# Patient Record
Sex: MALE | Race: WHITE | Employment: OTHER | URBAN - NONMETROPOLITAN AREA
[De-identification: names, ages, dates, MRNs, and addresses within clinical notes are randomized per-mention and may not be internally consistent; named-entity substitution may affect disease eponyms.]

---

## 2017-01-09 ENCOUNTER — ANTI-COAG VISIT (OUTPATIENT)
Dept: OTHER | Age: 72
End: 2017-01-09

## 2017-01-09 VITALS
BODY MASS INDEX: 24.4 KG/M2 | WEIGHT: 161 LBS | DIASTOLIC BLOOD PRESSURE: 66 MMHG | HEART RATE: 73 BPM | SYSTOLIC BLOOD PRESSURE: 114 MMHG

## 2017-01-09 DIAGNOSIS — I26.99 OTHER ACUTE PULMONARY EMBOLISM WITHOUT ACUTE COR PULMONALE (HCC): ICD-10-CM

## 2017-01-09 LAB — POC INR: 1.8 (ref 0.8–1.2)

## 2017-01-09 PROCEDURE — 85610 PROTHROMBIN TIME: CPT | Performed by: NURSE PRACTITIONER

## 2017-01-09 PROCEDURE — 99213 OFFICE O/P EST LOW 20 MIN: CPT | Performed by: NURSE PRACTITIONER

## 2017-01-09 ASSESSMENT — ENCOUNTER SYMPTOMS
DIARRHEA: 0
BLOOD IN STOOL: 0
CONSTIPATION: 0
SHORTNESS OF BREATH: 0

## 2017-01-10 ENCOUNTER — CARE COORDINATION (OUTPATIENT)
Dept: CARE COORDINATION | Age: 72
End: 2017-01-10

## 2017-01-30 ENCOUNTER — ANTI-COAG VISIT (OUTPATIENT)
Dept: OTHER | Age: 72
End: 2017-01-30

## 2017-01-30 VITALS
SYSTOLIC BLOOD PRESSURE: 139 MMHG | BODY MASS INDEX: 24.46 KG/M2 | WEIGHT: 161.4 LBS | HEART RATE: 78 BPM | DIASTOLIC BLOOD PRESSURE: 84 MMHG

## 2017-01-30 DIAGNOSIS — I82.419 DEEP VEIN THROMBOSIS (DVT) OF FEMORAL VEIN, UNSPECIFIED CHRONICITY, UNSPECIFIED LATERALITY (HCC): ICD-10-CM

## 2017-01-30 DIAGNOSIS — G45.9 TRANSIENT CEREBRAL ISCHEMIA, UNSPECIFIED TYPE: ICD-10-CM

## 2017-01-30 DIAGNOSIS — I26.99 OTHER ACUTE PULMONARY EMBOLISM WITHOUT ACUTE COR PULMONALE (HCC): ICD-10-CM

## 2017-01-30 LAB — POC INR: 2.1 (ref 0.8–1.2)

## 2017-01-30 PROCEDURE — 99999 PR OFFICE/OUTPT VISIT,PROCEDURE ONLY: CPT | Performed by: NURSE PRACTITIONER

## 2017-01-30 PROCEDURE — G8427 DOCREV CUR MEDS BY ELIG CLIN: HCPCS | Performed by: NURSE PRACTITIONER

## 2017-01-30 PROCEDURE — 3017F COLORECTAL CA SCREEN DOC REV: CPT | Performed by: NURSE PRACTITIONER

## 2017-01-30 PROCEDURE — 85610 PROTHROMBIN TIME: CPT | Performed by: NURSE PRACTITIONER

## 2017-01-30 PROCEDURE — 4040F PNEUMOC VAC/ADMIN/RCVD: CPT | Performed by: NURSE PRACTITIONER

## 2017-01-30 PROCEDURE — G8420 CALC BMI NORM PARAMETERS: HCPCS | Performed by: NURSE PRACTITIONER

## 2017-01-30 ASSESSMENT — ENCOUNTER SYMPTOMS
DIARRHEA: 0
CONSTIPATION: 0
SHORTNESS OF BREATH: 0
BLOOD IN STOOL: 0

## 2017-02-27 ENCOUNTER — ANTI-COAG VISIT (OUTPATIENT)
Dept: OTHER | Age: 72
End: 2017-02-27

## 2017-02-27 VITALS
BODY MASS INDEX: 24.46 KG/M2 | SYSTOLIC BLOOD PRESSURE: 111 MMHG | DIASTOLIC BLOOD PRESSURE: 67 MMHG | HEART RATE: 70 BPM | WEIGHT: 161.4 LBS

## 2017-02-27 DIAGNOSIS — I26.99 OTHER ACUTE PULMONARY EMBOLISM WITHOUT ACUTE COR PULMONALE (HCC): ICD-10-CM

## 2017-02-27 DIAGNOSIS — G45.9 TRANSIENT CEREBRAL ISCHEMIA, UNSPECIFIED TYPE: ICD-10-CM

## 2017-02-27 DIAGNOSIS — I82.412 DEEP VEIN THROMBOSIS (DVT) OF FEMORAL VEIN OF LEFT LOWER EXTREMITY, UNSPECIFIED CHRONICITY (HCC): ICD-10-CM

## 2017-02-27 LAB
INR BLD: 1.9
POC INR: 1.9 (ref 0.8–1.2)

## 2017-02-27 PROCEDURE — G8420 CALC BMI NORM PARAMETERS: HCPCS | Performed by: NURSE PRACTITIONER

## 2017-02-27 PROCEDURE — 99999 PR OFFICE/OUTPT VISIT,PROCEDURE ONLY: CPT | Performed by: NURSE PRACTITIONER

## 2017-02-27 PROCEDURE — G8427 DOCREV CUR MEDS BY ELIG CLIN: HCPCS | Performed by: NURSE PRACTITIONER

## 2017-02-27 PROCEDURE — 85610 PROTHROMBIN TIME: CPT | Performed by: NURSE PRACTITIONER

## 2017-02-27 PROCEDURE — 4040F PNEUMOC VAC/ADMIN/RCVD: CPT | Performed by: NURSE PRACTITIONER

## 2017-02-27 PROCEDURE — 3017F COLORECTAL CA SCREEN DOC REV: CPT | Performed by: NURSE PRACTITIONER

## 2017-02-27 ASSESSMENT — ENCOUNTER SYMPTOMS
BLOOD IN STOOL: 0
SHORTNESS OF BREATH: 0
CONSTIPATION: 0
DIARRHEA: 0

## 2017-02-28 ENCOUNTER — OFFICE VISIT (OUTPATIENT)
Dept: FAMILY MEDICINE CLINIC | Age: 72
End: 2017-02-28

## 2017-02-28 VITALS
HEIGHT: 68 IN | HEART RATE: 73 BPM | OXYGEN SATURATION: 98 % | DIASTOLIC BLOOD PRESSURE: 60 MMHG | BODY MASS INDEX: 24.25 KG/M2 | SYSTOLIC BLOOD PRESSURE: 100 MMHG | TEMPERATURE: 97.8 F | WEIGHT: 160 LBS | RESPIRATION RATE: 14 BRPM

## 2017-02-28 DIAGNOSIS — M46.1 SI (SACROILIAC) JOINT INFLAMMATION (HCC): Primary | ICD-10-CM

## 2017-02-28 DIAGNOSIS — I10 ESSENTIAL HYPERTENSION: ICD-10-CM

## 2017-02-28 DIAGNOSIS — K21.9 GASTROESOPHAGEAL REFLUX DISEASE WITHOUT ESOPHAGITIS: ICD-10-CM

## 2017-02-28 DIAGNOSIS — R25.2 CRAMPS OF LEFT LOWER EXTREMITY: ICD-10-CM

## 2017-02-28 DIAGNOSIS — I82.412 DEEP VEIN THROMBOSIS (DVT) OF FEMORAL VEIN OF LEFT LOWER EXTREMITY, UNSPECIFIED CHRONICITY (HCC): ICD-10-CM

## 2017-02-28 DIAGNOSIS — R25.2 MUSCLE CRAMPS: ICD-10-CM

## 2017-02-28 PROCEDURE — 3017F COLORECTAL CA SCREEN DOC REV: CPT | Performed by: FAMILY MEDICINE

## 2017-02-28 PROCEDURE — 99214 OFFICE O/P EST MOD 30 MIN: CPT | Performed by: FAMILY MEDICINE

## 2017-02-28 PROCEDURE — G8420 CALC BMI NORM PARAMETERS: HCPCS | Performed by: FAMILY MEDICINE

## 2017-02-28 PROCEDURE — G8427 DOCREV CUR MEDS BY ELIG CLIN: HCPCS | Performed by: FAMILY MEDICINE

## 2017-02-28 PROCEDURE — 1123F ACP DISCUSS/DSCN MKR DOCD: CPT | Performed by: FAMILY MEDICINE

## 2017-02-28 PROCEDURE — 4040F PNEUMOC VAC/ADMIN/RCVD: CPT | Performed by: FAMILY MEDICINE

## 2017-02-28 PROCEDURE — G8484 FLU IMMUNIZE NO ADMIN: HCPCS | Performed by: FAMILY MEDICINE

## 2017-02-28 PROCEDURE — 1036F TOBACCO NON-USER: CPT | Performed by: FAMILY MEDICINE

## 2017-02-28 ASSESSMENT — PATIENT HEALTH QUESTIONNAIRE - PHQ9
SUM OF ALL RESPONSES TO PHQ9 QUESTIONS 1 & 2: 0
SUM OF ALL RESPONSES TO PHQ QUESTIONS 1-9: 0
2. FEELING DOWN, DEPRESSED OR HOPELESS: 0
1. LITTLE INTEREST OR PLEASURE IN DOING THINGS: 0

## 2017-02-28 ASSESSMENT — ENCOUNTER SYMPTOMS
DIARRHEA: 0
BACK PAIN: 1
RESPIRATORY NEGATIVE: 1
VOMITING: 0
CONSTIPATION: 0
COUGH: 0
ALLERGIC/IMMUNOLOGIC NEGATIVE: 1
ABDOMINAL PAIN: 1
NAUSEA: 0

## 2017-03-13 ENCOUNTER — OFFICE VISIT (OUTPATIENT)
Dept: PULMONOLOGY | Age: 72
End: 2017-03-13

## 2017-03-13 VITALS
WEIGHT: 161 LBS | OXYGEN SATURATION: 97 % | BODY MASS INDEX: 24.4 KG/M2 | HEIGHT: 68 IN | TEMPERATURE: 97.2 F | DIASTOLIC BLOOD PRESSURE: 62 MMHG | HEART RATE: 70 BPM | SYSTOLIC BLOOD PRESSURE: 94 MMHG

## 2017-03-13 DIAGNOSIS — R91.1 INCIDENTAL LUNG NODULE, GREATER THAN OR EQUAL TO 8MM: Primary | ICD-10-CM

## 2017-03-13 PROCEDURE — 1036F TOBACCO NON-USER: CPT | Performed by: INTERNAL MEDICINE

## 2017-03-13 PROCEDURE — G8484 FLU IMMUNIZE NO ADMIN: HCPCS | Performed by: INTERNAL MEDICINE

## 2017-03-13 PROCEDURE — G8427 DOCREV CUR MEDS BY ELIG CLIN: HCPCS | Performed by: INTERNAL MEDICINE

## 2017-03-13 PROCEDURE — G8420 CALC BMI NORM PARAMETERS: HCPCS | Performed by: INTERNAL MEDICINE

## 2017-03-13 PROCEDURE — 3017F COLORECTAL CA SCREEN DOC REV: CPT | Performed by: INTERNAL MEDICINE

## 2017-03-13 PROCEDURE — 1123F ACP DISCUSS/DSCN MKR DOCD: CPT | Performed by: INTERNAL MEDICINE

## 2017-03-13 PROCEDURE — 4040F PNEUMOC VAC/ADMIN/RCVD: CPT | Performed by: INTERNAL MEDICINE

## 2017-03-13 PROCEDURE — 99214 OFFICE O/P EST MOD 30 MIN: CPT | Performed by: INTERNAL MEDICINE

## 2017-03-15 DIAGNOSIS — I82.412 DEEP VEIN THROMBOSIS (DVT) OF FEMORAL VEIN OF LEFT LOWER EXTREMITY, UNSPECIFIED CHRONICITY (HCC): Primary | ICD-10-CM

## 2017-03-20 ENCOUNTER — ANTI-COAG VISIT (OUTPATIENT)
Dept: OTHER | Age: 72
End: 2017-03-20

## 2017-03-20 VITALS
HEART RATE: 79 BPM | SYSTOLIC BLOOD PRESSURE: 115 MMHG | BODY MASS INDEX: 23.99 KG/M2 | DIASTOLIC BLOOD PRESSURE: 69 MMHG | WEIGHT: 157.8 LBS

## 2017-03-20 DIAGNOSIS — I82.412 DEEP VEIN THROMBOSIS (DVT) OF FEMORAL VEIN OF LEFT LOWER EXTREMITY, UNSPECIFIED CHRONICITY (HCC): ICD-10-CM

## 2017-03-20 DIAGNOSIS — G45.9 TRANSIENT CEREBRAL ISCHEMIA, UNSPECIFIED TYPE: ICD-10-CM

## 2017-03-20 LAB — POC INR: 1.9 (ref 0.8–1.2)

## 2017-03-20 ASSESSMENT — ENCOUNTER SYMPTOMS
CONSTIPATION: 0
SHORTNESS OF BREATH: 0
DIARRHEA: 0
BLOOD IN STOOL: 0

## 2017-04-11 ENCOUNTER — ANTI-COAG VISIT (OUTPATIENT)
Dept: OTHER | Age: 72
End: 2017-04-11

## 2017-04-11 VITALS
HEART RATE: 66 BPM | SYSTOLIC BLOOD PRESSURE: 108 MMHG | WEIGHT: 156 LBS | BODY MASS INDEX: 23.72 KG/M2 | DIASTOLIC BLOOD PRESSURE: 67 MMHG

## 2017-04-11 DIAGNOSIS — I82.412 DEEP VEIN THROMBOSIS (DVT) OF FEMORAL VEIN OF LEFT LOWER EXTREMITY, UNSPECIFIED CHRONICITY (HCC): ICD-10-CM

## 2017-04-11 DIAGNOSIS — G45.9 TRANSIENT CEREBRAL ISCHEMIA, UNSPECIFIED TYPE: ICD-10-CM

## 2017-04-11 LAB — POC INR: 2.1 (ref 0.8–1.2)

## 2017-04-11 ASSESSMENT — ENCOUNTER SYMPTOMS
SHORTNESS OF BREATH: 0
BLOOD IN STOOL: 0
DIARRHEA: 0
CONSTIPATION: 0

## 2017-05-01 RX ORDER — WARFARIN SODIUM 2.5 MG/1
TABLET ORAL
Qty: 180 TABLET | Refills: 0 | OUTPATIENT
Start: 2017-05-01

## 2017-05-01 RX ORDER — WARFARIN SODIUM 2.5 MG/1
TABLET ORAL
Qty: 180 TABLET | Refills: 3 | Status: SHIPPED | OUTPATIENT
Start: 2017-05-01 | End: 2018-06-05 | Stop reason: SDUPTHER

## 2017-05-01 RX ORDER — WARFARIN SODIUM 2.5 MG/1
TABLET ORAL
Qty: 180 TABLET | Refills: 3 | OUTPATIENT
Start: 2017-05-01

## 2017-05-09 ENCOUNTER — ANTI-COAG VISIT (OUTPATIENT)
Dept: OTHER | Age: 72
End: 2017-05-09

## 2017-05-09 VITALS
WEIGHT: 156.8 LBS | DIASTOLIC BLOOD PRESSURE: 64 MMHG | HEART RATE: 74 BPM | BODY MASS INDEX: 23.84 KG/M2 | SYSTOLIC BLOOD PRESSURE: 100 MMHG

## 2017-05-09 DIAGNOSIS — G45.9 TRANSIENT CEREBRAL ISCHEMIA, UNSPECIFIED TYPE: ICD-10-CM

## 2017-05-09 DIAGNOSIS — I82.412 DEEP VEIN THROMBOSIS (DVT) OF FEMORAL VEIN OF LEFT LOWER EXTREMITY, UNSPECIFIED CHRONICITY (HCC): ICD-10-CM

## 2017-05-09 LAB — POC INR: 2.5 (ref 0.8–1.2)

## 2017-05-09 RX ORDER — CHLORHEXIDINE GLUCONATE 0.12 MG/ML
RINSE ORAL
Refills: 0 | COMMUNITY
Start: 2017-04-11 | End: 2017-08-29 | Stop reason: ALTCHOICE

## 2017-05-09 ASSESSMENT — ENCOUNTER SYMPTOMS
DIARRHEA: 0
SHORTNESS OF BREATH: 0
BLOOD IN STOOL: 0
CONSTIPATION: 0

## 2017-06-06 ENCOUNTER — ANTI-COAG VISIT (OUTPATIENT)
Dept: OTHER | Age: 72
End: 2017-06-06

## 2017-06-06 VITALS
HEART RATE: 65 BPM | SYSTOLIC BLOOD PRESSURE: 128 MMHG | DIASTOLIC BLOOD PRESSURE: 67 MMHG | BODY MASS INDEX: 23.87 KG/M2 | WEIGHT: 157 LBS

## 2017-06-06 DIAGNOSIS — I82.412 DEEP VEIN THROMBOSIS (DVT) OF FEMORAL VEIN OF LEFT LOWER EXTREMITY, UNSPECIFIED CHRONICITY (HCC): ICD-10-CM

## 2017-06-06 DIAGNOSIS — G45.9 TRANSIENT CEREBRAL ISCHEMIA, UNSPECIFIED TYPE: ICD-10-CM

## 2017-06-06 LAB
HCT VFR BLD CALC: 41.7 % (ref 42–52)
HEMOGLOBIN: 14 GM/DL (ref 14–18)
POC INR: 2.6 (ref 0.8–1.2)

## 2017-06-06 ASSESSMENT — ENCOUNTER SYMPTOMS
DIARRHEA: 0
CONSTIPATION: 0
BLOOD IN STOOL: 0
SHORTNESS OF BREATH: 0

## 2017-06-20 ENCOUNTER — OFFICE VISIT (OUTPATIENT)
Dept: PULMONOLOGY | Age: 72
End: 2017-06-20

## 2017-06-20 VITALS
OXYGEN SATURATION: 94 % | BODY MASS INDEX: 23.82 KG/M2 | SYSTOLIC BLOOD PRESSURE: 107 MMHG | DIASTOLIC BLOOD PRESSURE: 63 MMHG | HEART RATE: 64 BPM | HEIGHT: 68 IN | WEIGHT: 157.2 LBS | TEMPERATURE: 96.7 F

## 2017-06-20 DIAGNOSIS — R91.1 INCIDENTAL LUNG NODULE, GREATER THAN OR EQUAL TO 8MM: Primary | ICD-10-CM

## 2017-06-20 DIAGNOSIS — R93.89 ABNORMAL CT SCAN, CHEST: ICD-10-CM

## 2017-06-20 PROCEDURE — 4040F PNEUMOC VAC/ADMIN/RCVD: CPT | Performed by: INTERNAL MEDICINE

## 2017-06-20 PROCEDURE — G8427 DOCREV CUR MEDS BY ELIG CLIN: HCPCS | Performed by: INTERNAL MEDICINE

## 2017-06-20 PROCEDURE — 99214 OFFICE O/P EST MOD 30 MIN: CPT | Performed by: INTERNAL MEDICINE

## 2017-06-20 PROCEDURE — 3017F COLORECTAL CA SCREEN DOC REV: CPT | Performed by: INTERNAL MEDICINE

## 2017-06-20 PROCEDURE — 1123F ACP DISCUSS/DSCN MKR DOCD: CPT | Performed by: INTERNAL MEDICINE

## 2017-06-20 PROCEDURE — G8420 CALC BMI NORM PARAMETERS: HCPCS | Performed by: INTERNAL MEDICINE

## 2017-06-20 PROCEDURE — 1036F TOBACCO NON-USER: CPT | Performed by: INTERNAL MEDICINE

## 2017-07-05 ENCOUNTER — ANTI-COAG VISIT (OUTPATIENT)
Dept: OTHER | Age: 72
End: 2017-07-05

## 2017-07-05 VITALS
DIASTOLIC BLOOD PRESSURE: 74 MMHG | SYSTOLIC BLOOD PRESSURE: 133 MMHG | WEIGHT: 156.4 LBS | HEART RATE: 89 BPM | BODY MASS INDEX: 23.78 KG/M2

## 2017-07-05 DIAGNOSIS — G45.9 TRANSIENT CEREBRAL ISCHEMIA, UNSPECIFIED TYPE: ICD-10-CM

## 2017-07-05 DIAGNOSIS — I82.412 DEEP VEIN THROMBOSIS (DVT) OF FEMORAL VEIN OF LEFT LOWER EXTREMITY, UNSPECIFIED CHRONICITY (HCC): ICD-10-CM

## 2017-07-05 LAB — POC INR: 3.1 (ref 0.8–1.2)

## 2017-07-05 ASSESSMENT — ENCOUNTER SYMPTOMS
CONSTIPATION: 0
SHORTNESS OF BREATH: 0
DIARRHEA: 0
BLOOD IN STOOL: 0

## 2017-07-25 ENCOUNTER — TELEPHONE (OUTPATIENT)
Dept: FAMILY MEDICINE CLINIC | Age: 72
End: 2017-07-25

## 2017-07-25 DIAGNOSIS — F43.9 SITUATIONAL STRESS: Primary | ICD-10-CM

## 2017-07-25 RX ORDER — LORAZEPAM 0.5 MG/1
0.5 TABLET ORAL EVERY 6 HOURS PRN
Qty: 30 TABLET | Refills: 1 | Status: SHIPPED | OUTPATIENT
Start: 2017-07-25 | End: 2017-08-24

## 2017-07-26 ENCOUNTER — HOSPITAL ENCOUNTER (OUTPATIENT)
Dept: PHARMACY | Age: 72
Setting detail: THERAPIES SERIES
Discharge: HOME | End: 2017-07-26
Payer: MEDICARE

## 2017-07-26 VITALS
SYSTOLIC BLOOD PRESSURE: 106 MMHG | HEART RATE: 70 BPM | DIASTOLIC BLOOD PRESSURE: 64 MMHG | WEIGHT: 151 LBS | BODY MASS INDEX: 22.96 KG/M2

## 2017-07-26 DIAGNOSIS — G45.9 TRANSIENT CEREBRAL ISCHEMIA, UNSPECIFIED TYPE: ICD-10-CM

## 2017-07-26 DIAGNOSIS — I82.412 DEEP VEIN THROMBOSIS (DVT) OF FEMORAL VEIN OF LEFT LOWER EXTREMITY, UNSPECIFIED CHRONICITY (HCC): ICD-10-CM

## 2017-07-26 LAB — POC INR: 1.6 (ref 0.8–1.2)

## 2017-07-26 PROCEDURE — 99211 OFF/OP EST MAY X REQ PHY/QHP: CPT | Performed by: PHARMACIST

## 2017-07-26 PROCEDURE — 85610 PROTHROMBIN TIME: CPT | Performed by: PHARMACIST

## 2017-07-26 PROCEDURE — 36416 COLLJ CAPILLARY BLOOD SPEC: CPT | Performed by: PHARMACIST

## 2017-07-26 ASSESSMENT — ENCOUNTER SYMPTOMS
DIARRHEA: 0
CONSTIPATION: 0
SHORTNESS OF BREATH: 0
BLOOD IN STOOL: 0

## 2017-08-16 ENCOUNTER — HOSPITAL ENCOUNTER (OUTPATIENT)
Dept: PHARMACY | Age: 72
Setting detail: THERAPIES SERIES
Discharge: HOME | End: 2017-08-16
Payer: MEDICARE

## 2017-08-16 VITALS
HEART RATE: 72 BPM | SYSTOLIC BLOOD PRESSURE: 126 MMHG | DIASTOLIC BLOOD PRESSURE: 74 MMHG | BODY MASS INDEX: 23.29 KG/M2 | WEIGHT: 153.2 LBS

## 2017-08-16 DIAGNOSIS — I82.412 DEEP VEIN THROMBOSIS (DVT) OF FEMORAL VEIN OF LEFT LOWER EXTREMITY, UNSPECIFIED CHRONICITY (HCC): ICD-10-CM

## 2017-08-16 DIAGNOSIS — G45.9 TRANSIENT CEREBRAL ISCHEMIA, UNSPECIFIED TYPE: ICD-10-CM

## 2017-08-16 LAB — POC INR: 2.1 (ref 0.8–1.2)

## 2017-08-16 PROCEDURE — 36416 COLLJ CAPILLARY BLOOD SPEC: CPT

## 2017-08-16 PROCEDURE — 85610 PROTHROMBIN TIME: CPT

## 2017-08-16 PROCEDURE — 99211 OFF/OP EST MAY X REQ PHY/QHP: CPT

## 2017-08-16 ASSESSMENT — ENCOUNTER SYMPTOMS
SHORTNESS OF BREATH: 0
BLOOD IN STOOL: 0
CONSTIPATION: 0
DIARRHEA: 0

## 2017-08-29 ENCOUNTER — OFFICE VISIT (OUTPATIENT)
Dept: FAMILY MEDICINE CLINIC | Age: 72
End: 2017-08-29
Payer: MEDICARE

## 2017-08-29 VITALS
BODY MASS INDEX: 23.31 KG/M2 | RESPIRATION RATE: 16 BRPM | HEART RATE: 68 BPM | DIASTOLIC BLOOD PRESSURE: 80 MMHG | HEIGHT: 68 IN | SYSTOLIC BLOOD PRESSURE: 130 MMHG | WEIGHT: 153.8 LBS

## 2017-08-29 DIAGNOSIS — M48.061 LUMBAR SPINAL STENOSIS: ICD-10-CM

## 2017-08-29 DIAGNOSIS — I10 ESSENTIAL HYPERTENSION: Primary | ICD-10-CM

## 2017-08-29 DIAGNOSIS — K21.9 GASTROESOPHAGEAL REFLUX DISEASE WITHOUT ESOPHAGITIS: ICD-10-CM

## 2017-08-29 DIAGNOSIS — N18.30 CKD (CHRONIC KIDNEY DISEASE) STAGE 3, GFR 30-59 ML/MIN (HCC): ICD-10-CM

## 2017-08-29 DIAGNOSIS — M54.50 CHRONIC MIDLINE LOW BACK PAIN WITHOUT SCIATICA: ICD-10-CM

## 2017-08-29 DIAGNOSIS — G89.29 CHRONIC MIDLINE LOW BACK PAIN WITHOUT SCIATICA: ICD-10-CM

## 2017-08-29 DIAGNOSIS — Z51.81 MEDICATION MONITORING ENCOUNTER: ICD-10-CM

## 2017-08-29 DIAGNOSIS — G45.9 TRANSIENT CEREBRAL ISCHEMIA, UNSPECIFIED TYPE: ICD-10-CM

## 2017-08-29 DIAGNOSIS — G47.62 NOCTURNAL LEG CRAMPS: ICD-10-CM

## 2017-08-29 PROCEDURE — 99214 OFFICE O/P EST MOD 30 MIN: CPT | Performed by: FAMILY MEDICINE

## 2017-08-29 PROCEDURE — 1123F ACP DISCUSS/DSCN MKR DOCD: CPT | Performed by: FAMILY MEDICINE

## 2017-08-29 PROCEDURE — 3017F COLORECTAL CA SCREEN DOC REV: CPT | Performed by: FAMILY MEDICINE

## 2017-08-29 PROCEDURE — 4040F PNEUMOC VAC/ADMIN/RCVD: CPT | Performed by: FAMILY MEDICINE

## 2017-08-29 PROCEDURE — G8427 DOCREV CUR MEDS BY ELIG CLIN: HCPCS | Performed by: FAMILY MEDICINE

## 2017-08-29 PROCEDURE — G8420 CALC BMI NORM PARAMETERS: HCPCS | Performed by: FAMILY MEDICINE

## 2017-08-29 PROCEDURE — 1036F TOBACCO NON-USER: CPT | Performed by: FAMILY MEDICINE

## 2017-08-29 ASSESSMENT — ENCOUNTER SYMPTOMS
DIARRHEA: 0
COUGH: 0
CONSTIPATION: 0
ALLERGIC/IMMUNOLOGIC NEGATIVE: 1
RESPIRATORY NEGATIVE: 1
ABDOMINAL PAIN: 1
BACK PAIN: 1
NAUSEA: 0

## 2017-09-13 ENCOUNTER — HOSPITAL ENCOUNTER (OUTPATIENT)
Dept: PHARMACY | Age: 72
Setting detail: THERAPIES SERIES
Discharge: HOME | End: 2017-09-13
Payer: MEDICARE

## 2017-09-13 VITALS
BODY MASS INDEX: 23.39 KG/M2 | DIASTOLIC BLOOD PRESSURE: 75 MMHG | WEIGHT: 153.8 LBS | HEART RATE: 69 BPM | SYSTOLIC BLOOD PRESSURE: 125 MMHG

## 2017-09-13 DIAGNOSIS — G45.9 TRANSIENT CEREBRAL ISCHEMIA, UNSPECIFIED TYPE: ICD-10-CM

## 2017-09-13 DIAGNOSIS — I82.412 DEEP VEIN THROMBOSIS (DVT) OF FEMORAL VEIN OF LEFT LOWER EXTREMITY, UNSPECIFIED CHRONICITY (HCC): ICD-10-CM

## 2017-09-13 LAB — POC INR: 2 (ref 0.8–1.2)

## 2017-09-13 PROCEDURE — 36416 COLLJ CAPILLARY BLOOD SPEC: CPT

## 2017-09-13 PROCEDURE — 85610 PROTHROMBIN TIME: CPT

## 2017-09-13 PROCEDURE — 99211 OFF/OP EST MAY X REQ PHY/QHP: CPT

## 2017-09-13 ASSESSMENT — ENCOUNTER SYMPTOMS
DIARRHEA: 0
CONSTIPATION: 0
BLOOD IN STOOL: 0
SHORTNESS OF BREATH: 0

## 2017-09-27 ENCOUNTER — TELEPHONE (OUTPATIENT)
Dept: PHARMACY | Age: 72
End: 2017-09-27

## 2017-10-10 NOTE — H&P
[address] [address]  History and Physical Update    Pt Name: Burgess Anderson  MRN: [id number]  YOB: 1945  Date of evaluation: 10/10/2017    I have examined the patient and reviewed the H&P/Consult and there are no changes to the patient or plans.       Teresa De Santiago  Electronically signed 10/10/2017 at 5:44 PM

## 2017-10-11 ENCOUNTER — HOSPITAL ENCOUNTER (OUTPATIENT)
Age: 72
Setting detail: OUTPATIENT SURGERY
Discharge: HOME | End: 2017-10-11
Attending: SPECIALIST | Admitting: SPECIALIST
Payer: MEDICARE

## 2017-10-11 ENCOUNTER — HOSPITAL ENCOUNTER (OUTPATIENT)
Dept: PHARMACY | Age: 72
Setting detail: THERAPIES SERIES
Discharge: HOME | End: 2017-10-11
Payer: MEDICARE

## 2017-10-11 VITALS
WEIGHT: 151.8 LBS | OXYGEN SATURATION: 100 % | DIASTOLIC BLOOD PRESSURE: 66 MMHG | SYSTOLIC BLOOD PRESSURE: 140 MMHG | HEART RATE: 85 BPM | TEMPERATURE: 98.3 F | HEIGHT: 68 IN | RESPIRATION RATE: 15 BRPM | BODY MASS INDEX: 23.01 KG/M2

## 2017-10-11 VITALS
BODY MASS INDEX: 22.93 KG/M2 | WEIGHT: 150.8 LBS | SYSTOLIC BLOOD PRESSURE: 131 MMHG | HEART RATE: 67 BPM | DIASTOLIC BLOOD PRESSURE: 63 MMHG

## 2017-10-11 DIAGNOSIS — G45.9 TRANSIENT CEREBRAL ISCHEMIA, UNSPECIFIED TYPE: ICD-10-CM

## 2017-10-11 DIAGNOSIS — I82.412 DEEP VEIN THROMBOSIS (DVT) OF FEMORAL VEIN OF LEFT LOWER EXTREMITY, UNSPECIFIED CHRONICITY (HCC): ICD-10-CM

## 2017-10-11 LAB — POC INR: 2.4 (ref 0.8–1.2)

## 2017-10-11 PROCEDURE — 85610 PROTHROMBIN TIME: CPT

## 2017-10-11 PROCEDURE — 7100000010 HC PHASE II RECOVERY - FIRST 15 MIN: Performed by: SPECIALIST

## 2017-10-11 PROCEDURE — 6370000000 HC RX 637 (ALT 250 FOR IP): Performed by: SPECIALIST

## 2017-10-11 PROCEDURE — A6446 CONFORM BAND S W>=3" <5"/YD: HCPCS | Performed by: SPECIALIST

## 2017-10-11 PROCEDURE — 36416 COLLJ CAPILLARY BLOOD SPEC: CPT

## 2017-10-11 PROCEDURE — 7100000011 HC PHASE II RECOVERY - ADDTL 15 MIN: Performed by: SPECIALIST

## 2017-10-11 PROCEDURE — 88331 PATH CONSLTJ SURG 1 BLK 1SPC: CPT

## 2017-10-11 PROCEDURE — 99211 OFF/OP EST MAY X REQ PHY/QHP: CPT

## 2017-10-11 PROCEDURE — 3600000002 HC SURGERY LEVEL 2 BASE: Performed by: SPECIALIST

## 2017-10-11 PROCEDURE — A6223 GAUZE >16<=48 NO W/SAL W/O B: HCPCS | Performed by: SPECIALIST

## 2017-10-11 PROCEDURE — 3600000012 HC SURGERY LEVEL 2 ADDTL 15MIN: Performed by: SPECIALIST

## 2017-10-11 PROCEDURE — 2500000003 HC RX 250 WO HCPCS: Performed by: SPECIALIST

## 2017-10-11 PROCEDURE — 88305 TISSUE EXAM BY PATHOLOGIST: CPT

## 2017-10-11 RX ORDER — LIDOCAINE HYDROCHLORIDE AND EPINEPHRINE 10; 10 MG/ML; UG/ML
INJECTION, SOLUTION INFILTRATION; PERINEURAL PRN
Status: DISCONTINUED | OUTPATIENT
Start: 2017-10-11 | End: 2017-10-11 | Stop reason: HOSPADM

## 2017-10-11 RX ORDER — GINSENG 100 MG
CAPSULE ORAL PRN
Status: DISCONTINUED | OUTPATIENT
Start: 2017-10-11 | End: 2017-10-11 | Stop reason: HOSPADM

## 2017-10-11 ASSESSMENT — ENCOUNTER SYMPTOMS
BLOOD IN STOOL: 0
CONSTIPATION: 0
SHORTNESS OF BREATH: 0
DIARRHEA: 0

## 2017-10-11 ASSESSMENT — PAIN - FUNCTIONAL ASSESSMENT: PAIN_FUNCTIONAL_ASSESSMENT: 0-10

## 2017-10-11 ASSESSMENT — PAIN SCALES - GENERAL: PAINLEVEL_OUTOF10: 2

## 2017-10-11 NOTE — OP NOTE
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 Surgeons/Assistants: Dr. Clau Arboleda     Estimated Blood Loss: 5ml     Complications: none immediately appreciated    Procedure: With the patient lying in the supine position after having anesthetized the area with a total of 23 ml of 1% Lidocaine 1:100,000 with epinephrine solution, the area was then prepped  draped in the standard surgical fashion. The skin cancer was excised and sent for fresh frozen evaluation. Pathology called back to the room and stated indeed the margin were clear. This left a very sizeable defect, which could not be closed primarily. Therefore, a rhomboid flap was then designed, elevated and inset with 4-0 Monocryl suture placed in interrupted buried fashion. The Burrow's triangles were resected to prevent dog-ear deformity and final closure was completed using skin staples, bacitracin, xeroform and bulky sterile dressings. The patient tolerated the procedure quite well and remained hemodynamically stable throughout the procedure and was quite comfortable throughout the operative course.     Clinical staging for cancer cases:  Ct  Hosea Arboleda  Electronically signed by me on 10/11/2017 at 1:10 PM

## 2017-11-08 ENCOUNTER — HOSPITAL ENCOUNTER (OUTPATIENT)
Dept: PHARMACY | Age: 72
Setting detail: THERAPIES SERIES
Discharge: HOME | End: 2017-11-08
Payer: MEDICARE

## 2017-11-08 VITALS
BODY MASS INDEX: 23.26 KG/M2 | HEART RATE: 67 BPM | DIASTOLIC BLOOD PRESSURE: 75 MMHG | SYSTOLIC BLOOD PRESSURE: 126 MMHG | WEIGHT: 153 LBS

## 2017-11-08 DIAGNOSIS — I82.412 DEEP VEIN THROMBOSIS (DVT) OF FEMORAL VEIN OF LEFT LOWER EXTREMITY, UNSPECIFIED CHRONICITY (HCC): ICD-10-CM

## 2017-11-08 DIAGNOSIS — G45.9 TRANSIENT CEREBRAL ISCHEMIA, UNSPECIFIED TYPE: ICD-10-CM

## 2017-11-08 LAB — POC INR: 2.2 (ref 0.8–1.2)

## 2017-11-08 PROCEDURE — 36416 COLLJ CAPILLARY BLOOD SPEC: CPT | Performed by: PHARMACIST

## 2017-11-08 PROCEDURE — 99211 OFF/OP EST MAY X REQ PHY/QHP: CPT | Performed by: PHARMACIST

## 2017-11-08 PROCEDURE — 85610 PROTHROMBIN TIME: CPT | Performed by: PHARMACIST

## 2017-12-13 ENCOUNTER — HOSPITAL ENCOUNTER (OUTPATIENT)
Dept: PHARMACY | Age: 72
Setting detail: THERAPIES SERIES
Discharge: HOME | End: 2017-12-13
Payer: MEDICARE

## 2017-12-13 LAB — POC INR: 2 (ref 0.8–1.2)

## 2017-12-13 PROCEDURE — 99211 OFF/OP EST MAY X REQ PHY/QHP: CPT

## 2017-12-13 PROCEDURE — 85610 PROTHROMBIN TIME: CPT

## 2017-12-13 PROCEDURE — 36416 COLLJ CAPILLARY BLOOD SPEC: CPT

## 2017-12-13 NOTE — PROGRESS NOTES
[facility]  Anticoagulation Clinic  [phone] (phone)           [phone] (fax)    Visit Date: 12/13/2017     Subjective:       Patient ID: Heraclio Moe, 74 y.o., male    HPI  Patient seen in clinic for anticoagulation management due to Hx of PE, DVT, TIA with a goal INR of 2.0-3.0. Patient last seen 5 week(s) ago. INR ordered and reviewed today. Patient denies any new Rx medications. Patient denies any OTC medications or products. Patient denies any missed doses. Patient denies change in diet. Patient denies change in tobacco/alcohol use. Patient denies upcoming surgeries. Patient states he has some discoloration/swelling in his leg where his clot had been in the past. Denies any pain/problems walking or redness. Advised if it worsens he should contact his PCP. Review of Systems   Constitutional: Negative for activity change, appetite change and fatigue. HENT: Negative for nosebleeds. Respiratory: Negative for shortness of breath. Cardiovascular: Negative for chest pain and leg swelling. Gastrointestinal: Negative for blood in stool, constipation and diarrhea. Genitourinary: Negative for hematuria. Neurological: Negative for weakness, light-headedness and headaches. Hematological: Does not bruise/bleed easily.        Objective:   Physical Exam   There were no vitals filed for this visit. Physical Exam    Assessment:      Lab Results   Component Value Date    INR 2.00 (H) 12/13/2017    INR 2.20 (H) 11/08/2017    INR 2.40 (H) 10/11/2017     INR therapeutic   Recent Labs      12/13/17   1115   INR  2.00*                               Plan:      Continue Coumadin 2.5 mg W, 5 mg MTThFSS. Recheck INR 5 weeks. Patient reminded to call the Anticoagulation Clinic with any signs or symptoms of bleeding or with any medication changes. Patient given instructions utilizing the teach back method.     Discharged ambulatory in no apparent distress.     Wilson Lloyd, PharmD  12/13/2017 11:28 AM

## 2017-12-18 ENCOUNTER — HOSPITAL ENCOUNTER (OUTPATIENT)
Age: 72
Discharge: HOME | End: 2017-12-18

## 2017-12-18 ENCOUNTER — HOSPITAL ENCOUNTER (OUTPATIENT)
Dept: CT IMAGING | Age: 72
Discharge: HOME | End: 2017-12-18
Payer: MEDICARE

## 2017-12-18 DIAGNOSIS — R91.1 INCIDENTAL LUNG NODULE, GREATER THAN OR EQUAL TO 8MM: ICD-10-CM

## 2017-12-18 DIAGNOSIS — I82.412 DEEP VEIN THROMBOSIS (DVT) OF FEMORAL VEIN OF LEFT LOWER EXTREMITY, UNSPECIFIED CHRONICITY (HCC): Primary | ICD-10-CM

## 2017-12-18 DIAGNOSIS — I82.412 DEEP VEIN THROMBOSIS (DVT) OF FEMORAL VEIN OF LEFT LOWER EXTREMITY, UNSPECIFIED CHRONICITY (HCC): ICD-10-CM

## 2017-12-18 LAB
HCT VFR BLD CALC: 42.3 % (ref 42–52)
HEMOGLOBIN: 14.2 GM/DL (ref 14–18)

## 2017-12-18 PROCEDURE — 85018 HEMOGLOBIN: CPT

## 2017-12-18 PROCEDURE — 36415 COLL VENOUS BLD VENIPUNCTURE: CPT

## 2017-12-18 PROCEDURE — 85014 HEMATOCRIT: CPT

## 2017-12-18 PROCEDURE — 71250 CT THORAX DX C-: CPT

## 2017-12-19 ENCOUNTER — OFFICE VISIT (OUTPATIENT)
Dept: PULMONOLOGY | Age: 72
End: 2017-12-19
Payer: MEDICARE

## 2017-12-19 VITALS
BODY MASS INDEX: 23.61 KG/M2 | WEIGHT: 155.8 LBS | HEART RATE: 80 BPM | HEIGHT: 68 IN | TEMPERATURE: 98.3 F | OXYGEN SATURATION: 97 % | SYSTOLIC BLOOD PRESSURE: 113 MMHG | DIASTOLIC BLOOD PRESSURE: 66 MMHG | RESPIRATION RATE: 16 BRPM

## 2017-12-19 DIAGNOSIS — R93.89 ABNORMAL CT OF THE CHEST: Primary | ICD-10-CM

## 2017-12-19 DIAGNOSIS — R91.1 INCIDENTAL LUNG NODULE, GREATER THAN OR EQUAL TO 8MM: ICD-10-CM

## 2017-12-19 PROCEDURE — 99214 OFFICE O/P EST MOD 30 MIN: CPT | Performed by: INTERNAL MEDICINE

## 2017-12-19 PROCEDURE — 1123F ACP DISCUSS/DSCN MKR DOCD: CPT | Performed by: INTERNAL MEDICINE

## 2017-12-19 PROCEDURE — G8427 DOCREV CUR MEDS BY ELIG CLIN: HCPCS | Performed by: INTERNAL MEDICINE

## 2017-12-19 PROCEDURE — 3017F COLORECTAL CA SCREEN DOC REV: CPT | Performed by: INTERNAL MEDICINE

## 2017-12-19 PROCEDURE — G8420 CALC BMI NORM PARAMETERS: HCPCS | Performed by: INTERNAL MEDICINE

## 2017-12-19 PROCEDURE — 4040F PNEUMOC VAC/ADMIN/RCVD: CPT | Performed by: INTERNAL MEDICINE

## 2017-12-19 PROCEDURE — G8484 FLU IMMUNIZE NO ADMIN: HCPCS | Performed by: INTERNAL MEDICINE

## 2017-12-19 PROCEDURE — 1036F TOBACCO NON-USER: CPT | Performed by: INTERNAL MEDICINE

## 2017-12-19 NOTE — PROGRESS NOTES
[facility]                                                 Pulmonary medicine clinic follow up note:      Neck Circumference -  15.5\"   Mallampati - 4    Lung Nodule Screening     [] Qualifies    [x] Does not qualify   [] Declined    [] Completed    HPI: The patient is a 74 y.o. male came for follow up regarding his left  lower lobe lung nodule with a repeat CT chest with out IV contrast. He had a hx of  left leg DVT 05/2015 and PE in 05/2016. His hypercoagulable work up was found to be negative in the past. He is on anticoagulation with coumadin indefinitely. He follows with [facility]. He currently takes no inhalers, he denies cough or shortness of breath. He states that he has had more stress lately. He denies cough. No recent hospitalizations. He is s/p CT guided biopsy of right lower lobe lung cavity leision on 5/19/15. He has a hx of HTN, GERD, RAMAN, TIA, chronic low back pain who recently admitted with left leg pain x 2 weeks. He underwent CTA of chest along with bilateral duplex scan of legs. He was found to have a 3 cm cavitary lesion right lower lobe a 16 mm lesion left lower lobe most concerning for neoplasm. Pulmonary medicine was called for further evaluation of lung mass. Review of Systems   General/Constitutional: He lost 2lbs of weight from the last visit with no appetite changes. No fever or chills. HENT: Negative. Eyes: Negative. Upper respiratory tract: No nasal stuffiness or post nasal drip. Lower respiratory tract/ lungs: No cough with no sputum production. No hemoptysis. Cardiovascular: No palpitations or chest pain. Gastrointestinal: No nausea or vomiting. Neurological: No focal neurologiacal weakness. Extremities: No edema. Musculoskeletal: No complaints. Genitourinary: No complaints. Hematological: Negative. Psychiatric/Behavioral: Negative. Skin: No itching.     Past Medical Physical Exam   Constitutional: No distress, mod build well kept  Head: Normocephalic and atraumatic. Mouth/Throat: Oropharynx is clear and moist. No oral thrush. Eyes: Conjunctivae are normal. No scleral icterus. Pupils are reactive to light. Neck: Neck supple. No tracheal deviation present. Cardiovascular: Normal rate, regular rhythm, S1 and S2 with no murmur. No peripheral edema or tenderness of left lower leg. Pulmonary/Chest: Normal effort with clear breath sounds. Patient exhibits no tenderness. Abdominal: Soft. Bowel sounds audible. No distension or tenderness to palp. Musculoskeletal: Moves all extremities  Neurological: Alert and follows commands  Skin: Skin is warm and dry. Diagnostic Data:      VL LOWER EXTREMITY BILATERAL VENOUS DUPLEX May 18, 2015  IMPRESSION: 1. There is no deep venous thrombosis within the right lower extremity. 2. There is acute occluding thrombus within the left common femoral, profunda femoral, femoral, and popliteal veins. There is also acute occluding thrombus within the left gastrocnemius, posterior tibial, and peroneal veins. 3. Jos Batista was notified by Marissa Dela Cruz at the time of imaging 5/18/2015 at 1649 hrs. Echo:   Study date: 19-May-2015  SUMMARY:  Left ventricle:  Size was normal.  Systolic function was normal. Ejection fraction was estimated in the range   of 55 % to 65 %. There were no regional wall motion abnormalities. Right ventricle: The size was normal.  Systolic function was normal.    Pulmonary arteries:  Estimated peak pressure was at least 30 mmHg. Connective tissue work up results:  ETHAN( Antinuclear antibody): Negative  RA ( Rheumatoid factor): Negative  ACE( Angiotensin converting enzyme level): <5 ( normal)  ESR ( Sed rate):28 ( Elevated)    Fungal antibodies: Negative    PSA: 6.68 ( Elevated)    Chest Xray done on :  May 21, 2015  IMPRESSION: 1. No pneumothorax is seen on the current examination.  Stable chest Range & Units Status   Anticardiolipin IgG 3 0 - 14 GPL  Cardiolipin Ab IgM 0 0 - 12 MPL Final   INTERPRETIVE INFORMATION: Anti-Cardiolipin IgM. CT of chest:  Mar 6, 2017  PROCEDURE: CT CHEST WO CONTRAST   COMPARISON: CTA chest 5/17/2016  1. No acute intrathoracic findings. 2. Nodule at the left lung base of indeterminate etiology. Followup CT of the chest in 3 months is recommended. CT chest:  Jun 13, 2017  PROCEDURE: CT CHEST WO CONTRAST   CLINICAL INFORMATION: Incidental lung nodule, greater than or equal to 8mm   COMPARISON: 3/6/2017    PROCEDURE: CT CHEST WO CONTRAST  1. Mild fibrotic scarring right lung base. 2. Persistent irregular nodule along left hemidiaphragm posterolaterally, most suggestive of fibrotic scarring although this was not present on the prior study from 2/8/2016. 3. Further evaluation is warranted either with repeat CT thorax in 6 months or a PET CT scan at this time. CT chest:  Dec 18, 2017  PROCEDURE: CT CHEST WO CONTRAST  1. The nodular density left lung base is decreased in size compared the prior examination now measuring 8.4 mm in greatest dimension. This likely represents a developing scar. 1 year follow-up is recommended. 2. Stable CT thorax otherwise. Assessment:  -Left lower lobe Nodule at the left lung base used to measure 1.1cm in the past. Now measuring 8.4mm in size. It was found initially on CT chest dated Mar 6, 2017- >? Scar Need follow up.   -Hx of 3 cm cavitary lesion right lower lobe a 16 mm lesion left lower lobe initially found on a CT chest dated 5/18/2015. The lesion is stable and  measuring 1.1cm in size. Differential includes scar Vs other etiologies. His PET scan is negative ( 6/10/2015)- stable.  -Left lower lobe pulmonary thromboembolism found on CTA 5/17/16. He had a hx of DVT in the past. He is on anticoagulation with coumadin indefinitely. He follows with coumadin clinic at [facility]. -Hx of Borderline serology for Coccidioidomycosis ?

## 2018-01-04 RX ORDER — BENAZEPRIL HYDROCHLORIDE 40 MG/1
40 TABLET, FILM COATED ORAL DAILY
Qty: 90 TABLET | Refills: 3 | Status: SHIPPED | OUTPATIENT
Start: 2018-01-04 | End: 2018-02-28 | Stop reason: DRUGHIGH

## 2018-01-04 NOTE — TELEPHONE ENCOUNTER
From: Bao Moe  Sent: 1/4/2018 11:54 AM EST  Subject: Medication Renewal Request    Belkis Cuevas would like a refill of the following medications:  benazepril (LOTENSIN) 40 MG tablet Emma Kohler MD]    Preferred pharmacy: [facility] [address] [place] , [place] - [address] [place] [address]. - F [phone]    Comment:

## 2018-01-17 ENCOUNTER — HOSPITAL ENCOUNTER (OUTPATIENT)
Dept: PHARMACY | Age: 73
Setting detail: THERAPIES SERIES
Discharge: HOME | End: 2018-01-17
Payer: MEDICARE

## 2018-01-17 LAB — POC INR: 2.4 (ref 0.8–1.2)

## 2018-01-17 PROCEDURE — 36416 COLLJ CAPILLARY BLOOD SPEC: CPT

## 2018-01-17 PROCEDURE — 85610 PROTHROMBIN TIME: CPT

## 2018-01-17 PROCEDURE — 99211 OFF/OP EST MAY X REQ PHY/QHP: CPT

## 2018-01-31 ENCOUNTER — TELEPHONE (OUTPATIENT)
Dept: PHARMACY | Age: 73
End: 2018-01-31

## 2018-02-21 ENCOUNTER — HOSPITAL ENCOUNTER (OUTPATIENT)
Dept: PHARMACY | Age: 73
Setting detail: THERAPIES SERIES
Discharge: HOME | End: 2018-02-21
Payer: MEDICARE

## 2018-02-21 VITALS — WEIGHT: 160.2 LBS | BODY MASS INDEX: 24.36 KG/M2

## 2018-02-21 DIAGNOSIS — G45.9 TRANSIENT CEREBRAL ISCHEMIA, UNSPECIFIED TYPE: ICD-10-CM

## 2018-02-21 DIAGNOSIS — I82.412 DEEP VEIN THROMBOSIS (DVT) OF FEMORAL VEIN OF LEFT LOWER EXTREMITY, UNSPECIFIED CHRONICITY (HCC): ICD-10-CM

## 2018-02-21 LAB — POC INR: 2.5 (ref 0.8–1.2)

## 2018-02-21 PROCEDURE — 36416 COLLJ CAPILLARY BLOOD SPEC: CPT

## 2018-02-21 PROCEDURE — 85610 PROTHROMBIN TIME: CPT

## 2018-02-21 PROCEDURE — 99211 OFF/OP EST MAY X REQ PHY/QHP: CPT

## 2018-02-21 NOTE — PROGRESS NOTES
[facility]  Anticoagulation Clinic  [phone] (phone)  [phone] (fax)    Mr. Johnathan Banuelos is a 74 y.o.  male with history of DVT, PE, TIA, per Dr. Jared Leal referral, who presents today for Warfarin monitoring and adjustment (5 week visit). Patient verifies current dosing regimen and tablet strength. No missed or extra doses. Patient denies s/s bleeding/bruising/SOB/chest pain. Has left leg swelling occasionally - usual.  No blood in urine or stool. Dietary changes:  has had more lettuce. No changes in medication/OTC agents/herbals. No change in alcohol use or tobacco use. No change in activity level. Patient denies headaches/dizziness/lightheadedness/falls. No vomiting/diarrhea or acute illness. No procedures scheduled in the future at this time. Lab Results   Component Value Date    INR 2.50 (H) 02/21/2018    INR 2.40 (H) 01/17/2018    INR 2.00 (H) 12/13/2017     INR therapeutic - goal 2-3. Recent Labs      02/21/18   1344   INR  2.50*           Plan:  POCT INR ordered/performed/result reviewed. Continue PO Coumadin 2.5 mg W, 5 mg MTThFSS. Recheck INR 6 weeks. Patient reminded to call the Anticoagulation Clinic with any signs or symptoms of bleeding or with any medication changes. Patient given instructions utilizing the teach back method. Discharged ambulatory in no apparent distress. After visit summary printed and reviewed with patient.       Medications reviewed and updated on home medication list Yes    Influenza vaccine:     [] given    [x] declined   [] received previously   [] plans to receive at a later time   [x] refused    [x] documented in EPIC

## 2018-02-28 ENCOUNTER — OFFICE VISIT (OUTPATIENT)
Dept: FAMILY MEDICINE CLINIC | Age: 73
End: 2018-02-28
Payer: MEDICARE

## 2018-02-28 VITALS
BODY MASS INDEX: 24.52 KG/M2 | HEIGHT: 68 IN | WEIGHT: 161.8 LBS | DIASTOLIC BLOOD PRESSURE: 68 MMHG | HEART RATE: 70 BPM | RESPIRATION RATE: 12 BRPM | TEMPERATURE: 97.7 F | SYSTOLIC BLOOD PRESSURE: 112 MMHG

## 2018-02-28 DIAGNOSIS — G89.29 CHRONIC MIDLINE LOW BACK PAIN WITHOUT SCIATICA: ICD-10-CM

## 2018-02-28 DIAGNOSIS — N18.30 CKD (CHRONIC KIDNEY DISEASE) STAGE 3, GFR 30-59 ML/MIN (HCC): ICD-10-CM

## 2018-02-28 DIAGNOSIS — I10 ESSENTIAL HYPERTENSION: Primary | ICD-10-CM

## 2018-02-28 DIAGNOSIS — M54.50 CHRONIC MIDLINE LOW BACK PAIN WITHOUT SCIATICA: ICD-10-CM

## 2018-02-28 DIAGNOSIS — K21.9 GASTROESOPHAGEAL REFLUX DISEASE WITHOUT ESOPHAGITIS: ICD-10-CM

## 2018-02-28 DIAGNOSIS — M46.1 INFLAMMATION OF SACROILIAC JOINT (HCC): ICD-10-CM

## 2018-02-28 DIAGNOSIS — R97.20 PSA ELEVATION: ICD-10-CM

## 2018-02-28 DIAGNOSIS — M48.062 SPINAL STENOSIS OF LUMBAR REGION WITH NEUROGENIC CLAUDICATION: ICD-10-CM

## 2018-02-28 DIAGNOSIS — I82.412 DEEP VEIN THROMBOSIS (DVT) OF FEMORAL VEIN OF LEFT LOWER EXTREMITY, UNSPECIFIED CHRONICITY (HCC): ICD-10-CM

## 2018-02-28 PROCEDURE — 1036F TOBACCO NON-USER: CPT | Performed by: FAMILY MEDICINE

## 2018-02-28 PROCEDURE — 1123F ACP DISCUSS/DSCN MKR DOCD: CPT | Performed by: FAMILY MEDICINE

## 2018-02-28 PROCEDURE — 99214 OFFICE O/P EST MOD 30 MIN: CPT | Performed by: FAMILY MEDICINE

## 2018-02-28 PROCEDURE — 4040F PNEUMOC VAC/ADMIN/RCVD: CPT | Performed by: FAMILY MEDICINE

## 2018-02-28 PROCEDURE — G8420 CALC BMI NORM PARAMETERS: HCPCS | Performed by: FAMILY MEDICINE

## 2018-02-28 PROCEDURE — 3017F COLORECTAL CA SCREEN DOC REV: CPT | Performed by: FAMILY MEDICINE

## 2018-02-28 PROCEDURE — G8427 DOCREV CUR MEDS BY ELIG CLIN: HCPCS | Performed by: FAMILY MEDICINE

## 2018-02-28 PROCEDURE — G8484 FLU IMMUNIZE NO ADMIN: HCPCS | Performed by: FAMILY MEDICINE

## 2018-02-28 RX ORDER — BENAZEPRIL HYDROCHLORIDE 20 MG/1
20 TABLET ORAL DAILY
Qty: 30 TABLET | Refills: 5 | Status: SHIPPED | OUTPATIENT
Start: 2018-02-28 | End: 2018-04-04 | Stop reason: DRUGHIGH

## 2018-02-28 ASSESSMENT — ENCOUNTER SYMPTOMS
RESPIRATORY NEGATIVE: 1
GASTROINTESTINAL NEGATIVE: 1
ALLERGIC/IMMUNOLOGIC NEGATIVE: 1
COUGH: 0
BACK PAIN: 1

## 2018-02-28 NOTE — PROGRESS NOTES
Subjective:      Patient ID: Didier Osei is a 74 y.o. male. HPI  Follow up of chronic conditions. Encounter Diagnoses   Name Primary?  Essential hypertension Yes    Gastroesophageal reflux disease without esophagitis     Deep vein thrombosis (DVT) of femoral vein of left lower extremity, unspecified chronicity (HCC)     Chronic midline low back pain without sciatica     PSA elevation, bph elevation     Spinal stenosis of lumbar region with neurogenic claudication     CKD (chronic kidney disease) stage 3, GFR 30-59 ml/min     Inflammation of sacroiliac joint (HCC)      HTN is stable with current medications. Pt has no medication side effects nor orthostatic symptoms. Patient is interested in seeing if his dose of Lotensin can be lowered so I will change him from 40 mg down to 20 mg daily. Sheets for tracking home blood pressures have been given to him and encouraged him to get a home blood pressure cuff and unit. BP Readings from Last 3 Encounters:   02/28/18 112/68   12/19/17 113/66   11/08/17 126/75     He continues on Coumadin therapy for treatment of his DVT of his left leg. He occasionally gets some soft tenderness in the leg especially walking and occasionally there is some dependent edema that develops intermittently. His PSA will be reviewed after being repeated in May of this year. He is gone from 4.6 up to 6.7 but no change in neurologic symptoms. He gets up twice to urinate at night but does feel like he gets complete bladder emptying. His spinal stenosis causing lumbar pain and some claudication is ongoing. He was asking about herbal preparations that might be helpful to control back pain and several suggestions were given to him which he will look into. Renal functions will be reassessed again with labs that he will obtain at the [address] lab draw at [facility] in May 2018.   Lab Results   Component Value Date     05/17/2016    K 4.9 05/17/2016 CL 99 05/17/2016    CO2 26 05/17/2016    BUN 30 05/17/2016    CREATININE 1.4 05/17/2016    GLUCOSE 100 05/17/2016    GLUCOSE 110 05/02/2015    CALCIUM 9.2 05/17/2016      The rest of this patient's conditions are stable. Past medical and surgical hx reviewed. Past Medical History:   Diagnosis Date    Arthritis     neck, Wrists , back    Cancer ([facility] [address]     skin (removed)    Cerebral artery occlusion with cerebral infarction ([address]     TIA    DVT (deep venous thrombosis) (HCC)     GERD (gastroesophageal reflux disease)     Hx of blood clots     PE    Hypertension     Pulmonary emboli ([address] 5/7/16    Scoliosis     TIA (transient ischemic attack)      Past Surgical History:   Procedure Laterality Date    EXCISION / BIOPSY SKIN LESION OF ARM Left 10/11/2017    EXCISION 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 WITH FLAP AND FROZEN SECTION performed by Manolo Milian MD at [place] [address] Left 10/11/2017    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    SKIN CANCER EXCISION  2010    Face skin cancer removal     Portions of this note were completed with a voice recording program.  Efforts were made to edit the dictations but occasionally words are mis-transcribed. Review of Systems   Constitutional: Negative. HENT: Negative. Respiratory: Negative. Negative for cough. Cardiovascular: Positive for leg swelling. Negative for chest pain and palpitations. See HPI. Gastrointestinal: Negative. Endocrine: Negative. Genitourinary: Positive for difficulty urinating. Negative for dysuria, frequency, hematuria and urgency. Symptoms are related to his BPH. He also gets up twice at night to urinate. Musculoskeletal: Positive for back pain. Skin: Negative. Allergic/Immunologic: Negative. Neurological: Negative. Negative for dizziness, light-headedness and headaches.    Hematological: Does not bruise/bleed easily. Psychiatric/Behavioral: Negative. All other systems reviewed and are negative. Objective:   Physical Exam   Constitutional: He is oriented to person, place, and time. He appears well-developed and well-nourished. HENT:   Right Ear: External ear normal.   Left Ear: External ear normal.   Nose: Nose normal.   Mouth/Throat: Oropharynx is clear and moist.   Eyes: Conjunctivae are normal.   Neck: Carotid bruit is not present. No thyromegaly present. Cardiovascular: Normal rate, regular rhythm, S1 normal, S2 normal and normal heart sounds. Exam reveals no gallop. No murmur heard. Pulmonary/Chest: No respiratory distress. He has no decreased breath sounds. He has no wheezes. He has no rhonchi. He has no rales. Abdominal: Soft. Bowel sounds are normal.   Musculoskeletal: He exhibits edema. Lumbar back: He exhibits decreased range of motion, tenderness and pain. He exhibits no spasm. Legs:  Lymphadenopathy:     He has no cervical adenopathy. Neurological: He is alert and oriented to person, place, and time. Skin: Skin is warm and dry. Psychiatric: He has a normal mood and affect. Nursing note and vitals reviewed. Assessment:      1. Essential hypertension  benazepril (LOTENSIN) 20 MG tablet   2. Gastroesophageal reflux disease without esophagitis     3. Deep vein thrombosis (DVT) of femoral vein of left lower extremity, unspecified chronicity (HCC)     4. Chronic midline low back pain without sciatica     5. PSA elevation, bph elevation  PSA Prostatic Specific Antigen   6. Spinal stenosis of lumbar region with neurogenic claudication     7. CKD (chronic kidney disease) stage 3, GFR 30-59 ml/min     8.  Inflammation of sacroiliac joint ([facility] [address]             Plan:      Orders Placed This Encounter   Procedures    PSA Prostatic Specific Antigen     Standing Status:   Future     Standing Expiration Date:   8/28/2018     Medications Discontinued During This Encounter

## 2018-04-04 ENCOUNTER — HOSPITAL ENCOUNTER (OUTPATIENT)
Dept: PHARMACY | Age: 73
Setting detail: THERAPIES SERIES
Discharge: HOME | End: 2018-04-04
Payer: MEDICARE

## 2018-04-04 DIAGNOSIS — G45.9 TRANSIENT CEREBRAL ISCHEMIA, UNSPECIFIED TYPE: ICD-10-CM

## 2018-04-04 DIAGNOSIS — I82.412 DEEP VEIN THROMBOSIS (DVT) OF FEMORAL VEIN OF LEFT LOWER EXTREMITY, UNSPECIFIED CHRONICITY (HCC): ICD-10-CM

## 2018-04-04 LAB — POC INR: 2.5 (ref 0.8–1.2)

## 2018-04-04 PROCEDURE — 85610 PROTHROMBIN TIME: CPT

## 2018-04-04 PROCEDURE — 36416 COLLJ CAPILLARY BLOOD SPEC: CPT

## 2018-04-04 PROCEDURE — 99211 OFF/OP EST MAY X REQ PHY/QHP: CPT

## 2018-04-04 RX ORDER — BENAZEPRIL HYDROCHLORIDE 40 MG/1
40 TABLET, FILM COATED ORAL DAILY
COMMUNITY
End: 2019-01-30 | Stop reason: SDUPTHER

## 2018-04-27 ENCOUNTER — TELEPHONE (OUTPATIENT)
Dept: PHARMACY | Age: 73
End: 2018-04-27

## 2018-05-16 ENCOUNTER — HOSPITAL ENCOUNTER (OUTPATIENT)
Dept: PHARMACY | Age: 73
Setting detail: THERAPIES SERIES
Discharge: HOME | End: 2018-05-16
Payer: MEDICARE

## 2018-05-16 DIAGNOSIS — I82.412 DEEP VEIN THROMBOSIS (DVT) OF FEMORAL VEIN OF LEFT LOWER EXTREMITY, UNSPECIFIED CHRONICITY (HCC): ICD-10-CM

## 2018-05-16 DIAGNOSIS — G45.9 TRANSIENT CEREBRAL ISCHEMIA, UNSPECIFIED TYPE: ICD-10-CM

## 2018-05-16 LAB — POC INR: 2.2 (ref 0.8–1.2)

## 2018-05-16 PROCEDURE — 36416 COLLJ CAPILLARY BLOOD SPEC: CPT

## 2018-05-16 PROCEDURE — 85610 PROTHROMBIN TIME: CPT

## 2018-05-16 PROCEDURE — 99211 OFF/OP EST MAY X REQ PHY/QHP: CPT

## 2018-06-05 ENCOUNTER — HOSPITAL ENCOUNTER (EMERGENCY)
Age: 73
Discharge: HOME | End: 2018-06-05
Attending: EMERGENCY MEDICINE
Payer: MEDICARE

## 2018-06-05 ENCOUNTER — HOSPITAL ENCOUNTER (EMERGENCY)
Dept: GENERAL RADIOLOGY | Age: 73
Discharge: HOME | End: 2018-06-05
Payer: MEDICARE

## 2018-06-05 ENCOUNTER — TELEPHONE (OUTPATIENT)
Dept: PHARMACY | Age: 73
End: 2018-06-05

## 2018-06-05 VITALS
DIASTOLIC BLOOD PRESSURE: 74 MMHG | RESPIRATION RATE: 16 BRPM | WEIGHT: 160 LBS | OXYGEN SATURATION: 98 % | BODY MASS INDEX: 24.33 KG/M2 | HEART RATE: 68 BPM | TEMPERATURE: 98.4 F | SYSTOLIC BLOOD PRESSURE: 129 MMHG

## 2018-06-05 DIAGNOSIS — S20.211A BRUISED RIBS, RIGHT, INITIAL ENCOUNTER: ICD-10-CM

## 2018-06-05 DIAGNOSIS — S20.211A CONTUSION OF RIGHT CHEST WALL, INITIAL ENCOUNTER: Primary | ICD-10-CM
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
## 2018-06-05 DIAGNOSIS — Z79.01 WARFARIN ANTICOAGULATION: ICD-10-CM

## 2018-06-05 PROCEDURE — 99213 OFFICE O/P EST LOW 20 MIN: CPT

## 2018-06-05 PROCEDURE — 71101 X-RAY EXAM UNILAT RIBS/CHEST: CPT

## 2018-06-05 PROCEDURE — 99213 OFFICE O/P EST LOW 20 MIN: CPT | Performed by: EMERGENCY MEDICINE

## 2018-06-05 RX ORDER — WARFARIN SODIUM 2.5 MG/1
TABLET ORAL
Qty: 180 TABLET | Refills: 3 | Status: SHIPPED | OUTPATIENT
Start: 2018-06-05 | End: 2019-06-25 | Stop reason: SDUPTHER

## 2018-06-05 RX ORDER — NAPROXEN 500 MG/1
500 TABLET ORAL 2 TIMES DAILY
Qty: 15 TABLET | Refills: 0 | Status: SHIPPED | OUTPATIENT
Start: 2018-06-05 | End: 2018-06-05 | Stop reason: CLARIF

## 2018-06-05 RX ORDER — TIZANIDINE 4 MG/1
4 TABLET ORAL 3 TIMES DAILY PRN
Qty: 12 TABLET | Refills: 0 | Status: SHIPPED | OUTPATIENT
Start: 2018-06-05 | End: 2018-08-29 | Stop reason: ALTCHOICE

## 2018-06-05 ASSESSMENT — ENCOUNTER SYMPTOMS
SINUS PRESSURE: 0
BACK PAIN: 1
COUGH: 0
SORE THROAT: 0
SHORTNESS OF BREATH: 0
TROUBLE SWALLOWING: 0
WHEEZING: 0
EYE DISCHARGE: 0
DIARRHEA: 0
EYE REDNESS: 0
ABDOMINAL PAIN: 0
EYE PAIN: 0
STRIDOR: 0
VOICE CHANGE: 0
NAUSEA: 0
VOMITING: 0

## 2018-06-05 ASSESSMENT — PAIN SCALES - GENERAL: PAINLEVEL_OUTOF10: 6

## 2018-06-05 ASSESSMENT — PAIN DESCRIPTION - FREQUENCY: FREQUENCY: CONTINUOUS

## 2018-06-05 ASSESSMENT — PAIN DESCRIPTION - ORIENTATION: ORIENTATION: RIGHT;LOWER

## 2018-06-05 ASSESSMENT — PAIN DESCRIPTION - DESCRIPTORS: DESCRIPTORS: ACHING;SORE

## 2018-06-05 ASSESSMENT — PAIN DESCRIPTION - PAIN TYPE: TYPE: ACUTE PAIN

## 2018-06-05 ASSESSMENT — PAIN DESCRIPTION - LOCATION: LOCATION: RIB CAGE

## 2018-06-25 ENCOUNTER — TELEPHONE (OUTPATIENT)
Dept: PHARMACY | Age: 73
End: 2018-06-25

## 2018-06-27 ENCOUNTER — HOSPITAL ENCOUNTER (OUTPATIENT)
Dept: PHARMACY | Age: 73
Setting detail: THERAPIES SERIES
Discharge: HOME | End: 2018-06-27
Payer: MEDICARE

## 2018-06-27 DIAGNOSIS — I82.412 DEEP VEIN THROMBOSIS (DVT) OF FEMORAL VEIN OF LEFT LOWER EXTREMITY, UNSPECIFIED CHRONICITY (HCC): ICD-10-CM

## 2018-06-27 DIAGNOSIS — G45.9 TRANSIENT CEREBRAL ISCHEMIA, UNSPECIFIED TYPE: ICD-10-CM

## 2018-06-27 LAB — POC INR: 2.2 (ref 0.8–1.2)

## 2018-06-27 PROCEDURE — 99211 OFF/OP EST MAY X REQ PHY/QHP: CPT

## 2018-06-27 PROCEDURE — 85610 PROTHROMBIN TIME: CPT

## 2018-06-27 PROCEDURE — 36416 COLLJ CAPILLARY BLOOD SPEC: CPT

## 2018-08-08 ENCOUNTER — HOSPITAL ENCOUNTER (OUTPATIENT)
Dept: PHARMACY | Age: 73
Setting detail: THERAPIES SERIES
Discharge: HOME | End: 2018-08-08
Payer: MEDICARE

## 2018-08-08 DIAGNOSIS — G45.9 TRANSIENT CEREBRAL ISCHEMIA, UNSPECIFIED TYPE: ICD-10-CM

## 2018-08-08 DIAGNOSIS — I82.412 DEEP VEIN THROMBOSIS (DVT) OF FEMORAL VEIN OF LEFT LOWER EXTREMITY, UNSPECIFIED CHRONICITY (HCC): ICD-10-CM

## 2018-08-08 LAB — POC INR: 2.3 (ref 0.8–1.2)

## 2018-08-08 PROCEDURE — 36416 COLLJ CAPILLARY BLOOD SPEC: CPT

## 2018-08-08 PROCEDURE — 85610 PROTHROMBIN TIME: CPT

## 2018-08-08 PROCEDURE — 99211 OFF/OP EST MAY X REQ PHY/QHP: CPT

## 2018-08-08 NOTE — PROGRESS NOTES
[facility]  Anticoagulation Clinic  [phone] (phone)  [phone] (fax)    Mr. Milan Malloy is a 74 y.o.  male with history of PE and infarction, TIA, DVT who presents today for anticoagulation monitoring and adjustment. Patient verifies current dosing regimen and tablet strength. No missed or extra doses. Patient denies s/s bleeding/bruising/swelling/SOB/chest pain  No blood in urine or stool. No dietary changes. No changes in medication/OTC agents/Herbals. No change in alcohol use or tobacco use. No change in activity level. Patient denies headaches/dizziness/lightheadedness/falls. No vomiting/diarrhea or acute illness. No procedures scheduled in the future at this time. Assessment:   Lab Results   Component Value Date    INR 2.30 (H) 08/08/2018    INR 2.20 (H) 06/27/2018    INR 2.20 (H) 05/16/2018     INR therapeutic   Recent Labs      08/08/18   1311   INR  2.30*     Plan: POCT INR ordered and result reviewed. Continue Coumadin 2.5 mg po W, 5 mg po MTTHFSS. Recheck INR 6 weeks. Patient reminded to call the Anticoagulation Clinic with any signs or symptoms of bleeding or with any medication changes. Patient given instructions utilizing the teach back method. Discharged ambulatory in no apparent distress. After visit summary printed and reviewed with patient.       Medications reviewed and updated on home medication list Yes    Influenza vaccine:     [] given    [x] declined   [] received previously   [] plans to receive at a later time   [] refused    [x] documented in EPIC

## 2018-08-29 ENCOUNTER — OFFICE VISIT (OUTPATIENT)
Dept: FAMILY MEDICINE CLINIC | Age: 73
End: 2018-08-29
Payer: MEDICARE

## 2018-08-29 VITALS
OXYGEN SATURATION: 96 % | DIASTOLIC BLOOD PRESSURE: 64 MMHG | WEIGHT: 154.3 LBS | RESPIRATION RATE: 16 BRPM | SYSTOLIC BLOOD PRESSURE: 122 MMHG | HEART RATE: 68 BPM | BODY MASS INDEX: 24.8 KG/M2 | HEIGHT: 66 IN

## 2018-08-29 DIAGNOSIS — I10 ESSENTIAL HYPERTENSION: Primary | ICD-10-CM

## 2018-08-29 DIAGNOSIS — Z79.01 ANTICOAGULATED ON COUMADIN: ICD-10-CM

## 2018-08-29 DIAGNOSIS — K21.9 GASTROESOPHAGEAL REFLUX DISEASE WITHOUT ESOPHAGITIS: ICD-10-CM

## 2018-08-29 DIAGNOSIS — R97.20 PSA ELEVATION: ICD-10-CM

## 2018-08-29 DIAGNOSIS — G89.29 CHRONIC MIDLINE LOW BACK PAIN WITHOUT SCIATICA: ICD-10-CM

## 2018-08-29 DIAGNOSIS — Z12.11 COLON CANCER SCREENING: ICD-10-CM

## 2018-08-29 DIAGNOSIS — M54.50 CHRONIC MIDLINE LOW BACK PAIN WITHOUT SCIATICA: ICD-10-CM

## 2018-08-29 DIAGNOSIS — I82.412 DEEP VEIN THROMBOSIS (DVT) OF FEMORAL VEIN OF LEFT LOWER EXTREMITY, UNSPECIFIED CHRONICITY (HCC): ICD-10-CM

## 2018-08-29 DIAGNOSIS — G45.9 TRANSIENT CEREBRAL ISCHEMIA, UNSPECIFIED TYPE: ICD-10-CM

## 2018-08-29 DIAGNOSIS — G56.03 BILATERAL CARPAL TUNNEL SYNDROME: ICD-10-CM

## 2018-08-29 PROCEDURE — G8420 CALC BMI NORM PARAMETERS: HCPCS | Performed by: FAMILY MEDICINE

## 2018-08-29 PROCEDURE — 1101F PT FALLS ASSESS-DOCD LE1/YR: CPT | Performed by: FAMILY MEDICINE

## 2018-08-29 PROCEDURE — 99214 OFFICE O/P EST MOD 30 MIN: CPT | Performed by: FAMILY MEDICINE

## 2018-08-29 PROCEDURE — G8427 DOCREV CUR MEDS BY ELIG CLIN: HCPCS | Performed by: FAMILY MEDICINE

## 2018-08-29 PROCEDURE — 1123F ACP DISCUSS/DSCN MKR DOCD: CPT | Performed by: FAMILY MEDICINE

## 2018-08-29 PROCEDURE — 3017F COLORECTAL CA SCREEN DOC REV: CPT | Performed by: FAMILY MEDICINE

## 2018-08-29 PROCEDURE — 4040F PNEUMOC VAC/ADMIN/RCVD: CPT | Performed by: FAMILY MEDICINE

## 2018-08-29 PROCEDURE — 1036F TOBACCO NON-USER: CPT | Performed by: FAMILY MEDICINE

## 2018-08-29 ASSESSMENT — PATIENT HEALTH QUESTIONNAIRE - PHQ9
SUM OF ALL RESPONSES TO PHQ9 QUESTIONS 1 & 2: 0
2. FEELING DOWN, DEPRESSED OR HOPELESS: 0
SUM OF ALL RESPONSES TO PHQ QUESTIONS 1-9: 0
SUM OF ALL RESPONSES TO PHQ QUESTIONS 1-9: 0
1. LITTLE INTEREST OR PLEASURE IN DOING THINGS: 0

## 2018-08-29 ASSESSMENT — ENCOUNTER SYMPTOMS
COUGH: 0
GASTROINTESTINAL NEGATIVE: 1
SHORTNESS OF BREATH: 0
RESPIRATORY NEGATIVE: 1
ALLERGIC/IMMUNOLOGIC NEGATIVE: 1

## 2018-08-29 NOTE — PATIENT INSTRUCTIONS
You may receive a survey about your visit with us today. The feedback from our patients helps us identify what is working well and where the service to all patients can be enhanced. Thank you! Patient Education        Continue present medications. Get wrist splint to sleep to help with carpal tunnel pain. Call gastroenterologist to schedule colonoscopy. Continue current medications. Learning About Diuretics for High Blood Pressure  Introduction  Diuretics help to lower blood pressure. This reduces your risk of a heart attack and stroke. It also reduces your risk of kidney disease. Diuretics cause your kidneys to remove sodium and water. They also relax the blood vessel walls. These help lower your blood pressure. Examples  · Chlorthalidone  · Hydrochlorothiazide  Possible side effects  There are some common side effects. They are:  · Too little potassium. · Feeling dizzy. · Rash. · Urinating a lot. · High blood sugar. (But this is not common.)  You may have other side effects. Check the information that comes with your medicine. What to know about taking this medicine  · You may take other medicines for blood pressure. Diuretics can help those work better. They can also prevent extra fluid in your body. · You may need to take potassium pills. Or you may have to watch how much potassium is in your food. Ask your doctor about this. · You may need blood tests to check your kidneys and your potassium level. · Take your medicines exactly as prescribed. Call your doctor if you think you are having a problem with your medicine. · Check with your doctor or pharmacist before you use any other medicines. This includes over-the-counter medicines. Make sure your doctor knows all of the medicines, vitamins, herbal products, and supplements you take. Taking some medicines together can cause problems. Where can you learn more? Go to [email or web address]://katheryn.[email or web address] org and sign in to your [employer] account. Enter C638 in the [facility] box to learn more about \"Learning About Diuretics for High Blood Pressure. \"     If you do not have an account, please click on the \"Sign Up Now\" link. Current as of: May 10, 2017  Content Version: 11.7  © 6382-7509 [employer]. Care instructions adapted under license by [facility] ([facility]). If you have questions about a medical condition or this instruction, always ask your healthcare professional. [address] any warranty or liability for your use of this information.

## 2018-08-29 NOTE — PROGRESS NOTES
Subjective:      Patient ID: Darren D eLa Cruz is a 74 y.o. male. HPI  Follow up of chronic conditions. Encounter Diagnoses   Name Primary?  Essential hypertension Yes    Transient cerebral ischemia, unspecified type     Gastroesophageal reflux disease without esophagitis     Bilateral carpal tunnel syndrome     Chronic midline low back pain without sciatica     Deep vein thrombosis (DVT) of femoral vein of left lower extremity, unspecified chronicity (HCC)     Anticoagulated on Coumadin     PSA elevation, bph elevation      HTN is stable with current medications. Pt has no medication side effects nor orthostatic symptoms. BP Readings from Last 3 Encounters:   08/29/18 122/64   06/05/18 129/74   02/28/18 112/68     Patient has been getting some intermittent nocturnal leg cramps since he is started using over-the-counter conic water, this has been helpful in decreasing the frequency and severity. The need to has some him and calcium to his diet was discussed. He has an elevated PSA and for this reason will be referred to urology for further evaluation. In the past he has had a large prostate on physical exam and he suspects that may be the issue again. He has no UTI symptoms. PSA (ng/mL)   Date Value   05/05/2018 8.25 (H)     He is on anticoagulation therapy currently and has multiple areas of ecchymosis secondary to his anticoagulation medications. The benign nature of these ecchymotic lesions was discussed with him. He has been developing some pain in both hands left greater than the right consistent with the distribution of carpal tunnel syndrome paresthesias and aching. 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 He is willing to do that as he does not want to go the route of surgery at this point.   We will attempt to use conservative therapy first.    He has no pain in either leg in spite of the history of the deep vein thrombosis of the left femoral vein. He has no shortness of breath and nothing to suggest a pulmonary embolus or venous thrombus obstruction of either leg. The rest of this patient's conditions are stable. Past medical and surgical hx reviewed. Past Medical History:   Diagnosis Date    Arthritis     neck, Wrists , back    Cancer ([facility] [address]     skin (removed)    Cerebral artery occlusion with cerebral infarction ([address]     TIA    DVT (deep venous thrombosis) (HCC)     GERD (gastroesophageal reflux disease)     Hx of blood clots     PE    Hypertension     Pulmonary emboli ([address] 5/7/16    Scoliosis     TIA (transient ischemic attack)      Past Surgical History:   Procedure Laterality Date    EXCISION / BIOPSY SKIN LESION OF ARM Left 10/11/2017    EXCISION 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 WITH FLAP AND FROZEN SECTION performed by Suze Villarreal MD at [place] [address] Left 10/11/2017    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    SKIN CANCER EXCISION  2010    Face skin cancer removal     Portions of this note were completed with a voice recording program.  Efforts were made to edit the dictations but occasionally words are mis-transcribed. Review of Systems   Constitutional: Negative. HENT: Negative. Respiratory: Negative. Negative for cough and shortness of breath. Cardiovascular: Negative. Negative for chest pain, palpitations and leg swelling. Gastrointestinal: Negative. Genitourinary: Positive for difficulty urinating. Negative for dysuria, frequency and urgency. Musculoskeletal: Positive for arthralgias. Allergic/Immunologic: Negative. Neurological: Negative for dizziness, light-headedness and headaches. Hematological: Negative for adenopathy. Bruises/bleeds easily. Psychiatric/Behavioral: Negative. All other systems reviewed and are negative.       Objective:   Physical Exam   Constitutional: He is oriented to person, place, and time. He appears well-developed and well-nourished. HENT:   Right Ear: External ear normal.   Left Ear: External ear normal.   Nose: Nose normal.   Mouth/Throat: Oropharynx is clear and moist.   Eyes: Conjunctivae are normal.   Neck: No thyromegaly present. Cardiovascular: Normal rate, regular rhythm, normal heart sounds and intact distal pulses. Exam reveals no gallop. No murmur heard. Pulmonary/Chest: Effort normal and breath sounds normal. He has no wheezes. He has no rales. Abdominal: Soft. Bowel sounds are normal. He exhibits no distension. There is no tenderness. Musculoskeletal: Normal range of motion. He exhibits no edema. Lymphadenopathy:     He has no cervical adenopathy. Neurological: He is alert and oriented to person, place, and time. Skin: Skin is warm and dry. Psychiatric: He has a normal mood and affect. Nursing note and vitals reviewed. Assessment:       Diagnosis Orders   1. Essential hypertension     2. Transient cerebral ischemia, unspecified type     3. Gastroesophageal reflux disease without esophagitis     4. Bilateral carpal tunnel syndrome     5. Chronic midline low back pain without sciatica     6. Deep vein thrombosis (DVT) of femoral vein of left lower extremity, unspecified chronicity (HCC)     7. Anticoagulated on Coumadin     8.  PSA elevation, bph elevation  [facility] - Angelina Villalta MD           Plan:      Orders Placed This Encounter   Procedures   [facility] Angelina Villalta [facility]     Referral Priority:   Routine     Referral Type:   Eval and Treat     Referral Reason:   Specialty Services Required     Referred to Provider:   Alyssa Marks MD     Requested Specialty:   Urology     Number of Visits Requested:   1     Medications Discontinued During This Encounter   Medication Reason    tiZANidine (ZANAFLEX) 4 MG tablet Therapy completed    UNABLE TO FIND Therapy completed     Current Outpatient Prescriptions   Medication Sig Dispense Refill    warfarin (COUMADIN) 2.5 MG tablet Take as directed by the Coumadin clinic, 180 tablets for 90 days 180 tablet 3    UNABLE TO FIND 1 capsule 2 times daily Curamin      ALOE VERA JUICE PO Take by mouth 2 times daily      benazepril (LOTENSIN) 40 MG tablet Take 40 mg by mouth daily      calcium carbonate (TUMS) 500 MG chewable tablet Take 1 tablet by mouth as needed for Heartburn        No current facility-administered medications for this visit. Continue present medications. Get wrist splint to sleep to help with carpal tunnel pain. Call gastroenterologist to schedule colonoscopy. Continue current medications. Audrey Hunter received counseling on the following healthy behaviors: nutrition and exercise  Reviewed prior labs and health maintenance  Continue current medications, diet and exercise. Discussed use, benefit, and side effects of prescribed medications. Barriers to medication compliance addressed. Patient given educational materials - see patient instructions  Was a self-tracking handout given in paper form or via [employer]? Yes    Requested Prescriptions      No prescriptions requested or ordered in this encounter       All patient questions answered. Patient voiced understanding. Quality Measures    Body mass index is 24.9 kg/m². Elevated. Weight control planned discussed Healthy diet and regular exercise. BP: 122/64. Blood pressure is normal. Treatment plan consists of No treatment change needed. Fall Risk 8/29/2018 2/28/2017 8/29/2016 5/19/2016 5/27/2015   2 or more falls in past year? yes no no no no   Fall with injury in past year? yes no no no no     The patient does not have a history of falls. I did , complete a risk assessment for falls.  A plan of care for falls No Treatment plan indicated    Lab Results   Component Value Date    LDLCALC 104 (H) 05/05/2018    (goal LDL reduction with dx if diabetes

## 2018-09-19 ENCOUNTER — HOSPITAL ENCOUNTER (OUTPATIENT)
Dept: PHARMACY | Age: 73
Setting detail: THERAPIES SERIES
Discharge: HOME | End: 2018-09-19
Payer: MEDICARE

## 2018-09-19 DIAGNOSIS — I82.412 DEEP VEIN THROMBOSIS (DVT) OF FEMORAL VEIN OF LEFT LOWER EXTREMITY, UNSPECIFIED CHRONICITY (HCC): ICD-10-CM

## 2018-09-19 DIAGNOSIS — G45.9 TRANSIENT CEREBRAL ISCHEMIA, UNSPECIFIED TYPE: ICD-10-CM

## 2018-09-19 LAB — POC INR: 3.9 (ref 0.8–1.2)

## 2018-09-19 PROCEDURE — 36416 COLLJ CAPILLARY BLOOD SPEC: CPT

## 2018-09-19 PROCEDURE — 99211 OFF/OP EST MAY X REQ PHY/QHP: CPT

## 2018-09-19 PROCEDURE — 85610 PROTHROMBIN TIME: CPT

## 2018-09-19 RX ORDER — BENAZEPRIL HYDROCHLORIDE 20 MG/1
20 TABLET ORAL DAILY
COMMUNITY
End: 2018-12-04 | Stop reason: SDUPTHER

## 2018-10-03 ENCOUNTER — HOSPITAL ENCOUNTER (OUTPATIENT)
Dept: PHARMACY | Age: 73
Setting detail: THERAPIES SERIES
Discharge: HOME | End: 2018-10-03
Payer: MEDICARE

## 2018-10-03 DIAGNOSIS — I82.412 DEEP VEIN THROMBOSIS (DVT) OF FEMORAL VEIN OF LEFT LOWER EXTREMITY, UNSPECIFIED CHRONICITY (HCC): ICD-10-CM

## 2018-10-03 DIAGNOSIS — G45.9 TIA (TRANSIENT ISCHEMIC ATTACK): ICD-10-CM

## 2018-10-03 LAB — POC INR: 3.2 (ref 0.8–1.2)

## 2018-10-03 PROCEDURE — 99211 OFF/OP EST MAY X REQ PHY/QHP: CPT

## 2018-10-03 PROCEDURE — 85610 PROTHROMBIN TIME: CPT

## 2018-10-03 PROCEDURE — 36416 COLLJ CAPILLARY BLOOD SPEC: CPT

## 2018-10-24 ENCOUNTER — HOSPITAL ENCOUNTER (OUTPATIENT)
Dept: PHARMACY | Age: 73
Setting detail: THERAPIES SERIES
Discharge: HOME | End: 2018-10-24
Payer: MEDICARE

## 2018-10-24 DIAGNOSIS — I82.412 DEEP VEIN THROMBOSIS (DVT) OF FEMORAL VEIN OF LEFT LOWER EXTREMITY, UNSPECIFIED CHRONICITY (HCC): ICD-10-CM

## 2018-10-24 DIAGNOSIS — G45.9 TIA (TRANSIENT ISCHEMIC ATTACK): ICD-10-CM

## 2018-10-24 LAB — POC INR: 2.3 (ref 0.8–1.2)

## 2018-10-24 PROCEDURE — 99211 OFF/OP EST MAY X REQ PHY/QHP: CPT

## 2018-10-24 PROCEDURE — 36416 COLLJ CAPILLARY BLOOD SPEC: CPT

## 2018-10-24 PROCEDURE — 85610 PROTHROMBIN TIME: CPT

## 2018-11-21 ENCOUNTER — HOSPITAL ENCOUNTER (OUTPATIENT)
Dept: PHARMACY | Age: 73
Setting detail: THERAPIES SERIES
Discharge: HOME | End: 2018-11-21
Payer: MEDICARE

## 2018-11-21 DIAGNOSIS — I82.412 DEEP VEIN THROMBOSIS (DVT) OF FEMORAL VEIN OF LEFT LOWER EXTREMITY, UNSPECIFIED CHRONICITY (HCC): ICD-10-CM

## 2018-11-21 DIAGNOSIS — G45.9 TIA (TRANSIENT ISCHEMIC ATTACK): ICD-10-CM

## 2018-11-21 LAB — POC INR: 1.9 (ref 0.8–1.2)

## 2018-11-21 PROCEDURE — 99211 OFF/OP EST MAY X REQ PHY/QHP: CPT

## 2018-11-21 PROCEDURE — 85610 PROTHROMBIN TIME: CPT

## 2018-11-21 PROCEDURE — 36416 COLLJ CAPILLARY BLOOD SPEC: CPT

## 2018-11-27 ENCOUNTER — APPOINTMENT (OUTPATIENT)
Dept: CT IMAGING | Age: 73
End: 2018-11-27
Payer: MEDICARE

## 2018-11-27 ENCOUNTER — TELEPHONE (OUTPATIENT)
Dept: UROLOGY | Age: 73
End: 2018-11-27

## 2018-11-27 ENCOUNTER — HOSPITAL ENCOUNTER (EMERGENCY)
Dept: GENERAL RADIOLOGY | Age: 73
Discharge: HOME | End: 2018-11-27
Payer: MEDICARE

## 2018-11-27 ENCOUNTER — HOSPITAL ENCOUNTER (EMERGENCY)
Age: 73
Discharge: HOME | End: 2018-11-27
Attending: FAMILY MEDICINE
Payer: MEDICARE

## 2018-11-27 VITALS
BODY MASS INDEX: 24.8 KG/M2 | WEIGHT: 158 LBS | RESPIRATION RATE: 18 BRPM | TEMPERATURE: 97.2 F | SYSTOLIC BLOOD PRESSURE: 149 MMHG | HEART RATE: 70 BPM | DIASTOLIC BLOOD PRESSURE: 79 MMHG | OXYGEN SATURATION: 97 % | HEIGHT: 67 IN
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
## 2018-11-27 DIAGNOSIS — S20.212A CONTUSION OF LEFT CHEST WALL, INITIAL ENCOUNTER: ICD-10-CM

## 2018-11-27 LAB
ALBUMIN SERPL-MCNC: 4.2 G/DL (ref 3.5–5.1)
ALP BLD-CCNC: 69 U/L (ref 38–126)
ALT SERPL-CCNC: 17 U/L (ref 11–66)
ANION GAP SERPL CALCULATED.3IONS-SCNC: 10 MEQ/L (ref 8–16)
AST SERPL-CCNC: 21 U/L (ref 5–40)
BASOPHILS # BLD: 0.8 %
BASOPHILS ABSOLUTE: 0.1 THOU/MM3 (ref 0–0.1)
BILIRUB SERPL-MCNC: 0.5 MG/DL (ref 0.3–1.2)
BILIRUBIN DIRECT: < 0.2 MG/DL (ref 0–0.3)
BUN BLDV-MCNC: 29 MG/DL (ref 7–22)
CALCIUM SERPL-MCNC: 10.6 MG/DL (ref 8.5–10.5)
CHLORIDE BLD-SCNC: 103 MEQ/L (ref 98–111)
CO2: 26 MEQ/L (ref 23–33)
CREAT SERPL-MCNC: 1.9 MG/DL (ref 0.4–1.2)
EOSINOPHIL # BLD: 1.7 %
EOSINOPHILS ABSOLUTE: 0.1 THOU/MM3 (ref 0–0.4)
ERYTHROCYTE [DISTWIDTH] IN BLOOD BY AUTOMATED COUNT: 12.1 % (ref 11.5–14.5)
ERYTHROCYTE [DISTWIDTH] IN BLOOD BY AUTOMATED COUNT: 40.8 FL (ref 35–45)
GFR SERPL CREATININE-BSD FRML MDRD: 35 ML/MIN/1.73M2
GLUCOSE BLD-MCNC: 122 MG/DL (ref 70–108)
HCT VFR BLD CALC: 43.9 % (ref 42–52)
HEMOGLOBIN: 14.8 GM/DL (ref 14–18)
IMMATURE GRANS (ABS): 0.02 THOU/MM3 (ref 0–0.07)
IMMATURE GRANULOCYTES: 0.3 %
INR BLD: 2.58 (ref 0.85–1.13)
LIPASE: 47.5 U/L (ref 5.6–51.3)
LYMPHOCYTES # BLD: 22.5 %
LYMPHOCYTES ABSOLUTE: 1.7 THOU/MM3 (ref 1–4.8)
MCH RBC QN AUTO: 31.1 PG (ref 26–33)
MCHC RBC AUTO-ENTMCNC: 33.7 GM/DL (ref 32.2–35.5)
MCV RBC AUTO: 92.2 FL (ref 80–94)
MONOCYTES # BLD: 8.6 %
MONOCYTES ABSOLUTE: 0.7 THOU/MM3 (ref 0.4–1.3)
NUCLEATED RED BLOOD CELLS: 0 /100 WBC
OSMOLALITY CALCULATION: 284.7 MOSMOL/KG (ref 275–300)
PLATELET # BLD: 257 THOU/MM3 (ref 130–400)
PMV BLD AUTO: 10 FL (ref 9.4–12.4)
POTASSIUM SERPL-SCNC: 5.2 MEQ/L (ref 3.5–5.2)
RBC # BLD: 4.76 MILL/MM3 (ref 4.7–6.1)
SEG NEUTROPHILS: 66.1 %
SEGMENTED NEUTROPHILS ABSOLUTE COUNT: 5 THOU/MM3 (ref 1.8–7.7)
SODIUM BLD-SCNC: 139 MEQ/L (ref 135–145)
TOTAL PROTEIN: 7.7 G/DL (ref 6.1–8)
WBC # BLD: 7.6 THOU/MM3 (ref 4.8–10.8)

## 2018-11-27 PROCEDURE — 83690 ASSAY OF LIPASE: CPT

## 2018-11-27 PROCEDURE — 85025 COMPLETE CBC W/AUTO DIFF WBC: CPT

## 2018-11-27 PROCEDURE — 85610 PROTHROMBIN TIME: CPT

## 2018-11-27 PROCEDURE — 99285 EMERGENCY DEPT VISIT HI MDM: CPT

## 2018-11-27 PROCEDURE — 2709999900 HC NON-CHARGEABLE SUPPLY

## 2018-11-27 PROCEDURE — 99215 OFFICE O/P EST HI 40 MIN: CPT

## 2018-11-27 PROCEDURE — 80053 COMPREHEN METABOLIC PANEL: CPT

## 2018-11-27 PROCEDURE — 36415 COLL VENOUS BLD VENIPUNCTURE: CPT

## 2018-11-27 PROCEDURE — 71250 CT THORAX DX C-: CPT

## 2018-11-27 PROCEDURE — 71101 X-RAY EXAM UNILAT RIBS/CHEST: CPT

## 2018-11-27 PROCEDURE — 82248 BILIRUBIN DIRECT: CPT

## 2018-11-27 RX ORDER — IBUPROFEN 800 MG/1
800 TABLET ORAL EVERY 6 HOURS PRN
COMMUNITY
End: 2018-12-04

## 2018-11-27 ASSESSMENT — ENCOUNTER SYMPTOMS
COUGH: 0
NAUSEA: 0
BACK PAIN: 0
SHORTNESS OF BREATH: 0
CHEST TIGHTNESS: 0
ABDOMINAL PAIN: 0
WHEEZING: 0
VOMITING: 0
DIARRHEA: 0

## 2018-11-27 ASSESSMENT — PAIN DESCRIPTION - FREQUENCY
FREQUENCY: INTERMITTENT
FREQUENCY: INTERMITTENT

## 2018-11-27 ASSESSMENT — PAIN DESCRIPTION - ORIENTATION: ORIENTATION: LEFT

## 2018-11-27 ASSESSMENT — PAIN DESCRIPTION - DESCRIPTORS
DESCRIPTORS: ACHING
DESCRIPTORS: ACHING

## 2018-11-27 ASSESSMENT — PAIN DESCRIPTION - LOCATION
LOCATION: RIB CAGE
LOCATION: RIB CAGE

## 2018-11-27 ASSESSMENT — PAIN SCALES - GENERAL
PAINLEVEL_OUTOF10: 5
PAINLEVEL_OUTOF10: 5

## 2018-11-27 ASSESSMENT — PAIN DESCRIPTION - PAIN TYPE
TYPE: ACUTE PAIN
TYPE: ACUTE PAIN

## 2018-11-27 NOTE — ED PROVIDER NOTES
[facility]  eMERGENCY dEPARTMENT eNCOUnter          CHIEF COMPLAINT       Chief Complaint   Patient presents with    Rib Injury     left rib pain       Nurses Notes reviewed and I agree except as noted in the HPI. HISTORY OF PRESENT ILLNESS    Sissy Gonzales is a 74 y.o. male who presents For left chest pain     Location/Symptom: Left-sided chest pain after a fall  Timing/Onset: 11/20/18  Context/Setting: On chronic Coumadin therapy for PE  He was grasping another adult when he fell backwards with the other person landing on his thorax  Urgent care today where chest x-ray question left fifth rib fracture with left-sided pleural effusion  Quality: Left-sided rib pain just below the breast  Worse with cough  Duration: 6 days  Modifying Factors: ibuprofen 800 mg po prn  Severity: 2/10    REVIEW OF SYSTEMS     Review of Systems   Constitutional: Negative for chills and fever. HENT: Negative for congestion. Eyes: Negative for visual disturbance. Respiratory: Negative for cough and wheezing. Cardiovascular: Positive for chest pain. Left Sided chest pain     Gastrointestinal: Negative for abdominal pain. Genitourinary: Negative for flank pain and hematuria. Musculoskeletal: Negative for back pain. Skin: Negative for wound. Neurological: Negative for weakness, numbness and headaches. Hematological:        On coumadin   Psychiatric/Behavioral: Negative for confusion. PAST MEDICAL HISTORY    has a past medical history of Arthritis; Cancer ([facility] [address] Cerebral artery occlusion with cerebral infarction [facility]); DVT (deep venous thrombosis) ([facility] [address] GERD (gastroesophageal reflux disease); Hx of blood clots; Hypertension; Pulmonary emboli ([facility] [address] Scoliosis; and TIA (transient ischemic attack).     SURGICAL HISTORY      has a past surgical history that includes Skin cancer excision (2010); other surgical history (Left, 10/11/2017); and EXCISION / BIOPSY SKIN LESION OF ARM (Left,
(Left, 10/11/2017). CURRENT MEDICATIONS       Previous Medications    ALOE VERA JUICE PO    Take by mouth 2 times daily    BENAZEPRIL (LOTENSIN) 20 MG TABLET    Take 20 mg by mouth daily    BENAZEPRIL (LOTENSIN) 40 MG TABLET    Take 40 mg by mouth daily    CALCIUM CARBONATE (TUMS) 500 MG CHEWABLE TABLET    Take 1 tablet by mouth as needed for Heartburn     IBUPROFEN (ADVIL;MOTRIN) 800 MG TABLET    Take 800 mg by mouth every 6 hours as needed for Pain    UNABLE TO FIND    1 capsule daily Curcumin    UNABLE TO FIND    curamin- takes prn    WARFARIN (COUMADIN) 2.5 MG TABLET    Take as directed by the Coumadin clinic, 180 tablets for 90 days       ALLERGIES     Patient is has No Known Allergies. FAMILY HISTORY     Patient'sfamily history includes Alzheimer's Disease in his mother; Cancer in his mother; Heart Disease in his mother; High Blood Pressure in his mother; Parkinsonism in his father. SOCIAL HISTORY     Patient  reports that he has never smoked. He has never used smokeless tobacco. He reports that he drinks alcohol. He reports that he does not use drugs. PHYSICAL EXAM     ED TRIAGE VITALS  BP: 139/75, Temp: 97.8 °F (36.6 °C), Pulse: 61, Resp: 16, SpO2: 97 %  Physical Exam   Constitutional: He is oriented to person, place, and time. Vital signs are normal. He appears well-developed and well-nourished. He is cooperative. He does not appear ill. HENT:   Head: Normocephalic. Right Ear: External ear normal.   Left Ear: External ear normal.   Mouth/Throat: Mucous membranes are normal.   Eyes: Conjunctivae are normal.   Neck: Normal range of motion and full passive range of motion without pain. Cardiovascular: Normal rate and normal heart sounds. Pulmonary/Chest: Effort normal and breath sounds normal. He exhibits tenderness (below left nipple). He exhibits no crepitus. Neurological: He is alert and oriented to person, place, and time. Skin: Skin is warm and dry.  No rash (on exposed surfaces)

## 2018-11-27 NOTE — ED NOTES
Patient sleeping between care. No further questions or concerns. Call light in reach. resp reg.      Gwendolyn Livingston RN  11/27/18 1488

## 2018-11-27 NOTE — ED NOTES
Discharge assessment complete. No changes. All discharge education and information given. Pt instructed to go to [facility] ED, private car, driving self, all belongings with patient. Verbalized Understanding. Left stable.      Sylvia Sanches LPN  64/43/43 [id number]

## 2018-11-27 NOTE — Clinical Note
Patient requiring further diagnostics that are not available here in the urgent care center due to abnormal chest x-ray and warfarin therapy. He has chosen [id number] [place] [address] ED and wishes to travel by private vehicle. Patient is stable and  able to do so. Report called to [facility].

## 2018-11-30 ENCOUNTER — TELEPHONE (OUTPATIENT)
Dept: PULMONOLOGY | Age: 73
End: 2018-11-30

## 2018-12-04 ENCOUNTER — TELEPHONE (OUTPATIENT)
Dept: UROLOGY | Age: 73
End: 2018-12-04

## 2018-12-04 ENCOUNTER — TELEPHONE (OUTPATIENT)
Dept: PHARMACY | Age: 73
End: 2018-12-04

## 2018-12-04 ENCOUNTER — OFFICE VISIT (OUTPATIENT)
Dept: UROLOGY | Age: 73
End: 2018-12-04
Payer: MEDICARE

## 2018-12-04 VITALS
BODY MASS INDEX: 25.01 KG/M2 | HEIGHT: 68 IN | SYSTOLIC BLOOD PRESSURE: 134 MMHG | DIASTOLIC BLOOD PRESSURE: 76 MMHG | WEIGHT: 165 LBS

## 2018-12-04 DIAGNOSIS — R35.0 FREQUENT URINATION: Primary | ICD-10-CM

## 2018-12-04 LAB — POST VOID RESIDUAL (PVR): 45 ML

## 2018-12-04 PROCEDURE — 1123F ACP DISCUSS/DSCN MKR DOCD: CPT | Performed by: NURSE PRACTITIONER

## 2018-12-04 PROCEDURE — 1101F PT FALLS ASSESS-DOCD LE1/YR: CPT | Performed by: NURSE PRACTITIONER

## 2018-12-04 PROCEDURE — G8484 FLU IMMUNIZE NO ADMIN: HCPCS | Performed by: NURSE PRACTITIONER

## 2018-12-04 PROCEDURE — 4040F PNEUMOC VAC/ADMIN/RCVD: CPT | Performed by: NURSE PRACTITIONER

## 2018-12-04 PROCEDURE — G8419 CALC BMI OUT NRM PARAM NOF/U: HCPCS | Performed by: NURSE PRACTITIONER

## 2018-12-04 PROCEDURE — 51798 US URINE CAPACITY MEASURE: CPT | Performed by: NURSE PRACTITIONER

## 2018-12-04 PROCEDURE — G8427 DOCREV CUR MEDS BY ELIG CLIN: HCPCS | Performed by: NURSE PRACTITIONER

## 2018-12-04 PROCEDURE — 99213 OFFICE O/P EST LOW 20 MIN: CPT | Performed by: NURSE PRACTITIONER

## 2018-12-04 PROCEDURE — 1036F TOBACCO NON-USER: CPT | Performed by: NURSE PRACTITIONER

## 2018-12-04 PROCEDURE — 3017F COLORECTAL CA SCREEN DOC REV: CPT | Performed by: NURSE PRACTITIONER

## 2018-12-04 RX ORDER — GENTAMICIN SULFATE 40 MG/ML
80 INJECTION, SOLUTION INTRAMUSCULAR; INTRAVENOUS ONCE
Qty: 2 ML | Refills: 0 | Status: SHIPPED | OUTPATIENT
Start: 2018-12-04 | End: 2018-12-04

## 2018-12-04 RX ORDER — CIPROFLOXACIN 500 MG/1
500 TABLET, FILM COATED ORAL 2 TIMES DAILY
Qty: 6 TABLET | Refills: 0 | Status: SHIPPED | OUTPATIENT
Start: 2018-12-04 | End: 2018-12-07

## 2018-12-04 RX ORDER — TAMSULOSIN HYDROCHLORIDE 0.4 MG/1
0.4 CAPSULE ORAL DAILY
Qty: 30 CAPSULE | Refills: 3 | Status: SHIPPED | OUTPATIENT
Start: 2018-12-04 | End: 2019-01-23 | Stop reason: SDUPTHER

## 2018-12-05 NOTE — TELEPHONE ENCOUNTER
[facility]  [facility]  Anticoagulation Clinic Bridge Instruction Sheet  Patient:   Jairo Camargo      YOB: 1945    Procedure:  Prostate biopsy      Date of Procedure:  12/17/18    Day Lovenox AM Lovenox PM Coumadin PM     12/12/18   none   none   none       12/13/18   none   none   none       12/14/18     70mg   none   none     12/15/18     70mg   none   none     12/16/18     35mg   none   none     12/17/18     none   none   none     12/18/18     none   70mg   none     12/19/18     none   70mg   none     12/20/18     none   70mg   none     12/21/18     none   70mg   7.5mg     12/22/18     none   70mg   7.5mg     12/23/18     none   70mg   5mg     12/24/18     none   70mg   5mg     12/25/18     none   70mg   5mg             INR to be checked:  12/26/18  Giulia Calix, LTAC, located within [facility]/12/5/18    Clinical Pharmacist/Date    Any questions please call [facility] at [phone]

## 2018-12-05 NOTE — TELEPHONE ENCOUNTER
Spoke with patient about bridging schedule. States he has appt with Dr Dionicio Tolliver on Monday to discuss colonoscopy. Wants opinion on whether to have colonoscopy or prostate bx first. States he may cancel bx and reschedule it. Patient states he will call Dr Azul Gill office today. Cancelled 12/19/19 coumadin appt and rescheduled for 12/11, so patient can get bridging instructions. Patient to call clinic back to let us know if he is going to have bx done as scheduled.

## 2018-12-10 ENCOUNTER — TELEPHONE (OUTPATIENT)
Dept: PHARMACY | Age: 73
End: 2018-12-10

## 2018-12-11 ENCOUNTER — OFFICE VISIT (OUTPATIENT)
Dept: PULMONOLOGY | Age: 73
End: 2018-12-11
Payer: MEDICARE

## 2018-12-11 VITALS
OXYGEN SATURATION: 92 % | BODY MASS INDEX: 24.4 KG/M2 | HEIGHT: 68 IN | HEART RATE: 77 BPM | DIASTOLIC BLOOD PRESSURE: 62 MMHG | SYSTOLIC BLOOD PRESSURE: 134 MMHG | WEIGHT: 161 LBS

## 2018-12-11 DIAGNOSIS — R93.89 ABNORMAL CT OF THE CHEST: Primary | ICD-10-CM

## 2018-12-11 PROCEDURE — 1101F PT FALLS ASSESS-DOCD LE1/YR: CPT | Performed by: NURSE PRACTITIONER

## 2018-12-11 PROCEDURE — G8427 DOCREV CUR MEDS BY ELIG CLIN: HCPCS | Performed by: NURSE PRACTITIONER

## 2018-12-11 PROCEDURE — 3017F COLORECTAL CA SCREEN DOC REV: CPT | Performed by: NURSE PRACTITIONER

## 2018-12-11 PROCEDURE — 4040F PNEUMOC VAC/ADMIN/RCVD: CPT | Performed by: NURSE PRACTITIONER

## 2018-12-11 PROCEDURE — 1036F TOBACCO NON-USER: CPT | Performed by: NURSE PRACTITIONER

## 2018-12-11 PROCEDURE — 99214 OFFICE O/P EST MOD 30 MIN: CPT | Performed by: NURSE PRACTITIONER

## 2018-12-11 PROCEDURE — 1123F ACP DISCUSS/DSCN MKR DOCD: CPT | Performed by: NURSE PRACTITIONER

## 2018-12-11 PROCEDURE — G8420 CALC BMI NORM PARAMETERS: HCPCS | Performed by: NURSE PRACTITIONER

## 2018-12-11 PROCEDURE — G8484 FLU IMMUNIZE NO ADMIN: HCPCS | Performed by: NURSE PRACTITIONER

## 2018-12-11 ASSESSMENT — ENCOUNTER SYMPTOMS
NAUSEA: 0
COUGH: 0
DIARRHEA: 0
STRIDOR: 0
SHORTNESS OF BREATH: 0
WHEEZING: 0
VOMITING: 0
CHEST TIGHTNESS: 0

## 2018-12-11 NOTE — PROGRESS NOTES
[facility]    Patient: Javon Nunn, 74 y.o.   : 2018    Pt of Dr. Moraes Parents     Chief Complaint   Patient presents with    Follow-up     Lung Nodule, 1 yr f/u CT 18        HPI  Jann Soulier is here for follow up for LLL nodule vs scar. Here to review most recent CT of chest. PMH significant for Pulmonary embolism with associated LLL pneumonia. Recently patient suffered fall from level ground with blunt force injury from elbow of another individual (grandson) presented to  and underwent CXR showed possible fracture to lateral aspect of 5th left rib and possible pleural effusion was referred to [facility] ED for further evaluation of possible rib fracture. CT imaging was completed showed no displaced rib fracture, and trace pleural effusion, reports below. Currently patient reports no respiratory complaint, denies SOB, CAIN, cough, phlegm production, wheeze or hemoptysis. Had Full PFT completed in  with no evidence of obstruction. Progress History:   Since last visit any new medical issues? No   New ER or hospital visits? Yes see above  Any new or changes in medicines? No  Using inhalers? No  Flu vaccine? Has never had not interested  Pneumonia vaccine?  Reports up to date  Past Medical hx   PMH:  Past Medical History:   Diagnosis Date    Arthritis     neck, Wrists , back    Cancer ([address]     skin (removed)    Cerebral artery occlusion with cerebral infarction ([address]     TIA    DVT (deep venous thrombosis) (HCC)     GERD (gastroesophageal reflux disease)     Hx of blood clots     PE    Hypertension     Pulmonary emboli (HCC) 16    Scoliosis     TIA (transient ischemic attack)      SURGICAL HISTORY:  Past Surgical History:   Procedure Laterality Date    EXCISION / BIOPSY SKIN LESION OF ARM Left 10/11/2017    EXCISION 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 WITH FLAP AND FROZEN SECTION performed by Daphnie Barboza MD Possible fifth rib fracture. Correlation for point tenderness. Small left pleural effusion. Minimal atelectasis or scarring in the left lung base. CT CHEST WO CONTRAST  11/27/2018   FINDINGS:   Limitations: Evaluation of the mediastinum and vascular structures is limited due to the lack of IV contrast. No lobar consolidation. Interval scarring or atelectasis in the left lung base. Trace pleural effusion Costophrenic recesses are preserved. No cardiomegaly. Atherosclerotic changes aortic arch. No pneumothorax. Degenerative changes thoracic spine. No acute findings in the thoracic spine bridging anterior osteophytes. No displaced rib fracture. Upper abdominal images demonstrate 2.5 cm right renal cyst. Few lower attenuating lesions in the liver. 3 cm lower pole left renal cyst. No lymphadenopathy. Impression:  1. Minimal atelectasis or scarring in the left lung base with trace pleural fluid. 2. No displaced rib fracture. 3. Additional findings as above  2018 L and 2017 on R        CT CHEST WO CONTRAST  12/18/2017   FINDINGS: No mediastinal or hilar adenopathy. Coronary artery calcifications. Normal heart size. No pericardial effusion. No infiltrates or pleural effusions. Irregular nodular density left lateral lung base seen on the prior examination currently measures 8.4 mm in greatest dimension compared to 11 on the previous scan. This likely represents developing scar. There is a parenchymal scar in the right lung base stable. No new lung masses are identified. Upper abdomen Stable bilateral renal cysts. No suspicious bone lesions. Impression:  1. The nodular density left lung base is decreased in size compared the prior examination now measuring 8.4 mm in greatest dimension. This likely represents a developing scar. 1 year follow-up is recommended. 2. Stable CT thorax otherwise. Assessment      Diagnosis Orders   1.  Abnormal CT of the chest        History of blood clots with PE

## 2018-12-17 ENCOUNTER — TELEPHONE (OUTPATIENT)
Dept: PHARMACY | Age: 73
End: 2018-12-17

## 2018-12-19 ENCOUNTER — HOSPITAL ENCOUNTER (OUTPATIENT)
Dept: PHARMACY | Age: 73
Setting detail: THERAPIES SERIES
Discharge: HOME | End: 2018-12-19
Payer: MEDICARE

## 2018-12-19 DIAGNOSIS — I82.412 DEEP VEIN THROMBOSIS (DVT) OF FEMORAL VEIN OF LEFT LOWER EXTREMITY, UNSPECIFIED CHRONICITY (HCC): ICD-10-CM

## 2018-12-19 DIAGNOSIS — I82.412 DEEP VEIN THROMBOSIS (DVT) OF FEMORAL VEIN OF LEFT LOWER EXTREMITY, UNSPECIFIED CHRONICITY (HCC): Primary | ICD-10-CM

## 2018-12-19 DIAGNOSIS — G45.9 TIA (TRANSIENT ISCHEMIC ATTACK): ICD-10-CM

## 2018-12-19 LAB — POC INR: 1.8 (ref 0.8–1.2)

## 2018-12-19 PROCEDURE — 99211 OFF/OP EST MAY X REQ PHY/QHP: CPT

## 2018-12-19 PROCEDURE — 85610 PROTHROMBIN TIME: CPT

## 2018-12-19 PROCEDURE — 36416 COLLJ CAPILLARY BLOOD SPEC: CPT

## 2019-01-07 ENCOUNTER — PROCEDURE VISIT (OUTPATIENT)
Dept: UROLOGY | Age: 74
End: 2019-01-07
Payer: MEDICARE

## 2019-01-07 VITALS — BODY MASS INDEX: 25.15 KG/M2 | DIASTOLIC BLOOD PRESSURE: 78 MMHG | WEIGHT: 163 LBS | SYSTOLIC BLOOD PRESSURE: 132 MMHG

## 2019-01-07 DIAGNOSIS — R97.20 ELEVATED PSA: Primary | ICD-10-CM

## 2019-01-07 PROCEDURE — 55700 PR BIOPSY OF PROSTATE,NEEDLE/PUNCH: CPT | Performed by: UROLOGY

## 2019-01-07 PROCEDURE — 99999 PR SONO GUIDE NEEDLE BIOPSY: CPT | Performed by: UROLOGY

## 2019-01-07 PROCEDURE — 99999 PR OFFICE/OUTPT VISIT,PROCEDURE ONLY: CPT | Performed by: UROLOGY

## 2019-01-07 PROCEDURE — 96372 THER/PROPH/DIAG INJ SC/IM: CPT | Performed by: UROLOGY

## 2019-01-07 PROCEDURE — 76872 US TRANSRECTAL: CPT | Performed by: UROLOGY

## 2019-01-07 RX ORDER — GENTAMICIN SULFATE 40 MG/ML
80 INJECTION, SOLUTION INTRAMUSCULAR; INTRAVENOUS ONCE
Status: COMPLETED | OUTPATIENT
Start: 2019-01-07 | End: 2019-01-07

## 2019-01-07 RX ADMIN — GENTAMICIN SULFATE 80 MG: 40 INJECTION, SOLUTION INTRAMUSCULAR; INTRAVENOUS at 10:00

## 2019-01-16 ENCOUNTER — HOSPITAL ENCOUNTER (OUTPATIENT)
Dept: PHARMACY | Age: 74
Setting detail: THERAPIES SERIES
Discharge: HOME | End: 2019-01-16
Payer: MEDICARE

## 2019-01-16 DIAGNOSIS — G45.9 TIA (TRANSIENT ISCHEMIC ATTACK): ICD-10-CM

## 2019-01-16 DIAGNOSIS — I82.412 DEEP VEIN THROMBOSIS (DVT) OF FEMORAL VEIN OF LEFT LOWER EXTREMITY, UNSPECIFIED CHRONICITY (HCC): ICD-10-CM

## 2019-01-16 LAB — POC INR: 1.5 (ref 0.8–1.2)

## 2019-01-16 PROCEDURE — 36416 COLLJ CAPILLARY BLOOD SPEC: CPT

## 2019-01-16 PROCEDURE — 99211 OFF/OP EST MAY X REQ PHY/QHP: CPT

## 2019-01-16 PROCEDURE — 85610 PROTHROMBIN TIME: CPT

## 2019-01-18 ENCOUNTER — TELEPHONE (OUTPATIENT)
Dept: PHARMACY | Age: 74
End: 2019-01-18

## 2019-01-21 ENCOUNTER — HOSPITAL ENCOUNTER (OUTPATIENT)
Dept: PHARMACY | Age: 74
Setting detail: THERAPIES SERIES
Discharge: HOME | End: 2019-01-21
Payer: MEDICARE

## 2019-01-21 DIAGNOSIS — G45.9 TIA (TRANSIENT ISCHEMIC ATTACK): ICD-10-CM

## 2019-01-21 DIAGNOSIS — I82.412 DEEP VEIN THROMBOSIS (DVT) OF FEMORAL VEIN OF LEFT LOWER EXTREMITY, UNSPECIFIED CHRONICITY (HCC): ICD-10-CM

## 2019-01-21 LAB — POC INR: 2 (ref 0.8–1.2)

## 2019-01-21 PROCEDURE — 99211 OFF/OP EST MAY X REQ PHY/QHP: CPT

## 2019-01-21 PROCEDURE — 85610 PROTHROMBIN TIME: CPT

## 2019-01-21 PROCEDURE — 36416 COLLJ CAPILLARY BLOOD SPEC: CPT

## 2019-01-23 ENCOUNTER — OFFICE VISIT (OUTPATIENT)
Dept: UROLOGY | Age: 74
End: 2019-01-23
Payer: MEDICARE

## 2019-01-23 VITALS
WEIGHT: 166 LBS | BODY MASS INDEX: 25.16 KG/M2 | DIASTOLIC BLOOD PRESSURE: 68 MMHG | HEIGHT: 68 IN | SYSTOLIC BLOOD PRESSURE: 118 MMHG

## 2019-01-23 DIAGNOSIS — C61 PROSTATE CA (HCC): Primary | ICD-10-CM

## 2019-01-23 DIAGNOSIS — N40.0 PROSTATISM: ICD-10-CM

## 2019-01-23 PROCEDURE — G8484 FLU IMMUNIZE NO ADMIN: HCPCS | Performed by: NURSE PRACTITIONER

## 2019-01-23 PROCEDURE — 1036F TOBACCO NON-USER: CPT | Performed by: NURSE PRACTITIONER

## 2019-01-23 PROCEDURE — 99214 OFFICE O/P EST MOD 30 MIN: CPT | Performed by: NURSE PRACTITIONER

## 2019-01-23 PROCEDURE — G8427 DOCREV CUR MEDS BY ELIG CLIN: HCPCS | Performed by: NURSE PRACTITIONER

## 2019-01-23 PROCEDURE — 1123F ACP DISCUSS/DSCN MKR DOCD: CPT | Performed by: NURSE PRACTITIONER

## 2019-01-23 PROCEDURE — G8419 CALC BMI OUT NRM PARAM NOF/U: HCPCS | Performed by: NURSE PRACTITIONER

## 2019-01-23 PROCEDURE — 4040F PNEUMOC VAC/ADMIN/RCVD: CPT | Performed by: NURSE PRACTITIONER

## 2019-01-23 PROCEDURE — 3017F COLORECTAL CA SCREEN DOC REV: CPT | Performed by: NURSE PRACTITIONER

## 2019-01-23 PROCEDURE — 1101F PT FALLS ASSESS-DOCD LE1/YR: CPT | Performed by: NURSE PRACTITIONER

## 2019-01-23 RX ORDER — TAMSULOSIN HYDROCHLORIDE 0.4 MG/1
0.4 CAPSULE ORAL DAILY
Qty: 90 CAPSULE | Refills: 3 | Status: SHIPPED | OUTPATIENT
Start: 2019-01-23 | End: 2020-01-16

## 2019-01-30 RX ORDER — BENAZEPRIL HYDROCHLORIDE 40 MG/1
40 TABLET, FILM COATED ORAL DAILY
Qty: 30 TABLET | Refills: 3 | Status: SHIPPED | OUTPATIENT
Start: 2019-01-30 | End: 2020-01-27

## 2019-01-31 ENCOUNTER — HOSPITAL ENCOUNTER (OUTPATIENT)
Dept: PHARMACY | Age: 74
Setting detail: THERAPIES SERIES
Discharge: HOME | End: 2019-01-31
Payer: MEDICARE

## 2019-01-31 DIAGNOSIS — G45.9 TIA (TRANSIENT ISCHEMIC ATTACK): ICD-10-CM

## 2019-01-31 DIAGNOSIS — I82.519 CHRONIC DEEP VEIN THROMBOSIS (DVT) OF FEMORAL VEIN, UNSPECIFIED LATERALITY (HCC): ICD-10-CM

## 2019-01-31 LAB — POC INR: 2.1 (ref 0.8–1.2)

## 2019-01-31 PROCEDURE — 85610 PROTHROMBIN TIME: CPT | Performed by: PHARMACIST

## 2019-01-31 PROCEDURE — 36416 COLLJ CAPILLARY BLOOD SPEC: CPT | Performed by: PHARMACIST

## 2019-01-31 PROCEDURE — 99211 OFF/OP EST MAY X REQ PHY/QHP: CPT | Performed by: PHARMACIST

## 2019-02-01 ENCOUNTER — TELEPHONE (OUTPATIENT)
Dept: UROLOGY | Age: 74
End: 2019-02-01

## 2019-02-12 ENCOUNTER — TELEPHONE (OUTPATIENT)
Dept: UROLOGY | Age: 74
End: 2019-02-12

## 2019-02-27 ENCOUNTER — OFFICE VISIT (OUTPATIENT)
Dept: FAMILY MEDICINE CLINIC | Age: 74
End: 2019-02-27
Payer: MEDICARE

## 2019-02-27 VITALS
RESPIRATION RATE: 12 BRPM | SYSTOLIC BLOOD PRESSURE: 128 MMHG | DIASTOLIC BLOOD PRESSURE: 70 MMHG | HEIGHT: 68 IN | WEIGHT: 166.8 LBS | HEART RATE: 60 BPM | BODY MASS INDEX: 25.28 KG/M2

## 2019-02-27 DIAGNOSIS — G45.9 TIA (TRANSIENT ISCHEMIC ATTACK): ICD-10-CM

## 2019-02-27 DIAGNOSIS — K21.9 GASTROESOPHAGEAL REFLUX DISEASE WITHOUT ESOPHAGITIS: ICD-10-CM

## 2019-02-27 DIAGNOSIS — Z51.81 MEDICATION MONITORING ENCOUNTER: ICD-10-CM

## 2019-02-27 DIAGNOSIS — G56.03 BILATERAL CARPAL TUNNEL SYNDROME: ICD-10-CM

## 2019-02-27 DIAGNOSIS — I10 ESSENTIAL HYPERTENSION: ICD-10-CM

## 2019-02-27 DIAGNOSIS — M48.062 SPINAL STENOSIS OF LUMBAR REGION WITH NEUROGENIC CLAUDICATION: ICD-10-CM

## 2019-02-27 DIAGNOSIS — C61 PROSTATE CANCER (HCC): Primary | ICD-10-CM

## 2019-02-27 DIAGNOSIS — N18.30 CKD (CHRONIC KIDNEY DISEASE) STAGE 3, GFR 30-59 ML/MIN (HCC): ICD-10-CM

## 2019-02-27 PROCEDURE — 1036F TOBACCO NON-USER: CPT | Performed by: FAMILY MEDICINE

## 2019-02-27 PROCEDURE — 1101F PT FALLS ASSESS-DOCD LE1/YR: CPT | Performed by: FAMILY MEDICINE

## 2019-02-27 PROCEDURE — G8427 DOCREV CUR MEDS BY ELIG CLIN: HCPCS | Performed by: FAMILY MEDICINE

## 2019-02-27 PROCEDURE — 99214 OFFICE O/P EST MOD 30 MIN: CPT | Performed by: FAMILY MEDICINE

## 2019-02-27 PROCEDURE — G8419 CALC BMI OUT NRM PARAM NOF/U: HCPCS | Performed by: FAMILY MEDICINE

## 2019-02-27 PROCEDURE — G8484 FLU IMMUNIZE NO ADMIN: HCPCS | Performed by: FAMILY MEDICINE

## 2019-02-27 PROCEDURE — 1123F ACP DISCUSS/DSCN MKR DOCD: CPT | Performed by: FAMILY MEDICINE

## 2019-02-27 PROCEDURE — 4040F PNEUMOC VAC/ADMIN/RCVD: CPT | Performed by: FAMILY MEDICINE

## 2019-02-27 PROCEDURE — 3017F COLORECTAL CA SCREEN DOC REV: CPT | Performed by: FAMILY MEDICINE

## 2019-02-27 ASSESSMENT — ENCOUNTER SYMPTOMS
COUGH: 0
SHORTNESS OF BREATH: 0
RESPIRATORY NEGATIVE: 1
GASTROINTESTINAL NEGATIVE: 1

## 2019-02-27 ASSESSMENT — PATIENT HEALTH QUESTIONNAIRE - PHQ9
SUM OF ALL RESPONSES TO PHQ QUESTIONS 1-9: 0
SUM OF ALL RESPONSES TO PHQ9 QUESTIONS 1 & 2: 0
1. LITTLE INTEREST OR PLEASURE IN DOING THINGS: 0
2. FEELING DOWN, DEPRESSED OR HOPELESS: 0
SUM OF ALL RESPONSES TO PHQ QUESTIONS 1-9: 0

## 2019-02-28 ENCOUNTER — HOSPITAL ENCOUNTER (OUTPATIENT)
Dept: PHARMACY | Age: 74
Setting detail: THERAPIES SERIES
Discharge: HOME | End: 2019-02-28
Payer: MEDICARE

## 2019-02-28 DIAGNOSIS — I82.519 CHRONIC DEEP VEIN THROMBOSIS (DVT) OF FEMORAL VEIN, UNSPECIFIED LATERALITY (HCC): ICD-10-CM

## 2019-02-28 DIAGNOSIS — G45.9 TIA (TRANSIENT ISCHEMIC ATTACK): ICD-10-CM

## 2019-02-28 LAB — POC INR: 2.5 (ref 0.8–1.2)

## 2019-02-28 PROCEDURE — 85610 PROTHROMBIN TIME: CPT

## 2019-02-28 PROCEDURE — 36416 COLLJ CAPILLARY BLOOD SPEC: CPT

## 2019-02-28 PROCEDURE — 99211 OFF/OP EST MAY X REQ PHY/QHP: CPT

## 2019-03-28 ENCOUNTER — HOSPITAL ENCOUNTER (OUTPATIENT)
Dept: PHARMACY | Age: 74
Setting detail: THERAPIES SERIES
Discharge: HOME | End: 2019-03-28
Payer: MEDICARE

## 2019-03-28 DIAGNOSIS — I82.519 CHRONIC DEEP VEIN THROMBOSIS (DVT) OF FEMORAL VEIN, UNSPECIFIED LATERALITY (HCC): ICD-10-CM

## 2019-03-28 DIAGNOSIS — G45.9 TIA (TRANSIENT ISCHEMIC ATTACK): ICD-10-CM

## 2019-03-28 LAB — POC INR: 1.6 (ref 0.8–1.2)

## 2019-03-28 PROCEDURE — 85610 PROTHROMBIN TIME: CPT

## 2019-03-28 PROCEDURE — 99211 OFF/OP EST MAY X REQ PHY/QHP: CPT

## 2019-03-28 PROCEDURE — 36416 COLLJ CAPILLARY BLOOD SPEC: CPT

## 2019-04-16 ENCOUNTER — HOSPITAL ENCOUNTER (OUTPATIENT)
Dept: PHARMACY | Age: 74
Setting detail: THERAPIES SERIES
Discharge: HOME | End: 2019-04-16
Payer: MEDICARE

## 2019-04-16 DIAGNOSIS — G45.9 TIA (TRANSIENT ISCHEMIC ATTACK): ICD-10-CM

## 2019-04-16 DIAGNOSIS — I82.519 CHRONIC DEEP VEIN THROMBOSIS (DVT) OF FEMORAL VEIN, UNSPECIFIED LATERALITY (HCC): ICD-10-CM

## 2019-04-16 LAB — POC INR: 3.6 (ref 0.8–1.2)

## 2019-04-16 PROCEDURE — 85610 PROTHROMBIN TIME: CPT

## 2019-04-16 PROCEDURE — 99211 OFF/OP EST MAY X REQ PHY/QHP: CPT

## 2019-04-16 PROCEDURE — 36416 COLLJ CAPILLARY BLOOD SPEC: CPT

## 2019-04-16 NOTE — PROGRESS NOTES
[facility]  Anticoagulation Clinic  [phone] (phone)  [phone] (fax)      Mr. Henri Cheadle is a 74 y.o.  male with history of PE/DVT/TIA, per Dr. Neymar May referral, who presents today for Warfarin monitoring and adjustment (3 week visit after increasing dose by 16.7% based on diet he was going to continue). Patient verifies current dosing regimen and tablet strength. No missed or extra doses, except as ordered last visit. Patient denies bleeding/bruising/swelling/SOB/chest pain. No blood in urine or stool. Dietary changes: slightly decreased celery and broccoli. After last visit, realized he'd had 3 days of ham and beans and cabbage. Had also been drinking green tea that he failed to mention (stopped after last visit). Stressed importance of consistent diet regarding Vitamin K. Asked for another instructional booklet (couldn't find his) - given. No changes in medication/OTC agents/herbals. No change in alcohol use or tobacco use. No change in activity level. Patient denies headaches/dizziness/lightheadedness/falls. No vomiting/diarrhea or acute illness. No procedures scheduled in the future at this time. Had a root canal.    Assessment:   Lab Results   Component Value Date    INR 3.60 (H) 04/16/2019    INR 1.60 (H) 03/28/2019    INR 2.50 (H) 02/28/2019     INR supratherapeutic - goal 2-3. Recent Labs     04/16/19  1142   INR 3.60*       Plan:  POCT INR ordered/performed/result reviewed. 2.5 mg today, T, then decrease PO Coumadin to 2.5 mg W, 5 mg MTThFSS (from 5 mg daily=7.1% decrease). Recheck INR in 3 weeks. (Report given - orders entered by AIMEE Celeste, PharmD.)  Patient reminded to call the Anticoagulation Clinic with any signs or symptoms of bleeding or with any medication changes. Patient given instructions utilizing the teach back method. Advised caution due to unusually thin blood.   Discharged ambulatory in no apparent

## 2019-05-02 ENCOUNTER — TELEPHONE (OUTPATIENT)
Dept: FAMILY MEDICINE CLINIC | Age: 74
End: 2019-05-02

## 2019-05-02 ENCOUNTER — TELEPHONE (OUTPATIENT)
Dept: PHARMACY | Age: 74
End: 2019-05-02

## 2019-05-02 DIAGNOSIS — G45.9 TIA (TRANSIENT ISCHEMIC ATTACK): Primary | ICD-10-CM

## 2019-05-02 DIAGNOSIS — Z79.01 ANTICOAGULATED ON COUMADIN: ICD-10-CM

## 2019-05-02 NOTE — TELEPHONE ENCOUNTER
Collaborative Practice Agreement (CPA) signed by Dr. Franky Espinoza 4/3/19. Called office to ask that electronic referral be entered.

## 2019-05-06 ENCOUNTER — HOSPITAL ENCOUNTER (OUTPATIENT)
Dept: PHARMACY | Age: 74
Setting detail: THERAPIES SERIES
Discharge: HOME | End: 2019-05-06
Payer: MEDICARE

## 2019-05-06 DIAGNOSIS — I82.519 CHRONIC DEEP VEIN THROMBOSIS (DVT) OF FEMORAL VEIN, UNSPECIFIED LATERALITY (HCC): ICD-10-CM

## 2019-05-06 DIAGNOSIS — G45.9 TIA (TRANSIENT ISCHEMIC ATTACK): ICD-10-CM

## 2019-05-06 LAB — POC INR: 2.9 (ref 0.8–1.2)

## 2019-05-06 PROCEDURE — 85610 PROTHROMBIN TIME: CPT

## 2019-05-06 PROCEDURE — 36416 COLLJ CAPILLARY BLOOD SPEC: CPT

## 2019-05-06 PROCEDURE — 99211 OFF/OP EST MAY X REQ PHY/QHP: CPT

## 2019-05-06 NOTE — PROGRESS NOTES
Medication Management [facility]  Anticoagulation Clinic  [phone] (phone)  [phone] (fax)      Mr. Leena Salinas is a 74 y.o.  male with history of DVT, PE, CVA who presents today for anticoagulation monitoring and adjustment. Patient verifies current dosing regimen and tablet strength. No missed or extra doses. Patient denies s/s bleeding/bruising/swelling/SOB/chest pain. No blood in urine or stool. No dietary changes. No changes in medication/OTC agents/Herbals. No change in alcohol use or tobacco use. No change in activity level. Patient denies headaches/dizziness/lightheadedness/falls. No vomiting/diarrhea or acute illness. No procedures scheduled in the future at this time. Assessment:   Lab Results   Component Value Date    INR 2.90 (H) 05/06/2019    INR 3.60 (H) 04/16/2019    INR 1.60 (H) 03/28/2019     INR therapeutic   Recent Labs     05/06/19  1348   INR 2.90*     Plan: POCT INR ordered and result reviewed with Melany Pharm. D. Order received and verified to continue Coumadin 2.5 mg po W, 5 mg po MTTHFSS. Recheck INR in 3 weeks. Patient reminded to call the Anticoagulation Clinic with any signs or symptoms of bleeding or with any medication changes. Patient given instructions utilizing the teach back method. Discharged ambulatory in no apparent distress. After visit summary printed and reviewed with patient.       Medications reviewed and updated on home medication list Yes    Influenza vaccine:     [] given    [x] declined   [] received previously   [] plans to receive at a later time   [x] refused    [x] documented in EPIC

## 2019-05-23 ENCOUNTER — OFFICE VISIT (OUTPATIENT)
Dept: UROLOGY | Age: 74
End: 2019-05-23
Payer: MEDICARE

## 2019-05-23 VITALS
SYSTOLIC BLOOD PRESSURE: 102 MMHG | BODY MASS INDEX: 24.86 KG/M2 | WEIGHT: 164 LBS | DIASTOLIC BLOOD PRESSURE: 56 MMHG | HEIGHT: 68 IN

## 2019-05-23 DIAGNOSIS — C61 PROSTATE CANCER (HCC): Primary | ICD-10-CM

## 2019-05-23 LAB — GLUCOSE URINE: NORMAL MG/DL

## 2019-05-23 PROCEDURE — 1123F ACP DISCUSS/DSCN MKR DOCD: CPT | Performed by: NURSE PRACTITIONER

## 2019-05-23 PROCEDURE — G8427 DOCREV CUR MEDS BY ELIG CLIN: HCPCS | Performed by: NURSE PRACTITIONER

## 2019-05-23 PROCEDURE — 3017F COLORECTAL CA SCREEN DOC REV: CPT | Performed by: NURSE PRACTITIONER

## 2019-05-23 PROCEDURE — G8420 CALC BMI NORM PARAMETERS: HCPCS | Performed by: NURSE PRACTITIONER

## 2019-05-23 PROCEDURE — 4040F PNEUMOC VAC/ADMIN/RCVD: CPT | Performed by: NURSE PRACTITIONER

## 2019-05-23 PROCEDURE — 1036F TOBACCO NON-USER: CPT | Performed by: NURSE PRACTITIONER

## 2019-05-23 PROCEDURE — 99213 OFFICE O/P EST LOW 20 MIN: CPT | Performed by: NURSE PRACTITIONER

## 2019-05-23 RX ORDER — OXYBUTYNIN CHLORIDE 5 MG/1
5 TABLET ORAL DAILY
Qty: 30 TABLET | Refills: 0 | Status: SHIPPED | OUTPATIENT
Start: 2019-05-23 | End: 2019-07-23

## 2019-05-23 NOTE — PROGRESS NOTES
[address] Jarvis Pyle  Dept: [phone]  Dept Fax: [phone] : [phone]      Visit Date: 5/23/2019    HPI:     Anahy Aguilar presents for follow-up of elevated PSA. He underwent TRUS biopsy on 1/7/19 for PSA of 8.91. Pathology showed Justin 3+3=6 prostate cancer in left apex and left base. Pathology was sent for Oncotype and came back very low risk. Most current PSA 8.62. He also has lower urinary tract symptoms and has been taking Flomax every 2-3 days. He reports fatigue when he takes the flomax. He notices his stream gets weaker around the 3rd day of not taking it. He reports nocturia is the most irritative symptom. He goes every 2-3 hours at night time.       Past Medical History:   Diagnosis Date    Arthritis     neck, Wrists , back    Cancer ([address]     skin (removed)    Cerebral artery occlusion with cerebral infarction ([address]     TIA    DVT (deep venous thrombosis) (HCC)     GERD (gastroesophageal reflux disease)     Hx of blood clots     PE    Hypertension     Pulmonary emboli (HCC) 5/7/16    Scoliosis     TIA (transient ischemic attack)       Past Surgical History:   Procedure Laterality Date    COLONOSCOPY  2019    [facility]    EGD  2019    [facility]    EXCISION / BIOPSY SKIN LESION OF ARM Left 10/11/2017    EXCISION 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 WITH FLAP AND FROZEN SECTION performed by Joanne Luna MD at [place] [address] Left 10/11/2017    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    SKIN CANCER EXCISION  2010    Face skin cancer removal       Family History   Problem Relation Age of Onset    Cancer Mother     High Blood Pressure Mother     Alzheimer's Disease Mother     Heart Disease Mother     Parkinsonism Father     Diabetes Neg Hx     Kidney Disease Neg Hx     Stroke Neg Hx     Colon Cancer Neg Hx     Esophageal Cancer Neg Hx     Rectal Cancer Neg Hx     Stomach Cancer Neg Hx        Social History     Tobacco Use    Smoking status: Never Smoker    Smokeless tobacco: Never Used   Substance Use Topics    Alcohol use: Yes     Comment: rarely          ROS:  Constitutional: Negative for chills, fatigue, fever, or weight loss. Eyes: Denies reported visual changes. ENT: Denies headache, difficulty swallowing, nose bleeds, ringing in ears, or earaches. Cardiovascular: Negative for chest pain, palpitations, tachycardia or edema. Respiratory: Denies cough or SOB. GI:The patient denies abdominal or flank pain, anorexia, nausea or vomiting. : See HPI  Musculoskeletal: Patient denies low back pain or painful or reduced ROM of the back or extremities. Neurological: The patient denies any symptoms of neurological impairment or TIA's; no history of stroke. Lymphatic: Denies swollen glands in neck, axillary or inguinal areas. Psychiatric: Denies anxiety or depression. Skin: Denies rash or lesions. The remainder of the complete ROS is negative    PHYSICAL EXAM:  VITALS:  BP (!) 102/56 (Site: Left Upper Arm, Position: Sitting, Cuff Size: Medium Adult)   Ht 5' 8\" (1.727 m)   Wt 164 lb (74.4 kg)   BMI 24.94 kg/m² . Constitutional:    Alert and oriented times 3, no acute distress and cooperative to examination with appropriate mood and affect. HEENT:   Head:         Normocephalic and atraumatic. Mouth/Throat:          Mucous membranes are normal.   Eyes:         EOM are normal. No scleral icterus. Nose:    The external appearance of the nose is normal  Ears: The ears appear normal to external inspection   Neck:         Supple, symmetrical, trachea midline, no adenopathy, thyroid symmetric, not enlarged and no tenderness. Cardiovascular:        Normal rate, regular rhythm, S1 S2 heart sounds. Pulmonary/Chest:       Chest symmetric with normal A/P diameter, no wheezes, rales, or rhonchi noted. Normal respiratory rate and rhthym. No use of accessory muscles. Abdominal:          Soft. No tenderness. Active bowel sounds. Musculoskeletal:    Normal range of motion. he exhibits no edema or tenderness of lower extremities. Extremities:    No cyanosis, clubbing, or edema present. Neurological:    Alert and oriented. No cranial nerve deficit. There are no focalizing motor or sensory deficits. DATA:  CBC:   Lab Results   Component Value Date    WBC 6.4 05/04/2019    RBC 4.52 05/04/2019    HGB 14.2 05/04/2019    HCT 42.0 05/04/2019    MCV 92.7 05/04/2019    MCH 31.4 05/04/2019    MCHC 33.9 05/04/2019    RDW 12.6 05/04/2019     05/04/2019    MPV 10.0 11/27/2018     BMP:    Lab Results   Component Value Date     05/04/2019    K 4.7 05/04/2019     05/04/2019    CO2 23 05/04/2019    BUN 36 05/04/2019    CREATININE 1.88 05/04/2019    CALCIUM 8.9 05/04/2019    LABGLOM 35 11/27/2018    GLUCOSE 96 05/04/2019     BUN/Creatinine:    Lab Results   Component Value Date    BUN 36 05/04/2019    CREATININE 1.88 05/04/2019     Magnesium:  No results found for: MG  Phosphorus:    Lab Results   Component Value Date    PHOS 3.4 05/04/2019     PT/INR:    Lab Results   Component Value Date    INR 2.90 05/06/2019    INR 2.58 11/27/2018     U/A:    Lab Results   Component Value Date    GLUCOSEU see below 05/23/2019       FINAL DIAGNOSIS:  A-C, E. Prostate, right apex, right mid, right base, and left mid,  needle core biopsies:   Benign prostatic tissue.      Negative for malignancy. D.  Prostate, left apex, needle core biopsy:      Adenocarcinoma of the prostate, involving 1 out of 2 cores, [place]      score 3+3 = 6 (measuring 1 mm/33 mm, 3%). [place], left base, needle core biopsy:      Adenocarcinoma of the prostate, involving 1 out of 2 cores, [place]      score 3+3 = 6 (measuring 1 mm/33 mm, 3%).     Lab Results   Component Value Date    PSA 8.62 (H) 05/04/2019    PSA 8.91 (H) 11/03/2018    PSA

## 2019-05-28 ENCOUNTER — HOSPITAL ENCOUNTER (OUTPATIENT)
Dept: PHARMACY | Age: 74
Setting detail: THERAPIES SERIES
Discharge: HOME | End: 2019-05-28
Payer: MEDICARE

## 2019-05-28 DIAGNOSIS — G45.9 TIA (TRANSIENT ISCHEMIC ATTACK): ICD-10-CM

## 2019-05-28 DIAGNOSIS — I82.519 CHRONIC DEEP VEIN THROMBOSIS (DVT) OF FEMORAL VEIN, UNSPECIFIED LATERALITY (HCC): ICD-10-CM

## 2019-05-28 DIAGNOSIS — Z86.73 HISTORY OF CVA (CEREBROVASCULAR ACCIDENT): ICD-10-CM

## 2019-05-28 LAB — POC INR: 2.8 (ref 0.8–1.2)

## 2019-05-28 PROCEDURE — 85610 PROTHROMBIN TIME: CPT

## 2019-05-28 PROCEDURE — 36416 COLLJ CAPILLARY BLOOD SPEC: CPT

## 2019-05-28 PROCEDURE — 99211 OFF/OP EST MAY X REQ PHY/QHP: CPT

## 2019-06-25 ENCOUNTER — HOSPITAL ENCOUNTER (OUTPATIENT)
Dept: PHARMACY | Age: 74
Setting detail: THERAPIES SERIES
Discharge: HOME | End: 2019-06-25
Payer: MEDICARE

## 2019-06-25 DIAGNOSIS — I82.519 CHRONIC DEEP VEIN THROMBOSIS (DVT) OF FEMORAL VEIN, UNSPECIFIED LATERALITY (HCC): ICD-10-CM

## 2019-06-25 DIAGNOSIS — G45.9 TIA (TRANSIENT ISCHEMIC ATTACK): ICD-10-CM

## 2019-06-25 DIAGNOSIS — Z86.73 HISTORY OF CVA (CEREBROVASCULAR ACCIDENT): ICD-10-CM

## 2019-06-25 LAB — POC INR: 2.6 (ref 0.8–1.2)

## 2019-06-25 PROCEDURE — 85610 PROTHROMBIN TIME: CPT

## 2019-06-25 PROCEDURE — 99211 OFF/OP EST MAY X REQ PHY/QHP: CPT

## 2019-06-25 PROCEDURE — 36416 COLLJ CAPILLARY BLOOD SPEC: CPT

## 2019-06-25 RX ORDER — WARFARIN SODIUM 2.5 MG/1
TABLET ORAL
Qty: 180 TABLET | Refills: 3 | Status: SHIPPED | OUTPATIENT
Start: 2019-06-25 | End: 2020-07-20

## 2019-06-25 NOTE — PROGRESS NOTES
Medication Management [facility]  [phone] (phone)  [phone] (fax)      Mr. William Trejo is a 74 y.o.  male with history of DVT, PE, CVA who presents today for anticoagulation monitoring and adjustment. Patient verifies current dosing regimen and tablet strength. No missed or extra doses. Patient denies s/s bleeding/swelling/SOB/chest pain. Usual bruising. No blood in urine or stool. No dietary changes. No changes in medication/OTC agents/Herbals. No change in alcohol use or tobacco use. No change in activity level. Patient denies dizziness/lightheadedness/falls. Has been having pain in left posterior neck and head. Plans to get checked. No vomiting/diarrhea or acute illness. No procedures scheduled in the future at this time. Assessment:   Lab Results   Component Value Date    INR 2.60 (H) 06/25/2019    INR 2.80 (H) 05/28/2019    INR 2.90 (H) 05/06/2019     INR therapeutic   Recent Labs     06/25/19  1338   INR 2.60*     Plan: POCT INR ordered and result reviewed. Continue Coumadin 2.5 mg po W, 5 mg po MTTHFSS. Recheck INR in 4 weeks. Patient reminded to call the Anticoagulation Clinic with any signs or symptoms of bleeding or with any medication changes. Patient given instructions utilizing the teach back method. Discharged ambulatory in no apparent distress. After visit summary printed and reviewed with patient.       Medications reviewed and updated on home medication list Yes    Influenza vaccine:     [] given    [x] declined   [] received previously   [] plans to receive at a later time   [x] refused    [x] documented in EPIC

## 2019-06-27 ENCOUNTER — HOSPITAL ENCOUNTER (EMERGENCY)
Age: 74
Discharge: HOME | End: 2019-06-27
Payer: MEDICARE

## 2019-06-27 ENCOUNTER — HOSPITAL ENCOUNTER (EMERGENCY)
Dept: GENERAL RADIOLOGY | Age: 74
Discharge: HOME | End: 2019-06-27
Payer: MEDICARE

## 2019-06-27 VITALS
TEMPERATURE: 98.7 F | SYSTOLIC BLOOD PRESSURE: 141 MMHG | RESPIRATION RATE: 16 BRPM | BODY MASS INDEX: 25.01 KG/M2 | HEART RATE: 80 BPM | OXYGEN SATURATION: 96 % | DIASTOLIC BLOOD PRESSURE: 63 MMHG | HEIGHT: 68 IN | WEIGHT: 165 LBS

## 2019-06-27 DIAGNOSIS — M19.90 ARTHROSIS: Primary | ICD-10-CM

## 2019-06-27 DIAGNOSIS — H61.23 BILATERAL IMPACTED CERUMEN: ICD-10-CM

## 2019-06-27 PROCEDURE — 2709999900 HC NON-CHARGEABLE SUPPLY

## 2019-06-27 PROCEDURE — 69209 REMOVE IMPACTED EAR WAX UNI: CPT

## 2019-06-27 PROCEDURE — 72040 X-RAY EXAM NECK SPINE 2-3 VW: CPT

## 2019-06-27 PROCEDURE — 99214 OFFICE O/P EST MOD 30 MIN: CPT | Performed by: NURSE PRACTITIONER

## 2019-06-27 PROCEDURE — 99213 OFFICE O/P EST LOW 20 MIN: CPT

## 2019-06-27 ASSESSMENT — ENCOUNTER SYMPTOMS
DIARRHEA: 0
COUGH: 0
VOMITING: 0
NAUSEA: 0
ABDOMINAL PAIN: 0

## 2019-06-27 ASSESSMENT — PAIN DESCRIPTION - PAIN TYPE: TYPE: ACUTE PAIN

## 2019-06-27 ASSESSMENT — PAIN DESCRIPTION - LOCATION: LOCATION: NECK;EAR

## 2019-06-27 ASSESSMENT — PAIN DESCRIPTION - ORIENTATION: ORIENTATION: LEFT

## 2019-06-27 ASSESSMENT — PAIN DESCRIPTION - FREQUENCY: FREQUENCY: CONTINUOUS

## 2019-06-27 ASSESSMENT — PAIN DESCRIPTION - PROGRESSION: CLINICAL_PROGRESSION: GRADUALLY WORSENING

## 2019-06-27 ASSESSMENT — PAIN DESCRIPTION - DESCRIPTORS: DESCRIPTORS: ACHING

## 2019-06-27 ASSESSMENT — PAIN DESCRIPTION - ONSET: ONSET: ON-GOING

## 2019-06-27 ASSESSMENT — PAIN SCALES - GENERAL: PAINLEVEL_OUTOF10: 2

## 2019-06-27 ASSESSMENT — PAIN - FUNCTIONAL ASSESSMENT: PAIN_FUNCTIONAL_ASSESSMENT: PREVENTS OR INTERFERES SOME ACTIVE ACTIVITIES AND ADLS

## 2019-06-27 NOTE — ED PROVIDER NOTES
noted.   Psychiatric: He has a normal mood and affect. His speech is normal.   Nursing note and vitals reviewed. DIAGNOSTIC RESULTS   Labs: No results found for this visit on 06/27/19. IMAGING:  XR CERVICAL SPINE (2-3 VIEWS)   Final Result   1. Moderate to marked multilevel facet arthrosis of the cervical spine is noted. No acute fracture is seen. **This report has been created using voice recognition software. It may contain minor errors which are inherent in voice recognition technology. **      Final report electronically signed by Dr. Karol Aponte on 6/27/2019 11:23 AM        URGENT CARE COURSE:     Vitals:    06/27/19 1101   BP: (!) 141/63   Pulse: 80   Resp: 16   Temp: 98.7 °F (37.1 °C)   TempSrc: Temporal   SpO2: 96%   Weight: 165 lb (74.8 kg)   Height: 5' 8\" (1.727 m)       Medications - No data to display  PROCEDURES:  None  FINALIMPRESSION      1. Arthrosis    2. Bilateral impacted cerumen        DISPOSITION/PLAN   DISPOSITION    Discharge   Xray as above. Advised patient to follow with PCP for further evaluation and management. Physical exam of ears after irrigation is normal.  Physical assessment findings, diagnostic testing(s) if applicable, and vital signs reviewed with patient/patient representative. Questions answered. Medications as directed, including OTC medications for supportive care. Education provided on medications. Differential diagnosis(s) discussed with patient/patient representative. Home care/self care instructions reviewed with patient/patient representative. Patient is to follow-up with family care provider in 2-3 days if no improvement. Patient is to go to the emergency department if symptoms worsen. Patient/patient representative is aware of care plan, questions answered, verbalizes understanding and is in agreement.   Teach back method used for patient/patient representative teaching(s) and printed instructions attached to after visit summary.       PATIENT REFERRED TO:  Nick Biggs MD  [address] [facility], [address] [place], [place] [address] [id number]  [phone]    Schedule an appointment as soon as possible for a visit in 3 days  for further evalation, If symptoms worsen, GO DIRECTLY TO [place] [address] [facility]  [address] [place] [address]  [phone]    As needed, If symptoms worsen, GO DIRECTLY TO THE EMERGENCY DEPARTMENT    DISCHARGE MEDICATIONS:  Discharge Medication List as of 6/27/2019 11:33 AM        Discharge Medication List as of 6/27/2019 11:33 AM          Job Blanca, [address], APRN - CNP  06/27/19 1223

## 2019-06-27 NOTE — ED TRIAGE NOTES
Patient to room with c/o left ear and neck pain increasing in pain three days ago. States left ear pain originally beginning three months ago. Denies injury. Pain increases while lying down.

## 2019-06-28 ENCOUNTER — TELEPHONE (OUTPATIENT)
Dept: FAMILY MEDICINE CLINIC | Age: 74
End: 2019-06-28

## 2019-07-02 ENCOUNTER — OFFICE VISIT (OUTPATIENT)
Dept: FAMILY MEDICINE CLINIC | Age: 74
End: 2019-07-02
Payer: MEDICARE

## 2019-07-02 VITALS
BODY MASS INDEX: 24.59 KG/M2 | DIASTOLIC BLOOD PRESSURE: 60 MMHG | HEART RATE: 68 BPM | RESPIRATION RATE: 16 BRPM | SYSTOLIC BLOOD PRESSURE: 110 MMHG | WEIGHT: 161.7 LBS

## 2019-07-02 DIAGNOSIS — M54.2 TRIGGER POINT OF NECK: Primary | ICD-10-CM

## 2019-07-02 DIAGNOSIS — M25.512 TRIGGER POINT OF SHOULDER REGION, LEFT: ICD-10-CM

## 2019-07-02 PROCEDURE — 1123F ACP DISCUSS/DSCN MKR DOCD: CPT | Performed by: FAMILY MEDICINE

## 2019-07-02 PROCEDURE — 20552 NJX 1/MLT TRIGGER POINT 1/2: CPT | Performed by: FAMILY MEDICINE

## 2019-07-02 PROCEDURE — G8420 CALC BMI NORM PARAMETERS: HCPCS | Performed by: FAMILY MEDICINE

## 2019-07-02 PROCEDURE — G8427 DOCREV CUR MEDS BY ELIG CLIN: HCPCS | Performed by: FAMILY MEDICINE

## 2019-07-02 PROCEDURE — 3017F COLORECTAL CA SCREEN DOC REV: CPT | Performed by: FAMILY MEDICINE

## 2019-07-02 PROCEDURE — 4040F PNEUMOC VAC/ADMIN/RCVD: CPT | Performed by: FAMILY MEDICINE

## 2019-07-02 PROCEDURE — 99213 OFFICE O/P EST LOW 20 MIN: CPT | Performed by: FAMILY MEDICINE

## 2019-07-02 PROCEDURE — 1036F TOBACCO NON-USER: CPT | Performed by: FAMILY MEDICINE

## 2019-07-02 RX ORDER — METHYLPREDNISOLONE ACETATE 80 MG/ML
80 INJECTION, SUSPENSION INTRA-ARTICULAR; INTRALESIONAL; INTRAMUSCULAR; SOFT TISSUE ONCE
Status: COMPLETED | OUTPATIENT
Start: 2019-07-02 | End: 2019-07-02

## 2019-07-02 RX ORDER — METHYLPREDNISOLONE 4 MG/1
TABLET ORAL
Qty: 1 KIT | Refills: 0 | Status: SHIPPED | OUTPATIENT
Start: 2019-07-02 | End: 2019-07-08

## 2019-07-02 RX ORDER — BACLOFEN 10 MG/1
10 TABLET ORAL 3 TIMES DAILY
Qty: 15 TABLET | Refills: 1 | Status: SHIPPED | OUTPATIENT
Start: 2019-07-02 | End: 2019-08-01 | Stop reason: SDUPTHER

## 2019-07-02 RX ADMIN — METHYLPREDNISOLONE ACETATE 80 MG: 80 INJECTION, SUSPENSION INTRA-ARTICULAR; INTRALESIONAL; INTRAMUSCULAR; SOFT TISSUE at 13:17

## 2019-07-02 NOTE — PROGRESS NOTES
Visit Information    Have you changed or started any medications since your last visit including any over-the-counter medicines, vitamins, or herbal medicines? no   Are you having any side effects from any of your medications? -  no  Have you stopped taking any of your medications? Is so, why? -  no    Have you seen any other physician or provider since your last visit? Yes - Records Obtained  Have you had any other diagnostic tests since your last visit? Yes - Records Obtained  Have you been seen in the emergency room and/or had an admission to a hospital since we last saw you? Yes - Records Obtained  Have you had your routine dental cleaning in the past 6 months? n/a    Have you activated your [employer] account? If not, what are your barriers?  Yes     Patient Care Team:  Jazmín Law MD as PCP - General (Family Medicine)  Jazmín Law MD as PCP - [facility]  Jacqueline Steinberg MD as Consulting Physician (Pulmonology)  Sunil Malloy MD as Consulting Physician (Urology)  Amie Mcmillan MD as Consulting Physician (Gastroenterology)    Medical History Review  Past Medical, Family, and Social History reviewed and does contribute to the patient presenting condition    Health Maintenance   Topic Date Due    DTaP/Tdap/Td vaccine (1 - Tdap) 08/26/1962    Shingles Vaccine (1 of 2) 08/26/1993    Annual Wellness Visit (AWV)  08/26/2006    A1C test (Diabetic or Prediabetic)  05/05/2019    Flu vaccine (1) 09/01/2019    Potassium monitoring  05/04/2020    Creatinine monitoring  05/04/2020    Lipid screen  05/04/2024    Colon cancer screen colonoscopy  01/04/2029    Pneumococcal 65+ years Vaccine  Completed         Administrations This Visit     methylPREDNISolone acetate (DEPO-MEDROL) injection 80 mg     Admin Date  07/02/2019  13:17 Action  Given Dose  80 mg Route  Intra-Lesional Site  Neck Administered By  Maureen Sales LPN    Ordering Provider:  Jazmín Law MD    NDC:  6864-8357-56    Lot#:

## 2019-07-02 NOTE — PROGRESS NOTES
Subjective:      Patient ID: Angie Cam is a 74 y.o. male. HPI  Patient comes in with persisting left neck and shoulder pain. He has had numerous chiropractic treatments, ice and cold massage, and over-the-counter pain meds without any results. He is not aware of any specific injuries that triggered this discomfort and is here now for evaluation. Review of Systems   Musculoskeletal: Positive for neck pain and neck stiffness. Objective:   Physical Exam   Neck: Muscular tenderness present. Decreased range of motion present. Assessment:       Diagnosis Orders   1. Trigger point of neck  AK INJECT TRIGGER POINT, 1 OR 2    methylPREDNISolone acetate (DEPO-MEDROL) injection 80 mg    methylPREDNISolone (MEDROL, ERICK,) 4 MG tablet    baclofen (LIORESAL) 10 MG tablet   2. Trigger point of shoulder region, left  AK INJECT TRIGGER POINT, 1 OR 2    methylPREDNISolone acetate (DEPO-MEDROL) injection 80 mg    methylPREDNISolone (MEDROL, ERICK,) 4 MG tablet    baclofen (LIORESAL) 10 MG tablet           Plan:      Orders Placed This Encounter   Procedures    AK INJECT TRIGGER POINT, 1 OR 2     The left cervical and left trapezius trigger points were cleansed and anesthetized with ethyl chloride. Each was injected with 2 cc of a mixture of 80 mg of Depo-Medrol and 3 cc of Marcaine. Ice was then applied. Consent was obtained prior to injection. Ice injected points of the neck and shoulder 4 times for 10 minutes over the next 24 hours. Medrol Dosepak today. Use baclofen 10 mg tablets up to 3 times a day as needed for muscle pain and tightness/spasm. Recheck as needed.         Jetta Essex, MD

## 2019-07-09 ENCOUNTER — TELEPHONE (OUTPATIENT)
Dept: FAMILY MEDICINE CLINIC | Age: 74
End: 2019-07-09

## 2019-07-09 DIAGNOSIS — B37.0 THRUSH: ICD-10-CM

## 2019-07-09 DIAGNOSIS — M54.2 CERVICAL MUSCLE PAIN: Primary | ICD-10-CM

## 2019-07-09 RX ORDER — CLOTRIMAZOLE 10 MG/1
10 LOZENGE ORAL; TOPICAL
Qty: 50 TABLET | Refills: 0 | Status: SHIPPED | OUTPATIENT
Start: 2019-07-09 | End: 2019-07-19

## 2019-07-09 NOTE — TELEPHONE ENCOUNTER
Medication has been sent to the pharmacy for treatment of what sounds like oral thrush. For his neck, if the meds are not working, the next step would be physical therapy or chiropractic treatment. If he is interested in physical therapy, I will refer him to the physical therapy provider of his choice.

## 2019-07-18 ENCOUNTER — HOSPITAL ENCOUNTER (OUTPATIENT)
Dept: PHYSICAL THERAPY | Age: 74
Setting detail: THERAPIES SERIES
Discharge: HOME | End: 2019-07-18
Payer: MEDICARE

## 2019-07-18 PROCEDURE — 97110 THERAPEUTIC EXERCISES: CPT

## 2019-07-18 PROCEDURE — 97161 PT EVAL LOW COMPLEX 20 MIN: CPT

## 2019-07-18 ASSESSMENT — PAIN SCALES - GENERAL: PAINLEVEL_OUTOF10: 5

## 2019-07-18 ASSESSMENT — PAIN DESCRIPTION - LOCATION: LOCATION: NECK

## 2019-07-18 ASSESSMENT — PAIN DESCRIPTION - PAIN TYPE: TYPE: CHRONIC PAIN

## 2019-07-18 NOTE — PROGRESS NOTES
** PLEASE SIGN, DATE AND TIME CERTIFICATION BELOW AND RETURN TO [facility] OUTPATIENT REHABILITATION (FAX #: [phone]). ATTEST/CO-SIGN IF ACCESSING VIA [employer]. THANK YOU.**    I certify that I have examined the patient below and determined that Physical Medicine and Rehabilitation service is necessary and that I approve the established plan of care for up to 90 days or as specifically noted. Attestation, signature or co-signature of physician indicates approval of certification requirements.    ________________________ ____________ __________  Physician Signature   Date   Time        [place]     Time In: 5989  Time Out: 1500  Minutes: 55  Timed Code Treatment Minutes: 10 Minutes(Ther EX)       Date: 2019  Patient Name: Pierce Oakes,  Gender:  male        CSN: [id number]   : 1945  (74 y.o.)  Referral Date : 07/10/19     Referring Practitioner: Pete Prado MD      Diagnosis: Cervical muscle pain M54.2  Treatment Diagnosis: Cervicalgia, Spinal stiffness, muscular weakness, R shoulder pain  Additional Pertinent Hx: Muscle cramping noticed a couple weeks ago. Carpal tunnel B wrists. Hx of blood clots. General:  PT Visit Information  Onset Date: 19  PT Insurance Information: Medicare - secondary [address] - unlimited visits, aquatic and modalities covered, no ionto, no heat/cold  Total # of Visits to Date: 1  Plan of Care/Certification Expiration Date: 19  Progress Note Counter: 1/10 for PN       See Medical History Questionnaire for information related to allergies and medications. Subjective:        Subjective: Patient has chronic history of neck and lower back pain. Tried chiropractor adjustments but did not notice much improvement. Patient was given cortisone injection, given muscle relaxer.  Since having injection it is slightly better, especially through the work day, but in the instructions for Next Treatment: Review HEP, UBE warm up forward and retro, prone on total gym scapular AROM, scapular theraband, corner stretching, upper trap, upper thoracic, posterior capsule, levator scap stretching. May continue traction as needed. Current Treatment Recommendations: Strengthening, ROM, Balance Training, Manual Therapy - Soft Tissue Mobilization, Manual Therapy - Joint Manipulation, Home Exercise Program, Modalities, Aquatics, Integrated Dry Needling    History: Personal factors or comorbidities that impact plan of care -  Moderate Complexity: 1-2 personal factors or comorbidities. See history section above for details. Examination: Body structures and functions, activity limitations, participation restrictions; using standardized tests and measures - Low Complexity: 1-2 body structures and functional, activity limitation and/or participation restrictions. See restrictions and objective section above for details. Clinical Presentation: Low - Stable and Uncomplicated: acute neck pain, no radicular symptoms noted    Decision Making: Low Complexity due to see above. Decision making was based on patient assessment and decision making process in determining plan of care and establishing reasonable expectations for measurable functional outcomes. Evaluation Complexity: Based on the findings of patient history, examination, clinical presentation, and decision making during this evaluation, the evaluation of Mora Risk  is of low complexity. Goals:       Short term goals  Time Frame for Short term goals: 4 weeks  Short term goal 1: Patient will report decreased neck pain from baseline 5/10 to less than 2/10 in order to sleep better at night. Short term goal 2: Patient will improve cervical AROM to 0-25 deg bilateral sidebend and 0-60 deg cervical rotation in order to turn head while driving.   Short term goal 3: Patient will demonstrate good cervical and scapular retraction

## 2019-07-23 ENCOUNTER — HOSPITAL ENCOUNTER (OUTPATIENT)
Dept: PHYSICAL THERAPY | Age: 74
Setting detail: THERAPIES SERIES
Discharge: HOME | End: 2019-07-23
Payer: MEDICARE

## 2019-07-23 ENCOUNTER — HOSPITAL ENCOUNTER (OUTPATIENT)
Dept: PHARMACY | Age: 74
Setting detail: THERAPIES SERIES
Discharge: HOME | End: 2019-07-23
Payer: MEDICARE

## 2019-07-23 VITALS — DIASTOLIC BLOOD PRESSURE: 68 MMHG | HEART RATE: 76 BPM | SYSTOLIC BLOOD PRESSURE: 112 MMHG

## 2019-07-23 DIAGNOSIS — I82.519 CHRONIC DEEP VEIN THROMBOSIS (DVT) OF FEMORAL VEIN, UNSPECIFIED LATERALITY (HCC): ICD-10-CM

## 2019-07-23 DIAGNOSIS — G45.9 TIA (TRANSIENT ISCHEMIC ATTACK): ICD-10-CM

## 2019-07-23 DIAGNOSIS — Z86.73 HISTORY OF CVA (CEREBROVASCULAR ACCIDENT): ICD-10-CM

## 2019-07-23 LAB
HCT VFR BLD CALC: 38.5 % (ref 42–52)
HEMOGLOBIN: 12.8 GM/DL (ref 14–18)
INR BLD: 4.22 (ref 0.85–1.13)

## 2019-07-23 PROCEDURE — 85018 HEMOGLOBIN: CPT

## 2019-07-23 PROCEDURE — 85610 PROTHROMBIN TIME: CPT

## 2019-07-23 PROCEDURE — 85014 HEMATOCRIT: CPT

## 2019-07-23 PROCEDURE — 97110 THERAPEUTIC EXERCISES: CPT

## 2019-07-23 PROCEDURE — 99211 OFF/OP EST MAY X REQ PHY/QHP: CPT

## 2019-07-23 ASSESSMENT — PAIN DESCRIPTION - LOCATION: LOCATION: NECK

## 2019-07-23 ASSESSMENT — PAIN DESCRIPTION - ORIENTATION: ORIENTATION: LEFT;RIGHT

## 2019-07-23 ASSESSMENT — PAIN SCALES - GENERAL: PAINLEVEL_OUTOF10: 4

## 2019-07-23 ASSESSMENT — PAIN DESCRIPTION - PAIN TYPE: TYPE: CHRONIC PAIN

## 2019-07-23 NOTE — PROGRESS NOTES
Recheck INR in 2 days. (Report given - orders entered by AIMEE Castillo, PharmD.)  Patient reminded to call the Anticoagulation Clinic with any signs or symptoms of bleeding or with any medication changes. Patient given instructions utilizing the teach back method. Advised caution due to unusually thin blood. Verified phone numbers; received permission to leave any new orders on voicemail. Discharged ambulatory in no apparent distress. Orders after lab INR/H&H reviewed with AIMEE Castillo, PharmD.:  See in 1 week (still hold Coumadin 2 days), and agreed with plan to send H&H to PCP. Message left on patient's answering machine with new INR result/new appt. for 0940 7/29 (canceled 7/25 visit). After visit summary printed and reviewed with patient.       Medications reviewed and updated on home medication list.    Influenza vaccine:     [] given    [] declined   [] received previously   [] plans to receive at a later time   [] refused    [] documented in EPIC

## 2019-07-24 DIAGNOSIS — Z79.01 ANTICOAGULATED ON COUMADIN: Primary | ICD-10-CM

## 2019-07-25 ENCOUNTER — HOSPITAL ENCOUNTER (OUTPATIENT)
Dept: PHYSICAL THERAPY | Age: 74
Setting detail: THERAPIES SERIES
Discharge: HOME | End: 2019-07-25
Payer: MEDICARE

## 2019-07-25 PROCEDURE — 97140 MANUAL THERAPY 1/> REGIONS: CPT

## 2019-07-25 PROCEDURE — 97110 THERAPEUTIC EXERCISES: CPT

## 2019-07-25 ASSESSMENT — PAIN SCALES - GENERAL: PAINLEVEL_OUTOF10: 1

## 2019-07-25 NOTE — PROGRESS NOTES
[place]     Time In: 1430  Time Out: 2151  Minutes: 39  Timed Code Treatment Minutes: 39 Minutes      Date: 2019  Patient Name: Pierce Oakes,  Gender:  male        CSN: [id number]   : 1945  (74 y.o.)  Referral Date : 07/10/19    Referring Practitioner: Pete Prado MD      Diagnosis: Cervical muscle pain M54.2  Treatment Diagnosis: Cervicalgia, Spinal stiffness, muscular weakness, R shoulder pain   Additional Pertinent Hx: Muscle cramping noticed a couple weeks ago. Carpal tunnel B wrists. Hx of blood clots. General:  PT Visit Information  Onset Date: 19  PT Insurance Information: Medicare - [employer] [address] - unlimited visits, aquatic and modalities covered, no ionto, no heat/cold  Total # of Visits to Date: 3  Plan of Care/Certification Expiration Date: 19  Progress Note Counter: 3/10 for PN             Subjective:        Subjective: Patient states he is doing ok today. Reports muscle cramps in his legs are a little better. Pain:  Patient Currently in Pain: Yes  Pain Assessment: 0-10  Pain Level: 1(Left neck)    Objective    Exercises  Exercise 1: UBE seat 6 x2 minutes ( 1 minute retro/1 minute forward) 120 RPM  Exercise 2: Seated with lumbar support (towel roll) scap retraction, scap retro rolls x10 each   Exercise 3: Seated with lumbar support (towel roll) posterior capsule stretch, upper thoracic, upper trap stretch, and levator stretch 15 seconds x3 bilateral  Exercise 4: Pec Stretch in the door way 15 seconds x3  Exercise 5: Seated with lumbar support (towel roll): cervical rotation x5 bilateral - tightness on L with going to the right  Exercise 6: Supine cervical retraction and chin tuck x10 each  Exercise 7: Supine: Elbow press 10x 5 seconds, External Rotation and horizontal abduction with orange band x10 each.  Shoulder flexion with orange x10 on Left onlym unable to due to right due to shoulder pain  Exercise 8: Supine cervical traction 30 seconds x5 followed by occipital release x2 minutes and gentle myofascial work x2 minutes to left side of neck   Exercise 9: Prone on Total Gym for shoulder rows, horizontal abduction, shoulder extension, shoulder flexion 10x each        Activity Tolerance:  Activity Tolerance: Patient Tolerated treatment well    Assessment:  Assessment: Progressed strengthening with prone on total gym and discussed sleeping position, with education on use of cervical towel roll. Patient tolerated fairly well. Patient felt better after manual work but did not rate pain level. Prognosis: Excellent     Patient Education:  Patient Education: Continue with HEP, Monitor response to progressions. Plan:  Times per week: 2  Plan weeks: 8  Specific instructions for Next Treatment: Review HEP, UBE warm up forward and retro, prone on total gym scapular AROM, scapular theraband, corner stretching, upper trap, upper thoracic, posterior capsule, levator scap stretching. May continue traction as needed. Current Treatment Recommendations: Strengthening, ROM, Balance Training, Manual Therapy - Soft Tissue Mobilization, Manual Therapy - Joint Manipulation, Home Exercise Program, Modalities, Aquatics, Integrated Dry Needling  Plan Comment: Continue with current POC    Goals:     Short term goals  Time Frame for Short term goals: 4 weeks  Short term goal 1: Patient will report decreased neck pain from baseline 5/10 to less than 2/10 in order to sleep better at night. Short term goal 2: Patient will improve cervical AROM to 0-25 deg bilateral sidebend and 0-60 deg cervical rotation in order to turn head while driving. Short term goal 3: Patient will demonstrate good cervical and scapular retraction posture during therapy exercises without cues from therapist.  Short term goal 4: Patient will be IND with HEP in order to meet long term goals.     Long term goals  Time Frame for

## 2019-07-29 ENCOUNTER — HOSPITAL ENCOUNTER (OUTPATIENT)
Dept: PHARMACY | Age: 74
Setting detail: THERAPIES SERIES
Discharge: HOME | End: 2019-07-29
Payer: MEDICARE

## 2019-07-29 ENCOUNTER — HOSPITAL ENCOUNTER (OUTPATIENT)
Age: 74
Discharge: HOME | End: 2019-07-29
Payer: MEDICARE

## 2019-07-29 ENCOUNTER — TELEPHONE (OUTPATIENT)
Dept: UROLOGY | Age: 74
End: 2019-07-29

## 2019-07-29 DIAGNOSIS — Z86.73 HISTORY OF CVA (CEREBROVASCULAR ACCIDENT): ICD-10-CM

## 2019-07-29 DIAGNOSIS — I82.519 CHRONIC DEEP VEIN THROMBOSIS (DVT) OF FEMORAL VEIN, UNSPECIFIED LATERALITY (HCC): ICD-10-CM

## 2019-07-29 DIAGNOSIS — Z79.01 ANTICOAGULATED ON COUMADIN: ICD-10-CM

## 2019-07-29 DIAGNOSIS — C61 PROSTATE CANCER (HCC): ICD-10-CM

## 2019-07-29 DIAGNOSIS — C61 PROSTATE CA (HCC): Primary | ICD-10-CM

## 2019-07-29 DIAGNOSIS — G45.9 TIA (TRANSIENT ISCHEMIC ATTACK): ICD-10-CM

## 2019-07-29 LAB
BASOPHILS # BLD: 0.7 %
BASOPHILS ABSOLUTE: 0 THOU/MM3 (ref 0–0.1)
EOSINOPHIL # BLD: 3.1 %
EOSINOPHILS ABSOLUTE: 0.2 THOU/MM3 (ref 0–0.4)
ERYTHROCYTE [DISTWIDTH] IN BLOOD BY AUTOMATED COUNT: 12.3 % (ref 11.5–14.5)
ERYTHROCYTE [DISTWIDTH] IN BLOOD BY AUTOMATED COUNT: 42.4 FL (ref 35–45)
HCT VFR BLD CALC: 39.9 % (ref 42–52)
HEMOGLOBIN: 13.2 GM/DL (ref 14–18)
IMMATURE GRANS (ABS): 0.02 THOU/MM3 (ref 0–0.07)
IMMATURE GRANULOCYTES: 0 %
LYMPHOCYTES # BLD: 26.8 %
LYMPHOCYTES ABSOLUTE: 1.5 THOU/MM3 (ref 1–4.8)
MCH RBC QN AUTO: 30.8 PG (ref 26–33)
MCHC RBC AUTO-ENTMCNC: 33.1 GM/DL (ref 32.2–35.5)
MCV RBC AUTO: 93.2 FL (ref 80–94)
MONOCYTES # BLD: 9.7 %
MONOCYTES ABSOLUTE: 0.5 THOU/MM3 (ref 0.4–1.3)
NUCLEATED RED BLOOD CELLS: 0 /100 WBC
PLATELET # BLD: 217 THOU/MM3 (ref 130–400)
PMV BLD AUTO: 10 FL (ref 9.4–12.4)
POC INR: 2.4 (ref 0.8–1.2)
PROSTATE SPECIFIC ANTIGEN: 9.75 NG/ML (ref 0–1)
RBC # BLD: 4.28 MILL/MM3 (ref 4.7–6.1)
SEG NEUTROPHILS: 59.3 %
SEGMENTED NEUTROPHILS ABSOLUTE COUNT: 3.3 THOU/MM3 (ref 1.8–7.7)
WBC # BLD: 5.6 THOU/MM3 (ref 4.8–10.8)

## 2019-07-29 PROCEDURE — 85610 PROTHROMBIN TIME: CPT

## 2019-07-29 PROCEDURE — 36416 COLLJ CAPILLARY BLOOD SPEC: CPT

## 2019-07-29 PROCEDURE — 84153 ASSAY OF PSA TOTAL: CPT

## 2019-07-29 PROCEDURE — 85025 COMPLETE CBC W/AUTO DIFF WBC: CPT

## 2019-07-29 PROCEDURE — 36415 COLL VENOUS BLD VENIPUNCTURE: CPT

## 2019-07-29 PROCEDURE — 99211 OFF/OP EST MAY X REQ PHY/QHP: CPT

## 2019-07-29 NOTE — PROGRESS NOTES
Medication Management [facility]  Anticoagulation Clinic  [phone] (phone)  [phone] (fax)      Mr. Lelo Horn is a 74 y.o.  male with history of DVT, PE, CVA who presents today for anticoagulation monitoring and adjustment. Patient verifies current dosing regimen and tablet strength. No missed or extra doses. Patient denies s/s bleeding/bruising/swelling/SOB/chest pain. No blood in urine or stool. No dietary changes. No changes in medication/OTC agents/Herbals. Did not fill mycelex. No change in alcohol use or tobacco use. Increase in activity level. Working outside. Patient denies headaches/dizziness/lightheadedness/falls. No vomiting/diarrhea or acute illness. No procedures scheduled in the future at this time. Assessment:   Lab Results   Component Value Date    INR 2.40 (H) 07/29/2019    INR 4.22 (H) 07/23/2019    INR 2.60 (H) 06/25/2019     INR therapeutic   Recent Labs     07/29/19  0943   INR 2.40*     Plan: POCT INR ordered and result reviewed with JOCELYN MOURA Order received and verified to continue Coumadin 2.5 mg po W, 5 mg po [id number]. Recheck INR in 2.5 weeks. Patient reminded to call the Anticoagulation Clinic with any signs or symptoms of bleeding or with any medication changes. Patient given instructions utilizing the teach back method. CBC order given per 's order. Discharged ambulatory in no apparent distress. After visit summary printed and reviewed with patient.       Medications reviewed and updated on home medication list Yes    Influenza vaccine:     [] given    [x] declined   [] received previously   [] plans to receive at a later time   [x] refused    [x] documented in EPIC

## 2019-07-30 ENCOUNTER — HOSPITAL ENCOUNTER (OUTPATIENT)
Dept: PHYSICAL THERAPY | Age: 74
Setting detail: THERAPIES SERIES
Discharge: HOME | End: 2019-07-30
Payer: MEDICARE

## 2019-07-30 PROCEDURE — 97110 THERAPEUTIC EXERCISES: CPT

## 2019-07-30 NOTE — PROGRESS NOTES
[place]     Time In: 1430  Time Out: 7467  Minutes: 43  Timed Code Treatment Minutes: 43 Minutes  Date: 2019  Patient Name: Dee Sandhu,  Gender:  male        CSN: [id number]   : 1945  (74 y.o.)  Referral Date : 07/10/19    Referring Practitioner: Yvon Bruno MD      Diagnosis: Cervical muscle pain M54.2  Treatment Diagnosis: Cervicalgia, Spinal stiffness, muscular weakness, R shoulder pain   Additional Pertinent Hx: Muscle cramping noticed a couple weeks ago. Carpal tunnel B wrists. Hx of blood clots. General:  PT Visit Information  Onset Date: 19  PT Insurance Information: Medicare - secondary [address] - unlimited visits, aquatic and modalities covered, no ionto, no heat/cold  Total # of Visits to Date: 4  Plan of Care/Certification Expiration Date: 19  Progress Note Counter: 4/10 for PN               Subjective:  Chart Reviewed: Yes  Family / Caregiver Present: No     Subjective: Patient stood from waiting room and felt,\"dizzy, I worked outside and last night I took my medicne and I don't feel well. \" Patient could not recall year of birth. BP 84/52. after rest 102/61. Patient feels therapy has helped his neck. Pain:  Patient Currently in Pain: No       Objective         Exercises  Exercise 2: Seated with lumbar support (towel roll) scap retraction, scap retro rolls x10 each   Exercise 3: Seated with lumbar support (towel roll) posterior capsule stretch, upper thoracic, upper trap stretch, and levator stretch 15 seconds x3 bilateral  Exercise 4: Pec Stretch in the door way 15 seconds x3  Exercise 5: Seated with lumbar support (towel roll): cervical rotation x5 bilateral - tightness on L with going to the right  Exercise 6: Seated cervical retraction and chin tuck x10 each.  No pain   Exercise 7: Supine: Elbow press 10x 5 seconds, External Rotation and horizontal abduction with orange band x10 each. Shoulder flexion with orange x10 on Left onlym unable to due to right due to shoulder pain  Exercise 8: Supine cervical traction 30 seconds x5 followed by occipital release x2 minutes and gentle myofascial work x2 minutes to left side of neck        Activity Tolerance: Tolerated well after seat rest break intially. Assessment: Body structures, Functions, Activity limitations: Decreased functional mobility , Decreased strength, Decreased endurance, Decreased ROM, Decreased balance  Assessment: Patient presented complaining of dizziness. Patient mildly unsteady with gait. Monitor BP starting at 84/52, 87/61,102/61. Did seated and supine work and held R [address] and prone shoulder PRE's. BP end of session. 121/65. Patient states he felt better. Discharge Recommendations: Continue to assess pending progress    Patient Education:  Patient Education: Continue with HEP. Plan:  Times per week: 2  Plan weeks: 8  Specific instructions for Next Treatment: Review HEP, UBE warm up forward and retro, prone on total gym scapular AROM, scapular theraband, corner stretching, upper trap, upper thoracic, posterior capsule, levator scap stretching. May continue traction as needed. Current Treatment Recommendations: Strengthening, ROM, Balance Training, Manual Therapy - Soft Tissue Mobilization, Manual Therapy - Joint Manipulation, Home Exercise Program, Modalities, Aquatics, Integrated Dry Needling  Plan Comment: Continue with current POC    Goals:       Short term goals  Time Frame for Short term goals: 4 weeks  Short term goal 1: Patient will report decreased neck pain from baseline 5/10 to less than 2/10 in order to sleep better at night. Short term goal 2: Patient will improve cervical AROM to 0-25 deg bilateral sidebend and 0-60 deg cervical rotation in order to turn head while driving.   Short term goal 3: Patient will demonstrate good cervical and scapular retraction posture during therapy exercises

## 2019-07-30 NOTE — RESULT ENCOUNTER NOTE
In that case, continue with Dr Solomon Mendoza. No need to see oncologist since he is seeing urology on a regular basis.

## 2019-08-01 ENCOUNTER — HOSPITAL ENCOUNTER (OUTPATIENT)
Dept: PHYSICAL THERAPY | Age: 74
Setting detail: THERAPIES SERIES
Discharge: HOME | End: 2019-08-01
Payer: MEDICARE

## 2019-08-01 ENCOUNTER — TELEPHONE (OUTPATIENT)
Dept: FAMILY MEDICINE CLINIC | Age: 74
End: 2019-08-01

## 2019-08-01 DIAGNOSIS — M54.2 TRIGGER POINT OF NECK: ICD-10-CM

## 2019-08-01 DIAGNOSIS — M25.512 TRIGGER POINT OF SHOULDER REGION, LEFT: ICD-10-CM

## 2019-08-01 PROCEDURE — 97110 THERAPEUTIC EXERCISES: CPT

## 2019-08-01 PROCEDURE — 97140 MANUAL THERAPY 1/> REGIONS: CPT

## 2019-08-01 RX ORDER — BACLOFEN 10 MG/1
10 TABLET ORAL 3 TIMES DAILY
Qty: 45 TABLET | Refills: 2 | Status: SHIPPED | OUTPATIENT
Start: 2019-08-01 | End: 2019-08-02 | Stop reason: SINTOL

## 2019-08-01 ASSESSMENT — PAIN SCALES - GENERAL: PAINLEVEL_OUTOF10: 3

## 2019-08-01 NOTE — PROGRESS NOTES
the right  Exercise 6: Seated cervical retraction and chin tuck x10 each. No pain   Exercise 7: Supine: Elbow press 10x 5 seconds, External Rotation and horizontal abduction with orange band x10 each. Shoulder flexion with orange x10 on Left onlym unable to due to right due to shoulder pain  Exercise 8: Supine cervical traction 30 seconds x5 followed by occipital release x2 minutes and gentle myofascial work x2 minutes to left side of neck   Exercise 9: Prone on Total Gym for shoulder rows, horizontal abduction, shoulder extension, shoulder flexion 10x each        Activity Tolerance:  Activity Tolerance: Patient Tolerated treatment well    Assessment: Body structures, Functions, Activity limitations: Decreased functional mobility , Decreased strength, Decreased endurance, Decreased ROM, Decreased balance  Assessment: Shortened session today and minimal progress due to patient arriving 15 minutes late to appointment. Discussed with patient possible causes of muscle cramping and intructed him to follow up with his doctor since they are more frequent and intense. Patient Education:  Patient Education: Continue with HEP. Plan:  Times per week: 2  Plan weeks: 8  Specific instructions for Next Treatment: Review HEP, UBE warm up forward and retro, prone on total gym scapular AROM, scapular theraband, corner stretching, upper trap, upper thoracic, posterior capsule, levator scap stretching. May continue traction as needed. Current Treatment Recommendations: Strengthening, ROM, Balance Training, Manual Therapy - Soft Tissue Mobilization, Manual Therapy - Joint Manipulation, Home Exercise Program, Modalities, Aquatics, Integrated Dry Needling  Plan Comment: Continue with current POC    Goals:     Short term goals  Time Frame for Short term goals: 4 weeks  Short term goal 1: Patient will report decreased neck pain from baseline 5/10 to less than 2/10 in order to sleep better at night.   Short term goal 2: Patient

## 2019-08-02 DIAGNOSIS — G47.62 NOCTURNAL LEG CRAMPS: ICD-10-CM

## 2019-08-02 DIAGNOSIS — G89.29 CHRONIC MIDLINE LOW BACK PAIN WITHOUT SCIATICA: Primary | ICD-10-CM

## 2019-08-02 DIAGNOSIS — M54.50 CHRONIC MIDLINE LOW BACK PAIN WITHOUT SCIATICA: Primary | ICD-10-CM

## 2019-08-02 RX ORDER — TIZANIDINE 2 MG/1
2 TABLET ORAL 4 TIMES DAILY PRN
Qty: 40 TABLET | Refills: 0 | Status: SHIPPED | OUTPATIENT
Start: 2019-08-02 | End: 2019-08-12

## 2019-08-06 ENCOUNTER — HOSPITAL ENCOUNTER (OUTPATIENT)
Dept: PHYSICAL THERAPY | Age: 74
Setting detail: THERAPIES SERIES
Discharge: HOME | End: 2019-08-06
Payer: MEDICARE

## 2019-08-06 PROCEDURE — 97110 THERAPEUTIC EXERCISES: CPT

## 2019-08-08 ENCOUNTER — HOSPITAL ENCOUNTER (OUTPATIENT)
Dept: PHYSICAL THERAPY | Age: 74
Setting detail: THERAPIES SERIES
Discharge: HOME | End: 2019-08-08
Payer: MEDICARE

## 2019-08-08 PROCEDURE — 97110 THERAPEUTIC EXERCISES: CPT

## 2019-08-08 NOTE — PROGRESS NOTES
[address]     Time In: 1052  Time Out: 1502  Minutes: 29  Timed Code Treatment Minutes: 29 Minutes                Date: 2019  Patient Name: Stacey Miner,  Gender:  male        CSN: [id number]   : 1945  (74 y.o.)  Referral Date : 07/10/19    Referring Practitioner: Timothy Vasquez MD      Diagnosis: Cervical muscle pain M54.2  Treatment Diagnosis: Cervicalgia, Spinal stiffness, muscular weakness, R shoulder pain   Additional Pertinent Hx: Muscle cramping noticed a couple weeks ago. Carpal tunnel B wrists. Hx of blood clots. General:  PT Visit Information  Onset Date: 19  PT Insurance Information: Medicare - [employer] [address] - unlimited visits, aquatic and modalities covered, no ionto, no heat/cold  Total # of Visits to Date: 7  Plan of Care/Certification Expiration Date: 19  Progress Note Counter: 7/10 for PN               Subjective:  Family / Caregiver Present: No     Subjective: Patient reproting pain level 2-3/10 today. Patient also stating that he has been a little dizzy today too. Pain:Neck pain 2-3/10            Objective  Exercises  Exercise 1: Did take BP at start of session. sitting in R arm 103/65. -pt reporting with sitting dizziness had passed  Exercise 2: Seated with lumbar support (towel roll) scap retraction, scap retro rolls x15 each   Exercise 3: Seated with lumbar support (towel roll) posterior capsule stretch, upper thoracic, upper trap stretch, and levator stretch 15 seconds x3 bilateral  Exercise 4: Pec Stretch in the door way 15 seconds x3  Exercise 5: Seated with lumbar support (towel roll): cervical rotation x10 bilateral - tightness on Left with going to the right  Exercise 6: Seated cervical retraction and chin tuck x10 each.  No pain   Exercise 7: Supine: Elbow press 15x 5 seconds, horizontal abduction with orange band x15 each  Exercise 8: Supine cervical traction 30 seconds x3 followed by occipital release x1-2 minutes  Exercise 10: supine shoulder flexion with hands together x 10 - left stretching right  attempted abduction but no stretch and pain in right shoulder so stopped. no stretach felt with ER either  Exercise 11: push ups at blue counter top x 10  Exercise 12: hydro chest level 5 x 10 bilat and level 3 x10 single arm         Activity Tolerance:  Activity Tolerance: Patient Tolerated treatment well    Assessment:  Assessment: Minimal progression today but did perform manual traction and occipital release with patient reporting no pain following. Patient did report feeling a little dizzy with position changes during session. . Did take BP at end of session and was 97/62. Gave a drink of water and had patient sit for a few minutes then was 110/67. Patient denies feeling dizzy at end of session and denies pain but still reporting neck feels stiff. Patient admits that he is on a new BP pill and every time he takes it he feels dizzy the next day. Strongly encouraged patient to call MD regarding BP pill. Prognosis: Excellent       Patient Education:  Patient Education: Encouraged patient to drink plenty of water and call MD about BP pill he is taking. Continue with HEP and monitor response to session. Plan:  Times per week: 2  Plan weeks: 8  Specific instructions for Next Treatment: Review HEP, UBE warm up forward and retro, prone on total gym scapular AROM, scapular theraband, corner stretching, upper trap, upper thoracic, posterior capsule, levator scap stretching. May continue traction as needed.    Current Treatment Recommendations: Strengthening, ROM, Balance Training, Manual Therapy - Soft Tissue Mobilization, Manual Therapy - Joint Manipulation, Home Exercise Program, Modalities, Aquatics, Integrated Dry Needling  Plan Comment: Continue with current POC    Goals:       Short term goals  Time Frame for Short term goals: 4

## 2019-08-13 ENCOUNTER — HOSPITAL ENCOUNTER (OUTPATIENT)
Dept: PHYSICAL THERAPY | Age: 74
Setting detail: THERAPIES SERIES
Discharge: HOME | End: 2019-08-13
Payer: MEDICARE

## 2019-08-13 PROCEDURE — 97110 THERAPEUTIC EXERCISES: CPT

## 2019-08-13 ASSESSMENT — PAIN SCALES - GENERAL: PAINLEVEL_OUTOF10: 3

## 2019-08-13 NOTE — PROGRESS NOTES
each  Exercise 9: Prone on Total Gym for shoulder rows, horizontal abduction, shoulder extension, shoulder flexion 10x each   Exercise 10: supine shoulder flexion with hands together x 10 - left stretching right    Exercise 11: push ups at blue counter top x 10  Exercise 12: hydro chest level 5 x 15 bilat and level 3 x15 single arm  Exercise 13: hydro stick x 10 F/B and S/S circles CW/CCW         Activity Tolerance:  Activity Tolerance: Patient Tolerated treatment well    Assessment: Body structures, Functions, Activity limitations: Decreased functional mobility , Decreased strength, Decreased endurance, Decreased ROM, Decreased balance  Assessment: Patient tolerated session well. Resumed UBE and added circles to hydrostick with good tolerance. Patient reporting feeling a little dizzy with position changes but once resting denies any dizziness. Patient reporting feeling good and not really pain feeling more stiffness at end of session. Prognosis: Excellent       Patient Education:  Patient Education: Continue with HEP. Monitor response to session. Plan:  Times per week: 2  Plan weeks: 8  Specific instructions for Next Treatment: Review HEP, UBE warm up forward and retro, prone on total gym scapular AROM, scapular theraband, corner stretching, upper trap, upper thoracic, posterior capsule, levator scap stretching. May continue traction as needed. Current Treatment Recommendations: Strengthening, ROM, Balance Training, Manual Therapy - Soft Tissue Mobilization, Manual Therapy - Joint Manipulation, Home Exercise Program, Modalities, Aquatics, Integrated Dry Needling  Plan Comment: Continue with current POC    Goals:       Short term goals  Time Frame for Short term goals: 4 weeks  Short term goal 1: Patient will report decreased neck pain from baseline 5/10 to less than 2/10 in order to sleep better at night.   Short term goal 2: Patient will improve cervical AROM to 0-25 deg bilateral sidebend and 0-60 deg

## 2019-08-15 ENCOUNTER — HOSPITAL ENCOUNTER (OUTPATIENT)
Dept: PHYSICAL THERAPY | Age: 74
Setting detail: THERAPIES SERIES
Discharge: HOME | End: 2019-08-15
Payer: MEDICARE

## 2019-08-15 ENCOUNTER — HOSPITAL ENCOUNTER (OUTPATIENT)
Dept: PHARMACY | Age: 74
Setting detail: THERAPIES SERIES
Discharge: HOME | End: 2019-08-15
Payer: MEDICARE

## 2019-08-15 DIAGNOSIS — Z86.73 HISTORY OF CVA (CEREBROVASCULAR ACCIDENT): ICD-10-CM

## 2019-08-15 DIAGNOSIS — I82.519 CHRONIC DEEP VEIN THROMBOSIS (DVT) OF FEMORAL VEIN, UNSPECIFIED LATERALITY (HCC): ICD-10-CM

## 2019-08-15 DIAGNOSIS — G45.9 TIA (TRANSIENT ISCHEMIC ATTACK): ICD-10-CM

## 2019-08-15 LAB — POC INR: 3.5 (ref 0.8–1.2)

## 2019-08-15 PROCEDURE — 99211 OFF/OP EST MAY X REQ PHY/QHP: CPT

## 2019-08-15 PROCEDURE — 36416 COLLJ CAPILLARY BLOOD SPEC: CPT

## 2019-08-15 PROCEDURE — 97110 THERAPEUTIC EXERCISES: CPT

## 2019-08-15 PROCEDURE — 85610 PROTHROMBIN TIME: CPT

## 2019-08-15 ASSESSMENT — PAIN DESCRIPTION - LOCATION: LOCATION: NECK

## 2019-08-15 ASSESSMENT — PAIN SCALES - GENERAL: PAINLEVEL_OUTOF10: 3

## 2019-08-15 ASSESSMENT — PAIN DESCRIPTION - ORIENTATION: ORIENTATION: RIGHT;LEFT

## 2019-08-15 ASSESSMENT — PAIN DESCRIPTION - PAIN TYPE: TYPE: CHRONIC PAIN

## 2019-08-15 NOTE — DISCHARGE SUMMARY
[address]     Time In: 5802  Time Out: 3477  Minutes: 40  Timed Code Treatment Minutes: 40 Minutes                Date: 8/15/2019  Patient Name: Blanca Britt,  Gender:  male        CSN: [id number]   : 1945  (74 y.o.)  Referral Date : 07/10/19    Referring Practitioner: Cecile Perera MD      Diagnosis: Cervical muscle pain M54.2  Treatment Diagnosis: Cervicalgia, Spinal stiffness, muscular weakness, R shoulder pain   Additional Pertinent Hx: Muscle cramping noticed a couple weeks ago. Carpal tunnel B wrists. Hx of blood clots. General:  PT Visit Information  Onset Date: 19  PT Insurance Information: Medicare - secondary [address] - unlimited visits, aquatic and modalities covered, no ionto, no heat/cold  Total # of Visits to Date: 9  Plan of Care/Certification Expiration Date: 19  Progress Note Counter: 0/10 progress note 8/15/19               Subjective:  Family / Caregiver Present: No  Comments: Sees physician follow up 19. Subjective: Patient still having a low amount of neck pain most days, occasionally flares up to 3-4/10 on left side of neck behind. He feels therapy has been helping his neck, less painful.       Pain:  Patient Currently in Pain: Yes  Pain Assessment: 0-10  Pain Level: 3  Pain Type: Chronic pain  Pain Location: Neck  Pain Orientation: Right, Left      Objective    Exercises  Exercise 1: UBE Seat 6 x2 minutes (1 minute forward/1 minute retro) 120 RPM   Exercise 2: Seated with lumbar support (towel roll) scap retraction, scap retro rolls x15 each    Exercise 3: Seated with lumbar support (towel roll) posterior capsule stretch, upper thoracic, upper trap stretch, and levator stretch 15 seconds x3 bilateral   Exercise 6: Seated cervical retraction and chin tuck x10 No pain   Exercise 11: push ups at blue counter top x 20  Exercise 13: hydro stick x 10 F/B and 3-4/10 in neck   Short term goal 2: Patient will improve cervical AROM to 0-25 deg bilateral sidebend and 0-60 deg cervical rotation in order to turn head while driving. - Goal Met 4/29/18 - head sidebend 0-30 deg right, 0-25 left. Rotation 0-60 deg right, 0-65 deg left. Short term goal 3: Patient will demonstrate good cervical and scapular retraction posture during therapy exercises without cues from therapist. - Goal Met 8/15/19 - good posture demonstrated during strength exercises, no increased pain. Short term goal 4: Patient will be IND with HEP in order to meet long term goals. - Goal Met 8/15/19 - using HEP handouts daily    Long term goals  Time Frame for Long term goals : 8 weeks  Long term goal 1: Patient will improve score of Neck disability index from baseline 38% to less than 20% in order to sleep more comfortably and lift heavy things for work. - Goal Met 8/15/19 - score 16% disability today, able to sleep with less pain but still awake from anxiety/worry. Long term goal 2: Patient will increase B shoulder strength to 5/5 for shoulder flexion, abduction, and scapular retractors prone in order to stabilize scapulae with lifting tasks. - Goal Met 8/15/19 - scap middle trap 5/5, shoulder ext 5/5, flexion 5/5 left 4+/5   Long term goal 3: Patient will tolerate 20 reps countertop push ups and 3 lb weight overhead press in order to better tolerate lifting and reaching tasks at work. - Not met 8/15/19 - progress made - 20 countertop push ups with good posture.  however R shoulder overhead reach increased pain    Dane Flurry, PT

## 2019-08-22 ENCOUNTER — OFFICE VISIT (OUTPATIENT)
Dept: FAMILY MEDICINE CLINIC | Age: 74
End: 2019-08-22
Payer: MEDICARE

## 2019-08-22 VITALS
BODY MASS INDEX: 25.1 KG/M2 | WEIGHT: 159.9 LBS | HEART RATE: 68 BPM | SYSTOLIC BLOOD PRESSURE: 106 MMHG | DIASTOLIC BLOOD PRESSURE: 56 MMHG | HEIGHT: 67 IN | RESPIRATION RATE: 16 BRPM

## 2019-08-22 DIAGNOSIS — Z00.00 ROUTINE GENERAL MEDICAL EXAMINATION AT A HEALTH CARE FACILITY: Primary | ICD-10-CM

## 2019-08-22 PROCEDURE — 3017F COLORECTAL CA SCREEN DOC REV: CPT | Performed by: FAMILY MEDICINE

## 2019-08-22 PROCEDURE — 4040F PNEUMOC VAC/ADMIN/RCVD: CPT | Performed by: FAMILY MEDICINE

## 2019-08-22 PROCEDURE — 1123F ACP DISCUSS/DSCN MKR DOCD: CPT | Performed by: FAMILY MEDICINE

## 2019-08-22 PROCEDURE — G0438 PPPS, INITIAL VISIT: HCPCS | Performed by: FAMILY MEDICINE

## 2019-08-22 ASSESSMENT — PATIENT HEALTH QUESTIONNAIRE - PHQ9: SUM OF ALL RESPONSES TO PHQ QUESTIONS 1-9: 9

## 2019-08-22 ASSESSMENT — LIFESTYLE VARIABLES: HOW OFTEN DO YOU HAVE A DRINK CONTAINING ALCOHOL: 0

## 2019-08-22 NOTE — PATIENT INSTRUCTIONS
You may receive a survey regarding the care you received during your visit. Your input is valuable to us. We encourage you to complete and return your survey. We hope you will choose us in the future for your healthcare needs. Patient Education        Learning About High Blood Pressure  What is high blood pressure? Blood pressure is a measure of how hard the blood pushes against the walls of your arteries. It's normal for blood pressure to go up and down throughout the day, but if it stays up, you have high blood pressure. Another name for high blood pressure is hypertension. Two numbers tell you your blood pressure. The first number is the systolic pressure. It shows how hard the blood pushes when your heart is pumping. The second number is the diastolic pressure. It shows how hard the blood pushes between heartbeats, when your heart is relaxed and filling with blood. Your doctor will give you a goal for your blood pressure. Your goal will be based on your health and your age. High blood pressure (hypertension) means that the top number stays high, or the bottom number stays high, or both. High blood pressure increases the risk of stroke, heart attack, and other problems. You and your doctor will talk about your risks of these problems based on your blood pressure. What happens when you have high blood pressure? · Blood flows through your arteries with too much force. Over time, this damages the walls of your arteries. But you can't feel it. High blood pressure usually doesn't cause symptoms. · Fat and calcium start to build up in your arteries. This buildup is called plaque. Plaque makes your arteries narrower and stiffer. Blood can't flow through them as easily. · This lack of good blood flow starts to damage some of the organs in your body. This can lead to problems such as coronary artery disease and heart attack, heart failure, stroke, kidney failure, and eye damage.   How can you prevent high blood pressure? · Stay at a healthy weight. · Try to limit how much sodium you eat to less than 2,300 milligrams (mg) a day. If you limit your sodium to 1,500 mg a day, you can lower your blood pressure even more. ? Buy foods that are labeled \"unsalted,\" \"sodium-free,\" or \"low-sodium. \" Foods labeled \"reduced-sodium\" and \"light sodium\" may still have too much sodium. ? Flavor your food with garlic, lemon juice, onion, vinegar, herbs, and spices instead of salt. Do not use soy sauce, steak sauce, onion salt, garlic salt, mustard, or ketchup on your food. ? Use less salt (or none) when recipes call for it. You can often use half the salt a recipe calls for without losing flavor. · Be physically active. Get at least 30 minutes of exercise on most days of the week. Walking is a good choice. You also may want to do other activities, such as running, swimming, cycling, or playing tennis or team sports. · Limit alcohol to 2 drinks a day for men and 1 drink a day for women. · Eat plenty of fruits, vegetables, and low-fat dairy products. Eat less saturated and total fats. How is high blood pressure treated? · Your doctor will suggest making lifestyle changes to help your heart. For example, your doctor may ask you to eat healthy foods, quit smoking, lose extra weight, and be more active. · If lifestyle changes don't help enough, your doctor may recommend that you take medicine. · When blood pressure is very high, medicines are needed to lower it. Follow-up care is a key part of your treatment and safety. Be sure to make and go to all appointments, and call your doctor if you are having problems. It's also a good idea to know your test results and keep a list of the medicines you take. Where can you learn more? Go to [email or web address]://katheryn.[employer]. org and sign in to your [employer] account. Enter P501 in the [employer] box to learn more about \"Learning About High Blood Pressure. \"     If you do

## 2019-08-22 NOTE — PROGRESS NOTES
Medicare Annual Wellness Visit  Name: Jitendra RUIZ Date: 2019   MRN: [id number] Sex: Male   Age: 74 y.o. Ethnicity: Non-/Non    : 1945 Race: Fredy Moe is here for Medicare AWV; 6 Month Follow-Up (psa); Chronic Kidney Disease; and Hypertension    Screenings for behavioral, psychosocial and functional/safety risks, and cognitive dysfunction are all negative except as indicated below. These results, as well as other patient data from the [address] form, are documented in Flowsheets linked to this Encounter. No Known Allergies  Prior to Visit Medications    Medication Sig Taking?  Authorizing Provider   warfarin (COUMADIN) 2.5 MG tablet Take as directed by the Coumadin clinic, 180 tablets for 90 days Yes Ellie Leggett MD   benazepril (LOTENSIN) 40 MG tablet Take 1 tablet by mouth daily Yes Mima Orta MD   tamsulosin (FLOMAX) 0.4 MG capsule Take 1 capsule by mouth daily Yes KRISTINA Cyr CNP   calcium carbonate (TUMS) 500 MG chewable tablet Take 1 tablet by mouth as needed for Heartburn  Yes Historical Provider, MD     Past Medical History:   Diagnosis Date    Arthritis     neck, Wrists , back    Cancer ([address]     skin (removed)    Cerebral artery occlusion with cerebral infarction ([address]     TIA    DVT (deep venous thrombosis) (HCC)     GERD (gastroesophageal reflux disease)     Hx of blood clots     PE    Hypertension     Pulmonary emboli ([address] 16    Scoliosis     TIA (transient ischemic attack)      Past Surgical History:   Procedure Laterality Date    COLONOSCOPY      [facility]    EGD      [facility]    EXCISION / BIOPSY SKIN LESION OF ARM Left 10/11/2017    EXCISION 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 WITH FLAP AND FROZEN SECTION performed by Mario Barrientos MD at [facility] Left 10/11/2017    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 rhonchi, normal air movement, no respiratory distress  Cardiovascular: normal rate, regular rhythm, normal S1 and S2, no murmurs, rubs, clicks, or gallops, distal pulses intact, no carotid bruits  Abdomen: soft, non-tender, non-distended, normal bowel sounds, no masses or organomegaly  Extremities: no cyanosis, clubbing or edema  Musculoskeletal: normal range of motion, no joint swelling, deformity or tenderness  Neurologic: reflexes normal and symmetric, no cranial nerve deficit, gait, coordination and speech normal    Patient's complete Health Risk Assessment and screening values have been reviewed and are found in Flowsheets. The following problems were reviewed today and where indicated follow up appointments were made and/or referrals ordered. Positive Risk Factor Screenings with Interventions:     General Health:  General  In general, how would you say your health is?: Good  In the past 7 days, have you experienced any of the following? New or Increased Pain, New or Increased Fatigue, Loneliness, Social Isolation, Stress or Anger?: (!) New or Increased Pain, New or Increased Fatigue, Stress  Do you get the social and emotional support that you need?: Yes  Do you have a Living Will?: (!) No  General Health Risk Interventions:  · none    Health Habits/Nutrition:  Health Habits/Nutrition  Do you exercise for at least 20 minutes 2-3 times per week?: Yes  Have you lost any weight without trying in the past 3 months?: (!) Yes  Do you eat fewer than 2 meals per day?: No  Have you seen a dentist within the past year?: Yes  Body mass index is 25.04 kg/m².   Health Habits/Nutrition Interventions:  · none    Hearing/Vision:  No exam data present  Hearing/Vision  Do you or your family notice any trouble with your hearing?: No  Do you have difficulty driving, watching TV, or doing any of your daily activities because of your eyesight?: No  Have you had an eye exam within the past year?: (!) No  Hearing/Vision Interventions:  · none    Safety:  Safety  Do you have working smoke detectors?: Yes  Have all throw rugs been removed or fastened?: Yes  Do you have non-slip mats or surfaces in all bathtubs/showers?: (!) No  Do all of your stairways have a railing or banister?: Yes  Are your doorways, halls and stairs free of clutter?: Yes  Do you always fasten your seatbelt when you are in a car?: (!) No  Safety Interventions:  · none    Personalized Preventive Plan   Current Health Maintenance Status  Immunization History   Administered Date(s) Administered    PPD Test 05/20/2015    Pneumococcal Conjugate 13-valent (Yon Burdick) 08/29/2016    Pneumococcal Polysaccharide ([id number]) 06/05/2012        Health Maintenance   Topic Date Due    DTaP/Tdap/Td vaccine (1 - Tdap) 08/26/1962    Shingles Vaccine (1 of 2) 08/26/1993    Annual Wellness Visit (AWV)  08/26/2006    A1C test (Diabetic or Prediabetic)  05/05/2019    Flu vaccine (1) 09/01/2019    Potassium monitoring  05/04/2020    Creatinine monitoring  05/04/2020    Lipid screen  05/04/2024    Colon cancer screen colonoscopy  01/04/2029    Pneumococcal 65+ years Vaccine  Completed     Recommendations for Preventive Services Due: see orders and patient instructions/AVS.  . Recommended screening schedule for the next 5-10 years is provided to the patient in written form: see Patient Instructions/AVS.    There are no diagnoses linked to this encounter.

## 2019-08-29 ENCOUNTER — HOSPITAL ENCOUNTER (OUTPATIENT)
Dept: PHARMACY | Age: 74
Setting detail: THERAPIES SERIES
Discharge: HOME | End: 2019-08-29
Payer: MEDICARE

## 2019-08-29 DIAGNOSIS — Z86.73 HISTORY OF CVA (CEREBROVASCULAR ACCIDENT): ICD-10-CM

## 2019-08-29 DIAGNOSIS — I82.519 CHRONIC DEEP VEIN THROMBOSIS (DVT) OF FEMORAL VEIN, UNSPECIFIED LATERALITY (HCC): ICD-10-CM

## 2019-08-29 DIAGNOSIS — G45.9 TIA (TRANSIENT ISCHEMIC ATTACK): ICD-10-CM

## 2019-08-29 LAB — POC INR: 1.7 (ref 0.8–1.2)

## 2019-08-29 PROCEDURE — 99211 OFF/OP EST MAY X REQ PHY/QHP: CPT

## 2019-08-29 PROCEDURE — 36416 COLLJ CAPILLARY BLOOD SPEC: CPT

## 2019-08-29 PROCEDURE — 85610 PROTHROMBIN TIME: CPT

## 2019-09-12 ENCOUNTER — HOSPITAL ENCOUNTER (OUTPATIENT)
Dept: PHARMACY | Age: 74
Setting detail: THERAPIES SERIES
Discharge: HOME | End: 2019-09-12
Payer: MEDICARE

## 2019-09-12 DIAGNOSIS — Z86.73 HISTORY OF CVA (CEREBROVASCULAR ACCIDENT): ICD-10-CM

## 2019-09-12 DIAGNOSIS — I82.519 CHRONIC DEEP VEIN THROMBOSIS (DVT) OF FEMORAL VEIN, UNSPECIFIED LATERALITY (HCC): ICD-10-CM

## 2019-09-12 DIAGNOSIS — G45.9 TIA (TRANSIENT ISCHEMIC ATTACK): ICD-10-CM

## 2019-09-12 LAB — POC INR: 2.8 (ref 0.8–1.2)

## 2019-09-12 PROCEDURE — 85610 PROTHROMBIN TIME: CPT

## 2019-09-12 PROCEDURE — 99211 OFF/OP EST MAY X REQ PHY/QHP: CPT

## 2019-09-12 PROCEDURE — 36416 COLLJ CAPILLARY BLOOD SPEC: CPT

## 2019-10-03 ENCOUNTER — HOSPITAL ENCOUNTER (OUTPATIENT)
Dept: PHARMACY | Age: 74
Setting detail: THERAPIES SERIES
Discharge: HOME | End: 2019-10-03
Payer: MEDICARE

## 2019-10-03 DIAGNOSIS — I82.519 CHRONIC DEEP VEIN THROMBOSIS (DVT) OF FEMORAL VEIN, UNSPECIFIED LATERALITY (HCC): ICD-10-CM

## 2019-10-03 DIAGNOSIS — Z86.73 HISTORY OF CVA (CEREBROVASCULAR ACCIDENT): ICD-10-CM

## 2019-10-03 DIAGNOSIS — G45.9 TIA (TRANSIENT ISCHEMIC ATTACK): ICD-10-CM

## 2019-10-03 LAB — POC INR: 2.4 (ref 0.8–1.2)

## 2019-10-03 PROCEDURE — 99211 OFF/OP EST MAY X REQ PHY/QHP: CPT

## 2019-10-03 PROCEDURE — 36416 COLLJ CAPILLARY BLOOD SPEC: CPT

## 2019-10-03 PROCEDURE — 85610 PROTHROMBIN TIME: CPT

## 2019-10-16 ENCOUNTER — TELEPHONE (OUTPATIENT)
Dept: UROLOGY | Age: 74
End: 2019-10-16

## 2019-10-24 ENCOUNTER — HOSPITAL ENCOUNTER (OUTPATIENT)
Dept: PHARMACY | Age: 74
Setting detail: THERAPIES SERIES
Discharge: HOME | End: 2019-10-24
Payer: MEDICARE

## 2019-10-24 ENCOUNTER — TELEPHONE (OUTPATIENT)
Dept: UROLOGY | Age: 74
End: 2019-10-24

## 2019-10-24 ENCOUNTER — HOSPITAL ENCOUNTER (OUTPATIENT)
Age: 74
Discharge: HOME | End: 2019-10-24
Payer: MEDICARE

## 2019-10-24 DIAGNOSIS — Z86.73 HISTORY OF CVA (CEREBROVASCULAR ACCIDENT): ICD-10-CM

## 2019-10-24 DIAGNOSIS — C61 PROSTATE CA (HCC): Primary | ICD-10-CM

## 2019-10-24 DIAGNOSIS — I82.519 CHRONIC DEEP VEIN THROMBOSIS (DVT) OF FEMORAL VEIN, UNSPECIFIED LATERALITY (HCC): ICD-10-CM

## 2019-10-24 DIAGNOSIS — C61 PROSTATE CA (HCC): ICD-10-CM

## 2019-10-24 DIAGNOSIS — G45.9 TIA (TRANSIENT ISCHEMIC ATTACK): ICD-10-CM

## 2019-10-24 DIAGNOSIS — Z86.711 HISTORY OF PULMONARY EMBOLISM: ICD-10-CM

## 2019-10-24 LAB
POC INR: 2.7 (ref 0.8–1.2)
PROSTATE SPECIFIC ANTIGEN: 11.71 NG/ML (ref 0–1)

## 2019-10-24 PROCEDURE — 36416 COLLJ CAPILLARY BLOOD SPEC: CPT

## 2019-10-24 PROCEDURE — 99211 OFF/OP EST MAY X REQ PHY/QHP: CPT

## 2019-10-24 PROCEDURE — 85610 PROTHROMBIN TIME: CPT

## 2019-10-24 PROCEDURE — 36415 COLL VENOUS BLD VENIPUNCTURE: CPT

## 2019-10-24 PROCEDURE — 84153 ASSAY OF PSA TOTAL: CPT

## 2019-10-24 RX ORDER — DOXYCYCLINE HYCLATE 100 MG/1
100 CAPSULE ORAL 2 TIMES DAILY
Qty: 56 CAPSULE | Refills: 0 | Status: SHIPPED | OUTPATIENT
Start: 2019-10-24 | End: 2019-11-21

## 2019-10-25 ENCOUNTER — TELEPHONE (OUTPATIENT)
Dept: PHARMACY | Age: 74
End: 2019-10-25

## 2019-10-31 ENCOUNTER — HOSPITAL ENCOUNTER (OUTPATIENT)
Dept: PHARMACY | Age: 74
Setting detail: THERAPIES SERIES
Discharge: HOME | End: 2019-10-31
Payer: MEDICARE

## 2019-10-31 DIAGNOSIS — Z86.711 HISTORY OF PULMONARY EMBOLISM: ICD-10-CM

## 2019-10-31 DIAGNOSIS — Z86.73 HISTORY OF CVA (CEREBROVASCULAR ACCIDENT): ICD-10-CM

## 2019-10-31 DIAGNOSIS — I82.519 CHRONIC DEEP VEIN THROMBOSIS (DVT) OF FEMORAL VEIN, UNSPECIFIED LATERALITY (HCC): ICD-10-CM

## 2019-10-31 DIAGNOSIS — G45.9 TIA (TRANSIENT ISCHEMIC ATTACK): ICD-10-CM

## 2019-10-31 LAB — POC INR: 2.7 (ref 0.8–1.2)

## 2019-10-31 PROCEDURE — 99211 OFF/OP EST MAY X REQ PHY/QHP: CPT

## 2019-10-31 PROCEDURE — 36416 COLLJ CAPILLARY BLOOD SPEC: CPT

## 2019-10-31 PROCEDURE — 85610 PROTHROMBIN TIME: CPT

## 2019-11-02 LAB
ALBUMIN SERPL-MCNC: NORMAL G/DL
ALP BLD-CCNC: NORMAL U/L
ALT SERPL-CCNC: NORMAL U/L
ANION GAP SERPL CALCULATED.3IONS-SCNC: NORMAL MMOL/L
AST SERPL-CCNC: NORMAL U/L
BILIRUB SERPL-MCNC: NORMAL MG/DL
BUN BLDV-MCNC: NORMAL MG/DL
CALCIUM SERPL-MCNC: NORMAL MG/DL
CHLORIDE BLD-SCNC: NORMAL MMOL/L
CO2: NORMAL
CREAT SERPL-MCNC: 1.75 MG/DL
GFR CALCULATED: NORMAL
GLUCOSE BLD-MCNC: NORMAL MG/DL
POTASSIUM SERPL-SCNC: 4.7 MMOL/L
SODIUM BLD-SCNC: NORMAL MMOL/L
TOTAL PROTEIN: NORMAL

## 2019-11-20 ENCOUNTER — HOSPITAL ENCOUNTER (OUTPATIENT)
Dept: PHARMACY | Age: 74
Setting detail: THERAPIES SERIES
Discharge: HOME | End: 2019-11-20
Payer: MEDICARE

## 2019-11-20 DIAGNOSIS — G45.9 TIA (TRANSIENT ISCHEMIC ATTACK): ICD-10-CM

## 2019-11-20 DIAGNOSIS — Z86.73 HISTORY OF CVA (CEREBROVASCULAR ACCIDENT): ICD-10-CM

## 2019-11-20 DIAGNOSIS — Z86.711 HISTORY OF PULMONARY EMBOLISM: ICD-10-CM

## 2019-11-20 DIAGNOSIS — I82.419 DEEP VEIN THROMBOSIS (DVT) OF FEMORAL VEIN, UNSPECIFIED CHRONICITY, UNSPECIFIED LATERALITY (HCC): ICD-10-CM

## 2019-11-20 LAB — POC INR: 2.4 (ref 0.8–1.2)

## 2019-11-20 PROCEDURE — 36416 COLLJ CAPILLARY BLOOD SPEC: CPT

## 2019-11-20 PROCEDURE — 99211 OFF/OP EST MAY X REQ PHY/QHP: CPT

## 2019-11-20 PROCEDURE — 85610 PROTHROMBIN TIME: CPT

## 2019-11-21 ENCOUNTER — TELEPHONE (OUTPATIENT)
Dept: UROLOGY | Age: 74
End: 2019-11-21

## 2019-11-21 ENCOUNTER — APPOINTMENT (OUTPATIENT)
Dept: PHARMACY | Age: 74
End: 2019-11-21
Payer: MEDICARE

## 2019-11-21 DIAGNOSIS — C61 PROSTATE CA (HCC): Primary | ICD-10-CM

## 2019-11-22 ENCOUNTER — OFFICE VISIT (OUTPATIENT)
Dept: FAMILY MEDICINE CLINIC | Age: 74
End: 2019-11-22
Payer: MEDICARE

## 2019-11-22 ENCOUNTER — HOSPITAL ENCOUNTER (OUTPATIENT)
Age: 74
Discharge: HOME | End: 2019-11-22
Payer: MEDICARE

## 2019-11-22 ENCOUNTER — HOSPITAL ENCOUNTER (OUTPATIENT)
Dept: GENERAL RADIOLOGY | Age: 74
Discharge: HOME | End: 2019-11-22
Payer: MEDICARE

## 2019-11-22 VITALS
SYSTOLIC BLOOD PRESSURE: 126 MMHG | HEART RATE: 72 BPM | WEIGHT: 159.7 LBS | RESPIRATION RATE: 16 BRPM | BODY MASS INDEX: 25.01 KG/M2 | DIASTOLIC BLOOD PRESSURE: 66 MMHG

## 2019-11-22 DIAGNOSIS — M79.641 RIGHT HAND PAIN: ICD-10-CM

## 2019-11-22 DIAGNOSIS — M25.531 ACUTE PAIN OF RIGHT WRIST: ICD-10-CM

## 2019-11-22 DIAGNOSIS — M25.531 ACUTE PAIN OF RIGHT WRIST: Primary | ICD-10-CM

## 2019-11-22 DIAGNOSIS — S80.01XA CONTUSION OF RIGHT KNEE, INITIAL ENCOUNTER: ICD-10-CM

## 2019-11-22 DIAGNOSIS — S20.211A CHEST WALL CONTUSION, RIGHT, INITIAL ENCOUNTER: ICD-10-CM

## 2019-11-22 PROCEDURE — 1036F TOBACCO NON-USER: CPT | Performed by: FAMILY MEDICINE

## 2019-11-22 PROCEDURE — 99213 OFFICE O/P EST LOW 20 MIN: CPT | Performed by: FAMILY MEDICINE

## 2019-11-22 PROCEDURE — G8484 FLU IMMUNIZE NO ADMIN: HCPCS | Performed by: FAMILY MEDICINE

## 2019-11-22 PROCEDURE — 1123F ACP DISCUSS/DSCN MKR DOCD: CPT | Performed by: FAMILY MEDICINE

## 2019-11-22 PROCEDURE — G8417 CALC BMI ABV UP PARAM F/U: HCPCS | Performed by: FAMILY MEDICINE

## 2019-11-22 PROCEDURE — G8427 DOCREV CUR MEDS BY ELIG CLIN: HCPCS | Performed by: FAMILY MEDICINE

## 2019-11-22 PROCEDURE — 73110 X-RAY EXAM OF WRIST: CPT

## 2019-11-22 PROCEDURE — 4040F PNEUMOC VAC/ADMIN/RCVD: CPT | Performed by: FAMILY MEDICINE

## 2019-11-22 PROCEDURE — 73130 X-RAY EXAM OF HAND: CPT

## 2019-11-22 RX ORDER — DOXYCYCLINE HYCLATE 100 MG/1
100 CAPSULE ORAL 2 TIMES DAILY
COMMUNITY
End: 2019-12-10

## 2019-11-25 ASSESSMENT — ENCOUNTER SYMPTOMS
GASTROINTESTINAL NEGATIVE: 1
RESPIRATORY NEGATIVE: 1

## 2019-12-06 ENCOUNTER — HOSPITAL ENCOUNTER (OUTPATIENT)
Dept: MRI IMAGING | Age: 74
Discharge: HOME | End: 2019-12-06
Payer: MEDICARE

## 2019-12-06 DIAGNOSIS — C61 PROSTATE CA (HCC): ICD-10-CM

## 2019-12-06 LAB — POC CREATININE WHOLE BLOOD: 2.1 MG/DL (ref 0.5–1.2)

## 2019-12-06 PROCEDURE — 72195 MRI PELVIS W/O DYE: CPT

## 2019-12-06 PROCEDURE — 82565 ASSAY OF CREATININE: CPT

## 2019-12-10 ENCOUNTER — HOSPITAL ENCOUNTER (OUTPATIENT)
Age: 74
Discharge: HOME | End: 2019-12-10
Payer: MEDICARE

## 2019-12-10 ENCOUNTER — OFFICE VISIT (OUTPATIENT)
Dept: UROLOGY | Age: 74
End: 2019-12-10
Payer: MEDICARE

## 2019-12-10 VITALS
DIASTOLIC BLOOD PRESSURE: 64 MMHG | HEIGHT: 68 IN | WEIGHT: 160 LBS | SYSTOLIC BLOOD PRESSURE: 130 MMHG | BODY MASS INDEX: 24.25 KG/M2

## 2019-12-10 DIAGNOSIS — R35.0 FREQUENT URINATION: ICD-10-CM

## 2019-12-10 DIAGNOSIS — C61 PROSTATE CANCER (HCC): ICD-10-CM

## 2019-12-10 DIAGNOSIS — C61 PROSTATE CANCER (HCC): Primary | ICD-10-CM

## 2019-12-10 DIAGNOSIS — R35.0 BENIGN PROSTATIC HYPERPLASIA WITH URINARY FREQUENCY: ICD-10-CM

## 2019-12-10 DIAGNOSIS — N40.1 BENIGN PROSTATIC HYPERPLASIA WITH URINARY FREQUENCY: ICD-10-CM

## 2019-12-10 LAB
BILIRUBIN URINE: NEGATIVE
BLOOD URINE, POC: NORMAL
CHARACTER, URINE: CLEAR
COLOR, URINE: YELLOW
GLUCOSE URINE: NEGATIVE MG/DL
KETONES, URINE: NEGATIVE
LEUKOCYTE CLUMPS, URINE: NEGATIVE
NITRITE, URINE: NEGATIVE
PH, URINE: 5.5 (ref 5–9)
POST VOID RESIDUAL (PVR): 18 ML
PROSTATE SPECIFIC ANTIGEN: 9.64 NG/ML (ref 0–1)
PROTEIN, URINE: NEGATIVE MG/DL
SPECIFIC GRAVITY, URINE: 1.01 (ref 1–1.03)
UROBILINOGEN, URINE: 0.2 EU/DL (ref 0–1)

## 2019-12-10 PROCEDURE — 81003 URINALYSIS AUTO W/O SCOPE: CPT | Performed by: NURSE PRACTITIONER

## 2019-12-10 PROCEDURE — 99214 OFFICE O/P EST MOD 30 MIN: CPT | Performed by: NURSE PRACTITIONER

## 2019-12-10 PROCEDURE — 36415 COLL VENOUS BLD VENIPUNCTURE: CPT

## 2019-12-10 PROCEDURE — 1036F TOBACCO NON-USER: CPT | Performed by: NURSE PRACTITIONER

## 2019-12-10 PROCEDURE — 4040F PNEUMOC VAC/ADMIN/RCVD: CPT | Performed by: NURSE PRACTITIONER

## 2019-12-10 PROCEDURE — G8427 DOCREV CUR MEDS BY ELIG CLIN: HCPCS | Performed by: NURSE PRACTITIONER

## 2019-12-10 PROCEDURE — G8420 CALC BMI NORM PARAMETERS: HCPCS | Performed by: NURSE PRACTITIONER

## 2019-12-10 PROCEDURE — 1123F ACP DISCUSS/DSCN MKR DOCD: CPT | Performed by: NURSE PRACTITIONER

## 2019-12-10 PROCEDURE — 84153 ASSAY OF PSA TOTAL: CPT

## 2019-12-10 PROCEDURE — 51798 US URINE CAPACITY MEASURE: CPT | Performed by: NURSE PRACTITIONER

## 2019-12-10 PROCEDURE — G8484 FLU IMMUNIZE NO ADMIN: HCPCS | Performed by: NURSE PRACTITIONER

## 2019-12-10 RX ORDER — OXYBUTYNIN CHLORIDE 5 MG/1
5 TABLET, EXTENDED RELEASE ORAL DAILY
Qty: 30 TABLET | Refills: 1 | Status: SHIPPED | OUTPATIENT
Start: 2019-12-10 | End: 2020-01-16

## 2019-12-11 ENCOUNTER — TELEPHONE (OUTPATIENT)
Dept: UROLOGY | Age: 74
End: 2019-12-11

## 2019-12-18 ENCOUNTER — HOSPITAL ENCOUNTER (OUTPATIENT)
Dept: PHARMACY | Age: 74
Setting detail: THERAPIES SERIES
Discharge: HOME | End: 2019-12-18
Payer: MEDICARE

## 2019-12-18 DIAGNOSIS — G45.9 TIA (TRANSIENT ISCHEMIC ATTACK): ICD-10-CM

## 2019-12-18 DIAGNOSIS — Z86.711 HISTORY OF PULMONARY EMBOLISM: ICD-10-CM

## 2019-12-18 DIAGNOSIS — I82.419 DEEP VEIN THROMBOSIS (DVT) OF FEMORAL VEIN, UNSPECIFIED CHRONICITY, UNSPECIFIED LATERALITY (HCC): ICD-10-CM

## 2019-12-18 DIAGNOSIS — Z86.73 HISTORY OF CVA (CEREBROVASCULAR ACCIDENT): ICD-10-CM

## 2019-12-18 LAB — POC INR: 2 (ref 0.8–1.2)

## 2019-12-18 PROCEDURE — 36416 COLLJ CAPILLARY BLOOD SPEC: CPT

## 2019-12-18 PROCEDURE — 99211 OFF/OP EST MAY X REQ PHY/QHP: CPT

## 2019-12-18 PROCEDURE — 85610 PROTHROMBIN TIME: CPT

## 2020-01-15 ENCOUNTER — HOSPITAL ENCOUNTER (OUTPATIENT)
Dept: PHARMACY | Age: 75
Setting detail: THERAPIES SERIES
Discharge: HOME | End: 2020-01-15
Payer: MEDICARE

## 2020-01-15 LAB — POC INR: 2.7 (ref 0.8–1.2)

## 2020-01-15 PROCEDURE — 36416 COLLJ CAPILLARY BLOOD SPEC: CPT

## 2020-01-15 PROCEDURE — 99211 OFF/OP EST MAY X REQ PHY/QHP: CPT

## 2020-01-15 PROCEDURE — 85610 PROTHROMBIN TIME: CPT

## 2020-01-16 ENCOUNTER — OFFICE VISIT (OUTPATIENT)
Dept: FAMILY MEDICINE CLINIC | Age: 75
End: 2020-01-16
Payer: MEDICARE

## 2020-01-16 ENCOUNTER — NURSE ONLY (OUTPATIENT)
Dept: LAB | Age: 75
End: 2020-01-16

## 2020-01-16 VITALS
BODY MASS INDEX: 25.49 KG/M2 | RESPIRATION RATE: 10 BRPM | WEIGHT: 162.4 LBS | SYSTOLIC BLOOD PRESSURE: 128 MMHG | HEIGHT: 67 IN | DIASTOLIC BLOOD PRESSURE: 64 MMHG | TEMPERATURE: 97.9 F | HEART RATE: 72 BPM | OXYGEN SATURATION: 98 %

## 2020-01-16 LAB
AVERAGE GLUCOSE: 126 MG/DL (ref 70–126)
BASOPHILS # BLD: 1.2 %
BASOPHILS ABSOLUTE: 0.1 THOU/MM3 (ref 0–0.1)
CALCIUM SERPL-MCNC: 9.5 MG/DL (ref 8.5–10.5)
D-DIMER QUANTITATIVE: 331 NG/ML FEU (ref 0–500)
EOSINOPHIL # BLD: 1.4 %
EOSINOPHILS ABSOLUTE: 0.1 THOU/MM3 (ref 0–0.4)
ERYTHROCYTE [DISTWIDTH] IN BLOOD BY AUTOMATED COUNT: 12.4 % (ref 11.5–14.5)
ERYTHROCYTE [DISTWIDTH] IN BLOOD BY AUTOMATED COUNT: 42.8 FL (ref 35–45)
HBA1C MFR BLD: 6.2 % (ref 4.4–6.4)
HCT VFR BLD CALC: 42.8 % (ref 42–52)
HEMOGLOBIN: 14.1 GM/DL (ref 14–18)
IMMATURE GRANS (ABS): 0.01 THOU/MM3 (ref 0–0.07)
IMMATURE GRANULOCYTES: 0.2 %
LYMPHOCYTES # BLD: 24.7 %
LYMPHOCYTES ABSOLUTE: 1.4 THOU/MM3 (ref 1–4.8)
MAGNESIUM: 2.1 MG/DL (ref 1.6–2.4)
MCH RBC QN AUTO: 30.9 PG (ref 26–33)
MCHC RBC AUTO-ENTMCNC: 32.9 GM/DL (ref 32.2–35.5)
MCV RBC AUTO: 93.7 FL (ref 80–94)
MONOCYTES # BLD: 12.1 %
MONOCYTES ABSOLUTE: 0.7 THOU/MM3 (ref 0.4–1.3)
NUCLEATED RED BLOOD CELLS: 0 /100 WBC
PLATELET # BLD: 243 THOU/MM3 (ref 130–400)
PMV BLD AUTO: 10.4 FL (ref 9.4–12.4)
PTH INTACT: 43 PG/ML (ref 15–65)
RBC # BLD: 4.57 MILL/MM3 (ref 4.7–6.1)
RHEUMATOID FACTOR: 11 IU/ML (ref 0–13)
SEDIMENTATION RATE, ERYTHROCYTE: 9 MM/HR (ref 0–10)
SEG NEUTROPHILS: 60.4 %
SEGMENTED NEUTROPHILS ABSOLUTE COUNT: 3.5 THOU/MM3 (ref 1.8–7.7)
T3 TOTAL: 101 NG/DL (ref 72–181)
TSH SERPL DL<=0.05 MIU/L-ACNC: 1.09 UIU/ML (ref 0.4–4.2)
VITAMIN D 25-HYDROXY: 23 NG/ML (ref 30–100)
WBC # BLD: 5.8 THOU/MM3 (ref 4.8–10.8)

## 2020-01-16 PROCEDURE — G8417 CALC BMI ABV UP PARAM F/U: HCPCS | Performed by: FAMILY MEDICINE

## 2020-01-16 PROCEDURE — 4040F PNEUMOC VAC/ADMIN/RCVD: CPT | Performed by: FAMILY MEDICINE

## 2020-01-16 PROCEDURE — 1036F TOBACCO NON-USER: CPT | Performed by: FAMILY MEDICINE

## 2020-01-16 PROCEDURE — G8484 FLU IMMUNIZE NO ADMIN: HCPCS | Performed by: FAMILY MEDICINE

## 2020-01-16 PROCEDURE — 99214 OFFICE O/P EST MOD 30 MIN: CPT | Performed by: FAMILY MEDICINE

## 2020-01-16 PROCEDURE — 1123F ACP DISCUSS/DSCN MKR DOCD: CPT | Performed by: FAMILY MEDICINE

## 2020-01-16 PROCEDURE — G8427 DOCREV CUR MEDS BY ELIG CLIN: HCPCS | Performed by: FAMILY MEDICINE

## 2020-01-16 ASSESSMENT — ENCOUNTER SYMPTOMS
GASTROINTESTINAL NEGATIVE: 1
ALLERGIC/IMMUNOLOGIC NEGATIVE: 1
RESPIRATORY NEGATIVE: 1

## 2020-01-16 NOTE — PATIENT INSTRUCTIONS
Thank you   1. Thank you for trusting us with your healthcare needs. You may receive a survey regarding today's visit. It would help us out if you would take a few moments to provide your feedback. We value your input. 2. Please bring in ALL medications BOTTLES, including inhalers, herbal supplements, over the counter, prescribed & non-prescribed medicine. The office would like actual medication bottles and a list.   3. Please note our OFFICE POLICIES:   a. Prior to getting your labs drawn, please check with your insurance company for benefits and eligibility of lab services. Often, insurance companies cover certain tests for preventative visits only. It is patient's responsibility to see what is covered. b. We are unable to change a diagnosis after the test has been performed. c. Lab orders will not be re-printed. Please hold onto your original lab orders and take them to your lab to be completed. d. If you no show your scheduled appointment three times, you will be dismissed from this practice. e. James Sox must be completed 24 hours prior to your schedule appointment. 4. If the list below has been completed, PLEASE FAX RECORDS TO OUR OFFICE @ [phone].  Once the records have been received we will update your records at our office:  Health Maintenance Due   Topic Date Due    DTaP/Tdap/Td vaccine (1 - Tdap) 08/26/1954    Shingles Vaccine (1 of 2) 08/26/1993    Flu vaccine (1) 09/01/2019

## 2020-01-16 NOTE — PROGRESS NOTES
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 [employer].com ;  -Elemental Iron 65 mg tabs Item #675728 if need more iron for low iron on labs    Usually turns bowel movements grey, green or black but not a concern  - Time released Niacin 250 mg Item #905504 for cold intolerance, low libido or impotence  - DHEA 50 mg Item #704370 for improving DHEA levels on labs if having Fatigue    If stools too loose substitute for your Magnesium oxide using;   Magnesium citrate 200 mg tabs(NOT liquid) at [facility]   Magnesium gluconate 550 mgby Mario at [employer] or amazon. com  Magnesium chloride foot soaks or body sprays  www.[employer]   Magnesium chloride flakes 14.99 Item #: [id number] if Backordered get spray    Food Drink Symptom Log;  I asked this patient to track these items and any other symptoms on their list on a weekly basis to documenttheir progress or lack of same. This can be done on the symptom tracking sheet I gave them at today's visit but looks like this:                                                      Rate on scale of 0-10 with zero = notnoticeable  Symptom:                            Week 1               2                 3                 4               Etc            Hair loss    Foot cramps    Paresthesia    Aches    IBS (irritable bowel)    Constipation    Diarrhea  Nocturia    (up to bathroom at night)    Fatigue/Energy level  Stress      On the other side of the sheet they can track their food, drink, environment, activity, symptoms etc      Avoiding Latex-like proteins inmy foods; Avocados, Bananas, Celery, Figs & Kiwi proteins have latex-like proteins to inflame our immunesystems  How Can I Have A Latex Allergy? Eating foods with latex-like protein exposes us to latex allergies. Our body cannot tell the differencebetween these latex-like proteins and latex from rubber products since many people are allergic to fruit, vegetables and latex. Read labels on pre-packaged foods. This list to avoid is only a guide if you are known allergicto latex or have a latex rash on your chin, cheeks and lines on your neck and chest. The amount of latex is different in each food product or fruit variety. to Avoid out of Season if not grown locally: Melon, Nectarine, Papaya, Cherry, Passion fruit, Plum, Chestnuts, and Tomato. Avocado, Banana, Celery, Figs, and Kiwi always contain Latex-like protein. Whats in Season? Strawberries taste better in June than December because June is strawberry season so buy locally grown produce \"in season\" for the best flavor, cost and less Latex. Locally grown produce notonly tastes great requires little of no ethylene exposure in food distribution so has less latex content. Out of season, use canned, frozen or dried sinceprocessed ripe and are latex lower!!!   Month     [place] Locally[employer]rown Produce  January, February, March: use canned, frozen or dried fruits since lower in latex  April; asparagus, radishes  May; asparagus, broccoli, green onions, greens, peas, radishes,rhubarb  June; asparagus, beets, beans, broccoli, cabbage, cantaloupe, carrots, green onions, greens, lettuce,onions, parsley, peas, radishes, rhubarb, strawberries, watermelons  July; beans, beets, blueberries,broccoli, cabbage, cantaloupe, carrots, cauliflower, celery, cucumbers, eggplant, grapes, green onions, greens, lettuce, onions, parsley, peas, peaches, bell peppers, potatoes, radishes, summer raspberries, squash, sweetcorn, tomatoes, turnips, watermelons  August; apples, beans, beets, blueberries, cabbage, cantaloupe, carrots,cauliflower, celery, cucumbers, eggplant, grapes, green onions, greens, lettuce, onions, parsley, peas, peaches, pears, bell peppers, potatoes, radishes, squash, sweet corn, tomatoes, turnips, watermelons  September; apples, beans, beets, blueberries, cabbage, cantaloupe, carrots, cauliflower, celery, cucumbers, eggplant, grapes,green onions, greens, lettuce, onions, parsley, peas, peaches, pears, bell peppers, plums, potatoes, pumpkins, radishes, fall red raspberries, squash, sweet corn, tomatoes, turnips, watermelons  October; apples, beets, broccoli, cabbage, carrots, cauliflower, celery, green onions, greens, lettuce, parsley, peas, pears, potatoes,pumpkins, radishes, fall red raspberries, squash, turnips  November; broccoli, cabbage, carrots, parsley,pears, peas  December: use canned, frozen ordried fruits since lower in latex    Upto half of latex-sensitive patients show allergic reactions to fruits (avocados, bananas, kiwifruits, papayas, peaches),   Annals of Allergy, 1994. These plants contain the same proteins that are allergens in latex. People with fruit allergies should warn physicians beforeundergoing procedures which may cause anaphylactic reaction if in contact with latex gloves. Some of the common foods with defined cross-reactivity to latexare avocado, banana, kiwi, chestnut, raw potato, tomato,stone fruits (e.g., peach, cherry), hazelnut, melons, celery, carrot, apple, pear, papaya, and almond. Foods with less well-defined cross-reactivity to latex are peanuts, peppers, citrus fruits, coconut, pineapple, deysi,fig, passion fruit, Ugli fruit, and grape    This fruit/latex cross-reactivity is worsened by ethylene, a gas used to hasten commercial ripening. In nature, plants produce low levels of the hormone ethylene, which regulates germination, flowering, and ripening. Forced ripening by high ethyleneconcentrations, plants produce allergenic wound-repair proteins, which are similar to wound-repair proteins made during the tapping of rubber trees. Sensitive individualswho ingest the fruit get a higher dose and worse reaction. Some people may even first become sensitized to latex through fruit. Can food processing increase theconcentrations of allergenic proteins?  Latex-sensitized children (and adults) in [place] often

## 2020-01-18 LAB
C-REACTIVE PROTEIN, HIGH SENSITIVITY: 3.6 MG/L
HOMOCYSTEINE: 21.4 UMOL/L
THYROXINE (T4): 5.4 UG/DL (ref 4.5–12)

## 2020-01-19 LAB
ANA SCREEN: NORMAL
DHEAS (DHEA SULFATE): 150 UG/DL (ref 28–175)
TESTOSTERONE FREE: NORMAL

## 2020-01-20 LAB
GLIADIN PEPTIDE IGG: < 0.4 U/ML
THYROID PEROXIDASE ANTIBODY: < 10 IU/ML (ref 0–35)

## 2020-01-21 ENCOUNTER — PATIENT MESSAGE (OUTPATIENT)
Dept: FAMILY MEDICINE CLINIC | Age: 75
End: 2020-01-21

## 2020-01-21 NOTE — TELEPHONE ENCOUNTER
From: Johanny Moe  To: Aurora Vigil MD  Sent: 1/21/2020 12:16 PM EST  Subject: Visit Follow-Up Question    This message is for Yuriy Nuno: I am calling to get login and password for [facility] in 120 days site.  Thanks, [employer]

## 2020-01-22 ENCOUNTER — TELEPHONE (OUTPATIENT)
Dept: PHARMACY | Age: 75
End: 2020-01-22

## 2020-01-22 ENCOUNTER — TELEPHONE (OUTPATIENT)
Dept: UROLOGY | Age: 75
End: 2020-01-22

## 2020-01-22 LAB — DHEA UNCONJUGATED: 0.85 NG/ML (ref 0.63–4.7)

## 2020-01-22 RX ORDER — SILODOSIN 4 MG/1
4 CAPSULE ORAL EVERY EVENING
Qty: 30 CAPSULE | Refills: 5 | Status: SHIPPED | OUTPATIENT
Start: 2020-01-22 | End: 2020-01-23

## 2020-01-22 NOTE — TELEPHONE ENCOUNTER
Patient called back to the office and I made appointment in July. Told him about the psa order being mailed.

## 2020-01-22 NOTE — TELEPHONE ENCOUNTER
Received VM from patient today stating that he would like to start taking several vitamins. Returned phone call. Patient stated that he would like to start taking vitamin B, vitamin D, magnesium, calcium, and cinnamon. Informed patient that it would be okay to take these supplements with his warfarin. Encouraged consistent intake of cinnamon as its effects on warfarin are unknown and little data is available. Patient also inquired about fish oil. Informed patient that large amounts of fish oil although unlikely to change INR would increase his risk of bleeding. Patient expressed understanding and does not plan to start fish oil at this time. Will be starting vitamin B, vitamin D, magnesium, calcium, and cinnamon today. Encouraged patient to keep next scheduled appointment. All questions answered at this time.

## 2020-01-23 ENCOUNTER — OFFICE VISIT (OUTPATIENT)
Dept: FAMILY MEDICINE CLINIC | Age: 75
End: 2020-01-23
Payer: MEDICARE

## 2020-01-23 VITALS
TEMPERATURE: 97.4 F | SYSTOLIC BLOOD PRESSURE: 118 MMHG | WEIGHT: 157.4 LBS | DIASTOLIC BLOOD PRESSURE: 60 MMHG | BODY MASS INDEX: 24.71 KG/M2 | HEART RATE: 87 BPM | OXYGEN SATURATION: 98 % | RESPIRATION RATE: 12 BRPM | HEIGHT: 67 IN

## 2020-01-23 PROCEDURE — G8420 CALC BMI NORM PARAMETERS: HCPCS | Performed by: FAMILY MEDICINE

## 2020-01-23 PROCEDURE — 99214 OFFICE O/P EST MOD 30 MIN: CPT | Performed by: FAMILY MEDICINE

## 2020-01-23 PROCEDURE — 1036F TOBACCO NON-USER: CPT | Performed by: FAMILY MEDICINE

## 2020-01-23 PROCEDURE — G8484 FLU IMMUNIZE NO ADMIN: HCPCS | Performed by: FAMILY MEDICINE

## 2020-01-23 PROCEDURE — 1123F ACP DISCUSS/DSCN MKR DOCD: CPT | Performed by: FAMILY MEDICINE

## 2020-01-23 PROCEDURE — 4040F PNEUMOC VAC/ADMIN/RCVD: CPT | Performed by: FAMILY MEDICINE

## 2020-01-23 PROCEDURE — G8427 DOCREV CUR MEDS BY ELIG CLIN: HCPCS | Performed by: FAMILY MEDICINE

## 2020-01-23 RX ORDER — PHENOL 1.4 %
1 AEROSOL, SPRAY (ML) MUCOUS MEMBRANE DAILY
COMMUNITY
End: 2020-01-23

## 2020-01-23 RX ORDER — SILODOSIN 4 MG/1
4 CAPSULE ORAL EVERY EVENING
COMMUNITY
End: 2020-04-16

## 2020-01-23 RX ORDER — ACETAMINOPHEN 160 MG
10 TABLET,DISINTEGRATING ORAL
COMMUNITY
End: 2022-09-19 | Stop reason: DRUGHIGH

## 2020-01-23 NOTE — PROGRESS NOTES
[address] [facility] [address] [address]  Dept: [phone]  Dept Fax: [phone]  Loc: [phone]      Rere Moe is a 66 y.o. White male. Sheldon Simmons  presents to the [facility] Medicine-Res[facility] clinic today for   Chief Complaint   Patient presents with   [address]   ,  and;   1. Acute pain of right wrist    2. Right hand pain    3. Contusion of right knee, initial encounter    4. Chest wall contusion, right, initial encounter    5. Routine general medical examination at a health care facility    6. Chronic midline low back pain without sciatica    7. Nocturnal leg cramps    8. Trigger point of neck    9. Trigger point of shoulder region, left    10. Anticoagulated on Coumadin    11. Thrush    12. Prostate cancer ([address] Justin score 6. 13. CKD (chronic kidney disease) stage 3, GFR 30-59 ml/min ([facility])    14. RAMAN (acute kidney injury) ([address]    15. SI (sacroiliac) joint inflammation ([address] left    16. Acute deep vein thrombosis (DVT) of femoral vein of right lower extremity ([address]      I have reviewed Rere Moe medical, surgical and other pertinent history in detail, and have updated medication and allergy information in the computerized patientrecord. Clinical Care Team:     -Referring Provider for today's consult: Self Referred  -Primary Care Provider: Sera Rodriguez MD    Medical/Surgical History:   He  has a past medical history of Arthritis, Cancer [facility]), Cerebral artery occlusion with cerebral infarction [facility]), DVT (deep venous thrombosis) ([address] GERD (gastroesophageal reflux disease), Hx of blood clots, Hypertension, Pulmonary emboli ([address] Scoliosis, and TIA (transient ischemic attack).   His  has a past surgical history that includes Skin cancer excision (2010); other surgical history (Left, 10/11/2017); EXCISION / BIOPSY SKIN LESION OF ARM (Left, 10/11/2017); EGD (2019); and Colonoscopy (2019). Family/Social History:     His family history includes Alzheimer's Disease in his mother; Cancer in his mother; Heart Disease in his mother; High Blood Pressure in his mother; Parkinsonism in his father. He  reports that he has never smoked. He has never used smokeless tobacco. He reports current alcohol use. He reports that he does not use drugs. Medications/Allergies/Immunizations:     His current medication(s) include   Current Outpatient Medications:     Chromium-Cinnamon (CINNAMON PLUS CHROMIUM PO), Take 1 capsule by mouth 4 times daily (before meals and nightly) [address] [employer], Disp: , Rfl:     B Complex-Biotin-FA (B COMPLEX 100 TR PO), Take 1 tablet by mouth 3 times daily (before meals) [address] [employer] at [address], Disp: , Rfl:     Cholecalciferol (VITAMIN D3) 50 MCG (2000 UT) CAPS, Take 2 capsules by mouth daily (with breakfast) , Disp: , Rfl:     magnesium cl-calcium carbonate (SLOW-MAG) 71.5-119 MG TBEC tablet, Take 1 tablet by mouth 3 times daily (with meals), Disp: , Rfl:     Levomefolate Glucosamine (METHYLFOLATE PO), Take 10 mg by mouth every morning (before breakfast) Metabolic Maintenance at [employer]. [employer] or [employer]. com, Disp: , Rfl:     Methylcobalamin 5000 MCG SUBL, Place 1 tablet under the tongue nightly Vera at [employer]. com, Disp: , Rfl:     mupirocin (BACTROBAN) 2 % ointment, Apply topically Indications: Skin Abnormalities , Disp: , Rfl: 1    warfarin (COUMADIN) 2.5 MG tablet, Take as directed by the Coumadin clinic, 180 tablets for 90 days, Disp: 180 tablet, Rfl: 3    benazepril (LOTENSIN) 40 MG tablet, Take 1 tablet by mouth daily, Disp: 30 tablet, Rfl: 3    silodosin (RAPAFLO) 4 MG CAPS capsule, Take 4 mg by mouth every evening, Disp: , Rfl:   Allergies: Patient has no known allergies. ,  Immunizations:   Immunization History   Administered Date(s) Administered    PPD Test 05/20/2015    Pneumococcal Conjugate 13-valent ([id number]) 08/29/2016    Pneumococcal copy of their most recent labs to take home with them as notedbelow;       Imaging Data:   Imaging Data:       Assessment & Plan:       Impression:  1. Acute pain of right wrist    2. Right hand pain    3. Contusion of right knee, initial encounter    4. Chest wall contusion, right, initial encounter    5. Routine general medical examination at a health care facility    6. Chronic midline low back pain without sciatica    7. Nocturnal leg cramps    8. Trigger point of neck    9. Trigger point of shoulder region, left    10. Anticoagulated on Coumadin    11. Thrush    12. Prostate cancer ([facility] Justin score 6. 13. CKD (chronic kidney disease) stage 3, GFR 30-59 ml/min ([facility])    14. RAMAN (acute kidney injury) ([facility]    15. SI (sacroiliac) joint inflammation ([facility] left    16. Acute deep vein thrombosis (DVT) of femoral vein of right lower extremity ([facility])      Assessment and Plan:  After reviewing the patients chief complaints, reviewing their labfindings in great detail (with the patient and those accompanying them) which correlate to their chief complaints, symptoms, and or medical conditions; suggestions were made relating to changes in diet and or supplementswhich may improve the complaints and which will be reflected in their future lab findings; Chief Complaint   Patient presents with   [facility]   ;    Plans for the next visits:  - Abnormal and non-optimal Labs were ordered today to be repeated in the next 120-365 days to assess changes from adjustments in nutrition and or nutrients.    - Patient instructed when having ablood draw to ask the  to divide their lab draws into multiple draws over several days if not feeling good at the time of the lab draw or if either prefers to do several smaller blood draws over several days  -Patient instructed to check with insurer before each lab draw and to to to the lab which the insurer directs them for the most cost effective lab draw with the least patient's cost  - Tim Flores  will be scheduled subsequentto those results. Lenin Gutierrez will bring in his drink and food log to his next visit    Chronic Problems Addressed on this Visit:                                   1.  Intensity of Service; Uncontrolled items at this visit; Chief Complaint   Patient presents with   [address]   ; Improved items at this visit; Stable items atthis visit;  2. Patients food and drinks were reviewed with the patient,       - Tim Flores will bring food+drink symptom log to next visit for inclusion in their record      - 75 better food list reviewed & given topatient with the omega 6 food list to avoid         - Gluten in corn and oats abstracts sheet reviewed and given to the patient today   3. Greater than 25 minutes were spent face to face on this visit of which >50% was for counseling and coordination of care. Patients food and drinks were reviewed with thepatient,   - they will bring a food drink symptom log to future visits for inclusion in their record    - 75 better food list reviewed & given to patient along with the omega 6 food list to avoid      - Glutenin corn and oats abstracts sheet reviewed and given to the patient today    - 23 Foods containing Latex-like proteins was reviewed and copy to be taken if desired     - Nutrient Supplements list provided and copyto be taken if desired    - Expert TA. codesy web site offered to patient to review at their convenience by staff with login information    Note:  I have discussed with the patient that with all nutraceuticals, there is often mixed data and emerging research which needs to be monitored; as well as an array of NIHfact sheets on nutrients and supplements. If I have recommended cinnamon at the request of this patient to assist them in control of their blood sugar, triglyceride and or weight issues.   I discussed that thepatient's clinical use of cinnamon Season? Strawberries taste better in June than December because June is strawberry season so buy locally grown produce \"in season\" for the best flavor, cost and less Latex. Locally grown produce notonly tastes great requires little of no ethylene exposure in food distribution so has less latex content. Out of season, use canned, frozen or dried sinceprocessed ripe and are latex lower!!!   Month     [place] LocallyGrown Produce  January, February, March: use canned, frozen or dried fruits since lower in latex  April; asparagus, radishes  May; asparagus, broccoli, green onions, greens, peas, radishes,rhubarb  June; asparagus, beets, beans, broccoli, cabbage, cantaloupe, carrots, green onions, greens, lettuce,onions, parsley, peas, radishes, rhubarb, strawberries, watermelons  July; beans, beets, blueberries,broccoli, cabbage, cantaloupe, carrots, cauliflower, celery, cucumbers, eggplant, grapes, green onions, greens, lettuce, onions, parsley, peas, peaches, bell peppers, potatoes, radishes, summer raspberries, squash, sweetcorn, tomatoes, turnips, watermelons  August; apples, beans, beets, blueberries, cabbage, cantaloupe, carrots,cauliflower, celery, cucumbers, eggplant, grapes, green onions, greens, lettuce, onions, parsley, peas, peaches, pears, bell peppers, potatoes, radishes, squash, sweet corn, tomatoes, turnips, watermelons  September; apples, beans, beets, blueberries, cabbage, cantaloupe, carrots, cauliflower, celery, cucumbers, eggplant, grapes,green onions, greens, lettuce, onions, parsley, peas, peaches, pears, bell peppers, plums, potatoes, pumpkins, radishes, fall red raspberries, squash, sweet corn, tomatoes, turnips, watermelons  October; apples, beets, broccoli, cabbage, carrots, cauliflower, celery, green onions, greens, lettuce, parsley, peas, pears, potatoes,pumpkins, radishes, fall red raspberries, squash, turnips  November; broccoli, cabbage, carrots, parsley,pears, peas  December: use canned, frozen ordried fruits since lower in latex    Upto half of latex-sensitive patients show allergic reactions to fruits (avocados, bananas, kiwifruits, papayas, peaches),   Annals of Allergy, 1994. These plants contain the same proteins that are allergens in latex. People with fruit allergies should warn physicians beforeundergoing procedures which may cause anaphylactic reaction if in contact with latex gloves. Some of the common foods with defined cross-reactivity to latexare avocado, banana, kiwi, chestnut, raw potato, tomato,stone fruits (e.g., peach, cherry), hazelnut, melons, celery, carrot, apple, pear, papaya, and almond. Foods with less well-defined cross-reactivity to latex are peanuts, peppers, citrus fruits, coconut, pineapple, deysi,fig, passion fruit, Ugli fruit, and grape    This fruit/latex cross-reactivity is worsened by ethylene, a gas used to hasten commercial ripening. In nature, plants produce low levels of the hormone ethylene, which regulates germination, flowering, and ripening. Forced ripening by high ethyleneconcentrations, plants produce allergenic wound-repair proteins, which are similar to wound-repair proteins made during the tapping of rubber trees. Sensitive individualswho ingest the fruit get a higher dose and worse reaction. Some people may even first become sensitized to latex through fruit. Can food processing increase theconcentrations of allergenic proteins? Latex-sensitized children (and adults) in [place] often experience allergic reactions after eating bananas ripenedartificially with ethylene. In the [place], food distribution centers treat unripe bananas and other produce with ethylene to ripen; not commonly done in [facility] since fruit is tree-ripened there. Does treatmentof food with ethylene induce banana proteins that cross-react with latex?  (Alex et al.    References:   Latex in Foods Allergy, http://ehp.niehs.nih.gov/members/2003/5811/5811.html    Search web for [employer] in Season \" for whereyou live or are at the time you food shop  www.nutritioncouncil.org/pdf/healthy/SeasonalProduce. pdf ,   Management of Latex, ://medicalcenter. [employer].edu/  search for latex

## 2020-01-24 ENCOUNTER — OFFICE VISIT (OUTPATIENT)
Dept: FAMILY MEDICINE CLINIC | Age: 75
End: 2020-01-24
Payer: MEDICARE

## 2020-01-24 VITALS
HEART RATE: 64 BPM | DIASTOLIC BLOOD PRESSURE: 52 MMHG | WEIGHT: 157.5 LBS | BODY MASS INDEX: 24.66 KG/M2 | SYSTOLIC BLOOD PRESSURE: 106 MMHG | RESPIRATION RATE: 16 BRPM

## 2020-01-24 PROCEDURE — 99213 OFFICE O/P EST LOW 20 MIN: CPT | Performed by: FAMILY MEDICINE

## 2020-01-24 PROCEDURE — G8427 DOCREV CUR MEDS BY ELIG CLIN: HCPCS | Performed by: FAMILY MEDICINE

## 2020-01-24 PROCEDURE — 1123F ACP DISCUSS/DSCN MKR DOCD: CPT | Performed by: FAMILY MEDICINE

## 2020-01-24 PROCEDURE — G8484 FLU IMMUNIZE NO ADMIN: HCPCS | Performed by: FAMILY MEDICINE

## 2020-01-24 PROCEDURE — 1036F TOBACCO NON-USER: CPT | Performed by: FAMILY MEDICINE

## 2020-01-24 PROCEDURE — G8420 CALC BMI NORM PARAMETERS: HCPCS | Performed by: FAMILY MEDICINE

## 2020-01-24 PROCEDURE — 4040F PNEUMOC VAC/ADMIN/RCVD: CPT | Performed by: FAMILY MEDICINE

## 2020-01-24 ASSESSMENT — ENCOUNTER SYMPTOMS
RESPIRATORY NEGATIVE: 1
GASTROINTESTINAL NEGATIVE: 1

## 2020-01-24 NOTE — PROGRESS NOTES
Visit Information    Have you changed or started any medications since your last visit including any over-the-counter medicines, vitamins, or herbal medicines? no   Are you having any side effects from any of your medications? -  no  Have you stopped taking any of your medications? Is so, why? -  no    Have you seen any other physician or provider since your last visit? Yes - Records Obtained  Have you had any other diagnostic tests since your last visit? Yes - Records Obtained  Have you been seen in the emergency room and/or had an admission to a hospital since we last saw you? No  Have you had your routine dental cleaning in the past 6 months? n/a    Have you activated your [employer] account? If not, what are your barriers?  Yes     Patient Care Team:  Gerber Roman MD as PCP - General (Family Medicine)  Gerber Roman MD as PCP - [facility]  Hero Ramachandran MD as Consulting Physician (Pulmonology)  Francisca Gaucher, MD as Consulting Physician (Urology)  Halima White MD as Consulting Physician (Gastroenterology)    Medical History Review  Past Medical, Family, and Social History reviewed and does not contribute to the patient presenting condition    Health Maintenance   Topic Date Due    DTaP/Tdap/Td vaccine (1 - Tdap) 08/26/1954    Shingles Vaccine (1 of 2) 08/26/1993    Flu vaccine (1) 09/01/2019    Annual Wellness Visit (AWV)  08/21/2020    Potassium monitoring  11/02/2020    Creatinine monitoring  11/02/2020    Pneumococcal 65+ years Vaccine  Completed

## 2020-02-19 ENCOUNTER — HOSPITAL ENCOUNTER (OUTPATIENT)
Dept: PHARMACY | Age: 75
Setting detail: THERAPIES SERIES
Discharge: HOME | End: 2020-02-19
Payer: MEDICARE

## 2020-02-19 LAB — POC INR: 4.7 (ref 0.8–1.2)

## 2020-02-19 PROCEDURE — 85610 PROTHROMBIN TIME: CPT

## 2020-02-19 PROCEDURE — 36416 COLLJ CAPILLARY BLOOD SPEC: CPT

## 2020-02-19 PROCEDURE — 99211 OFF/OP EST MAY X REQ PHY/QHP: CPT

## 2020-02-19 RX ORDER — HYDROCODONE BITARTRATE AND ACETAMINOPHEN 5; 325 MG/1; MG/1
TABLET ORAL
COMMUNITY
Start: 2020-01-31 | End: 2020-04-02 | Stop reason: ALTCHOICE

## 2020-02-19 NOTE — PROGRESS NOTES
home medication list.    Influenza vaccine: doesn't take.   [] given    [] declined   [] received previously   [] plans to receive at a later time   [] refused    [x] documented in [facility]

## 2020-02-24 ENCOUNTER — OFFICE VISIT (OUTPATIENT)
Dept: FAMILY MEDICINE CLINIC | Age: 75
End: 2020-02-24
Payer: MEDICARE

## 2020-02-24 VITALS
DIASTOLIC BLOOD PRESSURE: 62 MMHG | BODY MASS INDEX: 24.51 KG/M2 | WEIGHT: 156.5 LBS | RESPIRATION RATE: 16 BRPM | SYSTOLIC BLOOD PRESSURE: 114 MMHG | HEART RATE: 64 BPM

## 2020-02-24 PROCEDURE — G8420 CALC BMI NORM PARAMETERS: HCPCS | Performed by: FAMILY MEDICINE

## 2020-02-24 PROCEDURE — G8484 FLU IMMUNIZE NO ADMIN: HCPCS | Performed by: FAMILY MEDICINE

## 2020-02-24 PROCEDURE — 1123F ACP DISCUSS/DSCN MKR DOCD: CPT | Performed by: FAMILY MEDICINE

## 2020-02-24 PROCEDURE — 1036F TOBACCO NON-USER: CPT | Performed by: FAMILY MEDICINE

## 2020-02-24 PROCEDURE — 99214 OFFICE O/P EST MOD 30 MIN: CPT | Performed by: FAMILY MEDICINE

## 2020-02-24 PROCEDURE — G8427 DOCREV CUR MEDS BY ELIG CLIN: HCPCS | Performed by: FAMILY MEDICINE

## 2020-02-24 PROCEDURE — 4040F PNEUMOC VAC/ADMIN/RCVD: CPT | Performed by: FAMILY MEDICINE

## 2020-02-24 ASSESSMENT — ENCOUNTER SYMPTOMS
RESPIRATORY NEGATIVE: 1
SHORTNESS OF BREATH: 0
GASTROINTESTINAL NEGATIVE: 1
ALLERGIC/IMMUNOLOGIC NEGATIVE: 1

## 2020-02-24 NOTE — PROGRESS NOTES
Heart sounds: Normal heart sounds. Pulmonary:      Effort: Pulmonary effort is normal.      Breath sounds: Normal breath sounds. No stridor. No wheezing, rhonchi or rales. Chest:      Chest wall: No tenderness. Abdominal:      General: Abdomen is flat. Bowel sounds are normal.   Musculoskeletal:      Right lower leg: No edema. Left lower leg: No edema. Comments: Patient does have some varicosities of both lower extremities. Skin:     General: Skin is warm and dry. Neurological:      General: No focal deficit present. Mental Status: He is alert and oriented to person, place, and time. Psychiatric:         Mood and Affect: Mood normal.         Assessment:       Diagnosis Orders   1. Essential hypertension     2. Prostate cancer ([facility] Justin score 6.     3. Chronic deep vein thrombosis (DVT) of femoral vein of left lower extremity ([facility]     4. Anticoagulated on Coumadin     5. DDD (degenerative disc disease), lumbar     6. PSA elevation, bph elevation             Plan:      No orders of the defined types were placed in this encounter. There are no discontinued medications. Current Outpatient Medications   Medication Sig Dispense Refill    benazepril (LOTENSIN) 40 MG tablet TAKE 1 TABLET BY MOUTH ONCE DAILY 90 tablet 3    Chromium-Cinnamon (CINNAMON PLUS CHROMIUM PO) Take 1 capsule by mouth 4 times daily (before meals and nightly) [address] B Complex-Biotin-FA (B COMPLEX 100 TR PO) Take 1 tablet by mouth 3 times daily (before meals) [address] [facility] [address] (VITAMIN D3) 50 MCG (2000 UT) CAPS Take 2 capsules by mouth daily (with breakfast)       magnesium cl-calcium carbonate (SLOW-MAG) 71.5-119 MG TBEC tablet Take 1 tablet by mouth 3 times daily (with meals)      Levomefolate Glucosamine (METHYLFOLATE PO) Take 10 mg by mouth every morning (before breakfast) Metabolic Maintenance at [employer] or [employer]. com      Methylcobalamin 5000 MCG SUBL Place 1 tablet under the tongue arabella Vera at [employer]. com      mupirocin (BACTROBAN) 2 % ointment Apply topically Indications: Skin Abnormalities   1    warfarin (COUMADIN) 2.5 MG tablet Take as directed by the Coumadin clinic, 180 tablets for 90 days 180 tablet 3    HYDROcodone-acetaminophen (NORCO) 5-325 MG per tablet TAKE 1 TABLET BY MOUTH EVERY 6 HOURS AS NEEDED FOR PAIN FOR 3 DAYS      silodosin (RAPAFLO) 4 MG CAPS capsule Take 4 mg by mouth every evening       No current facility-administered medications for this visit. Continue present medications. Continue present level of physical activity. Discussed use, benefit, and side effects of prescribed medications. Barriers to compliance discussed. All patient questions answered. Pt voiced understanding.           Lachelle Ch MD

## 2020-02-24 NOTE — PROGRESS NOTES
Visit Information    Have you changed or started any medications since your last visit including any over-the-counter medicines, vitamins, or herbal medicines? no   Are you having any side effects from any of your medications? -  no  Have you stopped taking any of your medications? Is so, why? -  no    Have you seen any other physician or provider since your last visit? Yes - Records Obtained  Have you had any other diagnostic tests since your last visit? Yes - Records Obtained  Have you been seen in the emergency room and/or had an admission to a hospital since we last saw you? No  Have you had your routine dental cleaning in the past 6 months? n/a  Have you activated your [employer] account? If not, what are your barriers?  Yes     Patient Care Team:  Kana Naranjo MD as PCP - General (Family Medicine)  Kana Naranjo MD as PCP - [facility][1]  Min Lozano MD as Consulting Physician (Pulmonology)  Mee Naik MD as Consulting Physician (Urology)  Bradley Chow MD as Consulting Physician (Gastroenterology)    Medical History Review  Past Medical, Family, and Social History reviewed and does contribute to the patient presenting condition    Health Maintenance   Topic Date Due    DTaP/Tdap/Td vaccine (1 - Tdap) 08/26/1954    Shingles Vaccine (1 of 2) 08/26/1993    Flu vaccine (1) 09/01/2019    Annual Wellness Visit (AWV)  08/22/2020    Potassium monitoring  11/02/2020    Creatinine monitoring  11/02/2020    PSA counseling  12/10/2020    Pneumococcal 65+ years Vaccine  Completed    Hepatitis A vaccine  Aged Out    Hepatitis B vaccine  Aged Out    Hib vaccine  Aged Out    Meningococcal (ACWY) vaccine  Aged Out

## 2020-03-04 ENCOUNTER — HOSPITAL ENCOUNTER (OUTPATIENT)
Dept: PHARMACY | Age: 75
Setting detail: THERAPIES SERIES
Discharge: HOME | End: 2020-03-04
Payer: MEDICARE

## 2020-03-04 LAB — POC INR: 3.8 (ref 0.8–1.2)

## 2020-03-04 PROCEDURE — 36416 COLLJ CAPILLARY BLOOD SPEC: CPT

## 2020-03-04 PROCEDURE — 99211 OFF/OP EST MAY X REQ PHY/QHP: CPT

## 2020-03-04 PROCEDURE — 85610 PROTHROMBIN TIME: CPT

## 2020-03-18 ENCOUNTER — HOSPITAL ENCOUNTER (OUTPATIENT)
Dept: PHARMACY | Age: 75
Setting detail: THERAPIES SERIES
Discharge: HOME | End: 2020-03-18
Payer: MEDICARE

## 2020-03-18 LAB — POC INR: 3 (ref 0.8–1.2)

## 2020-03-18 PROCEDURE — 85610 PROTHROMBIN TIME: CPT

## 2020-03-18 PROCEDURE — 99211 OFF/OP EST MAY X REQ PHY/QHP: CPT

## 2020-03-18 PROCEDURE — 36416 COLLJ CAPILLARY BLOOD SPEC: CPT

## 2020-03-18 NOTE — PROGRESS NOTES
Medication Management [address]  [phone] (phone)  [phone] (fax)      Mr. Janet Lion is a 66 y.o.  male with history of PE, CVA, and DVT who presents today for anticoagulation monitoring and adjustment. Patient verifies current dosing regimen and tablet strength. No missed or extra doses. Patient denies s/s bleeding/bruising/swelling/SOB/chest pain  No blood in urine or stool. Dietary changes. Decrease in the vegetables (peas) since last visit. Encouraged patient to keep consistent. Changes in medication/OTC agents/Herbals. Cinnamon was 1000 units 4 times daily and the new one he purchased is 2000 units 4 times daily. States he has plans to keep it at 2000 units twice daily until he finds the 1000 unit capsules again. No change in alcohol use or tobacco use. No change in activity level. Patient denies headaches/dizziness/lightheadedness/falls. No vomiting/diarrhea or acute illness. No Procedures scheduled in the future at this time. Patient interview performed by Yessica Copeland PharmD Candidate [id number]    Assessment:   Lab Results   Component Value Date    INR 3.00 (H) 03/18/2020    INR 3.80 (H) 03/04/2020    INR 4.70 (H) 02/19/2020     INR therapeutic   Recent Labs     03/18/20  1028   INR 3.00*     Patient presents with therapeutic INR after 8% dose decrease at last visit. Plan:  Continue Coumadin 5mg SuTuWThSa and 2.5mg MonFri. Recheck INR in 3 weeks. Patient reminded to call the Anticoagulation Clinic with any signs or symptoms of bleeding or with any medication changes. Patient given instructions utilizing the teach back method. Discharged ambulatory in no apparent distress. After visit summary printed and reviewed with patient.       Medications reviewed and updated on home medication list -Yes    Influenza vaccine:     [] given    [x] declined   [] received previously   [] plans to receive at a later time   [] refused    [] documented in [id number] [place] [address],[address]:  [facility] Tracking Only    PHSO: No  Total # of Interventions Recommended: 0  - Maintenance Safety Lab Monitoring #: 1  Total Interventions Accepted: 0  Time Spent (min): 15    [facility] Hospital[facility]i  [facility], PharmD  PGY-1 Pharmacy Resident  3/18/2020, 10:53 AM

## 2020-03-30 ENCOUNTER — TELEPHONE (OUTPATIENT)
Dept: PHARMACY | Age: 75
End: 2020-03-30

## 2020-03-30 NOTE — TELEPHONE ENCOUNTER
Shmuel Campo called to let us know that  He started taking Bactrim /160 Bid x 10 days. .  This was started Friday evening. He's been taking 1 coumadin 2.5mg daily .

## 2020-04-01 ENCOUNTER — TELEPHONE (OUTPATIENT)
Dept: PHARMACY | Age: 75
End: 2020-04-01

## 2020-04-02 ENCOUNTER — NURSE ONLY (OUTPATIENT)
Dept: LAB | Age: 75
End: 2020-04-02

## 2020-04-02 ENCOUNTER — HOSPITAL ENCOUNTER (OUTPATIENT)
Dept: PHARMACY | Age: 75
Setting detail: THERAPIES SERIES
Discharge: HOME | End: 2020-04-02
Payer: MEDICARE

## 2020-04-02 LAB — INR BLD: 2.65 (ref 0.85–1.13)

## 2020-04-02 PROCEDURE — 99211 OFF/OP EST MAY X REQ PHY/QHP: CPT

## 2020-04-08 ENCOUNTER — APPOINTMENT (OUTPATIENT)
Dept: PHARMACY | Age: 75
End: 2020-04-08
Payer: MEDICARE

## 2020-04-16 ENCOUNTER — TELEPHONE (OUTPATIENT)
Dept: UROLOGY | Age: 75
End: 2020-04-16

## 2020-04-16 ENCOUNTER — HOSPITAL ENCOUNTER (OUTPATIENT)
Dept: PHARMACY | Age: 75
Setting detail: THERAPIES SERIES
Discharge: HOME | End: 2020-04-16
Payer: MEDICARE

## 2020-04-16 ENCOUNTER — NURSE ONLY (OUTPATIENT)
Dept: LAB | Age: 75
End: 2020-04-16

## 2020-04-16 LAB
BASOPHILS # BLD: 1 %
BASOPHILS ABSOLUTE: 0.1 THOU/MM3 (ref 0–0.1)
C-REACTIVE PROTEIN, HIGH SENSITIVITY: 7.3 MG/L
CALCIUM SERPL-MCNC: 9.3 MG/DL (ref 8.5–10.5)
EOSINOPHIL # BLD: 2.1 %
EOSINOPHILS ABSOLUTE: 0.1 THOU/MM3 (ref 0–0.4)
ERYTHROCYTE [DISTWIDTH] IN BLOOD BY AUTOMATED COUNT: 13 % (ref 11.5–14.5)
ERYTHROCYTE [DISTWIDTH] IN BLOOD BY AUTOMATED COUNT: 46.1 FL (ref 35–45)
HCT VFR BLD CALC: 39.8 % (ref 42–52)
HEMOGLOBIN: 13 GM/DL (ref 14–18)
IMMATURE GRANS (ABS): 0.02 THOU/MM3 (ref 0–0.07)
IMMATURE GRANULOCYTES: 0.3 %
INR BLD: 3.18 (ref 0.85–1.13)
LYMPHOCYTES # BLD: 27.1 %
LYMPHOCYTES ABSOLUTE: 1.7 THOU/MM3 (ref 1–4.8)
MAGNESIUM: 1.9 MG/DL (ref 1.6–2.4)
MCH RBC QN AUTO: 31.5 PG (ref 26–33)
MCHC RBC AUTO-ENTMCNC: 32.7 GM/DL (ref 32.2–35.5)
MCV RBC AUTO: 96.4 FL (ref 80–94)
MONOCYTES # BLD: 13.1 %
MONOCYTES ABSOLUTE: 0.8 THOU/MM3 (ref 0.4–1.3)
NUCLEATED RED BLOOD CELLS: 0 /100 WBC
PLATELET # BLD: 249 THOU/MM3 (ref 130–400)
PMV BLD AUTO: 9.4 FL (ref 9.4–12.4)
PROSTATE SPECIFIC ANTIGEN: 11.93 NG/ML (ref 0–1)
PTH INTACT: 41.1 PG/ML (ref 15–65)
RBC # BLD: 4.13 MILL/MM3 (ref 4.7–6.1)
SEDIMENTATION RATE, ERYTHROCYTE: 20 MM/HR (ref 0–10)
SEG NEUTROPHILS: 56.4 %
SEGMENTED NEUTROPHILS ABSOLUTE COUNT: 3.4 THOU/MM3 (ref 1.8–7.7)
URIC ACID: 8 MG/DL (ref 3.7–7)
VITAMIN D 25-HYDROXY: 37 NG/ML (ref 30–100)
WBC # BLD: 6.1 THOU/MM3 (ref 4.8–10.8)

## 2020-04-16 PROCEDURE — 99211 OFF/OP EST MAY X REQ PHY/QHP: CPT

## 2020-04-16 RX ORDER — CIPROFLOXACIN 500 MG/1
500 TABLET, FILM COATED ORAL 2 TIMES DAILY
Qty: 28 TABLET | Refills: 0 | Status: SHIPPED | OUTPATIENT
Start: 2020-04-16 | End: 2020-04-30

## 2020-04-16 NOTE — PROGRESS NOTES
Medication Management [address]   [phone] (phone)  [phone] (fax)      Anticoagulation encounter completed by telephone. Patient had lab result completed at outside lab. Mr. Arlin Ryan is a 66 y.o.  male with history of DVT, PE, CVA. Patient verifies current dosing regimen and tablet strength. No missed or extra doses. Patient denies s/s bleeding/bruising/swelling/SOB/chest pain  No blood in urine or stool. No dietary changes. Patient notes that his INRs have been high since he started herbals/vitamins prescribed a couple months ago. Patient states that his cinnamon tablet has curcumin in it. When he sees Dr. Tanya Villegas next week, wants to ask about a cinnamon tablet that does not have curcumin in it. No change in alcohol use or tobacco use. No change in activity level. Patient denies headaches/dizziness/lightheadedness/falls. No vomiting/diarrhea or acute illness. No Procedures scheduled in the future at this time. Assessment:   Lab Results   Component Value Date    INR 3.18 (H) 04/16/2020    INR 2.65 (H) 04/02/2020    INR 3.00 (H) 03/18/2020     INR supratherapeutic   Recent Labs     04/16/20  0918   INR 3.18*     Patient had presented with two supratherapeutic INRs when dose decreased from 32.5 mg weekly to 30 mg weekly. Since on 30 mg weekly (except period while on Bactrim) patient presented with 3.0 INR and 3.18 INR. Plan:  Take 2.5 mg today then decrease Coumadin 2.5 mg MoWeFr and 5 mg SuTuThSa (8.3% decrease). Recheck INR in 3 weeks (patient requested no sooner if possible. He does not like venous draw at lab). Patient reminded to call the Anticoagulation Clinic with signs or symptoms of bleeding or with any medication changes. Patient given instructions utilizing the teach back method.     CLINICAL PHARMACY CONSULT: MED RECONCILIATION/REVIEW ADDENDUM  For Pharmacy Admin Tracking Only  PHSO: No  Total # of Interventions

## 2020-04-17 ENCOUNTER — TELEPHONE (OUTPATIENT)
Dept: PHARMACY | Age: 75
End: 2020-04-17

## 2020-04-20 ENCOUNTER — TELEPHONE (OUTPATIENT)
Dept: UROLOGY | Age: 75
End: 2020-04-20

## 2020-04-20 NOTE — TELEPHONE ENCOUNTER
AND BLOOD THINNERS FOR 7 DAYS BEFORE THE BIOPSY AND 3 DAYS AFTER THE BIOPSY. SEE ATTACHED LIST OF ADDITIONAL MEDICATIONS. YOU MAY TAKE TYLENOL IF NEEDED  2. Please eat a regular breakfast/lunch. 3.  Take your antibiotics as directed:  Cipro 500 mg twice a day, start on:   6/8/20    take until finished. 4.  Bring your Gentamicin 80 mg with you to your appointment. 5.  Do a Fleets Enema 2 hours before the visit. Purchase it at any drug store and follow the instructions on the package. 6. Please ensure to bring a  with you the day of the surgery to transport you home. HOME GOING AND FOLLOW UP INSTRUCTIONS  Call the doctor if: 1. There is a large amount of blood or clots in the urine or stool. 2.  You are unable to urinate. 3.  If your temperature is 101 degrees F or greater. 4.  You could see blood in your semen for up to 2-months            5.   You may resume your regular medication except those on  attached list.    DATE: 4/20/2020       SIGNATURE:________________________________

## 2020-04-20 NOTE — TELEPHONE ENCOUNTER
Voice message left for patient to call the office. Regarding the (2) appointments scheduled. Lab/Ma 6/9/20 9:30am for the rectal swab.   UroNav guided prostate biopsy with Dr Annalisa Calvert 6/24/20 9:30am.

## 2020-04-20 NOTE — TELEPHONE ENCOUNTER
Dr Jodi Carrasco is asking for the approval for YareliGeeYee Nila to hold the Coumadin 7 days prior to the [facility] prostate biopsy on 6/24/20. Will contact medication management for the Coumadin Management.

## 2020-04-20 NOTE — TELEPHONE ENCOUNTER
[id number] Mckenna Alatorre thank you  He is not anxious at all to have biopsy done. Will have him scheduled for lab/ma visit for rectal swab in June, and then follow up for [facility] biopsy with you 2-3  Weeks later. Thank you dr Faby Lee!

## 2020-04-21 ENCOUNTER — TELEPHONE (OUTPATIENT)
Dept: UROLOGY | Age: 75
End: 2020-04-21

## 2020-04-21 ENCOUNTER — TELEPHONE (OUTPATIENT)
Dept: PHARMACY | Age: 75
End: 2020-04-21

## 2020-04-21 NOTE — TELEPHONE ENCOUNTER
Hussein Tejada, everything on that note looks fine. I would hold the Lovenox post biopsy for 48 hours.

## 2020-04-22 ENCOUNTER — HOSPITAL ENCOUNTER (OUTPATIENT)
Dept: PHARMACY | Age: 75
Setting detail: THERAPIES SERIES
Discharge: HOME | End: 2020-04-22
Payer: MEDICARE

## 2020-04-22 ENCOUNTER — NURSE ONLY (OUTPATIENT)
Dept: LAB | Age: 75
End: 2020-04-22

## 2020-04-22 LAB
CREAT SERPL-MCNC: 2.2 MG/DL (ref 0.4–1.2)
GFR SERPL CREATININE-BSD FRML MDRD: 29 ML/MIN/1.73M2
INR BLD: 2.73 (ref 0.85–1.13)

## 2020-04-22 PROCEDURE — 99211 OFF/OP EST MAY X REQ PHY/QHP: CPT

## 2020-04-22 NOTE — PROGRESS NOTES
Medication Management [address]   [phone] (phone)  [phone] (fax)      Anticoagulation encounter completed by telephone. Patient had lab result completed at outside lab. Mr. Veronica Fisher is a 66 y.o.  male with history of PE, DVT, CVA. Patient verifies current dosing regimen and tablet strength. No missed or extra doses. Patient denies s/s bleeding/bruising/swelling/SOB/chest pain  No blood in urine or stool. No dietary changes. On Cipro x 14 days - today is day 6  No change in alcohol use or tobacco use. No change in activity level. Patient denies headaches/dizziness/lightheadedness/falls. No vomiting/diarrhea or acute illness. Procedure 6/24/20 - to hold Coumadin 7 days prior and 3 days after, patient to be bridged with lovenox, hold lovenox 48 hours after biopsy (see TE 4/20/20). Assessment:   Lab Results   Component Value Date    INR 2.73 (H) 04/22/2020    INR 3.18 (H) 04/16/2020    INR 2.65 (H) 04/02/2020     INR therapeutic   Recent Labs     04/22/20  0920   INR 2.73*     Patient's INR therapeutic today. Patient had recent dose decrease with slight additional decrease due to being on cipro. Plan:  Continue Coumadin 2.5 mg MWF and 5 mg TThSSu except Sunday 4/26/20 - Coumadin 2.5 mg (due to interaction with cipro). Recheck INR in 3 weeks. Patient reminded to call the Anticoagulation Clinic with any signs or symptoms of bleeding or with any medication changes. Patient given instructions utilizing the teach back method.     Carlos Acevedo, RianD, BCPS  4/22/2020 12:32 PM    CLINICAL PHARMACY CONSULT: MED RECONCILIATION/REVIEW ADDENDUM    For Pharmacy Admin Tracking Only    PHSO: No  Total # of Interventions Recommended: 1  - Decreased Dose #: 1  - Maintenance Safety Lab Monitoring #: 1  Total Interventions Accepted: 1  Time Spent (min): 20

## 2020-04-23 ENCOUNTER — TELEPHONE (OUTPATIENT)
Dept: PHARMACY | Age: 75
End: 2020-04-23

## 2020-04-23 ENCOUNTER — VIRTUAL VISIT (OUTPATIENT)
Dept: FAMILY MEDICINE CLINIC | Age: 75
End: 2020-04-23
Payer: MEDICARE

## 2020-04-23 PROCEDURE — 99215 OFFICE O/P EST HI 40 MIN: CPT | Performed by: NURSE PRACTITIONER

## 2020-04-23 PROCEDURE — 4040F PNEUMOC VAC/ADMIN/RCVD: CPT | Performed by: NURSE PRACTITIONER

## 2020-04-23 PROCEDURE — G8428 CUR MEDS NOT DOCUMENT: HCPCS | Performed by: NURSE PRACTITIONER

## 2020-04-23 PROCEDURE — 1123F ACP DISCUSS/DSCN MKR DOCD: CPT | Performed by: NURSE PRACTITIONER

## 2020-04-23 NOTE — PROGRESS NOTES
the bowels  If you take Magnesium during or after meals it will not be as stimulating  · B-100  1 tablet - 3  times daily with meals   time released  without vitamin C   [place] [employer]  No Super-B complex  · Cinnamon 1000 mg - 4 times daily before meals that contain carbs and at bedtime  Start with 1 capsule 4 times daily, if after the first week you tolerate it well you can increase to 2 capsules 4 times daily. Cinnamon is not a free pass to eat a lot of carbs and sugars   Since you want to lower your dose of warfarin and would like to take fish oil, please discuss your options with your doctor. Utilize [employer] to assist you in decreasing the amount of Omega-6 (plant oil) in your diet  Cook from scratch, processed/cooked food in store/restaurants has [place] [address]      1. Does documentation reveal total time? Yes   2. Does documentation describe the content of counseling or coordinating care? Yes    I have spent more than 60 minutes face-to-face with this patient, greater than 50% was spent counseling/coordinating care. Total time spent 60  Minutes including chart review and non-face-to face time     No follow-ups on file. Archie Moe is a 66 y.o. male being evaluated by a Virtual Visit (video visit) encounter to address concerns as mentioned above. A caregiver was present when appropriate. Due to this being a TeleHealth encounter (During AJEYY-79 public health emergency), evaluation of the following organ systems was limited: Vitals/Constitutional/EENT/Resp/CV/GI//MS/Neuro/Skin/Heme-Lymph-Imm. Pursuant to the emergency declaration under the [address] [place] [address], [address] [place] [address] authority and the [employer] Act, this Virtual Visit was conducted with patient's (and/or legal guardian's) consent, to reduce the patient's risk of exposure to COVID-19 and provide necessary medical care.   The patient (and/or legal guardian) has also been advised to contact this office for worsening conditions or problems, and seek emergency medical treatment and/or call 911 if deemed necessary. Services were provided through a video synchronous discussion virtually to substitute for in-person clinic visit. Patient and provider were located at their individual homes. --Yair Borjas, KRISTINA - CNP on 4/28/2020 at 7:02 PM    An electronic signature was used to authenticate this note.

## 2020-04-27 ENCOUNTER — TELEPHONE (OUTPATIENT)
Dept: FAMILY MEDICINE CLINIC | Age: 75
End: 2020-04-27

## 2020-04-27 NOTE — TELEPHONE ENCOUNTER
I am not familiar with urology at [facility].  He will need to find out who he is willing to see and I can make the referral then.

## 2020-05-05 ENCOUNTER — OFFICE VISIT (OUTPATIENT)
Dept: NEPHROLOGY | Age: 75
End: 2020-05-05
Payer: MEDICARE

## 2020-05-05 VITALS
BODY MASS INDEX: 23.89 KG/M2 | WEIGHT: 152.6 LBS | TEMPERATURE: 97.2 F | DIASTOLIC BLOOD PRESSURE: 60 MMHG | OXYGEN SATURATION: 96 % | SYSTOLIC BLOOD PRESSURE: 106 MMHG | HEART RATE: 70 BPM

## 2020-05-05 PROCEDURE — G8427 DOCREV CUR MEDS BY ELIG CLIN: HCPCS | Performed by: INTERNAL MEDICINE

## 2020-05-05 PROCEDURE — 99203 OFFICE O/P NEW LOW 30 MIN: CPT | Performed by: INTERNAL MEDICINE

## 2020-05-05 PROCEDURE — 4040F PNEUMOC VAC/ADMIN/RCVD: CPT | Performed by: INTERNAL MEDICINE

## 2020-05-05 PROCEDURE — 1036F TOBACCO NON-USER: CPT | Performed by: INTERNAL MEDICINE

## 2020-05-05 PROCEDURE — G8420 CALC BMI NORM PARAMETERS: HCPCS | Performed by: INTERNAL MEDICINE

## 2020-05-05 PROCEDURE — 1123F ACP DISCUSS/DSCN MKR DOCD: CPT | Performed by: INTERNAL MEDICINE

## 2020-05-05 NOTE — PROGRESS NOTES
Nephrology Consult Note  Patient's Name: Uzair Moe  2:29 PM  5/5/2020    Nephrologist: Tank Escobar    Reason for Consult:  Chronic kidney disease  Requesting Physician:  No att. providers found  PCP: Juana Castillo MD    Chief Complaint:  I have kidney problems  Assessment   Diagnosis Orders   1. CKD (chronic kidney disease), stage IV (HCC)  Basic Metabolic Panel    Vitamin D 25 Hydroxy    PTH, Intact    Phosphorus    Protein / creatinine ratio, urine    Microalbumin / Creatinine Urine Ratio    US Renal Kidney   2. Essential hypertension     3. Prostate cancer ([facility] [address] Justin score 6. Protein / creatinine ratio, urine    Kappa/Lambda Free Lt Chains, Serum Quant    C3 Complement    C4 Complement   4. Gastroesophageal reflux disease without esophagitis     1.   2.     Plan    1. I discussed my thoughts  with the patient. 2. He understood. 3. I addressed his questions. 4. He likely has  chronic kidney disease from hypertensive nephrosclerosis which may be exacerbated by renal hypoperfusion from recent hypotension. Some time also the ACE inhibitor can cause worsening kidney function as well. In addition, possibility of obstructive uropathy is not ruled out because of his advanced age and history of adenocarcinoma of the prostate gland. Monoclonal gammopathy from adenocarcinoma of the prostate gland is also another consideration. 5. We will check markers of chronicity. 6. Kappa with lambda free light chain assay ratio. 7. Random urine protein creatinine ratio. 8. Kidney ultrasound. 9. Serologies. 10. Hold benazepril for now. 11. Monitor blood pressure closely at home. 12. Call us if systolic blood pressure is consistently above 140 mmHg. 13. At that time we might place him on alpha-blocker such as doxazosin. 14. Avoid nonsteroidal anti-inflammatory drugs. 15. List provided to the patient. 16. Return visit in 4 weeks with labs.         History Obtained From:  Patient and medical record  History of negative  Cardiovascular: negative  Gastrointestinal: negative  Genitourinary:negative  Integument/breast: negative  Hematologic/lymphatic: negative  Musculoskeletal:positive for arthralgias and stiff joints  Neurological: negative  Behavioral/Psych: negative  Endocrine: negative  Allergic/Immunologic: negative    Physical exam:   Constitutional:    Vitals:   Vitals:    05/05/20 1422   BP: 106/60   Pulse: 70   Temp: 97.2 °F (36.2 °C)   SpO2: 96%       Skin: no rash, turgor is normal  Heent: Pupils are reactive to light. Throat is clear. Oral mucosa is moist.  Neck: no bruits or jvd noted   Cardiovascular:  S1, S2 without murmur   Respiratory: Clear with no wheezes,rhonchi or rales   Abdomen: soft,non tender,good bowel sound and no palpable mass  Ext: No lower extremity edema  Psychiatric: mood and affect appropriate**  Musculoskeletal:  Rom, muscular strength intact   CNS: Very awake and very alert. Well-oriented. Normal speech. Good motor strength. No obvious focal deficit.     Data:   Labs:  Lab Results   Component Value Date     11/02/2019     05/04/2019     11/27/2018    K 4.7 11/02/2019    K 4.7 11/02/2019    K 4.7 05/04/2019     11/02/2019    CO2 24 11/02/2019    CO2 23 05/04/2019    CO2 26 11/27/2018    CREATININE 2.2 (H) 04/22/2020    CREATININE 1.75 (H) 11/02/2019    CREATININE 1.75 11/02/2019    BUN 42 (H) 11/02/2019    BUN 36 (H) 05/04/2019    BUN 29 (H) 11/27/2018    GLUCOSE 100 11/02/2019    GLUCOSE 96 05/04/2019    GLUCOSE 122 (H) 11/27/2018    PHOS 3.1 11/02/2019    PHOS 3.4 05/04/2019    PHOS 3.1 11/03/2018    WBC 6.1 04/16/2020    WBC 5.8 01/16/2020    WBC 5.0 11/02/2019    HGB 13.0 (L) 04/16/2020    HGB 14.1 01/16/2020    HGB 14.0 11/02/2019    HCT 39.8 (L) 04/16/2020    HCT 42.8 01/16/2020    HCT 41.8 11/02/2019    MCV 96.4 (H) 04/16/2020     04/16/2020     {Labs reviewed    Imaging:  CXR results:  @ studies        Thank you Dr. Joss Botello MD for allowing us

## 2020-05-13 ENCOUNTER — NURSE ONLY (OUTPATIENT)
Dept: LAB | Age: 75
End: 2020-05-13

## 2020-05-13 ENCOUNTER — HOSPITAL ENCOUNTER (OUTPATIENT)
Dept: PHARMACY | Age: 75
Setting detail: THERAPIES SERIES
Discharge: HOME | End: 2020-05-13
Payer: MEDICARE

## 2020-05-13 LAB
INR BLD: 1.74 (ref 0.85–1.13)
PROSTATE SPECIFIC ANTIGEN: 11.76 NG/ML (ref 0–1)

## 2020-05-13 PROCEDURE — 99211 OFF/OP EST MAY X REQ PHY/QHP: CPT

## 2020-05-14 ENCOUNTER — TELEPHONE (OUTPATIENT)
Dept: UROLOGY | Age: 75
End: 2020-05-14

## 2020-05-18 ENCOUNTER — TELEPHONE (OUTPATIENT)
Dept: PHARMACY | Age: 75
End: 2020-05-18

## 2020-05-20 ENCOUNTER — HOSPITAL ENCOUNTER (OUTPATIENT)
Dept: ULTRASOUND IMAGING | Age: 75
Discharge: HOME | End: 2020-05-20
Payer: MEDICARE

## 2020-05-20 PROCEDURE — 76770 US EXAM ABDO BACK WALL COMP: CPT

## 2020-05-21 ENCOUNTER — TELEPHONE (OUTPATIENT)
Dept: FAMILY MEDICINE CLINIC | Age: 75
End: 2020-05-21

## 2020-05-21 ENCOUNTER — TELEPHONE (OUTPATIENT)
Dept: UROLOGY | Age: 75
End: 2020-05-21

## 2020-05-21 NOTE — TELEPHONE ENCOUNTER
I have reviewed his Coumadin clinic note and his evaluation by Dr. Cherry Escobar, his nephrologist, and he is now a stage IV chronic kidney disease patient therefore making the switch to either Eliquis or Xarelto will need to be cleared through nephrology before being prescribed. Have Skip Corrales send a note to [address] [place] [address]  to see if they will  make a recommendation for dose and which medication is preferable or whether he should even remain on Coumadin for the time being.

## 2020-05-21 NOTE — TELEPHONE ENCOUNTER
Spoke with Cassie Chance with CC she read the note in epic and is updated      TE routed to Dr. Luis Garza for review and advice.

## 2020-05-26 ENCOUNTER — NURSE ONLY (OUTPATIENT)
Dept: LAB | Age: 75
End: 2020-05-26

## 2020-05-26 LAB
ANION GAP SERPL CALCULATED.3IONS-SCNC: 11 MEQ/L (ref 8–16)
BUN BLDV-MCNC: 39 MG/DL (ref 7–22)
CALCIUM SERPL-MCNC: 9.3 MG/DL (ref 8.5–10.5)
CHLORIDE BLD-SCNC: 106 MEQ/L (ref 98–111)
CO2: 22 MEQ/L (ref 23–33)
CREAT SERPL-MCNC: 1.8 MG/DL (ref 0.4–1.2)
CREATININE URINE: 84.3 MG/DL
CREATININE, URINE: 84.3 MG/DL
GFR SERPL CREATININE-BSD FRML MDRD: 37 ML/MIN/1.73M2
GLUCOSE BLD-MCNC: 123 MG/DL (ref 70–108)
MICROALBUMIN UR-MCNC: < 1.2 MG/DL
MICROALBUMIN/CREAT UR-RTO: 14 MG/G (ref 0–30)
PHOSPHORUS: 3.1 MG/DL (ref 2.4–4.7)
POTASSIUM SERPL-SCNC: 4.5 MEQ/L (ref 3.5–5.2)
PROT/CREAT RATIO, UR: 0.05
PROTEIN, URINE: 4.2 MG/DL
PTH INTACT: 37.3 PG/ML (ref 15–65)
SODIUM BLD-SCNC: 139 MEQ/L (ref 135–145)
VITAMIN D 25-HYDROXY: 41 NG/ML (ref 30–100)

## 2020-05-27 ENCOUNTER — TELEPHONE (OUTPATIENT)
Dept: UROLOGY | Age: 75
End: 2020-05-27

## 2020-05-27 LAB
C3 COMPLEMENT: 99 MG/DL (ref 88–201)
C4 COMPLEMENT: 28 MG/DL (ref 10–40)

## 2020-05-27 NOTE — TELEPHONE ENCOUNTER
Called patient this morning to move his rectal swab appointment to your schedule because Patricio Lubin will not be in. Patient stated that he meant to call today to let us know to cancel all appointments with us. He is transferring care to Dr. Dumont Sender. ANGEL Alford. Previously scheduled for UroNav Bx.

## 2020-05-28 LAB — KAPPA/LAMBDA FREE LIGHT CHAINS: NORMAL

## 2020-05-28 NOTE — TELEPHONE ENCOUNTER
Dr. Doris Chinchilla gave the ok'd for patient to be on Eliquis. Patient assistance Markos Roland will fax over form for provider review, sign and fax.

## 2020-05-28 NOTE — TELEPHONE ENCOUNTER
Per Dr. Olga Sullivan note, he is ok with apixiban from renal perspective. Referring MD for anticoagulation is Dr. Keith Terry. [facility] Prescription Assistance Technician, Ángel Jonas, can work with patient on assistance program applications if this if ok with Dr. Keith Terry. She will need additional information from prescribing MD office. She can be reached at [phone]. Routing note to Dr. Toya Gutierrez office for follow up.

## 2020-06-02 ENCOUNTER — NURSE ONLY (OUTPATIENT)
Dept: LAB | Age: 75
End: 2020-06-02

## 2020-06-02 ENCOUNTER — HOSPITAL ENCOUNTER (OUTPATIENT)
Dept: PHARMACY | Age: 75
Setting detail: THERAPIES SERIES
Discharge: HOME | End: 2020-06-02
Payer: MEDICARE

## 2020-06-02 ENCOUNTER — OFFICE VISIT (OUTPATIENT)
Dept: NEPHROLOGY | Age: 75
End: 2020-06-02
Payer: MEDICARE

## 2020-06-02 VITALS
TEMPERATURE: 98.2 F | WEIGHT: 153.2 LBS | HEART RATE: 66 BPM | SYSTOLIC BLOOD PRESSURE: 164 MMHG | OXYGEN SATURATION: 97 % | DIASTOLIC BLOOD PRESSURE: 85 MMHG | BODY MASS INDEX: 23.99 KG/M2

## 2020-06-02 LAB — INR BLD: 2.3 (ref 0.85–1.13)

## 2020-06-02 PROCEDURE — 99211 OFF/OP EST MAY X REQ PHY/QHP: CPT | Performed by: PHARMACIST

## 2020-06-02 PROCEDURE — G8420 CALC BMI NORM PARAMETERS: HCPCS | Performed by: INTERNAL MEDICINE

## 2020-06-02 PROCEDURE — G8427 DOCREV CUR MEDS BY ELIG CLIN: HCPCS | Performed by: INTERNAL MEDICINE

## 2020-06-02 PROCEDURE — 1036F TOBACCO NON-USER: CPT | Performed by: INTERNAL MEDICINE

## 2020-06-02 PROCEDURE — 4040F PNEUMOC VAC/ADMIN/RCVD: CPT | Performed by: INTERNAL MEDICINE

## 2020-06-02 PROCEDURE — 99213 OFFICE O/P EST LOW 20 MIN: CPT | Performed by: INTERNAL MEDICINE

## 2020-06-02 PROCEDURE — 1123F ACP DISCUSS/DSCN MKR DOCD: CPT | Performed by: INTERNAL MEDICINE

## 2020-06-02 NOTE — PROGRESS NOTES
insurance company. You may be responsible for any copays, coinsurance amounts or other amounts not covered by your insurance company. If you do not accept this, unfortunately we will not be able to schedule a virtual visit with the provider. Do you accept?   Yes

## 2020-06-02 NOTE — PROGRESS NOTES
Renal Progress Note    Assessment and Plan:      Diagnosis Orders   1. CKD (chronic kidney disease), stage IV ([address]     2. Essential hypertension     3. Prostate cancer ([address] Justin score 6.     4. Gastroesophageal reflux disease without esophagitis     5. Bilateral renal cysts       PLAN:  Reviewed labs results extensively with the patient. He understood. I addressed his questions. We went through the labs together item by item in the computer. Serum creatinine is improved to 1.8 mg/dL from 2.2 mg/dL. PTH is normal.  Vitamin D level is normal.  Kidney ultrasound no hydronephrosis but has  bilateral benign simple cyst.  Kappa with lambda free light chain assay ratio is normal.  Random urine protein creatinine ratio is normal.  Medications reviewed. No changes. Return visit in 6 months with labs. Patient Active Problem List   Diagnosis    Hypertension    TIA (transient ischemic attack)    GERD (gastroesophageal reflux disease)    Medication monitoring encounter    SI (sacroiliac) joint inflammation ([address] left    Dvt femoral (deep venous thrombosis) ([facility])    Chronic low back pain    Lung mass    Abnormal CT scan, chest, pulmonary nodule.     PSA elevation, bph elevation    Anticoagulated on Coumadin    Sciatica of left side associated with disorder of lumbar spine    Lumbar disc disease with radiculopathy    Lumbar spinal stenosis    Other spondylosis with radiculopathy, lumbar region    DDD (degenerative disc disease), lumbar    RAMAN (acute kidney injury) ([address]    CKD (chronic kidney disease) stage 3, GFR 30-59 ml/min ([facility])    Pleural effusion, left    Nocturnal leg cramps    Bilateral carpal tunnel syndrome    Prostate cancer ([address] [place] score 6.    History of CVA (cerebrovascular accident)    History of pulmonary embolism       Subjective:   Chief complaint:  Chief Complaint   Patient presents with    Chronic Kidney Disease     Stage III      HPI:This is a follow up visit for

## 2020-06-04 ENCOUNTER — TELEPHONE (OUTPATIENT)
Dept: FAMILY MEDICINE CLINIC | Age: 75
End: 2020-06-04

## 2020-06-08 NOTE — TELEPHONE ENCOUNTER
I phoned Zack Chou [phone] from Pt Assistance who says she is not sure on the dose. She received a call from Cavalier at the [address] [place] [address] to see if pt qualifies for the Eliquis. No dose was given. They want to start pt on Eliquis or Xeralto. Please advise.

## 2020-06-30 ENCOUNTER — NURSE ONLY (OUTPATIENT)
Dept: LAB | Age: 75
End: 2020-06-30

## 2020-06-30 ENCOUNTER — HOSPITAL ENCOUNTER (OUTPATIENT)
Dept: PHARMACY | Age: 75
Setting detail: THERAPIES SERIES
Discharge: HOME | End: 2020-06-30
Payer: MEDICARE

## 2020-06-30 LAB — INR BLD: 2.51 (ref 0.85–1.13)

## 2020-06-30 PROCEDURE — 99211 OFF/OP EST MAY X REQ PHY/QHP: CPT

## 2020-06-30 NOTE — PROGRESS NOTES
responsible for any copays, coinsurance amounts or other amounts not covered by your insurance company. If you do not accept this, unfortunately we will not be able to schedule a virtual visit with the provider. Do you accept?   Yes    CLINICAL PHARMACY CONSULT: MED RECONCILIATION/REVIEW ADDENDUM    For Pharmacy Admin Tracking Only    PHSO: No  Total # of Interventions Recommended: 0  - Updated Order #: 1 Updated Order Reason(s): OTC  Updated home medication list  - Maintenance Safety Lab Monitoring #: 1  Total Interventions Accepted: 0  Time Spent (min): 8307  [facility], PharmD

## 2020-07-06 ENCOUNTER — TELEPHONE (OUTPATIENT)
Dept: PHARMACY | Age: 75
End: 2020-07-06

## 2020-07-06 NOTE — TELEPHONE ENCOUNTER
Patient called stating he is having a procedure - prostate biopsy, but it is not scheduled yet. He has been putting it off waiting on insurance approval for Eliquis and received more paper work in the mail today for [employer]. Discussed need for bx and clinic will bridge patient with Lovenox as needed. Patient urged to call urologist and schedule biopsy.

## 2020-07-14 ENCOUNTER — TELEPHONE (OUTPATIENT)
Dept: PHARMACY | Age: 75
End: 2020-07-14

## 2020-07-14 NOTE — TELEPHONE ENCOUNTER
Discussed referral for [address] monitoring with patient. Sent message to Dr Светлана Zurita office.

## 2020-07-14 NOTE — TELEPHONE ENCOUNTER
Patient called and stated that he is starting Eliquis 7/17/20.   Please call the patient back with instructions   [phone]

## 2020-07-15 ENCOUNTER — TELEPHONE (OUTPATIENT)
Dept: FAMILY MEDICINE CLINIC | Age: 75
End: 2020-07-15

## 2020-07-15 NOTE — TELEPHONE ENCOUNTER
Spoke with patient - he informed me that he was told yesterday to hold Coumadin 7/17- 7/19 and we will check INR 7/20 to see if we can start Eliquis. Will continue with current plan.      Hanna Hutchinson, RianD, BCPS  7/15/2020  11:19 AM

## 2020-07-20 ENCOUNTER — HOSPITAL ENCOUNTER (OUTPATIENT)
Dept: PHARMACY | Age: 75
Setting detail: THERAPIES SERIES
Discharge: HOME | End: 2020-07-20
Payer: MEDICARE

## 2020-07-20 VITALS — TEMPERATURE: 98.1 F

## 2020-07-20 LAB — POC INR: 1.6 (ref 0.8–1.2)

## 2020-07-20 PROCEDURE — 36416 COLLJ CAPILLARY BLOOD SPEC: CPT

## 2020-07-20 PROCEDURE — 85610 PROTHROMBIN TIME: CPT

## 2020-07-20 PROCEDURE — 99213 OFFICE O/P EST LOW 20 MIN: CPT

## 2020-07-20 NOTE — PROGRESS NOTES
[facility]  7[facility]20/2020  2:37 PM    CLINICAL PHARMACY CONSULT: MED RECONCILIATION/REVIEW ADDENDUM    For Pharmacy Admin Tracking Only    PHSO: No  Total # of Interventions Recommended: 1  - Discontinued Medication #: 1 Discontinue Reason(s): No Longer Used- Stop Coumadin  - Maintenance Safety Lab Monitoring #: 1  Total Interventions Accepted: 1  Time Spent (min): 8654  [facility], PharmD

## 2020-07-20 NOTE — PROGRESS NOTES
Medication Management [facility]  Anticoagulation Clinic  [phone] (phone)  [phone] (fax)      Val Antunez is a 66 y.o. male with PMHx significant for  who presents to clinic 7/20/2020 for anticoagulation management and education. DOAC Therapy: Eliquis  Current Dosage: 2.5 mg BID starting tonight 7/20/20 prescribed by Dr. Jerardo Mccabe  Anticoagulation Indication(s): CVA/DVT/PE  (Confirmed absence of valvular heart disease or heart valve replacement)  Referring Physician: Dr. Jerardo Mccabe  Intended duration of Anticoagulation Therapy: Indefinite    Pertinent Laboratory Results  Recent CBC Results (baseline and q12mo):  Lab Results   Component Value Date    INR 1.60 (H) 07/20/2020    INR 2.51 (H) 06/30/2020    INR 2.30 (H) 06/02/2020      Ref.  Range 4/16/2020 09:21   WBC Latest Ref Range: 4.8 - 10.8 thou/mm3 6.1   RBC Latest Ref Range: 4.70 - 6.10 mill/mm3 4.13 (L)   Hemoglobin Quant Latest Ref Range: 14.0 - 18.0 gm/dl 13.0 (L)   Hematocrit Latest Ref Range: 42.0 - 52.0 % 39.8 (L)   MCV Latest Ref Range: 80.0 - 94.0 fL 96.4 (H)   MCH Latest Ref Range: 26.0 - 33.0 pg 31.5   MCHC Latest Ref Range: 32.2 - 35.5 gm/dl 32.7   MPV Latest Ref Range: 9.4 - 12.4 fL 9.4   RDW-CV Latest Ref Range: 11.5 - 14.5 % 13.0   RDW-SD Latest Ref Range: 35.0 - 45.0 fL 46.1 (H)   Platelet Count Latest Ref Range: 130 - 400 thou/mm3 249   Lymphocytes Absolute Latest Ref Range: 1.0 - 4.8 thou/mm3 1.7   Monocytes Absolute Latest Ref Range: 0.4 - 1.3 thou/mm3 0.8   Eosinophils Absolute Latest Ref Range: 0.0 - 0.4 thou/mm3 0.1   Basophils Absolute Latest Ref Range: 0.0 - 0.1 thou/mm3 0.1   Seg Neutrophils Latest Units: % 56.4   Segs Absolute Latest Ref Range: 1.8 - 7.7 thou/mm3 3.4   Lymphocytes Latest Units: % 27.1   Monocytes Latest Units: % 13.1   Eosinophils Latest Units: % 2.1   Basophils Latest Units: % 1.0   Immature Grans (Abs) Latest Ref Range: 0.00 - 0.07 thou/mm3 0.02   Immature Granulocytes Latest Units: % 0.3   Nucleated Red Blood Cells Latest Units: /100 wbc 0     Recent SCr Results (baseline and q12mo):  Results for Bert Jolley" (MRN [id number]) as of 7/20/2020 14:44   Ref. Range 4/22/2020 09:21 5/26/2020 10:23   Creatinine Latest Ref Range: 0.4 - 1.2 mg/dL 2.2 (H) 1.8 (H)     Calculated CrCl = 32.6 mL/min (ideal body weight)    Recent LFT Results (baseline and q12mo):     Ref. Range 11/2/2019 06:30   Albumin Latest Ref Range: 3.5 - 5.0 gm/dL 4.2   Albumin/Globulin Ratio Latest Ref Range: 1.5 - 2.5  1.6   Alk Phos Latest Ref Range: 41 - 137 IU/L 62   ALT Latest Ref Range: 10 - 40 IU/L 15   AST Latest Ref Range: 15 - 41 IU/L 20   Bilirubin Latest Ref Range: 0.2 - 1.0 mg/dL 0.6   Bilirubin, Direct Latest Ref Range: 0.1 - 0.2 mg/dL 0.1   Total Protein Latest Ref Range: 6.2 - 8.0 g/dL 6.9     Patients current weight= 69.5 kg    Patient Findings:    Mckayla Moe initiated therapy on and reports the following:  S/sxs of bleeding: Denies bleeding in nose, sputum, emesis, urine, stool, bruising  Other adverse side effects from therapy: Denies dyspepsia  S/sxs of DVT/PE:  Denies chest pain, shortness of breath, leg/calf pain, swelling or redness  S/sxs of CVA/TIA: Denies headache, dizziness, numbness, vision changes, confusion, slurred speech, facial droop  Compliance: Taken therapy as prescribed? Missed doses? Patient to start Eliquis 7/20/20. Affordability: Patient is able to afford medication  Refills: Patient would like refills sent to [address] [place] [address]. Recent falls/injuries, hospitalizations or significant changes in overall health or pregnancy:  Medication (including RX, OTC, and herbals) changes since last visit:  Potential drug interactions with prescribed NOAC: Fish Oil. Upcoming scheduled surgeries or procedures: None    Assessment/Plan:  DOAC therapy, dose, and duration remain appropriate based on labs and patient findings.      Medication list (including RX, OTC, and herbals) reviewed with patient and evaluated for potential drug interactions (P-gp inhibitors/inducers interact with dabigatran or edoxaban, dual P-gp/CY inhibitors interact with rivaroxaban or apixaban, anti-platelets and NSAIDs may increase bleeding risk)  Recommend repeating CBC,  Scr, LFTs annually    Patient was instructed to continue current regimen and reminded to call the clinic with any changes in medications, health, pregnancy, insurance/ financial status, planned procedures or surgeries, s/sxs of minor bleeding, or if needing refills. Patient was instructed to call 911 or go to the ER with any s/sxs of serious or uncontrolled bleeding, stroke, or blood clots. Education Provided:  Reviewed the following education with patient in detail: Reason for therapy, mechanism of action, intended duration of treatment, dosing, frequency, importance of compliance, what to do when a dose is missed, s/sxs of bleeding and thromboembolism, potential for medication interactions and to call clinic when new medications are started, avoidance of NSAIDs, lab monitoring required, perioperative management for surgeries/procedures. Provided patient with written educational materials. Total time spent with patient:  (with >50% of time providing education)    Next Clinic Appointment:  90 days per patient request. 10/20/20 @ 1 pm    Please call [facility]tico[facility] at [phone] with any questions.      Barrett Toro PharmD, BCPS  2020  2:55 PM

## 2020-07-28 ENCOUNTER — APPOINTMENT (OUTPATIENT)
Dept: PHARMACY | Age: 75
End: 2020-07-28
Payer: MEDICARE

## 2020-08-24 ENCOUNTER — OFFICE VISIT (OUTPATIENT)
Dept: FAMILY MEDICINE CLINIC | Age: 75
End: 2020-08-24
Payer: MEDICARE

## 2020-08-24 VITALS
HEIGHT: 65 IN | DIASTOLIC BLOOD PRESSURE: 78 MMHG | SYSTOLIC BLOOD PRESSURE: 154 MMHG | HEART RATE: 64 BPM | RESPIRATION RATE: 16 BRPM | TEMPERATURE: 97.9 F | WEIGHT: 150.3 LBS | BODY MASS INDEX: 25.04 KG/M2

## 2020-08-24 PROCEDURE — 4040F PNEUMOC VAC/ADMIN/RCVD: CPT | Performed by: FAMILY MEDICINE

## 2020-08-24 PROCEDURE — G0439 PPPS, SUBSEQ VISIT: HCPCS | Performed by: FAMILY MEDICINE

## 2020-08-24 PROCEDURE — 1123F ACP DISCUSS/DSCN MKR DOCD: CPT | Performed by: FAMILY MEDICINE

## 2020-08-24 ASSESSMENT — PATIENT HEALTH QUESTIONNAIRE - PHQ9
SUM OF ALL RESPONSES TO PHQ QUESTIONS 1-9: 0
SUM OF ALL RESPONSES TO PHQ QUESTIONS 1-9: 0

## 2020-08-24 ASSESSMENT — LIFESTYLE VARIABLES: HOW OFTEN DO YOU HAVE A DRINK CONTAINING ALCOHOL: 0

## 2020-08-24 NOTE — PROGRESS NOTES
Medicare Annual Wellness Visit  Name: Casper Arnold JCIFNA Date: 2020   MRN: [id number] Sex: Male   Age: 66 y.o. Ethnicity: Non-/Non    : 1945 Race: Mirlande Moe is here for Medicare AWV and 6 Month Follow-Up (HTN, PSA)    Screenings for behavioral, psychosocial and functional/safety risks, and cognitive dysfunction are all negative except as indicated below. These results, as well as other patient data from the [address] form, are documented in Flowsheets linked to this Encounter. Allergies   Allergen Reactions    Bactrim [Sulfamethoxazole-Trimethoprim] Other (See Comments)     weakness    Morphine Other (See Comments)    Nsaids      Other reaction(s): REQUIRES CLARIFICATION    Sulfamethoxazole Nausea And Vomiting    Trimethoprim Nausea And Vomiting         Prior to Visit Medications    Medication Sig Taking? Authorizing Provider   Omega-3 Fatty Acids ([employer] FISH OIL PO) Take 1 tablet by mouth Yes Historical Provider, MD   apixaban (ELIQUIS) 2.5 MG TABS tablet Take 1 tablet by mouth 2 times daily Yes Roosevelt Leonard MD   Chromium-Cinnamon (CINNAMON PLUS CHROMIUM PO) Take 1 capsule by mouth 4 times daily (before meals and nightly) [address] [facility] units per capsule Yes Historical Provider, MD   B Complex-Biotin-FA (B COMPLEX 100 TR PO) Take 1 tablet by mouth 3 times daily (before meals) [address] [facility] at [address] [facility] Provider, MD   Cholecalciferol (VITAMIN D3) 50 MCG (2000 UT) CAPS Take 2 capsules by mouth daily (with breakfast)  Yes Historical Provider, MD   magnesium cl-calcium carbonate (SLOW-MAG) 71.5-119 MG TBEC tablet Take 1 tablet by mouth 3 times daily (with meals) Yes Historical Provider, MD   Levomefolate Glucosamine (METHYLFOLATE PO) Take 10 mg by mouth every morning (before breakfast) Metabolic Maintenance at [employer] or [employer]. com Yes Historical Provider, MD   Methylcobalamin 5000 MCG SUBL Place 1 tablet under the tongue nightly Nancy Burk at [employer]. com Yes Historical Provider, MD   mupirocin (BACTROBAN) 2 % ointment Apply topically Indications: Skin Abnormalities As needed for dermatologist appointments Yes Historical Provider, MD         Past Medical History:   Diagnosis Date    Arthritis     neck, Wrists , back    Cancer ([address]     skin (removed)    Cerebral artery occlusion with cerebral infarction ([address]     TIA    DVT (deep venous thrombosis) ([address]     GERD (gastroesophageal reflux disease)     Hx of blood clots     PE    Hypertension     Prostate cancer ([address]     Pulmonary emboli ([address] 5/7/16    Scoliosis     TIA (transient ischemic attack)        Past Surgical History:   Procedure Laterality Date    COLONOSCOPY  2019    [facility]    EGD  2019    [facility]    EXCISION / BIOPSY SKIN LESION OF ARM Left 10/11/2017    EXCISION 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 WITH FLAP AND FROZEN SECTION performed by Beverly Lange MD at [place] [address] Left 10/11/2017    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    SKIN CANCER EXCISION  2010    Face skin cancer removal         Family History   Problem Relation Age of Onset    Cancer Mother     High Blood Pressure Mother     Alzheimer's Disease Mother     Heart Disease Mother     Parkinsonism Father     Diabetes Neg Hx     Kidney Disease Neg Hx     Stroke Neg Hx     Colon Cancer Neg Hx     Esophageal Cancer Neg Hx     Rectal Cancer Neg Hx     Stomach Cancer Neg Hx        CareTeam (Including outside providers/suppliers regularly involved in providing care):   Patient Care Team:  Graciela Dooley MD as PCP - General (Family Medicine)  Graciela Dooley MD as PCP - [facility] Empaneled Provider  Makenna Kilgore MD as Consulting Physician (Pulmonology)  Mai Johnson MD as Consulting Physician (Urology)  Lachelle Lopez MD as Consulting Physician (Gastroenterology)    Wt Readings from Last 3 Encounters:   08/24/20 150 lb 4.8 oz (68.2 kg)   06/02/20 153 lb 3.2 oz (69.5 kg)   05/05/20 152 lb 9.6 oz (69.2 kg)     Vitals:    08/24/20 1052 08/24/20 1100   BP: (!) 162/82 (!) 154/78   Site: Left Upper Arm Left Upper Arm   Position: Sitting Sitting   Cuff Size: Large Adult Large Adult   Pulse: 64    Resp: 16    Temp: 97.9 °F (36.6 °C)    TempSrc: Temporal    Weight: 150 lb 4.8 oz (68.2 kg)    Height: 5' 4.75\" (1.645 m)      Body mass index is 25.2 kg/m². Based upon direct observation of the patient, evaluation of cognition reveals recent and remote memory intact. General Appearance: alert and oriented to person, place and time, well developed and well- nourished, in no acute distress  Skin: warm and dry, no rash or erythema  Head: normocephalic and atraumatic  Eyes: pupils equal, round, and reactive to light, extraocular eye movements intact, conjunctivae normal  ENT: tympanic membrane, external ear and ear canal normal bilaterally, nose without deformity, nasal mucosa and turbinates normal without polyps  Neck: supple and non-tender without mass, no thyromegaly or thyroid nodules, no cervical lymphadenopathy  Pulmonary/Chest: clear to auscultation bilaterally- no wheezes, rales or rhonchi, normal air movement, no respiratory distress  Cardiovascular: normal rate, regular rhythm, normal S1 and S2, no murmurs, rubs, clicks, or gallops, distal pulses intact, no carotid bruits  Abdomen: soft, non-tender, non-distended, normal bowel sounds, no masses or organomegaly  Extremities: no cyanosis, clubbing or edema  Musculoskeletal: normal range of motion, no joint swelling, deformity or tenderness  Neurologic: reflexes normal and symmetric, no cranial nerve deficit, gait, coordination and speech normal    Patient's complete Health Risk Assessment and screening values have been reviewed and are found in Flowsheets.  The following problems were reviewed today and where indicated follow up appointments were made and/or referrals ordered. Positive Risk Factor Screenings with Interventions:     General Health:  General  In general, how would you say your health is?: Very Good  In the past 7 days, have you experienced any of the following? New or Increased Pain, New or Increased Fatigue, Loneliness, Social Isolation, Stress or Anger?: (!) New or Increased Pain, Loneliness, Social Isolation, Stress, Anger  Do you get the social and emotional support that you need?: Yes  Do you have a Living Will?: (!) No  General Health Risk Interventions:  · none. Health Habits/Nutrition:  Health Habits/Nutrition  Do you exercise for at least 20 minutes 2-3 times per week?: Yes  Have you lost any weight without trying in the past 3 months?: (!) Yes  Do you eat fewer than 2 meals per day?: No  Have you seen a dentist within the past year?: (!) No  Body mass index is 25.2 kg/m². Health Habits/Nutrition Interventions:  · none. Safety:  Safety  Do you have working smoke detectors?: Yes  Have all throw rugs been removed or fastened?: Yes  Do you have non-slip mats or surfaces in all bathtubs/showers?: Yes  Do all of your stairways have a railing or banister?: Yes  Are your doorways, halls and stairs free of clutter?: Yes  Do you always fasten your seatbelt when you are in a car?: (!) No  Safety Interventions:  · none.     Personalized Preventive Plan   Current Health Maintenance Status  Immunization History   Administered Date(s) Administered    PPD Test 05/20/2015    Pneumococcal Conjugate 13-valent (Alison Kvng) 08/29/2016    Pneumococcal Polysaccharide ([id number]) 06/05/2012        Health Maintenance   Topic Date Due    DTaP/Tdap/Td vaccine (1 - Tdap) 08/26/1962    Shingles Vaccine (1 of 2) 08/26/1993    Annual Wellness Visit (AWV)  05/29/2019    Flu vaccine (1) 09/01/2020    PSA counseling  05/13/2021    Potassium monitoring  07/23/2021    Creatinine monitoring  07/23/2021    Pneumococcal 65+ yrs at Risk Vaccine  Completed    Hepatitis A vaccine  Aged Out    Hepatitis B vaccine  Aged Out    Hib vaccine  Aged Out    Meningococcal (ACWY) vaccine  Aged Out     Recommendations for [employer] Due: see orders and patient instructions/AVS.  . Recommended screening schedule for the next 5-10 years is provided to the patient in written form: see Patient Instructions/AVS.    Elena Newman was seen today for medicare awv and 6 month follow-up. Diagnoses and all orders for this visit:    Routine general medical examination at a health care facility          Elena Newman received counseling on the following healthy behaviors: none, he follows a healthy diet and level of physical activity. Patient given educational materials on none. I have instructed Elena Newman to complete a self tracking handout on none and instructed them to bring it with them to his next appointment. Discussed use, benefit, and side effects of prescribed medications. Barriers to medication compliance addressed. All patient questions answered. Pt voiced understanding.

## 2020-08-24 NOTE — PROGRESS NOTES
Chronic Disease Visit Information    BP Readings from Last 3 Encounters:   06/02/20 (!) 164/85   05/05/20 106/60   02/24/20 114/62          Hemoglobin A1C (%)   Date Value   01/16/2020 6.2   05/05/2018 5.8   05/02/2015 6.1     Microalbumin, Random Urine (mg/dL)   Date Value   05/26/2020 < 1.20     LDL Calculated (mg/dL)   Date Value   11/02/2019 107 (H)     HDL   Date Value   11/02/2019 41 mg/dL   05/05/2012 41 mg/dl     BUN (mg/dL)   Date Value   07/23/2020 26 (H)     CREATININE (mg/dL)   Date Value   07/23/2020 1.72 (H)     Glucose (mg/dL)   Date Value   07/23/2020 96            Have you changed or started any medications since your last visit including any over-the-counter medicines, vitamins, or herbal medicines? no   Are you having any side effects from any of your medications? -  no  Have you stopped taking any of your medications? Is so, why? -  no    Have you seen any other physician or provider since your last visit? Yes - Records Obtained  Have you had any other diagnostic tests since your last visit? Yes - Records Obtained  Have you been seen in the emergency room and/or had an admission to a hospital since we last saw you? No  Have you had your annual diabetic retinal (eye) exam? Yes - Records Requested  Have you had your routine dental cleaning in the past 6 months? no    Have you activated your [employer] account? If not, what are your barriers?  Yes     Patient Care Team:  Grayson Lopez MD as PCP - General (Family Medicine)  Grayson Lopez MD as PCP - [facility]  Pricila Johnson MD as Consulting Physician (Pulmonology)  Ever Ratliff MD as Consulting Physician (Urology)  Abilio Engel MD as Consulting Physician (Gastroenterology)         Medical History Review  Past Medical, Family, and Social History reviewed and does contribute to the patient presenting condition    Health Maintenance   Topic Date Due    DTaP/Tdap/Td vaccine (1 - Tdap) 08/26/1962    Shingles Vaccine (1 of 2) 08/26/1993    Annual Wellness Visit (AWV)  05/29/2019    Flu vaccine (1) 09/01/2020    PSA counseling  05/13/2021    Potassium monitoring  07/23/2021    Creatinine monitoring  07/23/2021    Pneumococcal 65+ yrs at Risk Vaccine  Completed    Hepatitis A vaccine  Aged Out    Hepatitis B vaccine  Aged Out    Hib vaccine  Aged Out    Meningococcal (ACWY) vaccine  Aged Out

## 2020-09-21 ENCOUNTER — TELEPHONE (OUTPATIENT)
Dept: FAMILY MEDICINE CLINIC | Age: 75
End: 2020-09-21

## 2020-09-21 NOTE — TELEPHONE ENCOUNTER
Called pt regarding [address] stating pt had an adverse event reported as a adverse event. Pt states he's doing well with taking Eliquis 1 tablet 2 times daily. No events have happened per patient. Office will disregard fax.

## 2020-10-20 ENCOUNTER — HOSPITAL ENCOUNTER (OUTPATIENT)
Dept: PHARMACY | Age: 75
Setting detail: THERAPIES SERIES
Discharge: HOME | End: 2020-10-20
Payer: MEDICARE

## 2020-10-20 VITALS — TEMPERATURE: 98.1 F

## 2020-10-20 PROCEDURE — 99211 OFF/OP EST MAY X REQ PHY/QHP: CPT | Performed by: PHARMACIST

## 2020-10-20 NOTE — PROGRESS NOTES
Medication Management [facility]  Anticoagulation Clinic  [phone] (phone)  [phone] (fax)      Juancho Goetz is a 66 y.o. male with PMHx significant for DVT/PE/CVA who presents to clinic 10/20/2020 for anticoagulation management and education. DOAC Therapy: apixaban  Current Dosage: 2.5mg BID  Anticoagulation Indication(s):  DVT, PE, CVA  (Confirmed absence of valvular heart disease or heart valve replacement)  Referring Physician:   Dr. Louana Koyanagi  Intended duration of Anticoagulation Therapy:  indefinite    Pertinent Laboratory Results  Recent CBC Results (baseline and q12mo): Results for Chip Dubon" (MRN [id number]) as of 10/20/2020 15:11   Ref. Range 7/23/2020 13:17   WBC Latest Ref Range: 4.4 - 10.5 th/cmm 6.0   RBC Latest Ref Range: 4.50 - 6.00 mil/cmm 4.47 (L)   Hemoglobin Quant Latest Ref Range: 13.5 - 16.5 gm/dL 14.2   Hematocrit Latest Ref Range: 40.0 - 49.0 % 41.1   MCV Latest Ref Range: 80 - 97 CU LEONCIO 91.9   MCH Latest Ref Range: 27.5 - 33.0 PG 31.7   MCHC Latest Ref Range: 33.0 - 36.0 gm/dL 34.5   RDW Latest Ref Range: 12.0 - 16.0 % 12.4   Platelet Count Latest Ref Range: 150 - 400 th/cmm 224     Recent SCr Results (baseline and q12mo): Results for Chip Dubon" (MRN [id number]) as of 10/20/2020 15:11   Ref. Range 5/26/2020 10:23 7/23/2020 13:17   Creatinine Latest Ref Range: 0.60 - 1.30 mg/dL 1.8 (H) 1.72 (H)     Calculated CrCl =Results for Chip Dubon" (MRN [id number]) as of 10/20/2020 15:11   Ref. Range 7/23/2020 13:17   Creatinine Clearance Unknown 39 (L)     Recent LFT Results (baseline and q12mo):Results for Chip Dubon" (MRN [id number]) as of 10/20/2020 15:11   Ref.  Range 11/2/2019 06:30   Albumin Latest Ref Range: 3.5 - 5.0 gm/dL 4.2   Albumin/Globulin Ratio Latest Ref Range: 1.5 - 2.5  1.6   Alk Phos Latest Ref Range: 41 - 137 IU/L 62   ALT Latest Ref Range: 10 - 40 IU/L 15   AST Latest Ref Range: 15 - 41 IU/L 20 Bilirubin Latest Ref Range: 0.2 - 1.0 mg/dL 0.6   Bilirubin, Direct Latest Ref Range: 0.1 - 0.2 mg/dL 0.1   Total Protein Latest Ref Range: 6.2 - 8.0 g/dL 6.9     Patients current weight= 68.2 kg    Patient Findings:    Rossi Moe initiated therapy on 20 and reports the following:  S/sxs of bleeding: Denies bleeding in nose, sputum, emesis, urine, stool. Bruises easier, but similar to when he was on warfarin. Other adverse side effects from therapy: Denies dyspepsia   S/sxs of DVT/PE:  Denies chest pain, shortness of breath, leg/calf pain, swelling or redness  S/sxs of CVA/TIA: Denies headache, dizziness, numbness, vision changes, confusion, slurred speech, facial droop  Compliance: Taken therapy as prescribed? Yes Missed doses? States may have missed one or two doses over the course of the past 3 months. Educated pt on the fast on/off of Eliquis and the importance of compliance with doses. Affordability: Patient is able to afford medication. Currently enrolled in an assistance program  Refills: Patient states that he has no refills left, but just picked up 90 days worth of medication. Advised pt to contact PCP to obtain refills on Rx. Recent falls/injuries, hospitalizations or significant changes in overall health or pregnancy: None  Medication (including RX, OTC, and herbals) changes since last visit: Med list updated  Potential drug interactions with prescribed NOAC: None noted. Upcoming scheduled surgeries or procedures: None planned. Assessment/Plan:  DOAC therapy, dose, and duration remain appropriate based on labs and patient findings. Medication list (including RX, OTC, and herbals) reviewed with patient and evaluated for potential drug interactions (P-gp inhibitors/inducers interact with dabigatran or edoxaban, dual P-gp/CY inhibitors interact with rivaroxaban or apixaban, anti-platelets and NSAIDs may increase bleeding risk)  Recommend repeating CBC,  Scr, LFTs annually. Pt given lab order for Scr, CBC, LFT. Pt would like to wait to get these with labs ordered by Dr. Jeanne Jaimes in Nov.   Patient was instructed to continue current regimen and reminded to call the clinic with any changes in medications, health, pregnancy, insurance/ financial status, planned procedures or surgeries, s/sxs of minor bleeding. Patient was instructed to call 911 or go to the ER with any s/sxs of serious or uncontrolled bleeding, stroke, or blood clots. Education Provided:  Reviewed the following education with patient in detail: Reason for therapy, mechanism of action, intended duration of treatment, dosing, frequency, importance of compliance, what to do when a dose is missed, s/sxs of bleeding and thromboembolism, potential for medication interactions and to call clinic when new medications are started, avoidance of NSAIDs, lab monitoring required, perioperative management for surgeries/procedures. Xarelto® must be taken with evening meal (or largest meal of day). Pradaxa® can be taken with or without food but should be taken with a full glass of water. Pradaxa® cannot be crushed. Pradaxa® should be stored in original packaging. Provided patient with written educational materials. Total time spent with patient: 20 min    Next Clinic Appointment:  Annual appt in 2021    Please call [facility] Medication Management Anticoagulation Clinic at [phone] with any questions.          CLINICAL PHARMACY CONSULT: MED RECONCILIATION/REVIEW ADDENDUM    For Pharmacy Admin Tracking Only    PHSO: Yes  Total # of Interventions Recommended: 0  - Maintenance Safety Lab Monitoring #: 3  Total Interventions Accepted: 0  Time Spent (min): 20    Lou Crigler, PharmD

## 2020-11-05 ENCOUNTER — HOSPITAL ENCOUNTER (OUTPATIENT)
Age: 75
Discharge: HOME | End: 2020-11-05
Payer: MEDICARE

## 2020-11-05 ENCOUNTER — HOSPITAL ENCOUNTER (OUTPATIENT)
Dept: GENERAL RADIOLOGY | Age: 75
Discharge: HOME | End: 2020-11-05
Payer: MEDICARE

## 2020-11-05 PROCEDURE — 73140 X-RAY EXAM OF FINGER(S): CPT

## 2020-11-23 ENCOUNTER — NURSE ONLY (OUTPATIENT)
Dept: LAB | Age: 75
End: 2020-11-23

## 2020-11-23 ENCOUNTER — TELEPHONE (OUTPATIENT)
Dept: FAMILY MEDICINE CLINIC | Age: 75
End: 2020-11-23

## 2020-11-23 LAB
ALBUMIN SERPL-MCNC: 4 G/DL (ref 3.5–5.1)
ALP BLD-CCNC: 67 U/L (ref 38–126)
ALT SERPL-CCNC: 21 U/L (ref 11–66)
ANION GAP SERPL CALCULATED.3IONS-SCNC: 9 MEQ/L (ref 8–16)
AST SERPL-CCNC: 25 U/L (ref 5–40)
AVERAGE GLUCOSE: 108 MG/DL (ref 70–126)
BASOPHILS # BLD: 0.8 %
BASOPHILS ABSOLUTE: 0.1 THOU/MM3 (ref 0–0.1)
BILIRUB SERPL-MCNC: 0.6 MG/DL (ref 0.3–1.2)
BILIRUBIN DIRECT: < 0.2 MG/DL (ref 0–0.3)
BUN BLDV-MCNC: 28 MG/DL (ref 7–22)
C-REACTIVE PROTEIN, HIGH SENSITIVITY: 3.5 MG/L
CALCIUM SERPL-MCNC: 9.5 MG/DL (ref 8.5–10.5)
CHLORIDE BLD-SCNC: 104 MEQ/L (ref 98–111)
CHOLESTEROL, TOTAL: 181 MG/DL (ref 100–199)
CO2: 25 MEQ/L (ref 23–33)
CREAT SERPL-MCNC: 1.8 MG/DL (ref 0.4–1.2)
EOSINOPHIL # BLD: 1.1 %
EOSINOPHILS ABSOLUTE: 0.1 THOU/MM3 (ref 0–0.4)
ERYTHROCYTE [DISTWIDTH] IN BLOOD BY AUTOMATED COUNT: 12.6 % (ref 11.5–14.5)
ERYTHROCYTE [DISTWIDTH] IN BLOOD BY AUTOMATED COUNT: 44.4 FL (ref 35–45)
GFR SERPL CREATININE-BSD FRML MDRD: 37 ML/MIN/1.73M2
GLUCOSE BLD-MCNC: 109 MG/DL (ref 70–108)
HBA1C MFR BLD: 5.6 % (ref 4.4–6.4)
HCT VFR BLD CALC: 44.9 % (ref 42–52)
HDLC SERPL-MCNC: 55 MG/DL
HEMOGLOBIN: 14.9 GM/DL (ref 14–18)
HOMOCYSTEINE: 11.5 UMOL/L
IMMATURE GRANS (ABS): 0.02 THOU/MM3 (ref 0–0.07)
IMMATURE GRANULOCYTES: 0.3 %
LDL CHOLESTEROL CALCULATED: 109 MG/DL
LYMPHOCYTES # BLD: 22.7 %
LYMPHOCYTES ABSOLUTE: 1.5 THOU/MM3 (ref 1–4.8)
MAGNESIUM: 2.2 MG/DL (ref 1.6–2.4)
MCH RBC QN AUTO: 31.9 PG (ref 26–33)
MCHC RBC AUTO-ENTMCNC: 33.2 GM/DL (ref 32.2–35.5)
MCV RBC AUTO: 96.1 FL (ref 80–94)
MONOCYTES # BLD: 8 %
MONOCYTES ABSOLUTE: 0.5 THOU/MM3 (ref 0.4–1.3)
NUCLEATED RED BLOOD CELLS: 0 /100 WBC
PLATELET # BLD: 265 THOU/MM3 (ref 130–400)
PMV BLD AUTO: 9.9 FL (ref 9.4–12.4)
POTASSIUM SERPL-SCNC: 4.7 MEQ/L (ref 3.5–5.2)
PROSTATE SPECIFIC ANTIGEN: 10.04 NG/ML (ref 0–1)
PTH INTACT: 35.8 PG/ML (ref 15–65)
RBC # BLD: 4.67 MILL/MM3 (ref 4.7–6.1)
SEDIMENTATION RATE, ERYTHROCYTE: 9 MM/HR (ref 0–10)
SEG NEUTROPHILS: 67.1 %
SEGMENTED NEUTROPHILS ABSOLUTE COUNT: 4.4 THOU/MM3 (ref 1.8–7.7)
SODIUM BLD-SCNC: 138 MEQ/L (ref 135–145)
TOTAL PROTEIN: 6.8 G/DL (ref 6.1–8)
TRIGL SERPL-MCNC: 84 MG/DL (ref 0–199)
VITAMIN D 25-HYDROXY: 53 NG/ML (ref 30–100)
WBC # BLD: 6.5 THOU/MM3 (ref 4.8–10.8)

## 2020-11-23 NOTE — TELEPHONE ENCOUNTER
Pt will be dropping off a letter with a new skin cancer diagnosis by Dr. Kristin Matias. He is planning to get surgery by Dr. Federico Caceres to get it removed. Wants your opinion on anything else he should be doing.

## 2020-12-01 ENCOUNTER — OFFICE VISIT (OUTPATIENT)
Dept: NEPHROLOGY | Age: 75
End: 2020-12-01
Payer: MEDICARE

## 2020-12-01 ENCOUNTER — TELEPHONE (OUTPATIENT)
Dept: PHARMACY | Age: 75
End: 2020-12-01

## 2020-12-01 VITALS
BODY MASS INDEX: 25.02 KG/M2 | SYSTOLIC BLOOD PRESSURE: 155 MMHG | DIASTOLIC BLOOD PRESSURE: 85 MMHG | HEART RATE: 65 BPM | OXYGEN SATURATION: 100 % | WEIGHT: 149.2 LBS

## 2020-12-01 PROCEDURE — G8484 FLU IMMUNIZE NO ADMIN: HCPCS | Performed by: INTERNAL MEDICINE

## 2020-12-01 PROCEDURE — 99213 OFFICE O/P EST LOW 20 MIN: CPT | Performed by: INTERNAL MEDICINE

## 2020-12-01 PROCEDURE — G8427 DOCREV CUR MEDS BY ELIG CLIN: HCPCS | Performed by: INTERNAL MEDICINE

## 2020-12-01 PROCEDURE — 1036F TOBACCO NON-USER: CPT | Performed by: INTERNAL MEDICINE

## 2020-12-01 PROCEDURE — 1123F ACP DISCUSS/DSCN MKR DOCD: CPT | Performed by: INTERNAL MEDICINE

## 2020-12-01 PROCEDURE — G8417 CALC BMI ABV UP PARAM F/U: HCPCS | Performed by: INTERNAL MEDICINE

## 2020-12-01 PROCEDURE — 4040F PNEUMOC VAC/ADMIN/RCVD: CPT | Performed by: INTERNAL MEDICINE

## 2020-12-01 NOTE — TELEPHONE ENCOUNTER
Labs reviewed as ordered at [facility] appt 10/20/20. Pt completed these labs on 11/23/20. LFT, CBC, Scr stable. Scr 1.8. Pt on Eliquis 2.5mg BID as ordered by Dr. Malvin Siemens. Dosing appropriate as reduced-intensity dosing for prophylaxis against venous thromboembolism recurrence.

## 2020-12-01 NOTE — PROGRESS NOTES
or shortness of breath. No nausea vomiting. No fever or chills. No headaches. No abdominal pain. No blurry visions. No difficulties with urination. He does have chronic joint pain modified by analgesics. 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 Every other system is negative  Medications:     Current Outpatient Medications   Medication Sig Dispense Refill    Omega-3 Fatty Acids ([employer]S FISH OIL PO) Take 1 tablet by mouth 4 times daily       apixaban (ELIQUIS) 2.5 MG TABS tablet Take 1 tablet by mouth 2 times daily 180 tablet 1    Chromium-Cinnamon (CINNAMON PLUS CHROMIUM PO) Take 1 capsule by mouth 4 times daily (before meals and nightly) [address] [facility] units per capsule      B Complex-Biotin-FA (B COMPLEX 100 TR PO) Take 1 tablet by mouth 3 times daily (before meals) [address] [facility] at [address] (VITAMIN D3) 50 MCG (2000 UT) CAPS Take 2 capsules by mouth daily (with breakfast)       magnesium cl-calcium carbonate (SLOW-MAG) 71.5-119 MG TBEC tablet Take by mouth 3 times daily (with meals) 2 tabs threes times daily and 1 tab nightly      Levomefolate Glucosamine (METHYLFOLATE PO) Take 10 mg by mouth every morning (before breakfast) Metabolic Maintenance at [employer] or [employer]. com      Methylcobalamin 5000 MCG SUBL Place 1 tablet under the tongue nightly Vera at [email or web address]      mupirocin (BACTROBAN) 2 % ointment Apply topically Indications: Skin Abnormalities As needed for dermatologist appointments  1     No current facility-administered medications for this visit.         Lab Results:    CBC:   Lab Results   Component Value Date    WBC 6.5 11/23/2020    HGB 14.9 11/23/2020    HCT 44.9 11/23/2020    MCV 96.1 (H) 11/23/2020     11/23/2020     BMP:    Lab Results   Component Value Date     11/23/2020     07/23/2020     05/26/2020    K 4.7 11/23/2020    K 4.2 07/23/2020    K 4.5 edema  Musculoskeletal:Intact  Neuro:Alert, awake and oriented with no obvious focal deficit.   Speech is normal.    Electronically signed by Onur Pizarro MD on 12/1/2020 at 1:57 PM

## 2020-12-02 ENCOUNTER — OFFICE VISIT (OUTPATIENT)
Dept: FAMILY MEDICINE CLINIC | Age: 75
End: 2020-12-02
Payer: MEDICARE

## 2020-12-02 VITALS
DIASTOLIC BLOOD PRESSURE: 76 MMHG | TEMPERATURE: 97 F | HEIGHT: 65 IN | BODY MASS INDEX: 24.56 KG/M2 | HEART RATE: 65 BPM | RESPIRATION RATE: 10 BRPM | OXYGEN SATURATION: 98 % | WEIGHT: 147.4 LBS | SYSTOLIC BLOOD PRESSURE: 132 MMHG

## 2020-12-02 PROCEDURE — 4040F PNEUMOC VAC/ADMIN/RCVD: CPT | Performed by: FAMILY MEDICINE

## 2020-12-02 PROCEDURE — 1036F TOBACCO NON-USER: CPT | Performed by: FAMILY MEDICINE

## 2020-12-02 PROCEDURE — G8420 CALC BMI NORM PARAMETERS: HCPCS | Performed by: FAMILY MEDICINE

## 2020-12-02 PROCEDURE — 99214 OFFICE O/P EST MOD 30 MIN: CPT | Performed by: FAMILY MEDICINE

## 2020-12-02 PROCEDURE — G8484 FLU IMMUNIZE NO ADMIN: HCPCS | Performed by: FAMILY MEDICINE

## 2020-12-02 PROCEDURE — 1123F ACP DISCUSS/DSCN MKR DOCD: CPT | Performed by: FAMILY MEDICINE

## 2020-12-02 PROCEDURE — G8427 DOCREV CUR MEDS BY ELIG CLIN: HCPCS | Performed by: FAMILY MEDICINE

## 2020-12-02 ASSESSMENT — ENCOUNTER SYMPTOMS
ALLERGIC/IMMUNOLOGIC NEGATIVE: 1
GASTROINTESTINAL NEGATIVE: 1
RESPIRATORY NEGATIVE: 1
EYES NEGATIVE: 1

## 2020-12-02 NOTE — PROGRESS NOTES
[id number] [facility] [place] [address] [address]  Dept: [phone]  Dept Fax: [phone]  Loc: [phone]      Joanne Moe is a 66 y.o. White male. Shaun Mendez  presents to the [facility] clinic today for   Chief Complaint   Patient presents with   [facility] Discuss Labs    Other     discuss sarcoma   ,  and;   1. Stage 3 chronic kidney disease, unspecified whether stage 3a or 3b CKD    2. Spinal stenosis of lumbar region with neurogenic claudication    3. Chronic deep vein thrombosis (DVT) of femoral vein of left lower extremity (HCC)    4. Other spondylosis with radiculopathy, lumbar region    5. Prostate cancer ([address] Justin score 6.    6. Encounter for herb and vitamin supplement management    7. Thrush    8. Essential hypertension    9. TIA (transient ischemic attack)    10. Other intestinal malabsorption    11. Low glucose level    12. PSA elevation, bph elevation    13. Nocturnal leg cramps    14. Right hand pain      I have reviewed Joanne Moe medical, surgical and other pertinent history in detail, and have updated medication and allergy information in the computerized patientrecord. Clinical Care Team:     -Referring Provider for today's consult: Self Referred  -Primary Care Provider: Garrison Solorio DO    Medical/Surgical History:   He  has a past medical history of Arthritis, Cancer [facility]), Cerebral artery occlusion with cerebral infarction [facility]), DVT (deep venous thrombosis) ([facility] [address] GERD (gastroesophageal reflux disease), Hx of blood clots, Hypertension, Prostate cancer ([facility] [address] Pulmonary emboli ([facility] [address] [facility] Scoliosis, and TIA (transient ischemic attack). His  has a past surgical history that includes Skin cancer excision (2010); other surgical history (Left, 10/11/2017); EXCISION / BIOPSY SKIN LESION OF ARM (Left, 10/11/2017); EGD (2019); Colonoscopy (2019); and Prostate biopsy.     Family/Social History:     His family history includes Alzheimer's Disease in his mother; Cancer in his mother; Heart Disease in his mother; High Blood Pressure in his mother; Parkinsonism in his father. He  reports that he has never smoked. He has never used smokeless tobacco. He reports current alcohol use. He reports that he does not use drugs. Medications/Allergies/Immunizations:     His current medication(s) include   Current Outpatient Medications:     Omega-3 Fatty Acids (CVS FISH OIL PO), Take 1 tablet by mouth 4 times daily , Disp: , Rfl:     apixaban (ELIQUIS) 2.5 MG TABS tablet, Take 1 tablet by mouth 2 times daily, Disp: 180 tablet, Rfl: 1    Chromium-Cinnamon (CINNAMON PLUS CHROMIUM PO), Take 1 capsule by mouth 4 times daily (before meals and nightly) [facility] units per capsule, Disp: , Rfl:     B Complex-Biotin-FA (B COMPLEX 100 TR PO), Take 1 tablet by mouth 3 times daily (before meals) [address], Disp: , Rfl:     Cholecalciferol (VITAMIN D3) 50 MCG (2000 UT) CAPS, Take 2 capsules by mouth daily (with breakfast) , Disp: , Rfl:     magnesium cl-calcium carbonate (SLOW-MAG) 71.5-119 MG TBEC tablet, Take by mouth 3 times daily (with meals) 2 tabs threes times daily and 1 tab nightly, Disp: , Rfl:     Levomefolate Glucosamine (METHYLFOLATE PO), Take 10 mg by mouth every morning (before breakfast) Metabolic Maintenance at [employer]. com or amazon. com, Disp: , Rfl:     Methylcobalamin 5000 MCG SUBL, Place 1 tablet under the tongue nightly [place] at [employer]. com, Disp: , Rfl:     mupirocin (BACTROBAN) 2 % ointment, Apply topically Indications: Skin Abnormalities As needed for dermatologist appointments, Disp: , Rfl: 1  Allergies: Bactrim [sulfamethoxazole-trimethoprim];  Morphine; Nsaids; Sulfamethoxazole; and Trimethoprim,  Immunizations:   Immunization History   Administered Date(s) Administered    PPD Test 05/20/2015    Pneumococcal Conjugate 13-valent ([place] Hefty) 08/29/2016    Pneumococcal Polysaccharide ([id number]) 06/05/2012        History of PresentIllness:     Jordy's had concerns including Discuss Labs and Other (discuss sarcoma). Rupesh Jerez  presents to the [facility] today for;   Chief Complaint   Patient presents with   [address] Other     discuss sarcoma   , ,  abnormal labs follow up and these conditions as he  Is looking today for:     1. Stage 3 chronic kidney disease, unspecified whether stage 3a or 3b CKD    2. Spinal stenosis of lumbar region with neurogenic claudication    3. Chronic deep vein thrombosis (DVT) of femoral vein of left lower extremity (HCC)    4. Other spondylosis with radiculopathy, lumbar region    5. Prostate cancer ([facility] [address] [facility] [place] score 6.    6. Encounter for herb and vitamin supplement management    7. Thrush    8. Essential hypertension    9. TIA (transient ischemic attack)    10. Other intestinal malabsorption    11. Low glucose level    12. PSA elevation, bph elevation    13. Nocturnal leg cramps    14. Right hand pain      HPI    Subjective:     Review of Systems   Constitutional: Positive for chills. HENT: Negative. Eyes: Negative. Respiratory: Negative. Cardiovascular: Negative. Gastrointestinal: Negative. Endocrine: Negative. Genitourinary: Positive for frequency. Musculoskeletal: Positive for arthralgias and joint swelling. Skin: Negative. Allergic/Immunologic: Negative. Hematological: Negative. Psychiatric/Behavioral: Positive for sleep disturbance. All other systems reviewed and are negative. Objective:     /76 (Site: Left Upper Arm, Position: Sitting, Cuff Size: Medium Adult)   Pulse 65   Temp 97 °F (36.1 °C) (Temporal)   Resp 10   Ht 5' 4.76\" (1.645 m)   Wt 147 lb 6.4 oz (66.9 kg)   SpO2 98%   BMI 24.71 kg/m²   Physical Exam  Vitals signs and nursing note reviewed. Constitutional:       Appearance: Normal appearance.    HENT:      Head: Normocephalic. Pulmonary:      Effort: Pulmonary effort is normal.   Neurological:      Mental Status: He is alert. Psychiatric:         Mood and Affect: Mood normal.         Thought Content: Thought content normal.            Laboratory Data:   Lab results were searched in Care Everywhere and/or those brought by the pateint were reviewed today with Tone Nettles and he has a copy of their most recent labs to take home with them as notedbelow;       Imaging Data:   Imaging Data:       Assessment & Plan:       Impression:  1. Stage 3 chronic kidney disease, unspecified whether stage 3a or 3b CKD    2. Spinal stenosis of lumbar region with neurogenic claudication    3. Chronic deep vein thrombosis (DVT) of femoral vein of left lower extremity (HCC)    4. Other spondylosis with radiculopathy, lumbar region    5. Prostate cancer ([facility] [address] [facility] Justin score 6.    6. Encounter for herb and vitamin supplement management    7. Thrush    8. Essential hypertension    9. TIA (transient ischemic attack)    10. Other intestinal malabsorption    11. Low glucose level    12. PSA elevation, bph elevation    13. Nocturnal leg cramps    14. Right hand pain      Assessment and Plan:  After reviewing the patients chief complaints, reviewing their labfindings in great detail (with the patient and those accompanying them) which correlate to their chief complaints, symptoms, and or medical conditions; suggestions were made relating to changes in diet and or supplementswhich may improve the complaints and which will be reflected in their future lab findings; Chief Complaint   Patient presents with   [address] Other     discuss sarcoma   ;    Plans for the next visits:  - Abnormal and non-optimal Labs were ordered today to be repeated in the next 120-365 days to assess changes from adjustments in nutrition and or nutrients.    - Patient instructed when having ablood draw to ask the  to divide their lab draws into multiple draws over research which needs to be monitored; as well as an array of NIHfact sheets on nutrients and supplements. If I have recommended cinnamon at the request of this patient to assist them in control of their blood sugar, triglyceride and or weight issues. I discussed that thepatient's clinical use of cinnamon bark, calcium, magnesium, Vitamin D and pharmaceutical grade CVS #932925 fish oil or triple-strength fish oil, and B-75 two phase time-released B complex by Lisbet Real will be for atime-limited trial to determine their individual effectiveness and safety in this patient. I also referred the patient to the NMCD: Nutrition, Metabolism, and Cardiovascular Diseases (journal) and concerns about long-termuse and hepatotoxicity of cinnamon and other nutrients and suggest they frequently search nih.gov for the latest non-proprietary information on nutriceuticals as well as consider a subscription to 99Presents fordetails on reviewed supplements, or at the least review the nutrient files at [address] at [facility], an insulin mimetic, reduces some High Carbohydrate Dietary Impacts. Methylhydroxychalcone polymers insulin-enhancing properties in fat cells are responsible for enhanced glucose uptake, inhibiting hepatic HMG-CoA reductase and lowers lipids. www.jacn. org/content/20/4/327.full     But cinnamon with additivessuch as Cinnamon Extract are not effective as insulin mimetics.  :eStoreDirectory.at     Nutrients for Start up from [employer] or [employer] for ease to get started now ;  Bon Kim has some useable products;  - Triple Strength Fish Oil, enteric coated  - Vit D 3 5000 IU gel caps  - Iron ferrous sulfat 325 mg tabs  - Centrum Silver look-a-like for most patients, or  - Centrum plain look-a-like if need iron    Localpharmacies or chains such as CVS, Walgreen, Wal-mart, have;  - Triple Strength Fish Oil (enteric coated ifavailable) or    If not enteric coated, can take from freezer for less burps  - B-50 or B-100 released balanced B complex tabs  - Cinnamon bark 500 mg (without Chromium or extracts)   some brands list 1000 mg / serving of 2 capsules,    some brands have 1000 mg caps with the undesireable chromium / extract  - Calcium carbonate/citrate, magnesium oxide/citrate, Vit D 3  as 3-4 tabs/caps/serving     Some Local Brands may contain Zincwhich is acceptable for the first bottle or two  - Magnesium oxide 250 mg tabs for those having < 2 bowel movements daily  - Magnesium citrate 200 mg if having > 2bowel movement/day  - Centrum Silver or look-a-like for most patients, Centrum plain or look-a-like with iron  - Vitamin D-3 comes as 1,000 IU or 2,000 IU or 5,000 IU gel caps or Liquid drops      Some brands containing or derived from soy oil or corn oil are OK if not allergic to soy  - Elemental Iron 65 mg tabsat bedtime is available over the counter if need more iron     Usually turns bowel movements grey, green or black but not a concern  - Apricot Kernel Oil (by Now) for dry skin sensitive perineal or perianal area skin    Nutrients for ongoing use by Mail order for less expense from [employer] ;  - Strength Fish Oil , 240 Softgels Item #322263  -B-100 time released balanced B complex Item #437059  - Cinnamon bark 500 mg without Chromium or extract Item #805270  - Calcium carbonate 1000 mg, Magnesium oxide 500 mg, Vit D 3  400 IU Item #[id number]  - Magnesium oxide 500 mg tabs Item #204662 if less than 2 bowel movements daily  - [employer] Seniors Item #584265 for mostpatients, One Daily Item #320340 with iron  - Vit D 3  1,000 Item #103723      2,000 IU Item #375770  ,000 IU Item #905340     Some brands containing orderived from soy oil or corn oil are OK if not allergic to soy    Nutrients for Special Needs by Bacilio Chapman for less expense from www. puritan.com ;  -Elemental Iron 65 mg tabs Item #117237 if need more iron for low iron on labs    Usually turns bowel movements grey, green or black but not a concern  - Time released Niacin 250 mg Item #759969 for cold intolerance, low libido or impotence  - DHEA 50 mg Item #279767 for improving DHEA levels on labs if having Fatigue    If stools too loose substitute for your Magnesium oxide using;   Magnesium citrate 200 mg tabs(NOT liquid) at [employer]   Magnesium gluconate 550 mgby Mario at [employer] or amazon. com  Magnesium chloride foot soaks or body sprays  www.IPPLEX   Magnesium chloride flakes 14.99 Item #: [id number] if Backordered get spray    Food Drink Symptom Log;  I asked this patient to track these items and any other symptoms on their list on a weekly basis to documenttheir progress or lack of same. This can be done on the symptom tracking sheet I gave them at today's visit but looks like this:                                                      Rate on scale of 0-10 with zero = notnoticeable  Symptom:                            Week 1               2                 3                 4               Etc            Hair loss    Foot cramps    Paresthesia    Aches    IBS (irritable bowel)    Constipation    Diarrhea  Nocturia    (up to bathroom at night)    Fatigue/Energy level  Stress      On the other side of the sheet they can track their food, drink, environment, activity, symptoms etc      Avoiding Latex-like proteins inmy foods; Avocados, Bananas, Celery, Figs & Kiwi proteins have latex-like proteins to inflame our immunesystems  How Can I Have A Latex Allergy? Eating foods with latex-like protein exposes us to latex allergies. Our body cannot tell the differencebetween these latex-like proteins and latex from rubber products since many people are allergic to fruit, vegetables and latex. Read labels on pre-packaged foods.  This list to avoid is only a guide if you are known allergicto latex or have a latex rash on your chin, cheeks and lines on your neck and chest. The amount of latex is different in each food product or fruit variety. to Avoid out of Season if not grown locally: Melon, Nectarine, Papaya, Cherry, Passion fruit, Plum, Chestnuts, and Tomato. Avocado, Banana, Celery, Figs, and Kiwi always contain Latex-like protein. Whats in Season? Strawberries taste better in June than December because June is strawberry season so buy locally grown produce \"in season\" for the best flavor, cost and less Latex. Locally grown produce notonly tastes great requires little of no ethylene exposure in food distribution so has less latex content. Out of season, use canned, frozen or dried sinceprocessed ripe and are latex lower!!!   Month     [place] LocallyGrown Produce  January, February, March: use canned, frozen or dried fruits since lower in latex  April; asparagus, radishes  May; asparagus, broccoli, green onions, greens, peas, radishes,rhubarb  June; asparagus, beets, beans, broccoli, cabbage, cantaloupe, carrots, green onions, greens, lettuce,onions, parsley, peas, radishes, rhubarb, strawberries, watermelons  July; beans, beets, blueberries,broccoli, cabbage, cantaloupe, carrots, cauliflower, celery, cucumbers, eggplant, grapes, green onions, greens, lettuce, onions, parsley, peas, peaches, bell peppers, potatoes, radishes, summer raspberries, squash, sweetcorn, tomatoes, turnips, watermelons  August; apples, beans, beets, blueberries, cabbage, cantaloupe, carrots,cauliflower, celery, cucumbers, eggplant, grapes, green onions, greens, lettuce, onions, parsley, peas, peaches, pears, bell peppers, potatoes, radishes, squash, sweet corn, tomatoes, turnips, watermelons  September; apples, beans, beets, blueberries, cabbage, cantaloupe, carrots, cauliflower, celery, cucumbers, eggplant, grapes,green onions, greens, lettuce, onions, parsley, peas, peaches, pears, bell peppers, plums, potatoes, pumpkins, radishes, fall red raspberries, squash, sweet corn, tomatoes, turnips, watermelons  October; apples, beets, broccoli, cabbage, carrots, cauliflower, celery, green onions, greens, lettuce, parsley, peas, pears, potatoes,pumpkins, radishes, fall red raspberries, squash, turnips  November; broccoli, cabbage, carrots, parsley,pears, peas  December: use canned, frozen ordried fruits since lower in latex    Upto half of latex-sensitive patients show allergic reactions to fruits (avocados, bananas, kiwifruits, papayas, peaches),   Annals of Allergy, 1994. These plants contain the same proteins that are allergens in latex. People with fruit allergies should warn physicians beforeundergoing procedures which may cause anaphylactic reaction if in contact with latex gloves. Some of the common foods with defined cross-reactivity to latexare avocado, banana, kiwi, chestnut, raw potato, tomato,stone fruits (e.g., peach, cherry), hazelnut, melons, celery, carrot, apple, pear, papaya, and almond. Foods with less well-defined cross-reactivity to latex are peanuts, peppers, citrus fruits, coconut, pineapple, deysi,fig, passion fruit, Ugli fruit, and grape    This fruit/latex cross-reactivity is worsened by ethylene, a gas used to hasten commercial ripening. In nature, plants produce low levels of the hormone ethylene, which regulates germination, flowering, and ripening. Forced ripening by high ethyleneconcentrations, plants produce allergenic wound-repair proteins, which are similar to wound-repair proteins made during the tapping of rubber trees. Sensitive individualswho ingest the fruit get a higher dose and worse reaction. Some people may even first become sensitized to latex through fruit. Can food processing increase theconcentrations of allergenic proteins? Latex-sensitized children (and adults) in [place] often experience allergic reactions after eating bananas ripenedartificially with ethylene.  In the [place], food distribution centers treat unripe bananas and other produce with ethylene to ripen; not commonly done in [facility] since fruit is tree-ripened there. Does treatmentof food with ethylene induce banana proteins that cross-react with latex? (Alex et al.    References:   Latex in Foods Allergy, http://ehp.niehs.nih.gov/members/2003/5811/5811.html    Search web for \" Whats in Season \" for whereyou live or are at the time you food shop  www.nutritioncouncil.org/pdf/healthy/SeasonalProduce. pdf ,   Management of Latex, ://medicalcenter. o[employer].edu/  search for latex

## 2020-12-09 ENCOUNTER — TELEPHONE (OUTPATIENT)
Dept: PHARMACY | Age: 75
End: 2020-12-09

## 2020-12-09 NOTE — TELEPHONE ENCOUNTER
Pt called and left message saying he has questions about his medication prior to upcoming procedure/surgery.     Called pt back and left message at 10am.

## 2020-12-10 NOTE — TELEPHONE ENCOUNTER
Pt called back, explains that he is having skin cancer removed from his arm on 12/18 by Dr. Miguelito Ivory. Advised pt to stop Eliquis 48hrs prior to procedure as appropriate for CrCl<50ml/min. No Eliquis on 12/16-12/17. Procedure on 12/18. Resume Eliquis with pm dose on 12/19. Pt voiced understanding. Called Dr. Miguelito Ivory office, also reviewed plan with [facility].

## 2020-12-18 ENCOUNTER — HOSPITAL ENCOUNTER (OUTPATIENT)
Age: 75
Setting detail: OUTPATIENT SURGERY
Discharge: HOME | End: 2020-12-18
Attending: SPECIALIST | Admitting: SPECIALIST
Payer: MEDICARE

## 2020-12-18 VITALS
WEIGHT: 151.6 LBS | HEIGHT: 67 IN | TEMPERATURE: 97.3 F | OXYGEN SATURATION: 98 % | HEART RATE: 90 BPM | SYSTOLIC BLOOD PRESSURE: 178 MMHG | DIASTOLIC BLOOD PRESSURE: 81 MMHG | RESPIRATION RATE: 12 BRPM | BODY MASS INDEX: 23.79 KG/M2

## 2020-12-18 PROCEDURE — 2709999900 HC NON-CHARGEABLE SUPPLY: Performed by: SPECIALIST

## 2020-12-18 PROCEDURE — 3600000002 HC SURGERY LEVEL 2 BASE: Performed by: SPECIALIST

## 2020-12-18 PROCEDURE — 2500000003 HC RX 250 WO HCPCS: Performed by: SPECIALIST

## 2020-12-18 PROCEDURE — 3600000012 HC SURGERY LEVEL 2 ADDTL 15MIN: Performed by: SPECIALIST

## 2020-12-18 PROCEDURE — 88331 PATH CONSLTJ SURG 1 BLK 1SPC: CPT

## 2020-12-18 PROCEDURE — 88305 TISSUE EXAM BY PATHOLOGIST: CPT

## 2020-12-18 PROCEDURE — 6370000000 HC RX 637 (ALT 250 FOR IP): Performed by: SPECIALIST

## 2020-12-18 PROCEDURE — 7100000010 HC PHASE II RECOVERY - FIRST 15 MIN: Performed by: SPECIALIST

## 2020-12-18 PROCEDURE — 7100000011 HC PHASE II RECOVERY - ADDTL 15 MIN: Performed by: SPECIALIST

## 2020-12-18 RX ORDER — LIDOCAINE HYDROCHLORIDE AND EPINEPHRINE 10; 10 MG/ML; UG/ML
INJECTION, SOLUTION INFILTRATION; PERINEURAL PRN
Status: DISCONTINUED | OUTPATIENT
Start: 2020-12-18 | End: 2020-12-18 | Stop reason: ALTCHOICE

## 2020-12-18 RX ORDER — GINSENG 100 MG
CAPSULE ORAL PRN
Status: DISCONTINUED | OUTPATIENT
Start: 2020-12-18 | End: 2020-12-18 | Stop reason: ALTCHOICE

## 2020-12-18 ASSESSMENT — PAIN - FUNCTIONAL ASSESSMENT: PAIN_FUNCTIONAL_ASSESSMENT: 0-10

## 2020-12-18 ASSESSMENT — PAIN SCALES - GENERAL: PAINLEVEL_OUTOF10: 0

## 2020-12-18 NOTE — OP NOTE
Patient: Елена Guzmán  YOB: 1945  MRN: [id number]    Date of Procedure: 12/18/2020    Pre-Op Diagnosis: FIBROXANTHOMA RIGHT FOREARM    Post-Op Diagnosis: Same       Procedure(s):  EXCISION FIBROXANTHOMA RIGHT FOREARM WITH SKIN GRAFT    Surgeon(s):  Jett Macias MD    Assistant:   Physician Assistant: Cj Levy PA-C    Anesthesia: Local    Estimated Blood Loss (mL): Minimal    Complications: None    Specimens:   ID Type Source Tests Collected by Time Destination   A : excision fibroxanthoma right forearm Tissue Arm SURGICAL PATHOLOGY Jett Macias MD 12/18/2020 1357        Implants:  * No implants in log *      Drains: * No LDAs found *    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

## 2020-12-18 NOTE — PROGRESS NOTES
1419-  Patient arrived to phase II via chair to bay 5. Spontaneous respirations even and unlabored. Placed on monitor--VSS. Report received from [facility].   1420-  Assessment completed. Patient is alert and oriented x4. ACE wrap to right arm--clean, dry, and intact. Patient denies pain-- will monitor. 1425-  Patient denies snack or drink. Family called to come get him. 1430-  Patient dressing. 1435-  Discharge instructions given. Understanding verbalized. 12-  Family states they will be here in ten minutes. 1501-  Patient discharged in stable condition with all belongings. This RN walked patient to car.

## 2020-12-18 NOTE — H&P
[address] [place] [address]  History and Physical Update    Pt Name: Yamileth Landon  MRN: [id number]  YOB: 1945  Date of evaluation: 12/18/2020    I have examined the patient and reviewed the H&P/Consult and there are no changes to the patient or plans.       Benjiman Setting  Electronically signed 12/18/2020 at 6:42 AM

## 2021-02-11 ENCOUNTER — TELEPHONE (OUTPATIENT)
Dept: PHARMACY | Age: 76
End: 2021-02-11

## 2021-02-11 NOTE — TELEPHONE ENCOUNTER
Pt called to report that he is having carpal tunnel surgery with Dr. Naseem Escoto at [facility] on 2/19. MD has told him to hold Eliquis x 48 hrs prior. Confirmed with pt that 48hr hold of Eliquis is appropriate based on CrCl<50ml/min. Instructed pt to hold Eliquis 2/17-2/19. Resume Eliquis with pm dose on 2/20. Pt voiced understanding.

## 2021-02-23 ENCOUNTER — OFFICE VISIT (OUTPATIENT)
Dept: FAMILY MEDICINE CLINIC | Age: 76
End: 2021-02-23
Payer: MEDICARE

## 2021-02-23 VITALS
WEIGHT: 152.7 LBS | RESPIRATION RATE: 15 BRPM | DIASTOLIC BLOOD PRESSURE: 80 MMHG | TEMPERATURE: 97.2 F | HEART RATE: 73 BPM | BODY MASS INDEX: 23.92 KG/M2 | SYSTOLIC BLOOD PRESSURE: 138 MMHG

## 2021-02-23 DIAGNOSIS — M48.062 SPINAL STENOSIS OF LUMBAR REGION WITH NEUROGENIC CLAUDICATION: ICD-10-CM

## 2021-02-23 DIAGNOSIS — Z86.711 HISTORY OF PULMONARY EMBOLISM: ICD-10-CM

## 2021-02-23 DIAGNOSIS — Z86.73 HISTORY OF CVA (CEREBROVASCULAR ACCIDENT): ICD-10-CM

## 2021-02-23 DIAGNOSIS — C61 PROSTATE CANCER (HCC): ICD-10-CM

## 2021-02-23 DIAGNOSIS — I10 ESSENTIAL HYPERTENSION: Primary | ICD-10-CM

## 2021-02-23 DIAGNOSIS — N18.30 STAGE 3 CHRONIC KIDNEY DISEASE, UNSPECIFIED WHETHER STAGE 3A OR 3B CKD (HCC): ICD-10-CM

## 2021-02-23 DIAGNOSIS — N18.4 CKD (CHRONIC KIDNEY DISEASE), STAGE IV (HCC): ICD-10-CM

## 2021-02-23 DIAGNOSIS — M46.1 SI (SACROILIAC) JOINT INFLAMMATION (HCC): ICD-10-CM

## 2021-02-23 DIAGNOSIS — I82.512 CHRONIC DEEP VEIN THROMBOSIS (DVT) OF FEMORAL VEIN OF LEFT LOWER EXTREMITY (HCC): ICD-10-CM

## 2021-02-23 DIAGNOSIS — G45.9 TIA (TRANSIENT ISCHEMIC ATTACK): ICD-10-CM

## 2021-02-23 PROCEDURE — 4040F PNEUMOC VAC/ADMIN/RCVD: CPT | Performed by: FAMILY MEDICINE

## 2021-02-23 PROCEDURE — 99214 OFFICE O/P EST MOD 30 MIN: CPT | Performed by: FAMILY MEDICINE

## 2021-02-23 PROCEDURE — G8427 DOCREV CUR MEDS BY ELIG CLIN: HCPCS | Performed by: FAMILY MEDICINE

## 2021-02-23 PROCEDURE — 1036F TOBACCO NON-USER: CPT | Performed by: FAMILY MEDICINE

## 2021-02-23 PROCEDURE — G8420 CALC BMI NORM PARAMETERS: HCPCS | Performed by: FAMILY MEDICINE

## 2021-02-23 PROCEDURE — G8484 FLU IMMUNIZE NO ADMIN: HCPCS | Performed by: FAMILY MEDICINE

## 2021-02-23 PROCEDURE — 1123F ACP DISCUSS/DSCN MKR DOCD: CPT | Performed by: FAMILY MEDICINE

## 2021-02-23 ASSESSMENT — PATIENT HEALTH QUESTIONNAIRE - PHQ9
1. LITTLE INTEREST OR PLEASURE IN DOING THINGS: 0
SUM OF ALL RESPONSES TO PHQ QUESTIONS 1-9: 0
2. FEELING DOWN, DEPRESSED OR HOPELESS: 0

## 2021-02-23 ASSESSMENT — ENCOUNTER SYMPTOMS
RESPIRATORY NEGATIVE: 1
GASTROINTESTINAL NEGATIVE: 1

## 2021-02-23 NOTE — PROGRESS NOTES
1945    COVID-19 Vaccine (1 of 2) 08/26/1959    DTaP/Tdap/Td vaccine (1 - Tdap) 08/26/1962    Shingles Vaccine (1 of 2) 08/26/1993    Flu vaccine (1) 09/01/2020    Annual Wellness Visit (AWV)  08/25/2021    Potassium monitoring  11/23/2021    Creatinine monitoring  11/23/2021    PSA counseling  11/23/2021    Pneumococcal 65+ yrs at Risk Vaccine  Completed    Hepatitis A vaccine  Aged Out    Hepatitis B vaccine  Aged Out    Hib vaccine  Aged Out    Meningococcal (ACWY) vaccine  Aged Out

## 2021-02-23 NOTE — PROGRESS NOTES
2021    Azul Moe (:  1945) is a 66 y.o. male, here for a preventive medicine evaluation. Chief Complaint   Patient presents with    6 Month Follow-Up     PSA, DVT left leg, DDD     Hypertension     6 month eval.  Doing well overall. Recently had surgery for CTS at [facility], all went well. Hx of DVT and PE, on chronic Eliquis. BP looks good. BP Readings from Last 3 Encounters:   21 138/80   20 (!) 178/81   20 132/76     Weight is stable. Wt Readings from Last 3 Encounters:   21 152 lb 11.2 oz (69.3 kg)   20 151 lb 9.6 oz (68.8 kg)   20 147 lb 6.4 oz (66.9 kg)     Hx of CKD, followed by Bryce. Pt has prostate cancer, active surveillance. Patient Active Problem List   Diagnosis    Hypertension    TIA (transient ischemic attack)    GERD (gastroesophageal reflux disease)    Medication monitoring encounter    SI (sacroiliac) joint inflammation ([address] left    Dvt femoral (deep venous thrombosis) (HCC)    Chronic low back pain    Lung mass    Abnormal CT scan, chest, pulmonary nodule.  PSA elevation, bph elevation    Anticoagulated on Coumadin    Sciatica of left side associated with disorder of lumbar spine    Lumbar disc disease with radiculopathy    Lumbar spinal stenosis    Other spondylosis with radiculopathy, lumbar region    DDD (degenerative disc disease), lumbar    RAMAN (acute kidney injury) ([address]    CKD (chronic kidney disease) stage 3, GFR 30-59 ml/min    Pleural effusion, left    Nocturnal leg cramps    Bilateral carpal tunnel syndrome    Prostate cancer ([address] Justin score 6.    History of CVA (cerebrovascular accident)    History of pulmonary embolism    CKD (chronic kidney disease), stage IV (HCC)       Review of Systems   Constitutional: Negative. HENT: Negative. Respiratory: Negative. Cardiovascular: Negative. Gastrointestinal: Negative. Musculoskeletal: Negative.     All other systems reviewed and are negative. Prior to Visit Medications    Medication Sig Taking? Authorizing Provider   Omega-3 Fatty Acids ([employer] FISH OIL PO) Take 1 tablet by mouth 4 times daily  Yes Historical Provider, MD   apixaban (ELIQUIS) 2.5 MG TABS tablet Take 1 tablet by mouth 2 times daily Yes Livia Louie MD   Chromium-Cinnamon (CINNAMON PLUS CHROMIUM PO) Take 1 capsule by mouth 4 times daily (before meals and nightly) [address] [facility] units per capsule Yes Historical Provider, MD   B Complex-Biotin-FA (B COMPLEX 100 TR PO) Take 1 tablet by mouth 3 times daily (before meals) [id number] [facility] at [address] Provider, MD   Cholecalciferol (VITAMIN D3) 50 MCG (2000 UT) CAPS Take 2 capsules by mouth daily (with breakfast)  Yes Historical Provider, MD   magnesium cl-calcium carbonate (SLOW-MAG) 71.5-119 MG TBEC tablet Take by mouth 3 times daily (with meals) 2 tabs threes times daily and 1 tab nightly Yes Historical Provider, MD   Levomefolate Glucosamine (METHYLFOLATE PO) Take 10 mg by mouth every morning (before breakfast) Metabolic Maintenance at [employer] or [employer]. com Yes Historical Provider, MD   Methylcobalamin 5000 MCG SUBL Place 1 tablet under the tongue nightly Vera at [employer]. com Yes Historical Provider, MD   mupirocin (BACTROBAN) 2 % ointment Apply topically Indications: Skin Abnormalities As needed for dermatologist appointments Yes Historical Provider, MD        Allergies   Allergen Reactions    Bactrim [Sulfamethoxazole-Trimethoprim] Other (See Comments)     weakness    Morphine Other (See Comments)    Nsaids      Other reaction(s): REQUIRES CLARIFICATION    Sulfamethoxazole Nausea And Vomiting    Trimethoprim Nausea And Vomiting       Past Medical History:   Diagnosis Date    Arthritis     neck, Wrists , back    Cancer ([facility]     skin (removed)    Cerebral artery occlusion with cerebral infarction ([facility]     TIA    DVT (deep venous thrombosis) (HCC)     GERD (gastroesophageal reflux disease)     Hx of blood clots     PE    Hypertension     Kidney disease     [facility] cancer ([facility]     Pulmonary emboli ([facility] 5/7/16    Scoliosis     TIA (transient ischemic attack)        Past Surgical History:   Procedure Laterality Date    ARM SURGERY Right 12/18/2020    EXCISION FIBROXANTHOMA RIGHT FOREARM WITH SKIN GRAFT performed by India Greene MD at [facility] Left 02/2021    [address]OSCOPY  2019    [address]    EGD  2019    [address] / BIOPSY SKIN LESION OF ARM Left 10/11/2017    EXCISION 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 WITH FLAP AND FROZEN SECTION performed by India Greene MD at [place] [id number] Left 02/2021    left thumb     OTHER SURGICAL HISTORY Left 10/11/2017    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    PROSTATE BIOPSY      SKIN CANCER EXCISION  2010    Face skin cancer removal       Social History     Socioeconomic History    Marital status:       Spouse name: Not on file    Number of children: Not on file    Years of education: Not on file    Highest education level: Not on file   Occupational History    Not on file   Social Needs    Financial resource strain: Not on file    Food insecurity     Worry: Not on file     Inability: Not on file    Transportation needs     Medical: Not on file     Non-medical: Not on file   Tobacco Use    Smoking status: Never Smoker    Smokeless tobacco: Never Used   Substance and Sexual Activity    Alcohol use: Yes     Comment: rarely    Drug use: No    Sexual activity: Not on file   Lifestyle    Physical activity     Days per week: Not on file     Minutes per session: Not on file    Stress: Not on file   Relationships    Social connections     Talks on phone: Not on file     Gets together: Not on file     Attends Worship service: Not on file     Active member of club or organization: Not on file     Attends meetings of clubs or organizations: Not on file     Relationship status: Not on file    Intimate partner violence     Fear of current or ex partner: Not on file     Emotionally abused: Not on file     Physically abused: Not on file     Forced sexual activity: Not on file   Other Topics Concern    Not on file   Social History Narrative    Not on file        Family History   Problem Relation Age of Onset    Cancer Mother     High Blood Pressure Mother     Alzheimer's Disease Mother     Heart Disease Mother     Parkinsonism Father     Diabetes Neg Hx     Kidney Disease Neg Hx     Stroke Neg Hx     Colon Cancer Neg Hx     Esophageal Cancer Neg Hx     Rectal Cancer Neg Hx     Stomach Cancer Neg Hx        ADVANCE DIRECTIVE: N, <no information>    Vitals:    02/23/21 1103   BP: 138/80   Pulse: 73   Resp: 15   Temp: 97.2 °F (36.2 °C)   TempSrc: Skin   Weight: 152 lb 11.2 oz (69.3 kg)     Estimated body mass index is 23.92 kg/m² as calculated from the following:    Height as of 12/18/20: 5' 7\" (1.702 m). Weight as of this encounter: 152 lb 11.2 oz (69.3 kg). Physical Exam  Vitals signs and nursing note reviewed. Constitutional:       General: He is not in acute distress. Appearance: Normal appearance. He is well-developed. HENT:      Head: Normocephalic and atraumatic. Right Ear: Tympanic membrane normal.      Left Ear: Tympanic membrane normal.   Eyes:      Conjunctiva/sclera: Conjunctivae normal.   Neck:      Musculoskeletal: Neck supple. Cardiovascular:      Rate and Rhythm: Normal rate and regular rhythm. Heart sounds: Normal heart sounds. No murmur. Pulmonary:      Effort: Pulmonary effort is normal.      Breath sounds: Normal breath sounds. No wheezing, rhonchi or rales. Abdominal:      General: There is no distension. Skin:     General: Skin is warm and dry. Findings: No rash (on exposed surfaces).    Neurological:      General: No focal deficit present. Mental Status: He is alert. Psychiatric:         Attention and Perception: Attention normal.         Mood and Affect: Mood normal.         Speech: Speech normal.         Behavior: Behavior normal. Behavior is cooperative. Thought Content: Thought content normal.         Judgment: Judgment normal.         No flowsheet data found.     Lab Results   Component Value Date    CHOL 181 11/23/2020    CHOL 185 11/02/2019    CHOL 222 05/04/2019    CHOL 206 11/03/2018    CHOL 194 05/03/2014    CHOL 216 05/04/2013    TRIG 84 11/23/2020    TRIG 186 11/02/2019    TRIG 355 05/04/2019    HDL 55 11/23/2020    HDL 41 11/02/2019    HDL 47 05/04/2019    HDL 41 05/05/2012    LDLCALC 109 11/23/2020    LDLCALC 107 11/02/2019    LDLCALC 104 05/04/2019    GLUCOSE 109 11/23/2020    GLUCOSE 96 07/23/2020    LABA1C 5.6 11/23/2020    LABA1C 6.2 01/16/2020    LABA1C 5.8 05/05/2018       The 10-year ASCVD risk score (Charisma Steele, et al., 2013) is: 29.6%    Values used to calculate the score:      Age: 66 years      Sex: Male      Is Non- : No      Diabetic: No      Tobacco smoker: No      Systolic Blood Pressure: 826 mmHg      Is BP treated: No      HDL Cholesterol: 55 mg/dL      Total Cholesterol: 181 mg/dL    Immunization History   Administered Date(s) Administered    PPD Test 05/20/2015    Pneumococcal Conjugate 13-valent ([id number]) 08/29/2016    Pneumococcal Polysaccharide ([id number]) 06/05/2012       Health Maintenance   Topic Date Due    Hepatitis C screen  1945    COVID-19 Vaccine (1 of 2) 08/26/1959    DTaP/Tdap/Td vaccine (1 - Tdap) 08/26/1962    Shingles Vaccine (1 of 2) 08/26/1993    Flu vaccine (1) 09/01/2020    Annual Wellness Visit (AWV)  08/25/2021    Potassium monitoring  11/23/2021    Creatinine monitoring  11/23/2021    PSA counseling  11/23/2021    Pneumococcal 65+ yrs at Risk Vaccine  Completed    Hepatitis A vaccine  Aged Out    Hepatitis B vaccine  Aged Out    Hib vaccine  Aged Out    Meningococcal (ACWY) vaccine  Aged Out       ASSESSMENT/PLAN:  1. Essential hypertension  2. Prostate cancer ([facility] [place] score 6.  3. Chronic deep vein thrombosis (DVT) of femoral vein of left lower extremity (HCC)  4. Stage 3 chronic kidney disease, unspecified whether stage 3a or 3b CKD  5. Spinal stenosis of lumbar region with neurogenic claudication  6. TIA (transient ischemic attack)  7. History of pulmonary embolism  8. History of CVA (cerebrovascular accident)  9. SI (sacroiliac) joint inflammation (HCC)  10. CKD (chronic kidney disease), stage IV (HCC)    -  Chronic medical problems stable  -  Continue current medications  -  Follow up with specialists as scheduled  -  Labs in Epic reviewed, plans to repeat next month with Dr. Metz Doctor    Return in about 1 year (around 2/23/2022) for HTN. An electronic signature was used to authenticate this note.     --Marcelino Bay, DO on 2/23/2021 at 11:18 AM

## 2021-03-23 ENCOUNTER — NURSE ONLY (OUTPATIENT)
Dept: LAB | Age: 76
End: 2021-03-23

## 2021-03-23 DIAGNOSIS — M48.062 SPINAL STENOSIS OF LUMBAR REGION WITH NEUROGENIC CLAUDICATION: ICD-10-CM

## 2021-03-23 DIAGNOSIS — M79.641 RIGHT HAND PAIN: ICD-10-CM

## 2021-03-23 DIAGNOSIS — M47.26 OTHER SPONDYLOSIS WITH RADICULOPATHY, LUMBAR REGION: ICD-10-CM

## 2021-03-23 DIAGNOSIS — N18.30 STAGE 3 CHRONIC KIDNEY DISEASE, UNSPECIFIED WHETHER STAGE 3A OR 3B CKD (HCC): ICD-10-CM

## 2021-03-23 DIAGNOSIS — Z71.89 ENCOUNTER FOR HERB AND VITAMIN SUPPLEMENT MANAGEMENT: ICD-10-CM

## 2021-03-23 DIAGNOSIS — K90.89 OTHER INTESTINAL MALABSORPTION: ICD-10-CM

## 2021-03-23 DIAGNOSIS — I82.512 CHRONIC DEEP VEIN THROMBOSIS (DVT) OF FEMORAL VEIN OF LEFT LOWER EXTREMITY (HCC): ICD-10-CM

## 2021-03-23 DIAGNOSIS — G47.62 NOCTURNAL LEG CRAMPS: ICD-10-CM

## 2021-03-23 DIAGNOSIS — I10 ESSENTIAL HYPERTENSION: ICD-10-CM

## 2021-03-23 DIAGNOSIS — B37.0 THRUSH: ICD-10-CM

## 2021-03-23 DIAGNOSIS — R97.20 PSA ELEVATION: ICD-10-CM

## 2021-03-23 DIAGNOSIS — C61 PROSTATE CANCER (HCC): ICD-10-CM

## 2021-03-23 DIAGNOSIS — R73.09 LOW GLUCOSE LEVEL: ICD-10-CM

## 2021-03-23 DIAGNOSIS — G45.9 TIA (TRANSIENT ISCHEMIC ATTACK): ICD-10-CM

## 2021-03-23 LAB
AVERAGE GLUCOSE: 111 MG/DL (ref 70–126)
BASOPHILS # BLD: 1.3 %
BASOPHILS ABSOLUTE: 0.1 THOU/MM3 (ref 0–0.1)
CALCIUM SERPL-MCNC: 9.1 MG/DL (ref 8.5–10.5)
CHOLESTEROL, TOTAL: 199 MG/DL (ref 100–199)
EOSINOPHIL # BLD: 2.7 %
EOSINOPHILS ABSOLUTE: 0.2 THOU/MM3 (ref 0–0.4)
ERYTHROCYTE [DISTWIDTH] IN BLOOD BY AUTOMATED COUNT: 12.4 % (ref 11.5–14.5)
ERYTHROCYTE [DISTWIDTH] IN BLOOD BY AUTOMATED COUNT: 43.8 FL (ref 35–45)
HBA1C MFR BLD: 5.7 % (ref 4.4–6.4)
HCT VFR BLD CALC: 44.3 % (ref 42–52)
HDLC SERPL-MCNC: 49 MG/DL
HEMOGLOBIN: 14.5 GM/DL (ref 14–18)
IMMATURE GRANS (ABS): 0.01 THOU/MM3 (ref 0–0.07)
IMMATURE GRANULOCYTES: 0.2 %
LDL CHOLESTEROL CALCULATED: 134 MG/DL
LYMPHOCYTES # BLD: 32.9 %
LYMPHOCYTES ABSOLUTE: 1.8 THOU/MM3 (ref 1–4.8)
MAGNESIUM: 2.1 MG/DL (ref 1.6–2.4)
MCH RBC QN AUTO: 31.5 PG (ref 26–33)
MCHC RBC AUTO-ENTMCNC: 32.7 GM/DL (ref 32.2–35.5)
MCV RBC AUTO: 96.3 FL (ref 80–94)
MONOCYTES # BLD: 10.1 %
MONOCYTES ABSOLUTE: 0.6 THOU/MM3 (ref 0.4–1.3)
NUCLEATED RED BLOOD CELLS: 0 /100 WBC
PLATELET # BLD: 255 THOU/MM3 (ref 130–400)
PMV BLD AUTO: 9.7 FL (ref 9.4–12.4)
PROSTATE SPECIFIC ANTIGEN: 11.53 NG/ML (ref 0–1)
PTH INTACT: 46.1 PG/ML (ref 15–65)
RBC # BLD: 4.6 MILL/MM3 (ref 4.7–6.1)
RHEUMATOID FACTOR: < 10 IU/ML (ref 0–13)
SEDIMENTATION RATE, ERYTHROCYTE: 19 MM/HR (ref 0–10)
SEG NEUTROPHILS: 52.8 %
SEGMENTED NEUTROPHILS ABSOLUTE COUNT: 3 THOU/MM3 (ref 1.8–7.7)
T3 TOTAL: 88 NG/DL (ref 72–181)
TRIGL SERPL-MCNC: 81 MG/DL (ref 0–199)
TSH SERPL DL<=0.05 MIU/L-ACNC: 1.04 UIU/ML (ref 0.4–4.2)
VITAMIN D 25-HYDROXY: 52 NG/ML (ref 30–100)
WBC # BLD: 5.6 THOU/MM3 (ref 4.8–10.8)

## 2021-03-24 LAB
C-REACTIVE PROTEIN, HIGH SENSITIVITY: 4 MG/L
HOMOCYSTEINE: 11.7 UMOL/L
THYROXINE (T4): 4.7 UG/DL (ref 4.5–10.9)

## 2021-03-25 LAB
DHEAS (DHEA SULFATE): 111 UG/DL (ref 28–175)
TESTOSTERONE FREE: NORMAL

## 2021-03-26 ENCOUNTER — HOSPITAL ENCOUNTER (EMERGENCY)
Age: 76
Discharge: HOME | End: 2021-03-26
Payer: MEDICARE

## 2021-03-26 VITALS
DIASTOLIC BLOOD PRESSURE: 80 MMHG | SYSTOLIC BLOOD PRESSURE: 177 MMHG | BODY MASS INDEX: 23.86 KG/M2 | RESPIRATION RATE: 16 BRPM | HEIGHT: 67 IN | HEART RATE: 70 BPM | OXYGEN SATURATION: 97 % | WEIGHT: 152 LBS | TEMPERATURE: 97.2 F

## 2021-03-26 DIAGNOSIS — S51.811A SKIN TEAR OF RIGHT FOREARM WITHOUT COMPLICATION, INITIAL ENCOUNTER: Primary | ICD-10-CM

## 2021-03-26 LAB — ANA SCREEN: DETECTED

## 2021-03-26 PROCEDURE — 99213 OFFICE O/P EST LOW 20 MIN: CPT

## 2021-03-26 PROCEDURE — 99213 OFFICE O/P EST LOW 20 MIN: CPT | Performed by: NURSE PRACTITIONER

## 2021-03-26 ASSESSMENT — ENCOUNTER SYMPTOMS: SHORTNESS OF BREATH: 0

## 2021-03-26 NOTE — ED PROVIDER NOTES
[address] [facility] Encounter      CHIEFCOMPLAINT       Chief Complaint   Patient presents with    Laceration       Nurses Notes reviewed and I agree except as noted in the HPI. HISTORY OF PRESENT ILLNESS   Sherman Pettit is a 66 y.o. male who presents with c/o skin tear. Injury yesterday after patient right forearm struck a part of his truck. No pain. Area cleansed prior to arrival.  Currently applying antibiotic ointment. Patient states he is here because his daughter made him come. No additional complaints. REVIEW OF SYSTEMS     Review of Systems   Constitutional: Negative for chills, diaphoresis, fatigue and fever. Respiratory: Negative for shortness of breath. Cardiovascular: Negative for chest pain. Musculoskeletal: Negative for arthralgias and joint swelling. Skin: Positive for wound. Neurological: Negative for weakness and numbness. Hematological: Bruises/bleeds easily. Psychiatric/Behavioral: Negative for sleep disturbance. PAST MEDICAL HISTORY         Diagnosis Date    Arthritis     neck, Wrists , back    Cancer ([address]     skin (removed)    Cerebral artery occlusion with cerebral infarction ([address]     TIA    DVT (deep venous thrombosis) (HCC)     GERD (gastroesophageal reflux disease)     Hx of blood clots     PE    Hypertension     Kidney disease     Prostate cancer ([address]     Pulmonary emboli ([address] 5/7/16    Scoliosis     TIA (transient ischemic attack)        SURGICAL HISTORY     Patient  has a past surgical history that includes Skin cancer excision (2010); other surgical history (Left, 10/11/2017); EXCISION / BIOPSY SKIN LESION OF ARM (Left, 10/11/2017); EGD (2019); Colonoscopy (2019); Prostate biopsy; Arm Surgery (Right, 12/18/2020); Carpal tunnel release (Left, 02/2021); and Finger trigger release (Left, 02/2021).     CURRENT MEDICATIONS       Discharge Medication List as of 3/26/2021 11:54 AM      CONTINUE these medications which have NOT CHANGED    Details   Omega-3 Fatty Acids ([employer]S FISH OIL PO) Take 1 tablet by mouth 4 times daily Historical Med      apixaban (ELIQUIS) 2.5 MG TABS tablet Take 1 tablet by mouth 2 times daily, Disp-180 tablet, R-1Print      Chromium-Cinnamon (CINNAMON PLUS CHROMIUM PO) Take 1 capsule by mouth 4 times daily (before meals and nightly) [facility] units per capsuleHistorical Med      B Complex-Biotin-FA (B COMPLEX 100 TR PO) Take 1 tablet by mouth 3 times daily (before meals) [id number] [facility] at [address] [facility] (VITAMIN D3) 50 MCG (2000 UT) CAPS Take 2 capsules by mouth daily (with breakfast) Historical Med      magnesium cl-calcium carbonate (SLOW-MAG) 71.5-119 MG TBEC tablet Take by mouth 3 times daily (with meals) 2 tabs threes times daily and 1 tab nightlyHistorical Med      Levomefolate Glucosamine (METHYLFOLATE PO) Take 10 mg by mouth every morning (before breakfast) Metabolic Maintenance at [employer] or [employer]. comHistorical Med      Methylcobalamin 5000 MCG SUBL Place 1 tablet under the tongue nightly Vera at [employer]. comHistorical Med      mupirocin (BACTROBAN) 2 % ointment Apply topically Indications: Skin Abnormalities As needed for dermatologist appointments, Topical, R-1, Historical Med             ALLERGIES     Patient is is allergic to bactrim [sulfamethoxazole-trimethoprim]; morphine; nsaids; sulfamethoxazole; and trimethoprim. FAMILY HISTORY     Patient'sfamily history includes Alzheimer's Disease in his mother; Cancer in his mother; Heart Disease in his mother; High Blood Pressure in his mother; Parkinsonism in his father. SOCIAL HISTORY     Patient  reports that he has never smoked. He has never used smokeless tobacco. He reports current alcohol use. He reports that he does not use drugs.     PHYSICAL EXAM     ED TRIAGE VITALS  BP: (!) 177/80, Temp: 97.2 °F (36.2 °C), Pulse: 70, Resp: 16, SpO2: 97 %  Physical Exam  Vitals signs and ointment for a few days, then mild soap/warm water. If worse return or go to ER. PATIENT REFERRED TO:  Stephani Faye 1, [address]  [phone]      Follow up with PCP as needed. Continue current treatment. If worse return or go to ER.     DISCHARGE MEDICATIONS:  Discharge Medication List as of 3/26/2021 11:54 AM        Discharge Medication List as of 3/26/2021 11:54 AM          1425 Dipika Booth, APRN - CNP  03/26/21 1210

## 2021-03-27 LAB — ANTINUCLEAR ANTIBODY, HEP-2, IGG: NORMAL

## 2021-03-28 LAB — DHEA UNCONJUGATED: 1.08 NG/ML (ref 0.63–4.7)

## 2021-04-06 ENCOUNTER — OFFICE VISIT (OUTPATIENT)
Dept: FAMILY MEDICINE CLINIC | Age: 76
End: 2021-04-06
Payer: MEDICARE

## 2021-04-06 ENCOUNTER — NURSE ONLY (OUTPATIENT)
Dept: LAB | Age: 76
End: 2021-04-06

## 2021-04-06 VITALS
RESPIRATION RATE: 12 BRPM | TEMPERATURE: 98.1 F | BODY MASS INDEX: 24.08 KG/M2 | OXYGEN SATURATION: 98 % | HEIGHT: 67 IN | SYSTOLIC BLOOD PRESSURE: 130 MMHG | HEART RATE: 70 BPM | WEIGHT: 153.4 LBS | DIASTOLIC BLOOD PRESSURE: 68 MMHG

## 2021-04-06 DIAGNOSIS — Z86.73 HISTORY OF CVA (CEREBROVASCULAR ACCIDENT): ICD-10-CM

## 2021-04-06 DIAGNOSIS — M47.26 OTHER SPONDYLOSIS WITH RADICULOPATHY, LUMBAR REGION: ICD-10-CM

## 2021-04-06 DIAGNOSIS — I10 ESSENTIAL HYPERTENSION: ICD-10-CM

## 2021-04-06 DIAGNOSIS — C61 PROSTATE CANCER (HCC): Primary | ICD-10-CM

## 2021-04-06 DIAGNOSIS — M48.062 SPINAL STENOSIS OF LUMBAR REGION WITH NEUROGENIC CLAUDICATION: ICD-10-CM

## 2021-04-06 DIAGNOSIS — G45.9 TIA (TRANSIENT ISCHEMIC ATTACK): ICD-10-CM

## 2021-04-06 DIAGNOSIS — I82.512 CHRONIC DEEP VEIN THROMBOSIS (DVT) OF FEMORAL VEIN OF LEFT LOWER EXTREMITY (HCC): ICD-10-CM

## 2021-04-06 DIAGNOSIS — K90.89 OTHER INTESTINAL MALABSORPTION: ICD-10-CM

## 2021-04-06 DIAGNOSIS — M46.1 SI (SACROILIAC) JOINT INFLAMMATION (HCC): ICD-10-CM

## 2021-04-06 DIAGNOSIS — C61 PROSTATE CANCER (HCC): ICD-10-CM

## 2021-04-06 DIAGNOSIS — R73.09 LOW GLUCOSE LEVEL: ICD-10-CM

## 2021-04-06 DIAGNOSIS — Z86.711 HISTORY OF PULMONARY EMBOLISM: ICD-10-CM

## 2021-04-06 DIAGNOSIS — N18.30 STAGE 3 CHRONIC KIDNEY DISEASE, UNSPECIFIED WHETHER STAGE 3A OR 3B CKD (HCC): ICD-10-CM

## 2021-04-06 PROCEDURE — G8427 DOCREV CUR MEDS BY ELIG CLIN: HCPCS | Performed by: FAMILY MEDICINE

## 2021-04-06 PROCEDURE — 99213 OFFICE O/P EST LOW 20 MIN: CPT | Performed by: FAMILY MEDICINE

## 2021-04-06 PROCEDURE — 1036F TOBACCO NON-USER: CPT | Performed by: FAMILY MEDICINE

## 2021-04-06 PROCEDURE — 4040F PNEUMOC VAC/ADMIN/RCVD: CPT | Performed by: FAMILY MEDICINE

## 2021-04-06 PROCEDURE — 1123F ACP DISCUSS/DSCN MKR DOCD: CPT | Performed by: FAMILY MEDICINE

## 2021-04-06 PROCEDURE — G8420 CALC BMI NORM PARAMETERS: HCPCS | Performed by: FAMILY MEDICINE

## 2021-04-06 RX ORDER — PHENOL 1.4 %
1 AEROSOL, SPRAY (ML) MUCOUS MEMBRANE 2 TIMES DAILY PRN
COMMUNITY
End: 2022-09-19 | Stop reason: DRUGHIGH

## 2021-04-06 NOTE — PROGRESS NOTES
Surgery (Right, 12/18/2020); Carpal tunnel release (Left, 02/2021); and Finger trigger release (Left, 02/2021). Family/Social History:     His family history includes Alzheimer's Disease in his mother; Cancer in his mother; Heart Disease in his mother; High Blood Pressure in his mother; Parkinsonism in his father. He  reports that he has never smoked. He has never used smokeless tobacco. He reports current alcohol use. He reports that he does not use drugs. Medications/Allergies/Immunizations:     His current medication(s) include   Current Outpatient Medications:     calcium carbonate 600 MG TABS tablet, Take 1 tablet by mouth 2 times daily as needed, Disp: , Rfl:     Omega-3 Fatty Acids (CVS FISH OIL PO), Take 1 tablet by mouth 4 times daily , Disp: , Rfl:     apixaban (ELIQUIS) 2.5 MG TABS tablet, Take 1 tablet by mouth 2 times daily, Disp: 180 tablet, Rfl: 1    Chromium-Cinnamon (CINNAMON PLUS CHROMIUM PO), Take 1 capsule by mouth 4 times daily (before meals and nightly) [facility] units per capsule, Disp: , Rfl:     B Complex-Biotin-FA (B COMPLEX 100 TR PO), Take 1 tablet by mouth 3 times daily (before meals) [id number], Disp: , Rfl:     Cholecalciferol (VITAMIN D3) 50 MCG (2000 UT) CAPS, Take 2 capsules by mouth daily (with breakfast) Skip Sunday, Disp: , Rfl:     magnesium cl-calcium carbonate (SLOW-MAG) 71.5-119 MG TBEC tablet, Take by mouth 4 times daily (after meals and at bedtime) 2 tabs threes times daily and 1 tab nightly, Disp: , Rfl:     Levomefolate Glucosamine (METHYLFOLATE PO), Take 15 mg by mouth every morning (before breakfast) Metabolic Maintenance at [employer] or amazon. com , Disp: , Rfl:     Methylcobalamin 5000 MCG SUBL, Place 1 tablet under the tongue nightly Vera at [employer]. com, Disp: , Rfl:     mupirocin (BACTROBAN) 2 % ointment, Apply topically Indications: Skin Abnormalities As needed for dermatologist appointments, Disp: , Rfl: 1  Allergies: Morphine, Bactrim [sulfamethoxazole-trimethoprim], Nsaids, Sulfamethoxazole, and Trimethoprim,  Immunizations:   Immunization History   Administered Date(s) Administered    PPD Test 05/20/2015    Pneumococcal Conjugate 13-valent ([id number]) 08/29/2016    Pneumococcal Polysaccharide ([id number]) 06/05/2012        History of PresentIllness:     Jordy's had concerns including 3 Month Follow-Up, Discuss Labs, and Discuss Medications (changing? ). Alma Montero  presents to the [address] [place] [address] today for;   Chief Complaint   Patient presents with    3 Month Follow-Up    Discuss Labs    Discuss Medications     changing?   , ,  abnormal labs follow up and these conditions as he  Is looking today for:     1. Prostate cancer ([facility] [address] Justin score 6.    2. Essential hypertension    3. Stage 3 chronic kidney disease, unspecified whether stage 3a or 3b CKD    4. Chronic deep vein thrombosis (DVT) of femoral vein of left lower extremity (HCC)    5. Spinal stenosis of lumbar region with neurogenic claudication    6. TIA (transient ischemic attack)    7. History of CVA (cerebrovascular accident)    8. History of pulmonary embolism    9. SI (sacroiliac) joint inflammation (HCC)    10. Other spondylosis with radiculopathy, lumbar region    11. Other intestinal malabsorption    12. Low glucose level      HPI    Subjective:     Review of Systems   All other systems reviewed and are negative. Objective:     /68 (Site: Right Upper Arm, Position: Sitting, Cuff Size: Medium Adult)   Pulse 70   Temp 98.1 °F (36.7 °C) (Temporal)   Resp 12   Ht 5' 7.01\" (1.702 m)   Wt 153 lb 6.4 oz (69.6 kg)   SpO2 98%   BMI 24.02 kg/m²   Physical Exam  Vitals signs and nursing note reviewed. Constitutional:       Appearance: Normal appearance. HENT:      Head: Normocephalic. Pulmonary:      Effort: Pulmonary effort is normal.   Neurological:      Mental Status: He is alert.    Psychiatric:         Mood and instructed to check with insurer before each lab draw and to to to the lab which the insurer directs them for the most cost effective lab draw with the least patient's cost  - Estephanie Mahoney  will be scheduled subsequentto those results. Kaitlin Forrest will bring in his drink and food log to his next visit    Chronic Problems Addressed on this Visit:                                   1.  Intensity of Service; Uncontrolled items at this visit; Chief Complaint   Patient presents with    3 Month Follow-Up    Discuss Labs    Discuss Medications     changing?   ;              Improved items at this visit; Stable items atthis visit;  2. Patients food and drinks were reviewed with the patient,       - Estephanie Mahoney will bring food+drink symptom log to next visit for inclusion in their record      - 75 better food list reviewed & given topatient with the omega 6 food list to avoid         - Gluten in corn and oats abstracts sheet reviewed and given to the patient today   3. Greater than 25 minutes were spent face to face on this visit of which >50% was for counseling and coordination of care. Patients food and drinks were reviewed with thepatient,   - they will bring a food drink symptom log to future visits for inclusion in their record    - 75 better food list reviewed & given to patient along with the omega 6 food list to avoid      - Glutenin corn and oats abstracts sheet reviewed and given to the patient today    - 23 Foods containing Latex-like proteins was reviewed and copy to be taken if desired     - Nutrient Supplements list provided and copyto be taken if desired    - Adjacent Applications. [employer] web site offered to patient to review at their convenience by staff with login information    Note:  I have discussed with the patient that with all nutraceuticals, there is often mixed data and emerging research which needs to be monitored; as well as an array of NIHfact sheets on nutrients and supplements.      If I have recommended cinnamon at the request of this patient to assist them in control of their blood sugar, triglyceride and or weight issues. I discussed that thepatient's clinical use of cinnamon bark, calcium, magnesium, Vitamin D and pharmaceutical grade CVS #687588 fish oil or triple-strength fish oil, and B-75 two phase time-released B complex by Luis Grain will be for atime-limited trial to determine their individual effectiveness and safety in this patient. I also referred the patient to the NMCD: Nutrition, Metabolism, and Cardiovascular Diseases (journal) and concerns about long-termuse and hepatotoxicity of cinnamon and other nutrients and suggest they frequently search nih.gov for the latest non-proprietary information on nutriceuticals as well as consider a subscription to [employer] fordetails on reviewed supplements, or at the least review the nutrient files at [address] at [facility], [employer], an insulin mimetic, reduces some High Carbohydrate Dietary Impacts. Methylhydroxychalcone polymers insulin-enhancing properties in fat cells are responsible for enhanced glucose uptake, inhibiting hepatic HMG-CoA reductase and lowers lipids. www.jacn. org/content/20/4/327.full     But cinnamon with additivessuch as Cinnamon Extract are not effective as insulin mimetics.  :eStoreDirectory.at     Nutrients for Start up from [employer] or [employer] for ease to get started now ;  Bon Kim has some useable products;  - Triple Strength Fish Oil, enteric coated  - Vit D 3 5000 IU gel caps  - Iron ferrous sulfat 325 mg tabs  - Centrum Silver look-a-like for most patients, or  - Centrum plain look-a-like if need iron    Localpharmacies or chains such as CVS, Walgreen, Wal-mart, have;  - Triple Strength Fish Oil (enteric coated ifavailable) or    If not enteric coated, can take from freezer for less burps  - B-50 or B-100 released balanced B complex tabs  - Cinnamon bark 500 mg (without Chromium or extracts)   some brands list 1000 mg / serving of 2 capsules,    some brands have 1000 mg caps with the undesireable chromium / extract  - Calcium carbonate/citrate, magnesium oxide/citrate, Vit D 3  as 3-4 tabs/caps/serving     Some Local Brands may contain Zincwhich is acceptable for the first bottle or two  - Magnesium oxide 250 mg tabs for those having < 2 bowel movements daily  - Magnesium citrate 200 mg if having > 2bowel movement/day  - Centrum Silver or look-a-like for most patients, Centrum plain or look-a-like with iron  - Vitamin D-3 comes as 1,000 IU or 2,000 IU or 5,000 IU gel caps or Liquid drops      Some brands containing or derived from soy oil or corn oil are OK if not allergic to soy  - Elemental Iron 65 mg tabsat bedtime is available over the counter if need more iron     Usually turns bowel movements grey, green or black but not a concern  - Apricot Kernel Oil (by Now) for dry skin sensitive perineal or perianal area skin    Nutrients for ongoing use by Mail order for less expense from "[employer] ;  - Strength Fish Oil , 240 Softgels Item #162955  -B-100 time released balanced B complex Item #117641  - Cinnamon bark 500 mg without Chromium or extract Item #308502  - Calcium carbonate 1000 mg, Magnesium oxide 500 mg, Vit D 3  400 IU Item #238263  - Magnesium oxide 500 mg tabs Item #516213 if less than 2 bowel movements daily  - [employer] Sen[employer] Item #143043 for mostpatients, One Daily Item #447288 with iron  - Vit D 3  1,000 Item #495392      2,000 IU Item #059963  ,000 IU Item #715486     Some brands containing orderived from soy oil or corn oil are OK if not allergic to soy    Nutrients for Special Needs by Miley Moran for less expense from www. puritan.com ;  -Elemental Iron 65 mg tabs Item #514792 if need more iron for low iron on labs    Usually turns bowel movements grey, green or black but not a concern  - Time released Niacin 250 mg Item #164127 for cold intolerance, low libido or impotence  - DHEA 50 mg Item #494352 for improving DHEA levels on labs if having Fatigue    If stools too loose substitute for your Magnesium oxide using;   Magnesium citrate 200 mg tabs(NOT liquid) at [employer]   Magnesium gluconate 550 mgby Mario at [employer]. com or amazon. com  Magnesium chloride foot soaks or body sprays  www.[employer]   Magnesium chloride flakes 14.99 Item #: [id number] if Backordered get spray    Food Drink Symptom Log;  I asked this patient to track these items and any other symptoms on their list on a weekly basis to documenttheir progress or lack of same. This can be done on the symptom tracking sheet I gave them at today's visit but looks like this:                                                      Rate on scale of 0-10 with zero = notnoticeable  Symptom:                            Week 1               2                 3                 4               Etc            Hair loss    Foot cramps    Paresthesia    Aches    IBS (irritable bowel)    Constipation    Diarrhea  Nocturia    (up to bathroom at night)    Fatigue/Energy level  Stress      On the other side of the sheet they can track their food, drink, environment, activity, symptoms etc      Avoiding Latex-like proteins inmy foods; Avocados, Bananas, Celery, Figs & Kiwi proteins have latex-like proteins to inflame our immunesystems  How Can I Have A Latex Allergy? Eating foods with latex-like protein exposes us to latex allergies. Our body cannot tell the differencebetween these latex-like proteins and latex from rubber products since many people are allergic to fruit, vegetables and latex. Read labels on pre-packaged foods. This list to avoid is only a guide if you are known allergicto latex or have a latex rash on your chin, cheeks and lines on your neck and chest. The amount of latex is different in each food product or fruit variety.   to Avoid out of Season if not grown locally: Melon, Nectarine, Papaya, Cherry, Passion fruit, Plum, Chestnuts, and Tomato. Avocado, Banana, Celery, Figs, and Kiwi always contain Latex-like protein. Whats in Season? Strawberries taste better in June than December because June is strawberry season so buy locally grown produce \"in season\" for the best flavor, cost and less Latex. Locally grown produce notonly tastes great requires little of no ethylene exposure in food distribution so has less latex content. Out of season, use canned, frozen or dried sinceprocessed ripe and are latex lower!!!   Month     [place] LocallyGrown Produce  January, February, March: use canned, frozen or dried fruits since lower in latex  April; asparagus, radishes  May; asparagus, broccoli, green onions, greens, peas, radishes,rhubarb  June; asparagus, beets, beans, broccoli, cabbage, cantaloupe, carrots, green onions, greens, lettuce,onions, parsley, peas, radishes, rhubarb, strawberries, watermelons  July; beans, beets, blueberries,broccoli, cabbage, cantaloupe, carrots, cauliflower, celery, cucumbers, eggplant, grapes, green onions, greens, lettuce, onions, parsley, peas, peaches, bell peppers, potatoes, radishes, summer raspberries, squash, sweetcorn, tomatoes, turnips, watermelons  August; apples, beans, beets, blueberries, cabbage, cantaloupe, carrots,cauliflower, celery, cucumbers, eggplant, grapes, green onions, greens, lettuce, onions, parsley, peas, peaches, pears, bell peppers, potatoes, radishes, squash, sweet corn, tomatoes, turnips, watermelons  September; apples, beans, beets, blueberries, cabbage, cantaloupe, carrots, cauliflower, celery, cucumbers, eggplant, grapes,green onions, greens, lettuce, onions, parsley, peas, peaches, pears, bell peppers, plums, potatoes, pumpkins, radishes, fall red raspberries, squash, sweet corn, tomatoes, turnips, watermelons  October; apples, beets, broccoli, cabbage, carrots, cauliflower, celery, green onions, greens, lettuce, parsley, peas, pears, potatoes,pumpkins, radishes, fall red raspberries, squash, turnips  November; broccoli, cabbage, carrots, parsley,pears, peas  December: use canned, frozen ordried fruits since lower in latex    Upto half of latex-sensitive patients show allergic reactions to fruits (avocados, bananas, kiwifruits, papayas, peaches),   Annals of Allergy, 1994. These plants contain the same proteins that are allergens in latex. People with fruit allergies should warn physicians beforeundergoing procedures which may cause anaphylactic reaction if in contact with latex gloves. Some of the common foods with defined cross-reactivity to latexare avocado, banana, kiwi, chestnut, raw potato, tomato,stone fruits (e.g., peach, cherry), hazelnut, melons, celery, carrot, apple, pear, papaya, and almond. Foods with less well-defined cross-reactivity to latex are peanuts, peppers, citrus fruits, coconut, pineapple, deysi,fig, passion fruit, Ugli fruit, and grape    This fruit/latex cross-reactivity is worsened by ethylene, a gas used to hasten commercial ripening. In nature, plants produce low levels of the hormone ethylene, which regulates germination, flowering, and ripening. Forced ripening by high ethyleneconcentrations, plants produce allergenic wound-repair proteins, which are similar to wound-repair proteins made during the tapping of rubber trees. Sensitive individualswho ingest the fruit get a higher dose and worse reaction. Some people may even first become sensitized to latex through fruit. Can food processing increase theconcentrations of allergenic proteins? Latex-sensitized children (and adults) in [place] often experience allergic reactions after eating bananas ripenedartificially with ethylene. In the [place], food distribution centers treat unripe bananas and other produce with ethylene to ripen; not commonly done in [facility] since fruit is tree-ripened there.  Does

## 2021-04-06 NOTE — PATIENT INSTRUCTIONS
Thank you   1. Thank you for trusting us with your healthcare needs. You may receive a survey regarding today's visit. It would help us out if you would take a few moments to provide your feedback. We value your input. 2. Please bring in ALL medications BOTTLES, including inhalers, herbal supplements, over the counter, prescribed & non-prescribed medicine. The office would like actual medication bottles and a list.   3. Please note our OFFICE POLICIES:   a. Prior to getting your labs drawn, please check with your insurance company for benefits and eligibility of lab services. Often, insurance companies cover certain tests for preventative visits only. It is patient's responsibility to see what is covered. b. We are unable to change a diagnosis after the test has been performed. c. Lab orders will not be re-printed. Please hold onto your original lab orders and take them to your lab to be completed. d. If you no show your scheduled appointment three times, you will be dismissed from this practice. e. Eula Mckusick must be completed 24 hours prior to your schedule appointment. 4. If the list below has been completed, PLEASE FAX RECORDS TO OUR OFFICE @ [phone].  Once the records have been received we will update your records at our office:  Health Maintenance Due   Topic Date Due    Hepatitis C screen  Never done    COVID-19 Vaccine (1) Never done    DTaP/Tdap/Td vaccine (1 - Tdap) Never done    Shingles Vaccine (1 of 2) Never done

## 2021-04-08 LAB
GLIADIN PEPTIDE IGG: < 0.4 U/ML
TISSUE TRANSGLUTAMINASE IGA: 0.9 U/ML

## 2021-04-10 LAB — STRONGYLOIDES ANTIBODY: NORMAL

## 2021-05-25 ENCOUNTER — NURSE ONLY (OUTPATIENT)
Dept: LAB | Age: 76
End: 2021-05-25

## 2021-05-25 DIAGNOSIS — N18.4 CKD (CHRONIC KIDNEY DISEASE), STAGE IV (HCC): ICD-10-CM

## 2021-05-25 LAB
ANION GAP SERPL CALCULATED.3IONS-SCNC: 12 MEQ/L (ref 8–16)
BUN BLDV-MCNC: 30 MG/DL (ref 7–22)
CALCIUM SERPL-MCNC: 9.8 MG/DL (ref 8.5–10.5)
CHLORIDE BLD-SCNC: 106 MEQ/L (ref 98–111)
CO2: 23 MEQ/L (ref 23–33)
CREAT SERPL-MCNC: 1.8 MG/DL (ref 0.4–1.2)
GFR SERPL CREATININE-BSD FRML MDRD: 37 ML/MIN/1.73M2
GLUCOSE BLD-MCNC: 120 MG/DL (ref 70–108)
POTASSIUM SERPL-SCNC: 4.3 MEQ/L (ref 3.5–5.2)
PTH INTACT: 31.9 PG/ML (ref 15–65)
SODIUM BLD-SCNC: 141 MEQ/L (ref 135–145)
VITAMIN D 25-HYDROXY: 52 NG/ML (ref 30–100)

## 2021-06-01 ENCOUNTER — OFFICE VISIT (OUTPATIENT)
Dept: NEPHROLOGY | Age: 76
End: 2021-06-01
Payer: MEDICARE

## 2021-06-01 VITALS
SYSTOLIC BLOOD PRESSURE: 149 MMHG | TEMPERATURE: 98.2 F | BODY MASS INDEX: 24.05 KG/M2 | OXYGEN SATURATION: 96 % | WEIGHT: 153.6 LBS | HEART RATE: 61 BPM | DIASTOLIC BLOOD PRESSURE: 83 MMHG

## 2021-06-01 DIAGNOSIS — N18.32 STAGE 3B CHRONIC KIDNEY DISEASE (HCC): Primary | ICD-10-CM

## 2021-06-01 PROCEDURE — 4040F PNEUMOC VAC/ADMIN/RCVD: CPT | Performed by: NURSE PRACTITIONER

## 2021-06-01 PROCEDURE — 1123F ACP DISCUSS/DSCN MKR DOCD: CPT | Performed by: NURSE PRACTITIONER

## 2021-06-01 PROCEDURE — 99213 OFFICE O/P EST LOW 20 MIN: CPT | Performed by: NURSE PRACTITIONER

## 2021-06-01 PROCEDURE — G8427 DOCREV CUR MEDS BY ELIG CLIN: HCPCS | Performed by: NURSE PRACTITIONER

## 2021-06-01 PROCEDURE — 1036F TOBACCO NON-USER: CPT | Performed by: NURSE PRACTITIONER

## 2021-06-01 PROCEDURE — G8420 CALC BMI NORM PARAMETERS: HCPCS | Performed by: NURSE PRACTITIONER

## 2021-06-01 NOTE — PROGRESS NOTES
Carolina Moe is a 66 y. o.oldmale came for follow up regarding his chronic kidney disease, Stage III, of several year duration. Danielle Smith most recent creatinine is 1.8 and has been stable since 2018. States doing well. State compliance with meds and denies adverse effects. His BP generally runs ok except when he had Emergency Department visit. Denies dysuria. denies edema except in Lt leg following DVT. Alexandria Wyatt The pt denies use of NSAIDs. Usual state of health. Denies shortness of breath. Has a normal appetite. Pleasant    Recent Wts and BP Noted and Reviewed    Wt Readings from Last 3 Encounters:   06/01/21 153 lb 9.6 oz (69.7 kg)   04/06/21 153 lb 6.4 oz (69.6 kg)   03/26/21 152 lb (68.9 kg)     BP Readings from Last 3 Encounters:   06/01/21 (!) 149/83   04/06/21 130/68   03/26/21 (!) 177/80     Body mass index is 24.05 kg/m².   Immunization History   Administered Date(s) Administered    PPD Test 05/20/2015    Pneumococcal Conjugate 13-valent (Suzanne Dina) 08/29/2016    Pneumococcal Polysaccharide ([id number]) 06/05/2012     PAST MEDICAL HISTORY:       Diagnosis Date    Arthritis     neck, Wrists , back    Cancer ([address]     skin (removed)    Cerebral artery occlusion with cerebral infarction ([address]     TIA    DVT (deep venous thrombosis) (HCC)     GERD (gastroesophageal reflux disease)     Hx of blood clots     PE    Hypertension     Kidney disease     Prostate cancer ([address]     Pulmonary emboli ([address] 5/7/16    Scoliosis     TIA (transient ischemic attack)      SURGICAL HISTORY:    Past Surgical History:   Procedure Laterality Date    ARM SURGERY Right 12/18/2020    EXCISION FIBROXANTHOMA RIGHT FOREARM WITH SKIN GRAFT performed by Jean-Claude Pollard MD at [place] [id number] Left 02/2021    COLONOSCOPY  [address]    [address]    EGD  2019    [facility]    EXCISION / BIOPSY SKIN LESION OF ARM Left 10/11/2017    EXCISION 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 WITH FLAP AND FROZEN SECTION performed by Alphonso Vo MD at [place] [id number] Left 02/2021    left thumb     OTHER SURGICAL HISTORY Left 10/11/2017    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    PROSTATE BIOPSY      SKIN CANCER EXCISION  2010    Face skin cancer removal     ALLERGIES:    Allergies   Allergen Reactions    Morphine Other (See Comments)     Other reaction(s): Dizziness, Dizziness/nausea    Bactrim [Sulfamethoxazole-Trimethoprim] Other (See Comments)     weakness    Nsaids      Other reaction(s): REQUIRES CLARIFICATION    Sulfamethoxazole Nausea And Vomiting    Trimethoprim Nausea And Vomiting     TOBACCO:   reports that he has never smoked. He has never used smokeless tobacco.     EXAM:  VITALS:  BP (!) 149/83 (Site: Right Upper Arm, Position: Sitting, Cuff Size: Large Adult)   Pulse 61   Temp 98.2 °F (36.8 °C)   Wt 153 lb 9.6 oz (69.7 kg)   SpO2 96%   BMI 24.05 kg/m²    Body mass index is 24.05 kg/m². CONSTITUTIONAL:  No acute distress. Sitting comfortable in chair  HEENT:  Head is normocephalic, Extraocular movement intact,  Neck is supple  CARDIOVASCULAR:  No lower extremity edema  RESPIRATORY: No shortness of breath. ABDOMEN: soft  NEUROLOGICAL: Patient is alert and oriented to person, place, and time. Recent and remote memory is intact. SKIN: No rash on exposed surfaces  MUSCULOSKELETAL: Movement is coordinated.    EXTREMITIES: Distal lower extremity temp is warm,   PSYCHIATRIC: mood and affect appropriate    Current Outpatient Medications   Medication Sig Dispense Refill    calcium carbonate 600 MG TABS tablet Take 1 tablet by mouth 2 times daily as needed      Omega-3 Fatty Acids (CVS FISH OIL PO) Take 1 tablet by mouth 4 times daily       apixaban (ELIQUIS) 2.5 MG TABS tablet Take 1 tablet by mouth 2 times daily 180 tablet 1    Chromium-Cinnamon (CINNAMON PLUS CHROMIUM PO) Take 1 capsule by mouth 4 times daily (before meals and nightly) [facility] units per capsule      B Complex-Biotin-FA (B COMPLEX 100 TR PO) Take 1 tablet by mouth 3 times daily (before meals) [id number] [facility] at [address] (VITAMIN D3) 50 MCG (2000 UT) CAPS Take 2 capsules by mouth daily (with breakfast) Skip Sunday      magnesium cl-calcium carbonate (SLOW-MAG) 71.5-119 MG TBEC tablet Take by mouth 4 times daily (after meals and at bedtime) 2 tabs threes times daily and 1 tab nightly      Levomefolate Glucosamine (METHYLFOLATE PO) Take 15 mg by mouth every morning (before breakfast) Metabolic Maintenance at [employer] or amazon. com       Methylcobalamin 5000 MCG SUBL Place 1 tablet under the tongue nightly Vera at [employer]. com      mupirocin (BACTROBAN) 2 % ointment Apply topically Indications: Skin Abnormalities As needed for dermatologist appointments  1     No current facility-administered medications for this visit.        Labs and Meds reviewed and discussed with pt    Diagnostic Data:  Lab Results   Component Value Date    CREATININE 1.8 05/25/2021    CREATININE 1.8 11/23/2020    CREATININE 1.72 07/23/2020    CREATININE 1.8 05/26/2020    CREATININE 2.2 04/22/2020    CREATININE 1.75 11/02/2019     Lab Results   Component Value Date     05/25/2021    K 4.3 05/25/2021     05/25/2021    CO2 23 05/25/2021    BUN 30 05/25/2021    CREATININE 1.8 05/25/2021    LABGLOM 37 05/25/2021    GLUCOSE 120 05/25/2021    GLUCOSE 96 07/23/2020     Lab Results   Component Value Date    ANIONGAP 12.0 05/25/2021     Lab Results   Component Value Date    PHOS 3.1 05/26/2020    CALCIUM 9.8 05/25/2021     Lab Results   Component Value Date    VITD25 52 05/25/2021    VITD25 52 03/23/2021    VITD25 53 11/23/2020     Lab Results   Component Value Date    WBC 5.6 03/23/2021    HGB 14.5 03/23/2021    HCT 44.3 03/23/2021    MCV 96.3 (H) 03/23/2021    MCH 31.5 03/23/2021     03/23/2021     Lab Results   Component Value Date 2/24/2022 11:00 AM Yumiko Mi DO [address]

## 2021-09-22 ENCOUNTER — TELEPHONE (OUTPATIENT)
Dept: FAMILY MEDICINE CLINIC | Age: 76
End: 2021-09-22

## 2021-09-22 DIAGNOSIS — M47.26 OTHER SPONDYLOSIS WITH RADICULOPATHY, LUMBAR REGION: ICD-10-CM

## 2021-09-22 DIAGNOSIS — R73.09 LOW GLUCOSE LEVEL: ICD-10-CM

## 2021-09-22 DIAGNOSIS — K90.89 OTHER INTESTINAL MALABSORPTION: ICD-10-CM

## 2021-09-22 DIAGNOSIS — I10 ESSENTIAL HYPERTENSION: ICD-10-CM

## 2021-09-22 DIAGNOSIS — M46.1 SI (SACROILIAC) JOINT INFLAMMATION (HCC): ICD-10-CM

## 2021-09-22 DIAGNOSIS — N18.30 STAGE 3 CHRONIC KIDNEY DISEASE, UNSPECIFIED WHETHER STAGE 3A OR 3B CKD (HCC): ICD-10-CM

## 2021-09-22 DIAGNOSIS — C61 PROSTATE CANCER (HCC): Primary | ICD-10-CM

## 2021-09-22 DIAGNOSIS — Z86.711 HISTORY OF PULMONARY EMBOLISM: ICD-10-CM

## 2021-09-22 DIAGNOSIS — G47.62 NOCTURNAL LEG CRAMPS: ICD-10-CM

## 2021-09-22 DIAGNOSIS — M48.062 SPINAL STENOSIS OF LUMBAR REGION WITH NEUROGENIC CLAUDICATION: ICD-10-CM

## 2021-09-22 DIAGNOSIS — Z71.89 ENCOUNTER FOR HERB AND VITAMIN SUPPLEMENT MANAGEMENT: ICD-10-CM

## 2021-09-22 DIAGNOSIS — G45.9 TIA (TRANSIENT ISCHEMIC ATTACK): ICD-10-CM

## 2021-09-22 DIAGNOSIS — I82.512 CHRONIC DEEP VEIN THROMBOSIS (DVT) OF FEMORAL VEIN OF LEFT LOWER EXTREMITY (HCC): ICD-10-CM

## 2021-09-22 DIAGNOSIS — Z86.73 HISTORY OF CVA (CEREBROVASCULAR ACCIDENT): ICD-10-CM

## 2021-09-22 NOTE — TELEPHONE ENCOUNTER
Patient has an appt 10/6/2021 with Dr Mateus Kam and believes he is due for blood work. Please verify orders, and he would use [facility].

## 2021-09-22 NOTE — LETTER
[address] [place] [address],[address] [facility] [address]  Phone: [phone]  Fax: [phone]    Alejandro Moncada MD        September 23, 2021    [place] [facility] [address]      Dear Tone Nettles:    Here are the lab orders you requested    If you have any questions or concerns, please don't hesitate to call.     Sincerely,        Alejandro Moncada MD

## 2021-09-22 NOTE — TELEPHONE ENCOUNTER
Called to ask what orders he would like? Voice mail is full, cannot leave a message. Pended orders. Please review,attach diagnosis, approve or  Deny.

## 2021-09-24 ENCOUNTER — NURSE ONLY (OUTPATIENT)
Dept: LAB | Age: 76
End: 2021-09-24

## 2021-09-24 DIAGNOSIS — M47.26 OTHER SPONDYLOSIS WITH RADICULOPATHY, LUMBAR REGION: ICD-10-CM

## 2021-09-24 DIAGNOSIS — N18.30 STAGE 3 CHRONIC KIDNEY DISEASE, UNSPECIFIED WHETHER STAGE 3A OR 3B CKD (HCC): ICD-10-CM

## 2021-09-24 DIAGNOSIS — Z86.711 HISTORY OF PULMONARY EMBOLISM: ICD-10-CM

## 2021-09-24 DIAGNOSIS — K90.89 OTHER INTESTINAL MALABSORPTION: ICD-10-CM

## 2021-09-24 DIAGNOSIS — G45.9 TIA (TRANSIENT ISCHEMIC ATTACK): ICD-10-CM

## 2021-09-24 DIAGNOSIS — I82.512 CHRONIC DEEP VEIN THROMBOSIS (DVT) OF FEMORAL VEIN OF LEFT LOWER EXTREMITY (HCC): ICD-10-CM

## 2021-09-24 DIAGNOSIS — M48.062 SPINAL STENOSIS OF LUMBAR REGION WITH NEUROGENIC CLAUDICATION: ICD-10-CM

## 2021-09-24 DIAGNOSIS — C61 PROSTATE CANCER (HCC): ICD-10-CM

## 2021-09-24 DIAGNOSIS — R73.09 LOW GLUCOSE LEVEL: ICD-10-CM

## 2021-09-24 DIAGNOSIS — Z71.89 ENCOUNTER FOR HERB AND VITAMIN SUPPLEMENT MANAGEMENT: ICD-10-CM

## 2021-09-24 DIAGNOSIS — G47.62 NOCTURNAL LEG CRAMPS: ICD-10-CM

## 2021-09-24 DIAGNOSIS — M46.1 SI (SACROILIAC) JOINT INFLAMMATION (HCC): ICD-10-CM

## 2021-09-24 DIAGNOSIS — I10 ESSENTIAL HYPERTENSION: ICD-10-CM

## 2021-09-24 DIAGNOSIS — Z86.73 HISTORY OF CVA (CEREBROVASCULAR ACCIDENT): ICD-10-CM

## 2021-09-24 LAB
ALBUMIN SERPL-MCNC: 4.6 G/DL (ref 3.5–5.1)
ALP BLD-CCNC: 60 U/L (ref 38–126)
ALT SERPL-CCNC: 29 U/L (ref 11–66)
AMYLASE: 73 U/L (ref 20–104)
ANION GAP SERPL CALCULATED.3IONS-SCNC: 11 MEQ/L (ref 8–16)
AST SERPL-CCNC: 32 U/L (ref 5–40)
BILIRUB SERPL-MCNC: 1 MG/DL (ref 0.3–1.2)
BILIRUBIN DIRECT: < 0.2 MG/DL (ref 0–0.3)
BUN BLDV-MCNC: 32 MG/DL (ref 7–22)
CALCIUM SERPL-MCNC: 9.7 MG/DL (ref 8.5–10.5)
CHLORIDE BLD-SCNC: 105 MEQ/L (ref 98–111)
CHOLESTEROL, TOTAL: 214 MG/DL (ref 100–199)
CO2: 24 MEQ/L (ref 23–33)
CREAT SERPL-MCNC: 1.8 MG/DL (ref 0.4–1.2)
D-DIMER QUANTITATIVE: 599 NG/ML FEU (ref 0–500)
GFR SERPL CREATININE-BSD FRML MDRD: 37 ML/MIN/1.73M2
GLUCOSE BLD-MCNC: 103 MG/DL (ref 70–108)
HDLC SERPL-MCNC: 56 MG/DL
LDL CHOLESTEROL CALCULATED: 133 MG/DL
LIPASE: 46.4 U/L (ref 5.6–51.3)
MAGNESIUM: 2.1 MG/DL (ref 1.6–2.4)
POTASSIUM SERPL-SCNC: 4.4 MEQ/L (ref 3.5–5.2)
PROSTATE SPECIFIC ANTIGEN: 13.49 NG/ML (ref 0–1)
PTH INTACT: 43.3 PG/ML (ref 15–65)
RHEUMATOID FACTOR: < 10 IU/ML (ref 0–13)
SEDIMENTATION RATE, ERYTHROCYTE: 5 MM/HR (ref 0–10)
SODIUM BLD-SCNC: 140 MEQ/L (ref 135–145)
T3 TOTAL: 88 NG/DL (ref 72–181)
TOTAL PROTEIN: 7.5 G/DL (ref 6.1–8)
TRIGL SERPL-MCNC: 126 MG/DL (ref 0–199)
TSH SERPL DL<=0.05 MIU/L-ACNC: 1.42 UIU/ML (ref 0.4–4.2)
VITAMIN D 25-HYDROXY: 59 NG/ML (ref 30–100)

## 2021-09-25 LAB
C-REACTIVE PROTEIN, HIGH SENSITIVITY: 2.9 MG/L
HOMOCYSTEINE: 12 UMOL/L
THYROXINE (T4): 6.3 UG/DL (ref 4.5–10.9)

## 2021-09-26 LAB — TESTOSTERONE FREE: NORMAL

## 2021-09-27 LAB — DHEAS (DHEA SULFATE): 126 UG/DL (ref 28–175)

## 2021-09-28 ENCOUNTER — TELEPHONE (OUTPATIENT)
Dept: FAMILY MEDICINE CLINIC | Age: 76
End: 2021-09-28

## 2021-09-28 LAB
GLIADIN PEPTIDE IGG: 0.6 U/ML
TISSUE TRANSGLUTAMINASE IGA: 1.2 U/ML

## 2021-09-28 NOTE — TELEPHONE ENCOUNTER
Called and lm to make sure he saw Dr. Waldo Beckwith message in my chart. If any questions please call back.

## 2021-09-30 LAB — DHEA UNCONJUGATED: 1.07 NG/ML (ref 0.63–4.7)

## 2021-10-06 ENCOUNTER — OFFICE VISIT (OUTPATIENT)
Dept: FAMILY MEDICINE CLINIC | Age: 76
End: 2021-10-06
Payer: MEDICARE

## 2021-10-06 VITALS
TEMPERATURE: 96.6 F | HEART RATE: 67 BPM | WEIGHT: 151.8 LBS | RESPIRATION RATE: 10 BRPM | OXYGEN SATURATION: 96 % | HEIGHT: 67 IN | BODY MASS INDEX: 23.83 KG/M2 | SYSTOLIC BLOOD PRESSURE: 162 MMHG | DIASTOLIC BLOOD PRESSURE: 66 MMHG

## 2021-10-06 DIAGNOSIS — Z86.73 HISTORY OF CVA (CEREBROVASCULAR ACCIDENT): ICD-10-CM

## 2021-10-06 DIAGNOSIS — G45.9 TIA (TRANSIENT ISCHEMIC ATTACK): ICD-10-CM

## 2021-10-06 DIAGNOSIS — I10 ESSENTIAL HYPERTENSION: ICD-10-CM

## 2021-10-06 DIAGNOSIS — I82.512 CHRONIC DEEP VEIN THROMBOSIS (DVT) OF FEMORAL VEIN OF LEFT LOWER EXTREMITY (HCC): ICD-10-CM

## 2021-10-06 DIAGNOSIS — R97.20 PSA ELEVATION: ICD-10-CM

## 2021-10-06 DIAGNOSIS — K90.89 OTHER INTESTINAL MALABSORPTION: ICD-10-CM

## 2021-10-06 DIAGNOSIS — N18.4 CKD (CHRONIC KIDNEY DISEASE), STAGE IV (HCC): ICD-10-CM

## 2021-10-06 DIAGNOSIS — Z20.822 EXPOSURE TO COVID-19 VIRUS: Primary | ICD-10-CM

## 2021-10-06 DIAGNOSIS — M46.1 SI (SACROILIAC) JOINT INFLAMMATION (HCC): ICD-10-CM

## 2021-10-06 DIAGNOSIS — M48.062 SPINAL STENOSIS OF LUMBAR REGION WITH NEUROGENIC CLAUDICATION: ICD-10-CM

## 2021-10-06 DIAGNOSIS — Z71.89 ENCOUNTER FOR HERB AND VITAMIN SUPPLEMENT MANAGEMENT: ICD-10-CM

## 2021-10-06 DIAGNOSIS — N18.30 STAGE 3 CHRONIC KIDNEY DISEASE, UNSPECIFIED WHETHER STAGE 3A OR 3B CKD (HCC): ICD-10-CM

## 2021-10-06 DIAGNOSIS — Z86.711 HISTORY OF PULMONARY EMBOLISM: ICD-10-CM

## 2021-10-06 DIAGNOSIS — M47.26 OTHER SPONDYLOSIS WITH RADICULOPATHY, LUMBAR REGION: ICD-10-CM

## 2021-10-06 DIAGNOSIS — C61 PROSTATE CANCER (HCC): ICD-10-CM

## 2021-10-06 DIAGNOSIS — G47.62 NOCTURNAL LEG CRAMPS: ICD-10-CM

## 2021-10-06 DIAGNOSIS — R73.09 LOW GLUCOSE LEVEL: ICD-10-CM

## 2021-10-06 PROCEDURE — G8427 DOCREV CUR MEDS BY ELIG CLIN: HCPCS | Performed by: FAMILY MEDICINE

## 2021-10-06 PROCEDURE — G8420 CALC BMI NORM PARAMETERS: HCPCS | Performed by: FAMILY MEDICINE

## 2021-10-06 PROCEDURE — G8484 FLU IMMUNIZE NO ADMIN: HCPCS | Performed by: FAMILY MEDICINE

## 2021-10-06 PROCEDURE — 1123F ACP DISCUSS/DSCN MKR DOCD: CPT | Performed by: FAMILY MEDICINE

## 2021-10-06 PROCEDURE — 1036F TOBACCO NON-USER: CPT | Performed by: FAMILY MEDICINE

## 2021-10-06 PROCEDURE — 99214 OFFICE O/P EST MOD 30 MIN: CPT | Performed by: FAMILY MEDICINE

## 2021-10-06 PROCEDURE — 3288F FALL RISK ASSESSMENT DOCD: CPT | Performed by: FAMILY MEDICINE

## 2021-10-06 PROCEDURE — 4040F PNEUMOC VAC/ADMIN/RCVD: CPT | Performed by: FAMILY MEDICINE

## 2021-10-06 SDOH — ECONOMIC STABILITY: FOOD INSECURITY: WITHIN THE PAST 12 MONTHS, YOU WORRIED THAT YOUR FOOD WOULD RUN OUT BEFORE YOU GOT MONEY TO BUY MORE.: NEVER TRUE

## 2021-10-06 SDOH — ECONOMIC STABILITY: FOOD INSECURITY: WITHIN THE PAST 12 MONTHS, THE FOOD YOU BOUGHT JUST DIDN'T LAST AND YOU DIDN'T HAVE MONEY TO GET MORE.: NEVER TRUE

## 2021-10-06 ASSESSMENT — SOCIAL DETERMINANTS OF HEALTH (SDOH): HOW HARD IS IT FOR YOU TO PAY FOR THE VERY BASICS LIKE FOOD, HOUSING, MEDICAL CARE, AND HEATING?: NOT HARD AT ALL

## 2021-10-06 NOTE — PROGRESS NOTES
[address] [facility] Kristen VALE [address] [address]  Dept: [phone]  Dept Fax: [phone]  Loc: [phone]      Sukhdev Moe is a 64 y.o. White male. Marcelina Garcia  presents to the [place] [address] clinic today for   Chief Complaint   Patient presents with    6 Month Follow-Up   , and;   No diagnosis found. I have reviewed Sukhdev Moe medical, surgical and other pertinent history in detail, and have updated medication and allergy information in the computerized patient record. Clinical Care Team:     -Referring Provider for today's consult: self  -Primary Care Provider: Catherine Cosme DO    Medical/Surgical History:   He  has a past medical history of Arthritis, Cancer [facility]), Cerebral artery occlusion with cerebral infarction [facility]), DVT (deep venous thrombosis) ([facility] GERD (gastroesophageal reflux disease), Hx of blood clots, Hypertension, Kidney disease, Prostate cancer ([facility] Pulmonary emboli ([facility] Scoliosis, and TIA (transient ischemic attack). His  has a past surgical history that includes Skin cancer excision (2010); other surgical history (Left, 10/11/2017); EXCISION / BIOPSY SKIN LESION OF ARM (Left, 10/11/2017); EGD (2019); Colonoscopy (2019); Prostate biopsy; Arm Surgery (Right, 12/18/2020); Carpal tunnel release (Left, 02/2021); and Finger trigger release (Left, 02/2021). Family/Social History:     His family history includes Alzheimer's Disease in his mother; Cancer in his mother; Heart Disease in his mother; High Blood Pressure in his mother; Parkinsonism in his father. He  reports that he has never smoked. He has never used smokeless tobacco. He reports current alcohol use. He reports that he does not use drugs.     Medications/Allergies/Immunizations:     His current medication(s) include   Current Outpatient Medications:     calcium carbonate 600 MG TABS tablet, Take 1 tablet by mouth 2 times daily as needed, Disp: , Rfl:     Omega-3 Fatty Acids (CVS FISH OIL PO), Take 1 tablet by mouth 4 times daily , Disp: , Rfl:     apixaban (ELIQUIS) 2.5 MG TABS tablet, Take 1 tablet by mouth 2 times daily, Disp: 180 tablet, Rfl: 1    Chromium-Cinnamon (CINNAMON PLUS CHROMIUM PO), Take 1 capsule by mouth 4 times daily (before meals and nightly) [facility] units per capsule, Disp: , Rfl:     B Complex-Biotin-FA (B COMPLEX 100 TR PO), Take 1 tablet by mouth 3 times daily (before meals) [id number] [facility] [place], Disp: , Rfl:     Cholecalciferol (VITAMIN D3) 50 MCG (2000 UT) CAPS, Take 2 capsules by mouth daily (with breakfast) Skip Sunday, Disp: , Rfl:     magnesium cl-calcium carbonate (SLOW-MAG) 71.5-119 MG TBEC tablet, Take by mouth 4 times daily (after meals and at bedtime) 2 tabs threes times daily and 1 tab nightly, Disp: , Rfl:     Levomefolate Glucosamine (METHYLFOLATE PO), Take 15 mg by mouth every morning (before breakfast) Metabolic Maintenance at [employer] or amazon. com , Disp: , Rfl:     Methylcobalamin 5000 MCG SUBL, Place 1 tablet under the tongue nightly Vera at [employer]. com, Disp: , Rfl:     mupirocin (BACTROBAN) 2 % ointment, Apply topically Indications: Skin Abnormalities As needed for dermatologist appointments, Disp: , Rfl: 1  Allergies: Morphine, Bactrim [sulfamethoxazole-trimethoprim], Nsaids, Sulfamethoxazole, and Trimethoprim  Immunizations:   Immunization History   Administered Date(s) Administered    PPD Test 05/20/2015    Pneumococcal Conjugate 13-valent (Mbudrgk30) 08/29/2016    Pneumococcal Polysaccharide (Ulnuzwytv24) 06/05/2012        History of Present Illness:     Jordy's had concerns including 6 Month Follow-Up. Michael Templeton  presents to the [address] [place] [address] today for;   Chief Complaint   Patient presents with    6 Month Follow-Up   , abnormal labs follow up and these conditions as he  Is looking today for:     No diagnosis do several smaller blood draws over several days  -Patient instructed to check with insurer before each lab draw and to go to the lab which the insurer directs them for the most cost effective lab draw with the least patient's cost  - Faylene Eligio  will be scheduled subsequent to those results. Steven Pickens will bring in his drink, food, supplement log to his next visit    Chronic Problems Addressed on this Visit:                                   1.  Intensity of Service; Uncontrolled items at this visit; Chief Complaint   Patient presents with    6 Month Follow-Up   ; Improved items at this visit and Stable items were discussed at this visit;  2. Patients food, drinks, supplements and symptoms were reviewed with the patient,       - Dean Del Valle will bring food, drink, supplements and symptoms log to next visit for inclusion in their record      - 75 better food list reviewed & given to patient with the omega 6 food list to avoid      - The 52 Latex foods list was reviewed and given to the patients with the information on carrageenan         - Gluten in corn and oats abstracts sheet reviewed and given to the patient today   3.    Greater than 30 minutes time was spent with the patient face to face on this visit; of which >50% was for counseling and coordination of care, as well as the time spent before and after the visit reviewing the chart, documenting the encounter, reviewing labs,reports, NIH listed studies, making phone calls, etc.      Patients food and drinks were reviewed with the patient,   - They will bring a food drink symptom log to future visits for inclusion in their record    - 75 better food list reviewed & given to patient along with the omega 6 food list to avoid      - Gluten in corn and oats abstracts sheet reviewed and given to the patient today    - 23 Foods containing Latex-like proteins was reviewed and copy to be taken if desired     - Nutrient Supplements list provided and copyto be taken if desired    - [email or web address]. com web site offered to patient to review at their convenience by staff with login information    Note:  I have discussed with the patient that with all nutraceuticals, there is often mixed data and emerging research which needs to be monitored; as well as an array of NIH fact sheets on nutrients and supplements, available at www.nih,issue plus [employer]. Amura plus www.Webcollagei,org. If I have recommended cinnamon at the request of this patient to assist them in control of their blood sugar, triglyceride, and/or weight issues. I discussed that the patient's clinical use of cinnamon bark, calcium, magnesium, Vitamin D, and pharmaceutical grade CVS omega 3 oil or triple-strength fish oil, and B-50/B-100 time-released B-complex by [address] [facility] will be for a time-limited trial to determine their individual effectiveness and safety in this patient. I also referred the patient to the NMCD: Nutrition, Metabolism, and Cardiovascular Diseases (SecuritiesCard.pl) and concerns about long-term use and hepatotoxicity of cinnamon and other nutrients. I suggested they frequently search nih.gov for the latest non-proprietary information on nutriceuticals as well as consider a subscription to AXS-One for details on reviewed supplements, or at the least review the nutrient files at Replaced by [facility] at [facility], [address] bark, an insulin mimetic, reduces some High Carbohydrate Dietary Impacts. Methylhydroxychalcone polymers insulin-enhancing properties in fat cells are responsible for enhanced glucose uptake, inhibiting hepatic HMG-CoA reductase and lowers lipids. www.jacn. org/content/20/4/327.full     But cinnamon with additives such as Cinnamon Extract are not effective as insulin mimetics.  :eStoreDirectory.at     Nutrients for Start up from [employer] or [employer] for ease to get started now;  Bon Kim has some useable products;  - Triple Strength Fish Oil, enteric coated  - Vit D-3 5000 IU gel caps  - Iron ferrous sulfate 325 mg tabs  - Centrum Silver look-a-like for most patients, or  - Centrum plain look-a-like if need iron    Local pharmacies or chains such as [employer], have:  - [employer] pharmaceutical grade omega 3 is 90% EPA/DHA whereas most Triple strength fish oil are 75% EPA/DHA  - Triple Strength Fish Oil (enteric coated if available) or if not enteric coated, can take from freezer for less burps  - B-50 or B-100 released balanced B complex tabs by [address] [facility] bark 500 mg (without Chromium or extracts)   some brands list 1000 mg / serving of 2 capsules,    some brands have 1000 mg caps with the undesireable chromium extract  - Calcium carbonate/citrate, magnesium oxide/citrate, Vit D-3 as 3-4 tabs/caps/serving     Some Local Brands may contain Zinc which is acceptable for the first bottle or two  - Magnesium oxide 250 mg tabs for those having < 2 bowel movements daily  - Magnesium citrate 200 mg if having > 2 bowel movements/day  - Centrum Silver or look-a-like for most patients, Centrum plain or look-a-like with iron  - Vitamin D-3 comes as 1,000 IU or 2,000 IU or 5,000 IU gel caps or Liquid drops but keep Vitamin D levels <50 but >40     Some brands containing or derived from soy oil or corn oil are OK if not allergic to soy  - Elemental Iron 65 mg tabs at bedtime is available over the counter if need more iron     Usually turns bowel movements grey, green, or black but not a concern  - Apricot Kernel Oil (by Now) for dry skin sensitive perineal or perianal area skin    Nutrients for ongoing use by Mail order for less expense from www. ExaqtWorld ;  - Strength Fish Oil , 240 Softgels Item [id number]  -B-100 time released balanced B complex Item #[id number]  - Cinnamon bark 500 mg without Chromium or extract Item #[id number]  - Calcium carbonate 1000 mg, Magnesium oxide 500 mg, Vit D-3 400 IU Item #[id number]  - Magnesium oxide 500 mg tabs Item #[id number] if less than 2 bowel movements daily  - ABC Seniors Item #232015 for most patients, One Daily Item #773180 with iron  - Vit D 3  1,000 Item #[id number]      2,000 IU Item #[id number]   Item #[id number]     Some brands containing orderived from soy oil or corn oil are OK if not allergic to soy    Nutrients for Special Needs by Mail order for less expense from www. puritan.com;  -Elemental Iron 65 mg tabs Item #121570 if need more iron for low iron on labs    Usually turns bowel movements grey, green or black but not a concern  - Time released Niacin 250 mg Item #[id number] for cold intolerance, low libido or impotence  - DHEA 50 mg Item #316505 for improving DHEA levels on labs if having Fatigue    If stools too loose substitute for your Magnesium oxide using;   Magnesium citrate 200 mg tabs (NOT liquid) at [employer] [facility]   Magnesium gluconate 550 mg by Meryle Loan at [employer] or [employer]. com  Magnesium chloride foot soaks or body sprays  www."[employer] Inc[employer]   Magnesium chloride flakes 14.99 Item #: [id number] if back-ordered, get spray  Magnesium threonate, Magtein also helps mental clarity and sleep    Food Drink Symptom Log;  I asked this patient to track these items and any other symptoms on their list on a weekly basis to documenttheir progress or lack of same.  This can be done on the symptom tracking sheet I gave them at today's visit but looks like this:                                                      Rate on scale of 0-10 with zero = not noticeable  Symptom:                            Week 1               2                 3                 4               Etc            Hair loss    Foot cramps    Paresthesia    Aches    IBS (irritable bowel)    Constipation    Diarrhea  Nocturia (up to bathroom at night)    Fatigue/Energy level  Stress      On the other side of the sheet they can track their food, drink, environment, activity, symptoms etc      Avoiding Latex-like proteins in my foods; Avocados, Bananas, Celery, Figs & Kiwi proteins have latex-like proteins to inflame our immune systems, plus 47 more foods  How Can I Have A Latex Allergy? Eating foods with latex-like protein exposes us to latex allergies. Our body cannot tell the differencebetween these latex-like proteins and latex from rubber products since many people are allergic to fruit, vegetables and latex. Read labels on pre-packaged foods. This list to avoid is only a guide if you are known allergicto latex or have a latex rash on your chin, cheeks and lines on your neck and chest. The amount of latex is different in each food product or fruit variety. Avoid out of Season if not grown locally:   Melon, Nectarine, Papaya, Cherry, Passion fruit, Plum, Chestnuts, and Tomato. Avocado, Banana, Celery, Figs, and Kiwi always contain Latex-like protein. Whats in Season? Strawberries taste better in June than December because June is strawberry season so buy locally grown produce \"in season\" for the best flavor, cost, and less Latex. Locally grown produce not only tastes great but also requires little or no ethylene exposure in food distribution so has less latex content. Out of season: use canned, frozen, or dried since those are processed ripe and latex content is lower!!!     Month     [place] Locally Grown Produce  January, February, March: use canned, frozen or dried fruits since lower in latex  April: asparagus, radishes  May: asparagus, broccoli, green onions, greens, peas, radishes, rhubarb  June: asparagus, beets, beans, broccoli, cabbage, cantaloupe, carrots, green onions, greens, lettuce, onions, parsley, peas, radishes, rhubarb, strawberries, watermelons  July: beans, beets, blueberries, broccoli, cabbage, cantaloupe, carrots, cauliflower, celery, cucumbers, eggplant, grapes, green onions, greens, lettuce, onions, parsley, peas, peaches, bell peppers, potatoes, radishes, summer raspberries, squash, sweetcorn, tomatoes, turnips, watermelons  August: apples, beans, beets, blueberries, cabbage, cantaloupe, carrots, cauliflower, celery, cucumbers, eggplant, grapes, green onions, greens, lettuce, onions, parsley, peas, peaches, pears, bell peppers, potatoes, radishes, squash, sweet corn, tomatoes, turnips, watermelons  September: apples, beans, beets, blueberries, cabbage, cantaloupe, carrots, cauliflower, celery, cucumbers, eggplant, grapes, green onions, greens, lettuce, onions, parsley, peas, peaches, pears, bell peppers, plums, potatoes, pumpkins, radishes, fall red raspberries, squash, sweet corn, tomatoes, turnips, watermelons  October: apples, beets, broccoli, cabbage, carrots, cauliflower, celery, green onions, greens, lettuce, parsley, peas, pears, potatoes, pumpkins, radishes, fall red raspberries, squash, turnips  November: broccoli, cabbage, carrots, parsley, pears, peas  December: use canned, frozen or dried fruits since lower in latex    Upto half of latex-sensitive patients show allergic reactions to fruits (avocados, bananas, kiwifruits, papayas, peaches),   Annals of Allergy, 1994. These plants contain the same proteins that are allergens in latex. People with fruit allergies should warn physicians before undergoing procedures which may cause anaphylactic reaction if in contact with latex gloves. Some of the common foods with defined cross-reactivity to latex are avocado, banana, kiwi, chestnut, raw potato, tomato, stone fruits (e.g., peach, cherry), hazelnut, melons, celery, carrot, apple, pear, papaya, and almond. Foods with less well-defined cross-reactivity to latex are peanuts, peppers, citrus fruits, coconut, pineapple, deysi, fig, passion fruit, Ugli fruit, and grape. This fruit/latex cross-reactivity is worsened by ethylene, a gas used to hasten commercial ripening.  In nature, plants produce low levels of the hormone ethylene, which regulates germination, flowering, and ripening. Forced ripening by high ethylene concentrations, plants produce allergenic wound-repair proteins, which are similar to wound-repair proteins made during the tapping of rubber trees. Sensitive individuals who ingest the fruit get a higher dose and worse reaction. Some people may even first become sensitized to latex through fruit. Can food processing increase the concentrations of allergenic proteins? Latex-sensitized children (and adults) in [place] often experience allergic reactions after eating bananas ripened artificially with ethylene. In [facility], food distribution centers treat unripe bananas and other produce with ethylene to ripen; not commonly done in [facility] since fruit is tree-ripened there. Does treatment of food with ethylene induce banana proteins that cross-react with latex? (Alex et al.)    References:   Latex in Foods Allergy, http://ehp.niehs.nih.gov/members/2003/5811/5811.html    Search web for [employer] in Season \" for where you live or are at the time you food shop   Management of Latex, ://[email or web address]. [facility].[email or web address]/  search for nih, latex-like proteins in foods

## 2021-10-19 ENCOUNTER — HOSPITAL ENCOUNTER (OUTPATIENT)
Dept: PHARMACY | Age: 76
Setting detail: THERAPIES SERIES
Discharge: HOME | End: 2021-10-19
Payer: MEDICARE

## 2021-10-19 PROCEDURE — 99211 OFF/OP EST MAY X REQ PHY/QHP: CPT | Performed by: PHARMACIST

## 2021-10-19 NOTE — PROGRESS NOTES
Medication Management [facility]  Anticoagulation Clinic  [phone] (phone)  [phone] (fax)      Melinda Stevens is a 64 y.o. male with PMHx significant for DVT/PE/TIA who presents to clinic 10/19/2021 for anticoagulation management and education. DOAC Therapy: apixaban / rivaroxaban / dabigatran / edoxaban  Current Dosage: 2.5mg BID   Anticoagulation Indication(s):  DVT, PE  (Confirmed absence of valvular heart disease or heart valve replacement)  Referring Physician:   Dr. Sander Garcia  Intended duration of Anticoagulation Therapy:  indefinite    Pertinent Laboratory Results  Recent CBC Results (baseline and q12mo):  Lab Results   Component Value Date    INR 1.08 08/03/2020    INR 1.60 (H) 07/20/2020    INR 2.51 (H) 06/30/2020     Recent SCr Results (baseline and q12mo):  9/24/21 Scr = 1.8, baseline, monitored by Dr. Belle Barger  Recent LFT Results (baseline and q12mo):  9/24/21 - WNL     CBC planned with Dr. Maxim Mac in the next couple of months, Last CBC 3/20 was WNL    Patient Findings:    Para Vicky Moe initiated therapy on  and reports the following:  S/sxs of bleeding: Denies bleeding in nose, sputum, emesis, urine, stool, bruising  Other adverse side effects from therapy: Denies dyspepsia  S/sxs of DVT/PE:  Denies chest pain, shortness of breath, leg/calf pain, swelling or redness  S/sxs of CVA/TIA: Denies headache, dizziness, numbness, vision changes, confusion, slurred speech, facial droop  Compliance: Taken therapy as prescribed? YES Missed doses? NO  Affordability: Patient is able to afford medication - Med is approved thru [address] Patient [facility] (erik scanned in media tab 11/20/21), this was set up by OP Patient Assistance Technician. Called [facility], pt is approved thru the end of 2021, Eliquis is shipped directly to his home. [facility] states that pt and referring provider office will get reapplication in November for 2022.    Recent falls/injuries, hospitalizations or significant changes in overall health or pregnancy:  Medication (including RX, OTC, and herbals) changes since last visit:  Potential drug interactions with prescribed NOAC: none noted. Upcoming scheduled surgeries or procedures: none planned    Assessment/Plan:  DOAC therapy, dose, and duration remain appropriate based on labs and patient findings. Medication list (including RX, OTC, and herbals) reviewed with patient and evaluated for potential drug interactions (P-gp inhibitors/inducers interact with dabigatran or edoxaban, dual P-gp/CY inhibitors interact with rivaroxaban or apixaban, anti-platelets and NSAIDs may increase bleeding risk)  Recommend repeating CBC,  Scr, LFTs annually   Refills will be per Dr. Boubacar Garcia office/Rx assistance program.   Patient was instructed to continue current regimen and reminded to call the clinic with any changes in medications, health, pregnancy, insurance/ financial status, planned procedures or surgeries, s/sxs of minor bleeding, or if needing refills. Patient was instructed to call 911 or go to the ER with any s/sxs of serious or uncontrolled bleeding, stroke, or blood clots. Education Provided:  Reviewed the following education with patient in detail: Reason for therapy, mechanism of action, intended duration of treatment, dosing, frequency, importance of compliance, what to do when a dose is missed, s/sxs of bleeding and thromboembolism, potential for medication interactions and to call clinic when new medications are started, avoidance of NSAIDs, lab monitoring required, perioperative management for surgeries/procedures. Total time spent with patient: 20 min (with >50% of time providing education)    Next Clinic Appointment:  Annual appt scheduled for Oct 2022. Discussed with pt that lab work is being completed by other providers (Dr. Clark Randle and Dr. Fili Perez), pt still would like to follow w/ [facility] for annual visit.      Please call [facility] at [phone] with any questions.        For Pharmacy Admin Tracking Only     Intervention Detail: Adherence Monitorin   Total # of Interventions Recommended: 1   Total # of Interventions Accepted: 1   Time Spent (min): 20

## 2021-11-29 ENCOUNTER — NURSE ONLY (OUTPATIENT)
Dept: LAB | Age: 76
End: 2021-11-29

## 2021-11-29 DIAGNOSIS — N18.32 STAGE 3B CHRONIC KIDNEY DISEASE (HCC): ICD-10-CM

## 2021-11-29 LAB
ANION GAP SERPL CALCULATED.3IONS-SCNC: 13 MEQ/L (ref 8–16)
BUN BLDV-MCNC: 29 MG/DL (ref 7–22)
CALCIUM SERPL-MCNC: 9.2 MG/DL (ref 8.5–10.5)
CHLORIDE BLD-SCNC: 103 MEQ/L (ref 98–111)
CO2: 23 MEQ/L (ref 23–33)
CREAT SERPL-MCNC: 2 MG/DL (ref 0.4–1.2)
CREATININE URINE: 171.8 MG/DL
GFR SERPL CREATININE-BSD FRML MDRD: 32 ML/MIN/1.73M2
GLUCOSE BLD-MCNC: 109 MG/DL (ref 70–108)
POTASSIUM SERPL-SCNC: 4.2 MEQ/L (ref 3.5–5.2)
PROT/CREAT RATIO, UR: 0.11
PROTEIN, URINE: 18.2 MG/DL
SODIUM BLD-SCNC: 139 MEQ/L (ref 135–145)
VITAMIN D 25-HYDROXY: 42 NG/ML (ref 30–100)

## 2021-12-02 ENCOUNTER — TELEPHONE (OUTPATIENT)
Dept: FAMILY MEDICINE CLINIC | Age: 76
End: 2021-12-02

## 2021-12-02 ENCOUNTER — VIRTUAL VISIT (OUTPATIENT)
Dept: FAMILY MEDICINE CLINIC | Age: 76
End: 2021-12-02
Payer: MEDICARE

## 2021-12-02 DIAGNOSIS — U07.1 COVID-19: Primary | ICD-10-CM

## 2021-12-02 PROCEDURE — 1036F TOBACCO NON-USER: CPT | Performed by: FAMILY MEDICINE

## 2021-12-02 PROCEDURE — 4040F PNEUMOC VAC/ADMIN/RCVD: CPT | Performed by: FAMILY MEDICINE

## 2021-12-02 PROCEDURE — 1123F ACP DISCUSS/DSCN MKR DOCD: CPT | Performed by: FAMILY MEDICINE

## 2021-12-02 PROCEDURE — G8428 CUR MEDS NOT DOCUMENT: HCPCS | Performed by: FAMILY MEDICINE

## 2021-12-02 PROCEDURE — G8484 FLU IMMUNIZE NO ADMIN: HCPCS | Performed by: FAMILY MEDICINE

## 2021-12-02 PROCEDURE — 99213 OFFICE O/P EST LOW 20 MIN: CPT | Performed by: FAMILY MEDICINE

## 2021-12-02 PROCEDURE — G8420 CALC BMI NORM PARAMETERS: HCPCS | Performed by: FAMILY MEDICINE

## 2021-12-02 NOTE — TELEPHONE ENCOUNTER
----- Message from Isabel Dueñas sent at 12/1/2021  4:32 PM EST -----  Subject: Message to Provider    QUESTIONS  Information for Provider? DOESNT FEEL WELL AND TOOK AT HOME TEST FOR COVID   AND IT TESTED POSITIVE, DOESNT HAVE MANY SYMPTOMS, HAS FEVER AND COUGHING,   Tabor Marnie WHAT TO DO NEXT  ---------------------------------------------------------------------------  --------------  CALL BACK INFO  What is the best way for the office to contact you? OK to leave message on   voicemail  Preferred Call Back Phone Number? [phone]  ---------------------------------------------------------------------------  --------------  SCRIPT ANSWERS  Relationship to Patient?  Self

## 2021-12-02 NOTE — PROGRESS NOTES
Avtar Moe (:  1945) is a 64 y.o. male,Established patient, here for evaluation of the following chief complaint(s): Concern For COVID-19              SUBJECTIVE/OBJECTIVE:  HPI:    Chief Complaint   Patient presents with    Concern For COVID-19       Pt presents today with c/o sinus congestion, cough, fever for the last week. Tested + for COVID yesterday. Symptoms began on the . Denies chest tightness, SOB. Unvaccinated. Interested in [place]. Patient Active Problem List   Diagnosis    Hypertension    TIA (transient ischemic attack)    GERD (gastroesophageal reflux disease)    Medication monitoring encounter    SI (sacroiliac) joint inflammation ([address] left    Dvt femoral (deep venous thrombosis) ([facility])    Chronic low back pain    Lung mass    Abnormal CT scan, chest, pulmonary nodule.     PSA elevation, bph elevation    Anticoagulated on Coumadin    Sciatica of left side associated with disorder of lumbar spine    Lumbar disc disease with radiculopathy    Lumbar spinal stenosis    Other spondylosis with radiculopathy, lumbar region    DDD (degenerative disc disease), lumbar    RAMAN (acute kidney injury) ([address]    CKD (chronic kidney disease) stage 3, GFR 30-59 ml/min ([facility])    Pleural effusion, left    Nocturnal leg cramps    Bilateral carpal tunnel syndrome    Prostate cancer ([address] Trevor score 6.    History of CVA (cerebrovascular accident)    History of pulmonary embolism    CKD (chronic kidney disease), stage IV ([facility])     Past Surgical History:   Procedure Laterality Date    ARM SURGERY Right 2020    EXCISION FIBROXANTHOMA RIGHT FOREARM WITH SKIN GRAFT performed by Riley Longoria MD at [place] [address] Left 2021    COLONOSCOPY  2019    [facility]    EGD  2019    [facility]    EXCISION / BIOPSY SKIN LESION OF ARM Left 10/11/2017    EXCISION 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 WITH FLAP AND FROZEN SECTION patient (or guardian if applicable) is aware that this is a billable service. Verbal consent to proceed has been obtained within the past 12 months. The visit was conducted pursuant to the emergency declaration under the [address] [place] [address] authority and the [employer] and [facility] General Act. Patient identification was verified, and a caregiver was present when appropriate. The patient was located in a state where the provider was credentialed to provide care. An electronic signature was used to authenticate this note.     --Brea Garcia, DO

## 2021-12-03 ENCOUNTER — TELEPHONE (OUTPATIENT)
Dept: FAMILY MEDICINE CLINIC | Age: 76
End: 2021-12-03

## 2021-12-03 DIAGNOSIS — U07.1 COVID-19: ICD-10-CM

## 2021-12-03 RX ORDER — DIPHENHYDRAMINE HYDROCHLORIDE 50 MG/ML
50 INJECTION INTRAMUSCULAR; INTRAVENOUS
OUTPATIENT
Start: 2021-12-03

## 2021-12-03 RX ORDER — SODIUM CHLORIDE 0.9 % (FLUSH) 0.9 %
5-40 SYRINGE (ML) INJECTION PRN
OUTPATIENT
Start: 2021-12-03

## 2021-12-03 RX ORDER — SODIUM CHLORIDE 9 MG/ML
INJECTION, SOLUTION INTRAVENOUS CONTINUOUS
OUTPATIENT
Start: 2021-12-03

## 2021-12-03 RX ORDER — HEPARIN SODIUM (PORCINE) LOCK FLUSH IV SOLN 100 UNIT/ML 100 UNIT/ML
500 SOLUTION INTRAVENOUS PRN
OUTPATIENT
Start: 2021-12-03

## 2021-12-03 RX ORDER — SODIUM CHLORIDE 9 MG/ML
25 INJECTION, SOLUTION INTRAVENOUS PRN
OUTPATIENT
Start: 2021-12-03

## 2021-12-03 RX ORDER — ACETAMINOPHEN 325 MG/1
650 TABLET ORAL
OUTPATIENT
Start: 2021-12-03

## 2021-12-03 RX ORDER — ONDANSETRON 2 MG/ML
8 INJECTION INTRAMUSCULAR; INTRAVENOUS
OUTPATIENT
Start: 2021-12-03

## 2021-12-03 RX ORDER — ALBUTEROL SULFATE 90 UG/1
4 AEROSOL, METERED RESPIRATORY (INHALATION) PRN
OUTPATIENT
Start: 2021-12-03

## 2021-12-03 ASSESSMENT — ENCOUNTER SYMPTOMS
CHEST TIGHTNESS: 0
COUGH: 1
SINUS PRESSURE: 1
GASTROINTESTINAL NEGATIVE: 1
SHORTNESS OF BREATH: 0

## 2021-12-03 NOTE — TELEPHONE ENCOUNTER
----- Message from Wichoshaw Espinos sent at 12/2/2021  4:44 PM EST -----  Subject: Referral Request    QUESTIONS   Reason for referral request? Regeneron infusion   Has the physician seen you for this condition before? Yes  Select a date? 2021-12-02  Select the Provider the patient wants to be referred to, if known (PCP or   Specialist)? Outside Physician - [facility]   Preferred Specialist (if applicable)? Do you already have an appointment scheduled? No  Additional Information for Provider? patient was told by PCP too late but   he's still in 10 day window. referral needs to be sent to [facility]   ---------------------------------------------------------------------------  --------------  [address]  What is the best way for the office to contact you? OK to leave message on   voicemail  Preferred Call Back Phone Number?  [phone]

## 2021-12-03 NOTE — TELEPHONE ENCOUNTER
I phoned [facility] and orders were given to [facility]. Out Pt Nursing will contact pt to schedule. She is aware day 10 is Sunday.

## 2021-12-03 NOTE — TELEPHONE ENCOUNTER
----- Message from Miles Ulloa DO sent at 12/3/2021  9:06 AM EST -----  Please schedule pt for Regeneron.

## 2021-12-03 NOTE — TELEPHONE ENCOUNTER
Attempted to contact [facility] [phone], left detailed vm requesting a call back regarding.

## 2021-12-03 NOTE — TELEPHONE ENCOUNTER
Spoke with pt and daughter [place] who state they no longer want to go to [facility]. Please disregard. Pt would like to have this done at [facility], ok to schedule? Please complete VV from yesterday including EUA review. Please advise.

## 2021-12-04 ENCOUNTER — TELEPHONE (OUTPATIENT)
Dept: FAMILY MEDICINE CLINIC | Age: 76
End: 2021-12-04

## 2021-12-04 ENCOUNTER — APPOINTMENT (OUTPATIENT)
Dept: GENERAL RADIOLOGY | Age: 76
End: 2021-12-04
Payer: MEDICARE

## 2021-12-04 ENCOUNTER — HOSPITAL ENCOUNTER (OUTPATIENT)
Age: 76
Setting detail: OBSERVATION
Discharge: HOME | End: 2021-12-05
Attending: EMERGENCY MEDICINE
Payer: MEDICARE

## 2021-12-04 DIAGNOSIS — R77.8 ELEVATED TROPONIN: ICD-10-CM

## 2021-12-04 DIAGNOSIS — J18.9 PNEUMONIA DUE TO INFECTIOUS ORGANISM, UNSPECIFIED LATERALITY, UNSPECIFIED PART OF LUNG: ICD-10-CM

## 2021-12-04 DIAGNOSIS — R07.9 CHEST PAIN, UNSPECIFIED TYPE: ICD-10-CM

## 2021-12-04 DIAGNOSIS — U07.1 COVID-19: Primary | ICD-10-CM

## 2021-12-04 LAB
ALBUMIN SERPL-MCNC: 3.8 G/DL (ref 3.5–5.1)
ALP BLD-CCNC: 50 U/L (ref 38–126)
ALT SERPL-CCNC: 27 U/L (ref 11–66)
ANION GAP SERPL CALCULATED.3IONS-SCNC: 15 MEQ/L (ref 8–16)
AST SERPL-CCNC: 44 U/L (ref 5–40)
BASOPHILS # BLD: 0.2 %
BASOPHILS ABSOLUTE: 0 THOU/MM3 (ref 0–0.1)
BILIRUB SERPL-MCNC: 0.4 MG/DL (ref 0.3–1.2)
BUN BLDV-MCNC: 23 MG/DL (ref 7–22)
CALCIUM SERPL-MCNC: 8.7 MG/DL (ref 8.5–10.5)
CHLORIDE BLD-SCNC: 98 MEQ/L (ref 98–111)
CO2: 23 MEQ/L (ref 23–33)
CREAT SERPL-MCNC: 1.9 MG/DL (ref 0.4–1.2)
EOSINOPHIL # BLD: 0 %
EOSINOPHILS ABSOLUTE: 0 THOU/MM3 (ref 0–0.4)
ERYTHROCYTE [DISTWIDTH] IN BLOOD BY AUTOMATED COUNT: 11.9 % (ref 11.5–14.5)
ERYTHROCYTE [DISTWIDTH] IN BLOOD BY AUTOMATED COUNT: 40.4 FL (ref 35–45)
GFR SERPL CREATININE-BSD FRML MDRD: 34 ML/MIN/1.73M2
GLUCOSE BLD-MCNC: 116 MG/DL (ref 70–108)
HCT VFR BLD CALC: 44 % (ref 42–52)
HEMOGLOBIN: 15 GM/DL (ref 14–18)
IMMATURE GRANS (ABS): 0.01 THOU/MM3 (ref 0–0.07)
IMMATURE GRANULOCYTES: 0.2 %
LIPASE: 59.6 U/L (ref 5.6–51.3)
LYMPHOCYTES # BLD: 14.9 %
LYMPHOCYTES ABSOLUTE: 0.7 THOU/MM3 (ref 1–4.8)
MCH RBC QN AUTO: 31.6 PG (ref 26–33)
MCHC RBC AUTO-ENTMCNC: 34.1 GM/DL (ref 32.2–35.5)
MCV RBC AUTO: 92.6 FL (ref 80–94)
MONOCYTES # BLD: 15.6 %
MONOCYTES ABSOLUTE: 0.7 THOU/MM3 (ref 0.4–1.3)
NUCLEATED RED BLOOD CELLS: 0 /100 WBC
OSMOLALITY CALCULATION: 276.6 MOSMOL/KG (ref 275–300)
PLATELET # BLD: 189 THOU/MM3 (ref 130–400)
PMV BLD AUTO: 9.6 FL (ref 9.4–12.4)
POTASSIUM REFLEX MAGNESIUM: 3.9 MEQ/L (ref 3.5–5.2)
PROCALCITONIN: 0.12 NG/ML (ref 0.01–0.09)
RBC # BLD: 4.75 MILL/MM3 (ref 4.7–6.1)
SCAN OF BLOOD SMEAR: NORMAL
SEG NEUTROPHILS: 69.1 %
SEGMENTED NEUTROPHILS ABSOLUTE COUNT: 3.2 THOU/MM3 (ref 1.8–7.7)
SODIUM BLD-SCNC: 136 MEQ/L (ref 135–145)
TOTAL PROTEIN: 7 G/DL (ref 6.1–8)
TROPONIN T: 0.01 NG/ML
WBC # BLD: 4.6 THOU/MM3 (ref 4.8–10.8)

## 2021-12-04 PROCEDURE — 93005 ELECTROCARDIOGRAM TRACING: CPT | Performed by: EMERGENCY MEDICINE

## 2021-12-04 PROCEDURE — 84484 ASSAY OF TROPONIN QUANT: CPT

## 2021-12-04 PROCEDURE — 83605 ASSAY OF LACTIC ACID: CPT

## 2021-12-04 PROCEDURE — 2500000003 HC RX 250 WO HCPCS: Performed by: STUDENT IN AN ORGANIZED HEALTH CARE EDUCATION/TRAINING PROGRAM

## 2021-12-04 PROCEDURE — 86140 C-REACTIVE PROTEIN: CPT

## 2021-12-04 PROCEDURE — 80053 COMPREHEN METABOLIC PANEL: CPT

## 2021-12-04 PROCEDURE — 83735 ASSAY OF MAGNESIUM: CPT

## 2021-12-04 PROCEDURE — 87040 BLOOD CULTURE FOR BACTERIA: CPT

## 2021-12-04 PROCEDURE — 85025 COMPLETE CBC W/AUTO DIFF WBC: CPT

## 2021-12-04 PROCEDURE — 2580000003 HC RX 258: Performed by: STUDENT IN AN ORGANIZED HEALTH CARE EDUCATION/TRAINING PROGRAM

## 2021-12-04 PROCEDURE — 83690 ASSAY OF LIPASE: CPT

## 2021-12-04 PROCEDURE — 84145 PROCALCITONIN (PCT): CPT

## 2021-12-04 PROCEDURE — 71045 X-RAY EXAM CHEST 1 VIEW: CPT

## 2021-12-04 PROCEDURE — 99283 EMERGENCY DEPT VISIT LOW MDM: CPT

## 2021-12-04 PROCEDURE — 36415 COLL VENOUS BLD VENIPUNCTURE: CPT

## 2021-12-04 PROCEDURE — 96365 THER/PROPH/DIAG IV INF INIT: CPT

## 2021-12-04 PROCEDURE — 6360000002 HC RX W HCPCS: Performed by: STUDENT IN AN ORGANIZED HEALTH CARE EDUCATION/TRAINING PROGRAM

## 2021-12-04 RX ORDER — ZINC GLUCONATE 50 MG
50 TABLET ORAL EVERY 12 HOURS
COMMUNITY
End: 2022-01-19

## 2021-12-04 RX ORDER — MULTIVIT WITH MINERALS/LUTEIN
250 TABLET ORAL EVERY 12 HOURS
COMMUNITY
End: 2022-01-19

## 2021-12-04 RX ORDER — ASPIRIN 81 MG/1
324 TABLET, CHEWABLE ORAL ONCE
Status: COMPLETED | OUTPATIENT
Start: 2021-12-04 | End: 2021-12-05

## 2021-12-04 RX ORDER — DEXAMETHASONE SODIUM PHOSPHATE 4 MG/ML
6 INJECTION, SOLUTION INTRA-ARTICULAR; INTRALESIONAL; INTRAMUSCULAR; INTRAVENOUS; SOFT TISSUE ONCE
Status: COMPLETED | OUTPATIENT
Start: 2021-12-04 | End: 2021-12-05

## 2021-12-04 RX ADMIN — SODIUM CHLORIDE: 9 INJECTION, SOLUTION INTRAVENOUS at 23:50

## 2021-12-04 NOTE — TELEPHONE ENCOUNTER
Patient follows with Dr. Idania Cali for WEE protocol; however, Dr. Gemini Gomez is PCP. Daughter called concerned for patient. Patient tested positive for COVID last week. Spoke to patient and he states he feels short of breath and is having chest pain with deep cough. Daughter concerned \"he doesn't look good. \" Pulse ox has been 93-94%. Patient has been using albuterol nebulizer PRN to help with breathing. Dr. Gemini Gomez ordered Regeneron treatment for Monday. Given patient's symptoms and daughter's concern, recommend patient go to the ED immediately to be evaluated. Recommend patient follow-up with PCP's otherwise since he is established with Dr. Gemini Gomez.     Electronically signed by Bartolo Roberson MD on 12/4/2021 at 4:58 PM

## 2021-12-04 NOTE — Clinical Note
Patient Class: Observation [104]   REQUIRED: Diagnosis: COVID-19 [[id number]]   Estimated Length of Stay: Estimated stay of less than 2 midnights   Telemetry/Cardiac Monitoring Required?: Yes

## 2021-12-05 VITALS
BODY MASS INDEX: 22.73 KG/M2 | HEIGHT: 68 IN | SYSTOLIC BLOOD PRESSURE: 138 MMHG | HEART RATE: 70 BPM | OXYGEN SATURATION: 95 % | TEMPERATURE: 97.9 F | WEIGHT: 150 LBS | RESPIRATION RATE: 18 BRPM | DIASTOLIC BLOOD PRESSURE: 74 MMHG

## 2021-12-05 LAB
C-REACTIVE PROTEIN: 5.04 MG/DL (ref 0–1)
EKG ATRIAL RATE: 83 BPM
EKG P AXIS: 64 DEGREES
EKG P-R INTERVAL: 146 MS
EKG Q-T INTERVAL: 334 MS
EKG QRS DURATION: 72 MS
EKG QTC CALCULATION (BAZETT): 392 MS
EKG R AXIS: 8 DEGREES
EKG T AXIS: 74 DEGREES
EKG VENTRICULAR RATE: 83 BPM
FLU A ANTIGEN: NEGATIVE
FLU B ANTIGEN: NEGATIVE
LACTIC ACID, SEPSIS: 0.9 MMOL/L (ref 0.5–1.9)
MAGNESIUM: 1.7 MG/DL (ref 1.6–2.4)

## 2021-12-05 PROCEDURE — 6360000002 HC RX W HCPCS: Performed by: STUDENT IN AN ORGANIZED HEALTH CARE EDUCATION/TRAINING PROGRAM

## 2021-12-05 PROCEDURE — 96367 TX/PROPH/DG ADDL SEQ IV INF: CPT

## 2021-12-05 PROCEDURE — 96375 TX/PRO/DX INJ NEW DRUG ADDON: CPT

## 2021-12-05 PROCEDURE — 87804 INFLUENZA ASSAY W/OPTIC: CPT

## 2021-12-05 PROCEDURE — 2580000003 HC RX 258: Performed by: STUDENT IN AN ORGANIZED HEALTH CARE EDUCATION/TRAINING PROGRAM

## 2021-12-05 PROCEDURE — 93010 ELECTROCARDIOGRAM REPORT: CPT | Performed by: INTERNAL MEDICINE

## 2021-12-05 PROCEDURE — G0378 HOSPITAL OBSERVATION PER HR: HCPCS

## 2021-12-05 PROCEDURE — 6370000000 HC RX 637 (ALT 250 FOR IP): Performed by: STUDENT IN AN ORGANIZED HEALTH CARE EDUCATION/TRAINING PROGRAM

## 2021-12-05 RX ORDER — ONDANSETRON 2 MG/ML
4 INJECTION INTRAMUSCULAR; INTRAVENOUS EVERY 6 HOURS PRN
Status: DISCONTINUED | OUTPATIENT
Start: 2021-12-05 | End: 2021-12-05 | Stop reason: HOSPADM

## 2021-12-05 RX ORDER — SODIUM CHLORIDE 0.9 % (FLUSH) 0.9 %
5-40 SYRINGE (ML) INJECTION PRN
Status: DISCONTINUED | OUTPATIENT
Start: 2021-12-05 | End: 2021-12-05 | Stop reason: HOSPADM

## 2021-12-05 RX ORDER — ONDANSETRON 4 MG/1
4 TABLET, ORALLY DISINTEGRATING ORAL EVERY 8 HOURS PRN
Status: DISCONTINUED | OUTPATIENT
Start: 2021-12-05 | End: 2021-12-05 | Stop reason: HOSPADM

## 2021-12-05 RX ORDER — POLYETHYLENE GLYCOL 3350 17 G/17G
17 POWDER, FOR SOLUTION ORAL DAILY PRN
Status: DISCONTINUED | OUTPATIENT
Start: 2021-12-05 | End: 2021-12-05 | Stop reason: HOSPADM

## 2021-12-05 RX ORDER — DEXAMETHASONE 6 MG/1
6 TABLET ORAL
Qty: 4 TABLET | Refills: 0 | Status: SHIPPED | OUTPATIENT
Start: 2021-12-06 | End: 2021-12-10

## 2021-12-05 RX ORDER — 0.9 % SODIUM CHLORIDE 0.9 %
1000 INTRAVENOUS SOLUTION INTRAVENOUS ONCE
Status: COMPLETED | OUTPATIENT
Start: 2021-12-05 | End: 2021-12-05

## 2021-12-05 RX ORDER — ACETAMINOPHEN 325 MG/1
650 TABLET ORAL EVERY 6 HOURS PRN
Status: DISCONTINUED | OUTPATIENT
Start: 2021-12-05 | End: 2021-12-05 | Stop reason: HOSPADM

## 2021-12-05 RX ORDER — ACETAMINOPHEN 160 MG
10 TABLET,DISINTEGRATING ORAL
Status: DISCONTINUED | OUTPATIENT
Start: 2021-12-05 | End: 2021-12-05 | Stop reason: HOSPADM

## 2021-12-05 RX ORDER — SODIUM CHLORIDE 9 MG/ML
25 INJECTION, SOLUTION INTRAVENOUS PRN
Status: DISCONTINUED | OUTPATIENT
Start: 2021-12-05 | End: 2021-12-05 | Stop reason: HOSPADM

## 2021-12-05 RX ORDER — ACETAMINOPHEN 650 MG/1
650 SUPPOSITORY RECTAL EVERY 6 HOURS PRN
Status: DISCONTINUED | OUTPATIENT
Start: 2021-12-05 | End: 2021-12-05 | Stop reason: HOSPADM

## 2021-12-05 RX ORDER — NITROGLYCERIN 0.4 MG/1
0.4 TABLET SUBLINGUAL EVERY 5 MIN PRN
Status: DISCONTINUED | OUTPATIENT
Start: 2021-12-05 | End: 2021-12-05 | Stop reason: HOSPADM

## 2021-12-05 RX ORDER — SODIUM CHLORIDE 0.9 % (FLUSH) 0.9 %
5-40 SYRINGE (ML) INJECTION EVERY 12 HOURS SCHEDULED
Status: DISCONTINUED | OUTPATIENT
Start: 2021-12-05 | End: 2021-12-05 | Stop reason: HOSPADM

## 2021-12-05 RX ADMIN — DEXAMETHASONE SODIUM PHOSPHATE 6 MG: 4 INJECTION, SOLUTION INTRA-ARTICULAR; INTRALESIONAL; INTRAMUSCULAR; INTRAVENOUS; SOFT TISSUE at 03:34

## 2021-12-05 RX ADMIN — ASPIRIN 81 MG CHEWABLE TABLET 324 MG: 81 TABLET CHEWABLE at 00:22

## 2021-12-05 RX ADMIN — CEFTRIAXONE SODIUM 1000 MG: 1 INJECTION, POWDER, FOR SOLUTION INTRAMUSCULAR; INTRAVENOUS at 03:39

## 2021-12-05 RX ADMIN — AZITHROMYCIN DIHYDRATE 500 MG: 500 INJECTION, POWDER, LYOPHILIZED, FOR SOLUTION INTRAVENOUS at 04:12

## 2021-12-05 RX ADMIN — SODIUM CHLORIDE 1000 ML: 9 INJECTION, SOLUTION INTRAVENOUS at 03:37

## 2021-12-05 ASSESSMENT — ENCOUNTER SYMPTOMS
BACK PAIN: 0
SORE THROAT: 0
DIARRHEA: 0
SINUS PAIN: 0
ABDOMINAL PAIN: 1
RHINORRHEA: 0
NAUSEA: 0
COUGH: 1
VOMITING: 0
EYE REDNESS: 0
SHORTNESS OF BREATH: 1

## 2021-12-05 NOTE — ED NOTES
Discharge instructions reviewed, scripts discussed, follow-up discussed. Pt verbalized understanding.         Cristóbal Patton RN  12/05/21 4202

## 2021-12-05 NOTE — ED NOTES
Pt ambulated in soto with pulse ox, maintained O2 sat of 95% or greater.      Shawna Ramirez RN  12/05/21 2427

## 2021-12-05 NOTE — ED PROVIDER NOTES
[facility] [address] [address] EMERGENCY DEPT  Faculty Attestation    I performed a history and physical examination of the patient and discussed management with the resident. I reviewed the residents note and agree with the documented findings and plan of care. Any areas of disagreement are noted on the chart. I was personally present for the key portions of any procedures. I have documented in the chart those procedures where I was not present during the key portions. I have reviewed the emergency nurses triage note. I agree with the chief complaint, past medical history, past surgical history, allergies, medications, social, and family history as documented unless otherwise noted below.        This is a 57-year-old male who presents to the emergency department requesting a COVID-19 monoclonal antibody infusion  Patient states that he tested positive for COVID-19 around 3 to 4 days ago  He was set up for the infusion on Monday, in 2 days  Today after using an albuterol treatment he had more coughing than normal  When coughing he noted some upper abdominal discomfort  No abdominal pain without coughing  No nausea or vomiting  No genitourinary or stool complaints  No primary chest pain  No palpitations or near syncope  No shortness of breath or wheezing  Daughter indicates that they contacted his primary care provider who told him to come to the emergency department for the infusion instead  Review of systems otherwise negative  They have no additional concerns at this time    On examination he appears in no acute distress  Normal vital signs  Normal work of breathing  Lungs clear  Heart is regular in rate and rhythm  Abdomen soft  Vague, mild upper abdominal discomfort  Negative Encarnacion's sign  No rebound tenderness or guarding  Normal bowel sounds  Chest wall is nontender  No peripheral or pitting edema    Skin is warm and dry  Normal peripheral perfusion and skin turgor    EKG at 2103 shows a sinus rhythm with rate of 83 bpm. Intervals within normal limits. Indeterminate axis. Poor R wave progression. There is baseline motion artifact and wandering but no consistent ST segment changes concerning for acute infarction. No arrhythmia. Labs and imaging reviewed    XR CHEST PORTABLE   Final Result   Left basilar opacities which may represent infiltrate or atelectasis. **This report has been created using voice recognition software. It may contain minor errors which are inherent in voice recognition technology. **      Final report electronically signed by Dr Nicole Jaramillo on 12/4/2021 10:22 PM        Patient has an elevated troponin  While he does have a history of chronic kidney disease we do not have recent troponins for comparison  Patient appears to have pneumonia which appears more lobar than generalized  We will start antibiotics and plan for admission      Mega Rosario DO  Attending Emergency Physician        Luh Casas DO  12/04/21 2178

## 2021-12-05 NOTE — ED NOTES
Pt ambulated to restroom with no difficulties. Pt oxygen did drop down to 91% on room air. Pt returned to bed. Hospitalist at bedside.       Garrett Das RN  12/05/21 1169

## 2021-12-05 NOTE — ED NOTES
Pt updated on POC at this time. Daughter at bedside, pt respers are regular on room air.       Geeta Randall RN  12/04/21 4267

## 2021-12-05 NOTE — ED NOTES
Pt ambulated to restroom with no difficulties. Respirations even and unlabored.       Naga Almeida RN  12/05/21 9441

## 2021-12-05 NOTE — ED NOTES
ED nurse-to-nurse bedside report    Chief Complaint   Patient presents with    Chest Pain    Nasal Congestion    Cough      LOC: alert and orientated to name, place, date  Vital signs   Vitals:    12/05/21 0112 12/05/21 0527 12/05/21 0727 12/05/21 0827   BP: (!) 128/114 (!) 152/74 128/70 138/74   Pulse: 84 79 79 70   Resp: 18 20 28 18   Temp:  97.9 °F (36.6 °C)     TempSrc:  Oral     SpO2: 97% 95% 94% 95%   Weight:       Height:          Pain:    Pain Interventions:   Pain Goal:   Oxygen: No    Current needs required RA   Telemetry: Yes  LDAs:   Peripheral IV 12/04/21 Left Antecubital (Active)   Site Assessment Clean; Dry; Intact 12/05/21 0527   Line Status Normal saline locked 12/05/21 0527   Dressing Status Clean; Dry; Intact 12/05/21 0527   Dressing Intervention New 12/04/21 2131     Continuous Infusions:    sodium chloride       Mobility: Requires assistance * 1  [place] Fall Risk Score:    Fall Risk 10/6/2021 8/24/2020 8/22/2019 8/29/2018 2/28/2017 8/29/2016 5/19/2016   2 or more falls in past year? no no no yes no no no   Fall with injury in past year? no no no yes no no no     Fall Interventions: Side rails up x2, call light in reach  Report given to: Mary Tafoya, [address] [place] [address], RN  12/05/21 0956

## 2021-12-05 NOTE — H&P
Hospitalist - History & Physical      Patient: Cassi Stable    Unit/Bed:02/002A  YOB: 1945  MRN: [id number]   Acct: [de-identified]   PCP: Meggan Tamez DO    Date of Service: Pt seen/examined on 12/05/21  and Admitted to Observation with expected LOS less than two midnights due to medical therapy. Chief Complaint: chest pain/cough    Assessment and Plan:  COVID pneumonia: mild disease. No hypoxia. Unvaccinated. Lifetime nonsmoker. CXR with no significant acute findings. Procal 0. 12. Afebrile. No leukocytosis. Given 1x Rocephin/Zithromax in the ED. Will give 1 L IVNS bolus. No indications for steroid/antibiotics therapy. Monitor overnight. Check Flu A/B Ag.     Elevated Troponin: likely from demand ischemia in the setting of COVID pneumonia. No chest pain concerning for ACS. Pain is present only when coughing, epigastric. No other aggravating/alleviating factors. Asymptomatic at this time. Of note, I witnessed patient walking down the hallway to use restroom with no symptoms of dyspnea/chest pain or signs of hypoxia. Hx PE/DVT: unclear provoked or unprovoked. On Eliquis 2.5 mg BID. CKD 3b: eGFR 34, Cr 1.9. At baseline. Follows with Dr. Debi Jaramillo. Hx prostate/skin cancer/arthritis noted. Code Status: FULL    Tele:   [] yes              [x] no    Diet: regular    DVT prophylaxis: [] Lovenox                                 [] SCDs                                 [] SQ Heparin                                 [] Encourage ambulation                                 [x] Already on Anticoagulation      Disposition:      [x] Home- discharge in AM if no changes.                              [] TCU                             [] Rehab                             [] Psych                             [] SNF                             [] [place]                             [] Other-      History Of Present Illness:    63 yo M with history of PE (05/2016)/DVT, TIA, prostate cancer, CKD, recently positive home test for COVID 4 days ago presented with chief complaints of cough with associated chest pain and weakness. ED course:  Afebrile and hemodynamically stable, saturating well on RA. Labs generally unremarkable except Troponin 0.015. WBC 4.6. Procal 0.12. CXR ? left basilar opacities. EKG NSR. Given aspirin 324 mg, Rocephin/Zithromax/Decadron 6 mg Iv x1 dose. On evaluation, patient reports symptoms of dry cough with generalized weakness started about a week ago with other family members also being sick. Tested positive for COVID on home test. Persistent weakness and cough and PCP's recommendation prompted his visit to the ED today. Reports Tmax 100.4 a couple of days ago. Has pleuritic epigastric/chest pain in the lower chest area only when coughing. No significant sputum production. With reduced PO intake. Has no dyspnea at rest or with exertion, chest pain at rest/exertion, nausea, vomiting, diarrhea, loss of taste/smell. Unvaccinated for COVID and has not received Flu shots. States he feels slightly better already.        Past Medical History:        Diagnosis Date    Arthritis     neck, Wrists , back    Cancer ([address]     skin (removed)    Cerebral artery occlusion with cerebral infarction (HCC)     TIA    DVT (deep venous thrombosis) (HCC)     GERD (gastroesophageal reflux disease)     Hx of blood clots     PE    Hypertension     Kidney disease     Prostate cancer ([address]     Pulmonary emboli ([address] 5/7/16    Scoliosis     TIA (transient ischemic attack)        Past Surgical History:      Procedure Laterality Date    ARM SURGERY Right 12/18/2020    EXCISION FIBROXANTHOMA RIGHT FOREARM WITH SKIN GRAFT performed by Dennis Kumari MD at [place] [address] Left 02/2021    [facility]  2019    [facility]    EGD  [address]    [address] / BIOPSY SKIN LESION OF ARM Left 10/11/2017    EXCISION 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 WITH FLAP AND FROZEN SECTION performed by Brandee Awan MD at [place] [address] Left 02/2021    left thumb     OTHER SURGICAL HISTORY Left 10/11/2017    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    PROSTATE BIOPSY      SKIN CANCER EXCISION  2010    Face skin cancer removal       Home Medications:   No current facility-administered medications on file prior to encounter. Current Outpatient Medications on File Prior to Encounter   Medication Sig Dispense Refill    zinc gluconate 50 MG tablet Take 50 mg by mouth every 12 hours      Ascorbic Acid (VITAMIN C) 250 MG tablet Take 250 mg by mouth every 12 hours      calcium carbonate 600 MG TABS tablet Take 1 tablet by mouth 2 times daily as needed      Omega-3 Fatty Acids ([employer] FISH OIL PO) Take 1 tablet by mouth 4 times daily       apixaban (ELIQUIS) 2.5 MG TABS tablet Take 1 tablet by mouth 2 times daily 180 tablet 1    Chromium-Cinnamon (CINNAMON PLUS CHROMIUM PO) Take 1 capsule by mouth 4 times daily (before meals and nightly) [facility] units per capsule      B Complex-Biotin-FA (B COMPLEX 100 TR PO) Take 1 tablet by mouth 3 times daily (before meals) [address] [facility] at [address] (VITAMIN D3) 50 MCG (2000 UT) CAPS Take 10 capsules by mouth every 14 days One daily but double Mon Wed Fridays      magnesium cl-calcium carbonate (SLOW-MAG) 71.5-119 MG TBEC tablet Take by mouth 4 times daily (after meals and at bedtime) 2 tabs threes times daily and 1 tab nightly      Levomefolate Glucosamine (METHYLFOLATE PO) Take 15 mg by mouth every morning (before breakfast) Metabolic Maintenance at [employer] or [employer]. [employer]       Methylcobalamin 5000 MCG SUBL Place 1 tablet under the tongue nightly Vera at [employer]      mupirocin (BACTROBAN) 2 % ointment Apply topically Indications: Skin Abnormalities As needed for dermatologist appointments  1 Allergies:    Morphine, Bactrim [sulfamethoxazole-trimethoprim], Nsaids, Sulfamethoxazole, and Trimethoprim    Social History:    reports that he has never smoked. He has never used smokeless tobacco. He reports current alcohol use. He reports that he does not use drugs. Family History:       Problem Relation Age of Onset    Cancer Mother     High Blood Pressure Mother     Alzheimer's Disease Mother     Heart Disease Mother     Parkinsonism Father     Diabetes Neg Hx     Kidney Disease Neg Hx     Stroke Neg Hx     Colon Cancer Neg Hx     Esophageal Cancer Neg Hx     Rectal Cancer Neg Hx     Stomach Cancer Neg Hx        Diet:  Diet NPO    Review of systems:   Pertinent positives as noted in the HPI. All other systems reviewed and negative. PHYSICAL EXAM:  BP (!) 155/77   Pulse 82   Temp 98.9 °F (37.2 °C)   Resp 23   Ht 5' 8\" (1.727 m)   Wt 150 lb (68 kg)   SpO2 95%   BMI 22.81 kg/m²   General appearance: No apparent distress, appears stated age and cooperative. HEENT: Normal cephalic, atraumatic without obvious deformity. Extra ocular muscles intact. Conjunctivae/corneas clear. Neck: Supple, with full range of motion. No jugular venous distention. Respiratory:  Normal respiratory effort. Clear to auscultation, bilaterally without Rales/Wheezes/Rhonchi. Cardiovascular: Regular rate and rhythm with normal S1/S2 without murmurs, rubs or gallops. Abdomen: Soft, non-tender, non-distended. Musculoskeletal:  No clubbing, cyanosis or edema bilaterally. Skin: Skin color, texture, turgor normal.  No rashes or lesions. Neurologic:  Neurovascularly intact without any focal sensory/motor deficits.  Cranial nerves: II-XII intact, grossly non-focal.  Psychiatric: Alert and oriented, thought content appropriate, normal insight  Capillary Refill: Brisk,< 3 seconds   Peripheral Pulses: +2 palpable, equal bilaterally     Labs:   Recent Labs     12/04/21  2145   WBC 4.6*   HGB 15.0   HCT 44.0   PLT 189     Recent Labs     12/04/21  2145      K 3.9   CL 98   CO2 23   BUN 23*   CREATININE 1.9*   CALCIUM 8.7     Recent Labs     12/04/21  2145   AST 44*   ALT 27   BILITOT 0.4   ALKPHOS 50     No results for input(s): INR in the last 72 hours. No results for input(s): [place] Pears in the last 72 hours. Urinalysis:    Lab Results   Component Value Date    NITRU Negative 12/10/2019    BLOODU Trace-lysed 12/10/2019    GLUCOSEU Negative 12/10/2019       Radiology:   XR CHEST PORTABLE   Final Result   Left basilar opacities which may represent infiltrate or atelectasis. **This report has been created using voice recognition software. It may contain minor errors which are inherent in voice recognition technology. **      Final report electronically signed by Dr Max Kline on 12/4/2021 10:22 PM        XR CHEST PORTABLE    Result Date: 12/4/2021  PROCEDURE: XR CHEST PORTABLE CLINICAL INFORMATION: 70-year-old male with chest pain, cough. COMPARISON: Radiograph 11/27/2018. TECHNIQUE: AP upright view of the chest was obtained. FINDINGS: There are opacities near the left lung base which may be related to infiltrate or atelectasis. The cardiac silhouette and pulmonary vasculature are within normal limits. There is some atherosclerosis in the aortic arch. There is no significant pleural effusion or pneumothorax. Visualized portions of the upper abdomen are within normal limits. The osseous structures are intact. No acute fractures or suspicious osseous lesions. Left basilar opacities which may represent infiltrate or atelectasis. **This report has been created using voice recognition software. It may contain minor errors which are inherent in voice recognition technology. ** Final report electronically signed by Dr Max Kline on 12/4/2021 10:22 PM            Electronically signed by   Monica Salinas MD   PGY2, Internal Medicine  12/5/2021

## 2021-12-05 NOTE — ED TRIAGE NOTES
Presents to [facility] with concerns of CP, cough, and congestion. Pt reports he was told to come to [facility] from [facility] for infusion. Pt reports he has appt Monday. States he was hoping he didn't have to wait 2 more days.  Will monitor

## 2021-12-05 NOTE — ED PROVIDER NOTES
[address] [address]          Pt Name: Lizett Donnelly  MRN: [id number]  [place] 1945  Date of evaluation: 12/4/2021  Treating Resident Physician: Eugenie Knight MD  Supervising Physician: Dr yun     [address] [place] [address]       Chief Complaint   Patient presents with    Chest Pain    Nasal Congestion    Cough     History obtained from the patient. HISTORY OF PRESENT ILLNESS    HPI  Lizett Donnelly is a 64 y.o. male with past medical history of pulmonary emboli, DVT, skin cancer, hypertension, prostate cancer who presents to the emergency department for evaluation of worsening shortness of breath despite exertion and productive cough for last couple days as well as some fevers. Patient is positive for COVID-19 tested 4 days ago. Is unvaccinated. Patient is on Eliquis 2.5 mg twice daily. Has a history of prone embolisms and DVTs as well as prostate cancer in the past.    Denies any nausea vomiting diarrhea. . Does mention having some epigastric/chest pain this been waxing waning the last couple days. That is intermittently exertional.    The patient has no other acute complaints at this time. REVIEW OF SYSTEMS   Review of Systems   Constitutional: Positive for chills, fatigue and fever. HENT: Positive for congestion. Negative for rhinorrhea, sinus pain and sore throat. Eyes: Negative for redness. Respiratory: Positive for cough and shortness of breath. Cardiovascular: Positive for chest pain. Gastrointestinal: Positive for abdominal pain. Negative for diarrhea, nausea and vomiting. Genitourinary: Negative for dysuria. Musculoskeletal: Positive for myalgias. Negative for back pain. Skin: Negative for rash. Neurological: Negative for light-headedness and headaches. Psychiatric/Behavioral: Negative for agitation.          PAST MEDICAL AND SURGICAL HISTORY     Past Medical History:   Diagnosis Date    Arthritis neck, Wrists , back    Cancer ([facility]     skin (removed)    Cerebral artery occlusion with cerebral infarction ([facility]     TIA    DVT (deep venous thrombosis) (HCC)     GERD (gastroesophageal reflux disease)     Hx of blood clots     PE    Hypertension     Kidney disease     Prostate cancer ([facility]     Pulmonary emboli ([facility] 5/7/16    Scoliosis     TIA (transient ischemic attack)      Past Surgical History:   Procedure Laterality Date    ARM SURGERY Right 12/18/2020    EXCISION FIBROXANTHOMA RIGHT FOREARM WITH SKIN GRAFT performed by [facility] at [facility] Left 02/2021    COLONOSCOPY  2019    [facility]    EGD  2019    [address] / BIOPSY SKIN LESION OF ARM Left 10/11/2017    EXCISION 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 WITH FLAP AND FROZEN SECTION performed by Riley Longoria MD at [address] [address] Left 02/2021    left thumb     OTHER SURGICAL HISTORY Left 10/11/2017    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    PROSTATE BIOPSY      SKIN CANCER EXCISION  2010    Face skin cancer removal         MEDICATIONS     Current Facility-Administered Medications:     nitroGLYCERIN (NITROSTAT) SL tablet 0.4 mg, 0.4 mg, SubLINGual, Q5 Min PRN, Meghna Talley MD    bamlanivimab 700 mg, etesevimab 1,400 mg in sodium chloride 0.9 % 160 mL IVPB, , IntraVENous, Once, Meghna Talley MD, Last Rate: 309.7 mL/hr at 12/04/21 2350, New Bag at 12/04/21 2350    aspirin chewable tablet 324 mg, 324 mg, Oral, Once, Meghna Talley MD    cefTRIAXone (ROCEPHIN) 1000 mg IVPB in 50 mL D5W minibag, 1,000 mg, IntraVENous, Once, Meghna Talley MD    azithromycin (ZITHROMAX) 500 mg in D5W 250ml addavial, 500 mg, IntraVENous, Once, Meghna Talley MD    dexamethasone (DECADRON) injection 6 mg, 6 mg, IntraVENous, Once, Meghna Talley MD    Current Outpatient heart sounds. Pulmonary:      Effort: Pulmonary effort is normal. No respiratory distress. Breath sounds: Rhonchi present. Comments: Diminished breath sounds bilaterally, some rhonchi noted in bilateral lower lobes. Abdominal:      General: Abdomen is flat. There is no distension. Palpations: Abdomen is soft. Tenderness: There is abdominal tenderness. There is no guarding or rebound. Comments: Epigastric tenderness palpation. Musculoskeletal:         General: Normal range of motion. Cervical back: Normal range of motion and neck supple. No rigidity. No muscular tenderness. Lymphadenopathy:      Cervical: No cervical adenopathy. Skin:     General: Skin is warm and dry. Capillary Refill: Capillary refill takes less than 2 seconds. Coloration: Skin is not jaundiced. Neurological:      General: No focal deficit present. Mental Status: He is alert and oriented to person, place, and time. Psychiatric:         Mood and Affect: Mood normal.         Behavior: Behavior normal.           MEDICAL DECISION MAKING   Initial Assessment: This is a 43-year-old male unvaccinated COVID-19 he was positive for COVID-19  5 days ago presents with worsening productive cough for last couple as well as a fever. Also complains of epigastric/chest pain that is been present for last couple days. Differential Diagnosis Included but not limited to: ACS, superimposed bacterial pneumonia, costochondritis, myalgias, musculoskeletal, pancreatitis    MDM:   Patient has an elevated troponin, he also has signs of infiltrate as well as an elevated procalcitonin which is concerning for possible superimposed back to pneumonia and will be given some antibiotics azithromycin Rocephin for coverage as well as Decadron 6 mg. He remains on room air with normal saturations. He'll also be receiving a Regeneron infusion in the emergency department.  He was given aspirin as well as nitroglycerin the hospitalist was contacted for admission. EKG shows no signs of infarction at this time. ED RESULTS   Laboratory results:  Labs Reviewed   COMPREHENSIVE METABOLIC PANEL W/ REFLEX TO MG FOR LOW K - Abnormal; Notable for the following components:       Result Value    Glucose 116 (*)     CREATININE 1.9 (*)     BUN 23 (*)     AST 44 (*)     All other components within normal limits   CBC WITH AUTO DIFFERENTIAL - Abnormal; Notable for the following components:    WBC 4.6 (*)     Lymphocytes Absolute 0.7 (*)     All other components within normal limits   LIPASE - Abnormal; Notable for the following components:    Lipase 59.6 (*)     All other components within normal limits   TROPONIN - Abnormal; Notable for the following components:    Troponin T 0.015 (*)     All other components within normal limits   PROCALCITONIN - Abnormal; Notable for the following components:    Procalcitonin 0.12 (*)     All other components within normal limits   GLOMERULAR FILTRATION RATE, ESTIMATED - Abnormal; Notable for the following components:    Est, Glom Filt Rate 34 (*)     All other components within normal limits   CULTURE, BLOOD 1   CULTURE, BLOOD 2   ANION GAP   OSMOLALITY   SCAN OF BLOOD SMEAR   LACTATE, SEPSIS   LACTATE, SEPSIS       Radiologic studies results:  XR CHEST PORTABLE   Final Result   Left basilar opacities which may represent infiltrate or atelectasis. **This report has been created using voice recognition software. It may contain minor errors which are inherent in voice recognition technology. **      Final report electronically signed by Dr Jaimee Mejia on 12/4/2021 10:22 PM          ED Medications administered this visit:   Medications   bamlanivimab 700 mg, etesevimab 1,400 mg in sodium chloride 0.9 % 160 mL IVPB ( IntraVENous New Bag 12/4/21 4960)   aspirin chewable tablet 324 mg (has no administration in time range)   cefTRIAXone (ROCEPHIN) 1000 mg IVPB in 50 mL D5W minibag (has no administration in time range)   azithromycin (ZITHROMAX) 500 mg in D5W 250ml addavial (has no administration in time range)   dexamethasone (DECADRON) injection 6 mg (has no administration in time range)   nitroGLYCERIN (NITROSTAT) SL tablet 0.4 mg (has no administration in time range)         ED COURSE     ED Course as of 12/05/21 0006   Sat Dec 04, 2021   2255 WBC(!): 4.6 [AL]   2255 Hemoglobin Quant: 15.0 [AL]   2255 Hematocrit: 44.0 [AL]   2255 Platelet Count: 740 [AL]   2255 Creatinine(!): 1.9  This is typical for patient's baseline [AL]   2255 AST(!): 44 [AL]   2255 Lipase(!): 59.6 [AL]   2255 Troponin T(!): 0.015 [AL]   2256 XR CHEST PORTABLE  \"IMPRESSION:  Left basilar opacities which may represent infiltrate or atelectasis.   \" [AL]   4181 Patient was updated on his laboratory studies as well as his imaging studies and the need for admission. He will be given a dose of antibiotics. It is elevated procalcitonin and leukopenia and infiltrate on his x-ray as well as his recent symptoms of increasing productive cough that is different from his baseline COVID-19 symptoms that is been having. [AL]      ED Course User Index  [AL] Wendy Kimbrough MD       MEDICATION CHANGES     New Prescriptions    No medications on file         FINAL DISPOSITION     Final diagnoses:   COVID-19   Pneumonia due to infectious organism, unspecified laterality, unspecified part of lung   Elevated troponin   Chest pain, unspecified type     Condition: condition: stable  Dispo: Admit to telemetry      This transcription was electronically signed. Parts of this transcriptions may have been dictated by use of voice recognition software and electronically transcribed, and parts may have been transcribed with the assistance of an ED scribe. The transcription may contain errors not detected in proofreading. Please refer to my supervising physician's documentation if my documentation differs.     Electronically Signed: Darlyn Navarro Carmine Mendoza MD, 12/05/21, 12:06 AM         Trice Ennis MD  Resident  12/05/21 7903

## 2021-12-06 ENCOUNTER — TELEPHONE (OUTPATIENT)
Dept: FAMILY MEDICINE CLINIC | Age: 76
End: 2021-12-06

## 2021-12-06 ENCOUNTER — CARE COORDINATION (OUTPATIENT)
Dept: CARE COORDINATION | Age: 76
End: 2021-12-06

## 2021-12-06 ENCOUNTER — HOSPITAL ENCOUNTER (OUTPATIENT)
Dept: NURSING | Age: 76
End: 2021-12-06

## 2021-12-06 RX ORDER — BENZONATATE 100 MG/1
100-200 CAPSULE ORAL 3 TIMES DAILY PRN
Qty: 30 CAPSULE | Refills: 0 | Status: SHIPPED | OUTPATIENT
Start: 2021-12-06 | End: 2022-01-19

## 2021-12-06 NOTE — CARE COORDINATION
Attempted to reach Maude Michelle today for ED f/u [facility] outreach. No answer. Message left to return call.

## 2021-12-06 NOTE — CARE COORDINATION
Attempted to reach [phone] today for ED f/u [facility] outreach. No answer. Message left to return call.

## 2021-12-07 ENCOUNTER — CARE COORDINATION (OUTPATIENT)
Dept: CARE COORDINATION | Age: 76
End: 2021-12-07

## 2021-12-07 ENCOUNTER — TELEPHONE (OUTPATIENT)
Dept: FAMILY MEDICINE CLINIC | Age: 76
End: 2021-12-07

## 2021-12-07 ENCOUNTER — OFFICE VISIT (OUTPATIENT)
Dept: NEPHROLOGY | Age: 76
End: 2021-12-07
Payer: MEDICARE

## 2021-12-07 VITALS
WEIGHT: 145.8 LBS | TEMPERATURE: 97.1 F | SYSTOLIC BLOOD PRESSURE: 138 MMHG | DIASTOLIC BLOOD PRESSURE: 72 MMHG | OXYGEN SATURATION: 97 % | HEART RATE: 70 BPM | BODY MASS INDEX: 22.17 KG/M2

## 2021-12-07 DIAGNOSIS — N28.1 BILATERAL RENAL CYSTS: ICD-10-CM

## 2021-12-07 DIAGNOSIS — N18.32 STAGE 3B CHRONIC KIDNEY DISEASE (HCC): Primary | ICD-10-CM

## 2021-12-07 DIAGNOSIS — I10 ESSENTIAL HYPERTENSION: ICD-10-CM

## 2021-12-07 DIAGNOSIS — U07.1 SARS-COV-2 POSITIVE: ICD-10-CM

## 2021-12-07 PROCEDURE — G8484 FLU IMMUNIZE NO ADMIN: HCPCS | Performed by: INTERNAL MEDICINE

## 2021-12-07 PROCEDURE — 1123F ACP DISCUSS/DSCN MKR DOCD: CPT | Performed by: INTERNAL MEDICINE

## 2021-12-07 PROCEDURE — 4040F PNEUMOC VAC/ADMIN/RCVD: CPT | Performed by: INTERNAL MEDICINE

## 2021-12-07 PROCEDURE — G8427 DOCREV CUR MEDS BY ELIG CLIN: HCPCS | Performed by: INTERNAL MEDICINE

## 2021-12-07 PROCEDURE — 99213 OFFICE O/P EST LOW 20 MIN: CPT | Performed by: INTERNAL MEDICINE

## 2021-12-07 PROCEDURE — 1036F TOBACCO NON-USER: CPT | Performed by: INTERNAL MEDICINE

## 2021-12-07 PROCEDURE — G8420 CALC BMI NORM PARAMETERS: HCPCS | Performed by: INTERNAL MEDICINE

## 2021-12-07 RX ORDER — CLOTRIMAZOLE 10 MG/1
10 LOZENGE ORAL; TOPICAL
Qty: 35 TABLET | Refills: 0 | Status: SHIPPED | OUTPATIENT
Start: 2021-12-07 | End: 2021-12-14

## 2021-12-07 NOTE — TELEPHONE ENCOUNTER
The patient called in and stated that he started on the Decadron yesterday and after that he noticed that he had rough spots in him mouth and looking inside he has white spots on the inside of the cheeks and on the tongue. He stated that the only medications that are new for him is he did received the Regeneron over the weekend, Decadron and Tessalon. The patient uses [facility]. Please advise.

## 2021-12-07 NOTE — PROGRESS NOTES
Renal Progress Note    Assessment and Plan:      Diagnosis Orders   1. Stage 3b chronic kidney disease ([address]     2. Essential hypertension     3. Bilateral renal cysts     4. SARS-CoV-2 positive       PLAN:  Lab result discussed with the patient. He understood. I addressed his questions. Serum creatinine stable at 1.9 mg/dL. Vitamin D level is normal  Medications reviewed   No changes   Return visit in 6 months with labs       Patient Active Problem List   Diagnosis    Hypertension    TIA (transient ischemic attack)    GERD (gastroesophageal reflux disease)    Medication monitoring encounter    SI (sacroiliac) joint inflammation ([address] left    D[facility] femoral [facility]) [facility]    Chronic low back pain    Lung mass    Abnormal CT scan, chest, pulmonary nodule.  PSA elevation, bph elevation    Anticoagulated on Coumadin    Sciatica of left side associated with disorder of lumbar spine    Lumbar disc disease with radiculopathy    Lumbar spinal stenosis    Other spondylosis with radiculopathy, lumbar region    DDD (degenerative disc disease), lumbar    RAMAN (acute kidney injury) ([address]    CKD (chronic kidney disease) stage 3, GFR 30-59 ml/min ([facility])    Pleural effusion, left    Nocturnal leg cramps    Bilateral carpal tunnel syndrome    Prostate cancer ([address] [place] score 6.    History of CVA (cerebrovascular accident)    History of pulmonary embolism    CKD (chronic kidney disease), stage IV ([address]    COVID-19         Subjective:   Chief complaint:  Chief Complaint   Patient presents with    Chronic Kidney Disease     Stage IIIb      HPI:This is a follow up visit for Mr Alexei Parra who is here today for return appointment. Seen for chronic kidney disease. Was last seen about 12 months ago. Doing well since then with no complaint. No new medications. No chest pain. No shortness of breath. No diarrhea. No nausea vomiting. No fever chills.   However, he was in the topically Indications: Skin Abnormalities As needed for dermatologist appointments  1     No current facility-administered medications for this visit.        Lab Results:    CBC:   Lab Results   Component Value Date    WBC 4.6 (L) 12/04/2021    HGB 15.0 12/04/2021    HCT 44.0 12/04/2021    MCV 92.6 12/04/2021     12/04/2021     BMP:    Lab Results   Component Value Date     12/04/2021     11/29/2021     09/24/2021    K 3.9 12/04/2021    K 4.2 11/29/2021    K 4.4 09/24/2021    CL 98 12/04/2021     11/29/2021     09/24/2021    CO2 23 12/04/2021    CO2 23 11/29/2021    CO2 24 09/24/2021    BUN 23 (H) 12/04/2021    BUN 29 (H) 11/29/2021    BUN 32 (H) 09/24/2021    CREATININE 1.9 (H) 12/04/2021    CREATININE 2.0 (H) 11/29/2021    CREATININE 1.8 (H) 09/24/2021    GLUCOSE 116 (H) 12/04/2021    GLUCOSE 109 (H) 11/29/2021    GLUCOSE 103 09/24/2021      Hepatic:   Lab Results   Component Value Date    AST 44 (H) 12/04/2021    AST 32 09/24/2021    AST 25 11/23/2020    ALT 27 12/04/2021    ALT 29 09/24/2021    ALT 21 11/23/2020    BILITOT 0.4 12/04/2021    BILITOT 1.0 09/24/2021    BILITOT 0.6 11/23/2020    ALKPHOS 50 12/04/2021    ALKPHOS 60 09/24/2021    ALKPHOS 67 11/23/2020     BNP: No results found for: BNP  Lipids:   Lab Results   Component Value Date    CHOL 214 (H) 09/24/2021    HDL 56 09/24/2021     INR:   Lab Results   Component Value Date    INR 1.08 08/03/2020    INR 1.60 (H) 07/20/2020    INR 2.51 (H) 06/30/2020     URINE:   Lab Results   Component Value Date    PROTUR 18.2 11/29/2021     Lab Results   Component Value Date    NITRU Negative 12/10/2019    COLORU Yellow 12/10/2019    UROBILINOGEN 0.20 12/10/2019    BILIRUBINUR Negative 12/10/2019    BLOODU Trace-lysed 12/10/2019    GLUCOSEU Negative 12/10/2019    KETUA Negative 12/10/2019      Microalbumen/Creatinine ratio:  No components found for: RUCREAT    Objective:   Vitals: /72 (Site: Left Upper Arm, Position: Sitting, Cuff Size: Medium Adult)   Pulse 70   Temp 97.1 °F (36.2 °C)   Wt 145 lb 12.8 oz (66.1 kg)   SpO2 97%   BMI 22.17 kg/m²      Constitutional:  Alert, awake, no apparent distress  Skin:normal with no rash or any lesions  HEENT:Pupils are reactive . Throat is clear. Oral mucosa is moist.  Neck:supple with no thyromegaly or bruit   Cardiovascular:  S1, S2 without murmur   Respiratory:  Clear to auscultation with no wheezes or rales  Abdomen: +bowel sound, soft, non tender and no bruit  Ext: No LE edema  Musculoskeletal:Intact  Neuro:Alert, awake and oriented with no obvious focal deficit. Speech is normal.    Electronically signed by Juanita Wynne MD on 12/7/2021 at 12:58 PM   **This report has been created using voice recognition software. It maycontain minor  errors which are inherent in voice recognition technology. **

## 2021-12-07 NOTE — CARE COORDINATION
Patient contacted regarding COVID-19 diagnosis, pulse oximeter ordered at discharge and monoclonal antibody infusion follow up. Discussed COVID-19 related testing which was available at this time. Test results were positive. Patient informed of results, if available? Yes. Ambulatory Care Manager contacted the patient by telephone to perform post discharge assessment. Call within 2 business days of discharge: Yes. Verified name and  with patient as identifiers. Provided introduction to self, and explanation of the CTN/ACM role, and reason for call due to risk factors for infection and/or exposure to COVID-19. Symptoms reviewed with patient who verbalized the following symptoms: cough, no new symptoms and no worsening symptoms. Due to no new or worsening symptoms encounter was not routed to provider for escalation. Discussed follow-up appointments. If no appointment was previously scheduled, appointment scheduling offered: Yes. Has appt with Dr. Darlyn Wahl on   [facility] follow up appointment(s):   Future Appointments   Date Time Provider Dot Monsalve   2021  1:00 PM MD MAYELA Velasquez   2021 11:30 AM Trudee Cabot, DO [facility]   2022 11:30 AM [facility]or [facility] [address]   2022 11:00 AM Trudee Cabot, DO [address] [place] [address] Avenue   10/18/2022  1:00 PM [facility] [place] [address]     Non-[facility] follow up appointment(s): n/a    Non-face-to-face services provided:  Obtained and reviewed discharge summary and/or continuity of care documents     Advance Care Planning:   Does patient have an Advance Directive:  . Julia Mendes Educated patient about risk for severe COVID-19 due to risk factors according to CDC guidelines. ACM reviewed discharge instructions, medical action plan and red flag symptoms with the patient who verbalized understanding.  Discussed COVID vaccination status: No. Education provided on COVID-19 vaccination as appropriate. Discussed exposure protocols and quarantine with CDC Guidelines. Patient was given an opportunity to verbalize any questions and concerns and agrees to contact ACM or health care provider for questions related to their healthcare. Reviewed and educated patient on any new and changed medications related to discharge diagnosis     Was patient discharged with a pulse oximeter? Yes 95% RA  Discussed and confirmed pulse oximeter discharge instructions and when to notify provider or seek emergency care. ACM provided contact information. Plan for follow-up call in 5-7 days based on severity of symptoms and risk factors.

## 2021-12-07 NOTE — ACP (ADVANCE CARE PLANNING)
Advance Care Planning   Healthcare Decision Maker:    Primary Decision Maker: Josy Jha - Child - [phone]    Click here to complete Healthcare Decision Makers including selection of the Healthcare Decision Maker Relationship (ie \"Primary\"). Today we documented Decision Maker(s) consistent with Legal Next of Kin hierarchy.

## 2021-12-09 ENCOUNTER — VIRTUAL VISIT (OUTPATIENT)
Dept: FAMILY MEDICINE CLINIC | Age: 76
End: 2021-12-09
Payer: MEDICARE

## 2021-12-09 DIAGNOSIS — R07.81 PLEURITIC CHEST PAIN: ICD-10-CM

## 2021-12-09 DIAGNOSIS — I10 ESSENTIAL HYPERTENSION: ICD-10-CM

## 2021-12-09 DIAGNOSIS — J22 LRTI (LOWER RESPIRATORY TRACT INFECTION): ICD-10-CM

## 2021-12-09 DIAGNOSIS — N18.30 STAGE 3 CHRONIC KIDNEY DISEASE, UNSPECIFIED WHETHER STAGE 3A OR 3B CKD (HCC): ICD-10-CM

## 2021-12-09 DIAGNOSIS — U07.1 COVID-19: Primary | ICD-10-CM

## 2021-12-09 PROCEDURE — 1123F ACP DISCUSS/DSCN MKR DOCD: CPT | Performed by: FAMILY MEDICINE

## 2021-12-09 PROCEDURE — 4040F PNEUMOC VAC/ADMIN/RCVD: CPT | Performed by: FAMILY MEDICINE

## 2021-12-09 PROCEDURE — 99214 OFFICE O/P EST MOD 30 MIN: CPT | Performed by: FAMILY MEDICINE

## 2021-12-09 PROCEDURE — G8428 CUR MEDS NOT DOCUMENT: HCPCS | Performed by: FAMILY MEDICINE

## 2021-12-09 RX ORDER — AZITHROMYCIN 250 MG/1
250 TABLET, FILM COATED ORAL SEE ADMIN INSTRUCTIONS
Qty: 6 TABLET | Refills: 0 | Status: SHIPPED | OUTPATIENT
Start: 2021-12-09 | End: 2021-12-14

## 2021-12-09 ASSESSMENT — ENCOUNTER SYMPTOMS
GASTROINTESTINAL NEGATIVE: 1
COUGH: 1
SHORTNESS OF BREATH: 0
WHEEZING: 0
CHEST TIGHTNESS: 1

## 2021-12-09 NOTE — PROGRESS NOTES
Freya Moe (:  1945) is a 64 y.o. male,Established patient, here for evaluation of the following chief complaint(s): ED Follow-up           SUBJECTIVE/OBJECTIVE:  HPI:    Chief Complaint   Patient presents with    ED Follow-up     CHIEF COMPLAINT            Chief Complaint   Patient presents with    Chest Pain    Nasal Congestion    Cough      History obtained from the patient.        HISTORY OF PRESENT ILLNESS    HPI  Lizett Donnelly is a 64 y.o. male with past medical history of pulmonary emboli, DVT, skin cancer, hypertension, prostate cancer who presents to the emergency department for evaluation of worsening shortness of breath despite exertion and productive cough for last couple days as well as some fevers. Patient is positive for COVID-19 tested 4 days ago. Is unvaccinated.     Patient is on Eliquis 2.5 mg twice daily. Has a history of prone embolisms and DVTs as well as prostate cancer in the past.     Denies any nausea vomiting diarrhea. . Does mention having some epigastric/chest pain this been waxing waning the last couple days. That is intermittently exertional.     The patient has no other acute complaints at this time. MEDICAL DECISION MAKING   Initial Assessment: This is a 68-year-old male unvaccinated COVID-19 he was positive for COVID-19  5 days ago presents with worsening productive cough for last couple as well as a fever. Also complains of epigastric/chest pain that is been present for last couple days.     Differential Diagnosis Included but not limited to: ACS, superimposed bacterial pneumonia, costochondritis, myalgias, musculoskeletal, pancreatitis     MDM:   Patient has an elevated troponin, he also has signs of infiltrate as well as an elevated procalcitonin which is concerning for possible superimposed back to pneumonia and will be given some antibiotics azithromycin Rocephin for coverage as well as Decadron 6 mg. He remains on room air with normal saturations.  He'll also be receiving a Regeneron infusion in the emergency department. He was given aspirin as well as nitroglycerin the hospitalist was contacted for admission. EKG shows no signs of infarction at this time.     Overall feeling much better. Fevers resolved. Energy level returning. Still with harsh cough, hurts chest when he does cough. On Decadron and Tessalon Perles. Cough productive at times. Was not sent home on abx. SpO2 96% on RA. Patient Active Problem List   Diagnosis    Hypertension    TIA (transient ischemic attack)    GERD (gastroesophageal reflux disease)    Medication monitoring encounter    SI (sacroiliac) joint inflammation ([address] left    [facility]) [facility]    Chronic low back pain    Lung mass    Abnormal CT scan, chest, pulmonary nodule.     PSA elevation, bph elevation    Anticoagulated on Coumadin    Sciatica of left side associated with disorder of lumbar spine    Lumbar disc disease with radiculopathy    Lumbar spinal stenosis    Other spondylosis with radiculopathy, lumbar region    DDD (degenerative disc disease), lumbar    RAMAN (acute kidney injury) ([address]    CKD (chronic kidney disease) stage 3, GFR 30-59 ml/min ([facility])    Pleural effusion, left    Nocturnal leg cramps    Bilateral carpal tunnel syndrome    Prostate cancer ([address] [place] score 6.    History of CVA (cerebrovascular accident)    History of pulmonary embolism    CKD (chronic kidney disease), stage IV ([address]    COVID-19     Past Surgical History:   Procedure Laterality Date    ARM SURGERY Right 12/18/2020    EXCISION FIBROXANTHOMA RIGHT FOREARM WITH SKIN GRAFT performed by Xander Sandy MD at [place] [address] Left 02/2021    COLONOSCOPY  2019    [address]    EGD  2019    [facility]    EXCISION / BIOPSY SKIN LESION OF ARM Left 10/11/2017    EXCISION 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 WITH FLAP AND FROZEN SECTION performed by kidney disease, unspecified whether stage 3a or 3b CKD ([address] [facility]    -  ED reports reviewed  -  S/P Regeneron  -  Continue Decadron to completion, Tessalon perles prn  -  Add Zpak to cover for possible bacterial etiology    Return if symptoms worsen or fail to improve. Xena Moe, was evaluated through a synchronous (real-time) audio-video encounter. The patient (or guardian if applicable) is aware that this is a billable service. Verbal consent to proceed has been obtained within the past 12 months. The visit was conducted pursuant to the emergency declaration under the [facility], [address] [place] [address] authority and the [employer] and [employer] Act. Patient identification was verified, and a caregiver was present when appropriate. The patient was located in a state where the provider was credentialed to provide care. An electronic signature was used to authenticate this note.     --Lina Mendoza DO

## 2021-12-10 LAB — BLOOD CULTURE, ROUTINE: NORMAL

## 2021-12-10 NOTE — TELEPHONE ENCOUNTER
Spoke to Tim Dickerson. He and she are both doing better. He just got an antibiotic yesterday and has no fever. She feels better also,, just a bit tired. Told her since he has an appt 1/6/2022, if he needs to change it if not better just let us know.     Said she will and thanks for checking

## 2021-12-14 ENCOUNTER — CARE COORDINATION (OUTPATIENT)
Dept: CARE COORDINATION | Age: 76
End: 2021-12-14

## 2021-12-14 ENCOUNTER — TELEPHONE (OUTPATIENT)
Dept: FAMILY MEDICINE CLINIC | Age: 76
End: 2021-12-14

## 2021-12-14 NOTE — TELEPHONE ENCOUNTER
----- Message from Marina Alamo sent at 12/14/2021 10:17 AM EST -----  Subject: Message to Provider    QUESTIONS  Information for Provider? Patient was calling in to update his health with   Dr. Connie Olson. He finished his antibiotic yesterday. There is still discomfort   in chest from coughing but its improving. He is getting energy back and   feeling better. ---------------------------------------------------------------------------  --------------  Omelia Counter INFO  What is the best way for the office to contact you? OK to leave message on   voicemail  Preferred Call Back Phone Number? [phone]  ---------------------------------------------------------------------------  --------------  SCRIPT ANSWERS  Relationship to Patient?  Self

## 2022-01-13 ENCOUNTER — NURSE ONLY (OUTPATIENT)
Dept: LAB | Age: 77
End: 2022-01-13

## 2022-01-13 DIAGNOSIS — Z20.822 EXPOSURE TO COVID-19 VIRUS: ICD-10-CM

## 2022-01-13 DIAGNOSIS — N18.4 CKD (CHRONIC KIDNEY DISEASE), STAGE IV (HCC): ICD-10-CM

## 2022-01-13 DIAGNOSIS — Z86.73 HISTORY OF CVA (CEREBROVASCULAR ACCIDENT): ICD-10-CM

## 2022-01-13 DIAGNOSIS — C61 PROSTATE CANCER (HCC): ICD-10-CM

## 2022-01-13 DIAGNOSIS — M47.26 OTHER SPONDYLOSIS WITH RADICULOPATHY, LUMBAR REGION: ICD-10-CM

## 2022-01-13 DIAGNOSIS — I10 ESSENTIAL HYPERTENSION: ICD-10-CM

## 2022-01-13 DIAGNOSIS — M48.062 SPINAL STENOSIS OF LUMBAR REGION WITH NEUROGENIC CLAUDICATION: ICD-10-CM

## 2022-01-13 DIAGNOSIS — Z71.89 ENCOUNTER FOR HERB AND VITAMIN SUPPLEMENT MANAGEMENT: ICD-10-CM

## 2022-01-13 DIAGNOSIS — R73.09 LOW GLUCOSE LEVEL: ICD-10-CM

## 2022-01-13 DIAGNOSIS — I82.512 CHRONIC DEEP VEIN THROMBOSIS (DVT) OF FEMORAL VEIN OF LEFT LOWER EXTREMITY (HCC): ICD-10-CM

## 2022-01-13 DIAGNOSIS — K90.89 OTHER INTESTINAL MALABSORPTION: ICD-10-CM

## 2022-01-13 DIAGNOSIS — G47.62 NOCTURNAL LEG CRAMPS: ICD-10-CM

## 2022-01-13 DIAGNOSIS — G45.9 TIA (TRANSIENT ISCHEMIC ATTACK): ICD-10-CM

## 2022-01-13 DIAGNOSIS — N18.30 STAGE 3 CHRONIC KIDNEY DISEASE, UNSPECIFIED WHETHER STAGE 3A OR 3B CKD (HCC): ICD-10-CM

## 2022-01-13 DIAGNOSIS — R97.20 PSA ELEVATION: ICD-10-CM

## 2022-01-13 DIAGNOSIS — N18.32 STAGE 3B CHRONIC KIDNEY DISEASE (HCC): ICD-10-CM

## 2022-01-13 DIAGNOSIS — Z86.711 HISTORY OF PULMONARY EMBOLISM: ICD-10-CM

## 2022-01-13 DIAGNOSIS — M46.1 SI (SACROILIAC) JOINT INFLAMMATION (HCC): ICD-10-CM

## 2022-01-13 LAB
ANION GAP SERPL CALCULATED.3IONS-SCNC: 10 MEQ/L (ref 8–16)
AVERAGE GLUCOSE: 114 MG/DL (ref 70–126)
BASOPHILS # BLD: 0.9 %
BASOPHILS ABSOLUTE: 0.1 THOU/MM3 (ref 0–0.1)
BUN BLDV-MCNC: 31 MG/DL (ref 7–22)
C-REACTIVE PROTEIN, HIGH SENSITIVITY: 3.2 MG/L
CALCIUM SERPL-MCNC: 10 MG/DL (ref 8.5–10.5)
CHLORIDE BLD-SCNC: 105 MEQ/L (ref 98–111)
CHOLESTEROL, TOTAL: 219 MG/DL (ref 100–199)
CO2: 25 MEQ/L (ref 23–33)
CREAT SERPL-MCNC: 1.7 MG/DL (ref 0.4–1.2)
EOSINOPHIL # BLD: 0.4 %
EOSINOPHILS ABSOLUTE: 0 THOU/MM3 (ref 0–0.4)
ERYTHROCYTE [DISTWIDTH] IN BLOOD BY AUTOMATED COUNT: 13.4 % (ref 11.5–14.5)
ERYTHROCYTE [DISTWIDTH] IN BLOOD BY AUTOMATED COUNT: 48 FL (ref 35–45)
GFR SERPL CREATININE-BSD FRML MDRD: 39 ML/MIN/1.73M2
GLUCOSE BLD-MCNC: 104 MG/DL (ref 70–108)
HBA1C MFR BLD: 5.8 % (ref 4.4–6.4)
HCT VFR BLD CALC: 45.8 % (ref 42–52)
HDLC SERPL-MCNC: 66 MG/DL
HEMOGLOBIN: 15.2 GM/DL (ref 14–18)
HOMOCYSTEINE: 12.3 UMOL/L
IMMATURE GRANS (ABS): 0.02 THOU/MM3 (ref 0–0.07)
IMMATURE GRANULOCYTES: 0.3 %
LDL CHOLESTEROL CALCULATED: 134 MG/DL
LYMPHOCYTES # BLD: 31.2 %
LYMPHOCYTES ABSOLUTE: 2.2 THOU/MM3 (ref 1–4.8)
MAGNESIUM: 2.2 MG/DL (ref 1.6–2.4)
MCH RBC QN AUTO: 32.1 PG (ref 26–33)
MCHC RBC AUTO-ENTMCNC: 33.2 GM/DL (ref 32.2–35.5)
MCV RBC AUTO: 96.6 FL (ref 80–94)
MONOCYTES # BLD: 9.3 %
MONOCYTES ABSOLUTE: 0.6 THOU/MM3 (ref 0.4–1.3)
NUCLEATED RED BLOOD CELLS: 0 /100 WBC
PLATELET # BLD: 267 THOU/MM3 (ref 130–400)
PMV BLD AUTO: 9.4 FL (ref 9.4–12.4)
POTASSIUM SERPL-SCNC: 4.5 MEQ/L (ref 3.5–5.2)
PROSTATE SPECIFIC ANTIGEN: 12.78 NG/ML (ref 0–1)
PTH INTACT: 30.3 PG/ML (ref 15–65)
RBC # BLD: 4.74 MILL/MM3 (ref 4.7–6.1)
SEDIMENTATION RATE, ERYTHROCYTE: 14 MM/HR (ref 0–10)
SEG NEUTROPHILS: 57.9 %
SEGMENTED NEUTROPHILS ABSOLUTE COUNT: 4 THOU/MM3 (ref 1.8–7.7)
SODIUM BLD-SCNC: 140 MEQ/L (ref 135–145)
TRIGL SERPL-MCNC: 95 MG/DL (ref 0–199)
VITAMIN D 25-HYDROXY: 49 NG/ML (ref 30–100)
WBC # BLD: 6.9 THOU/MM3 (ref 4.8–10.8)

## 2022-01-14 LAB
DHEAS (DHEA SULFATE): 132 UG/DL (ref 28–175)
TISSUE TRANSGLUTAMINASE IGA: 1.2 U/ML

## 2022-01-17 LAB — DHEA UNCONJUGATED: 1.17 NG/ML (ref 0.63–4.7)

## 2022-01-19 ENCOUNTER — OFFICE VISIT (OUTPATIENT)
Dept: FAMILY MEDICINE CLINIC | Age: 77
End: 2022-01-19
Payer: MEDICARE

## 2022-01-19 VITALS
RESPIRATION RATE: 10 BRPM | HEART RATE: 69 BPM | SYSTOLIC BLOOD PRESSURE: 138 MMHG | DIASTOLIC BLOOD PRESSURE: 78 MMHG | BODY MASS INDEX: 22.85 KG/M2 | TEMPERATURE: 97.1 F | HEIGHT: 68 IN | WEIGHT: 150.8 LBS | OXYGEN SATURATION: 98 %

## 2022-01-19 DIAGNOSIS — N18.4 CKD (CHRONIC KIDNEY DISEASE), STAGE IV (HCC): ICD-10-CM

## 2022-01-19 DIAGNOSIS — R97.20 PSA ELEVATION: Primary | ICD-10-CM

## 2022-01-19 DIAGNOSIS — I10 ESSENTIAL HYPERTENSION: ICD-10-CM

## 2022-01-19 DIAGNOSIS — M46.1 SI (SACROILIAC) JOINT INFLAMMATION (HCC): ICD-10-CM

## 2022-01-19 DIAGNOSIS — N18.30 STAGE 3 CHRONIC KIDNEY DISEASE, UNSPECIFIED WHETHER STAGE 3A OR 3B CKD (HCC): ICD-10-CM

## 2022-01-19 DIAGNOSIS — G47.62 NOCTURNAL LEG CRAMPS: ICD-10-CM

## 2022-01-19 DIAGNOSIS — Z86.711 HISTORY OF PULMONARY EMBOLISM: ICD-10-CM

## 2022-01-19 DIAGNOSIS — U07.1 COVID-19: ICD-10-CM

## 2022-01-19 DIAGNOSIS — C61 PROSTATE CANCER (HCC): ICD-10-CM

## 2022-01-19 DIAGNOSIS — G45.9 TIA (TRANSIENT ISCHEMIC ATTACK): ICD-10-CM

## 2022-01-19 DIAGNOSIS — K90.89 OTHER INTESTINAL MALABSORPTION: ICD-10-CM

## 2022-01-19 DIAGNOSIS — R73.09 LOW GLUCOSE LEVEL: ICD-10-CM

## 2022-01-19 DIAGNOSIS — I82.512 CHRONIC DEEP VEIN THROMBOSIS (DVT) OF FEMORAL VEIN OF LEFT LOWER EXTREMITY (HCC): ICD-10-CM

## 2022-01-19 DIAGNOSIS — R07.81 PLEURITIC CHEST PAIN: ICD-10-CM

## 2022-01-19 DIAGNOSIS — Z71.89 ENCOUNTER FOR HERB AND VITAMIN SUPPLEMENT MANAGEMENT: ICD-10-CM

## 2022-01-19 LAB — TESTOSTERONE FREE: NORMAL

## 2022-01-19 PROCEDURE — 1123F ACP DISCUSS/DSCN MKR DOCD: CPT | Performed by: FAMILY MEDICINE

## 2022-01-19 PROCEDURE — G8427 DOCREV CUR MEDS BY ELIG CLIN: HCPCS | Performed by: FAMILY MEDICINE

## 2022-01-19 PROCEDURE — G8420 CALC BMI NORM PARAMETERS: HCPCS | Performed by: FAMILY MEDICINE

## 2022-01-19 PROCEDURE — G8484 FLU IMMUNIZE NO ADMIN: HCPCS | Performed by: FAMILY MEDICINE

## 2022-01-19 PROCEDURE — 1036F TOBACCO NON-USER: CPT | Performed by: FAMILY MEDICINE

## 2022-01-19 PROCEDURE — 4040F PNEUMOC VAC/ADMIN/RCVD: CPT | Performed by: FAMILY MEDICINE

## 2022-01-19 PROCEDURE — 99213 OFFICE O/P EST LOW 20 MIN: CPT | Performed by: FAMILY MEDICINE

## 2022-01-19 ASSESSMENT — ENCOUNTER SYMPTOMS
ALLERGIC/IMMUNOLOGIC NEGATIVE: 1
RESPIRATORY NEGATIVE: 1
GASTROINTESTINAL NEGATIVE: 1

## 2022-01-19 NOTE — PATIENT INSTRUCTIONS
Thank you   1. Thank you for trusting us with your healthcare needs. You may receive a survey regarding today's visit. It would help us out if you would take a few moments to provide your feedback. We value your input. 2. Please bring in ALL medications BOTTLES, including inhalers, herbal supplements, over the counter, prescribed & non-prescribed medicine. The office would like actual medication bottles and a list.   3. Please note our OFFICE POLICIES:   a. Prior to getting your labs drawn, please check with your insurance company for benefits and eligibility of lab services. Often, insurance companies cover certain tests for preventative visits only. It is patient's responsibility to see what is covered. b. We are unable to change a diagnosis after the test has been performed. c. Lab orders will not be re-printed. Please hold onto your original lab orders and take them to your lab to be completed. d. If you no show your scheduled appointment three times, you will be dismissed from this practice. e. Rodri Churn must be completed 24 hours prior to your schedule appointment. 4. If the list below has been completed, PLEASE FAX RECORDS TO OUR OFFICE @ [phone].  Once the records have been received we will update your records at our office:  Health Maintenance Due   Topic Date Due    Hepatitis C screen  Never done    COVID-19 Vaccine (1) Never done    DTaP/Tdap/Td vaccine (1 - Tdap) Never done    Shingles Vaccine (1 of 2) Never done   [employer] Visit (AWV)  08/25/2021    Flu vaccine (1) Never done

## 2022-01-19 NOTE — PROGRESS NOTES
[id number] [facility] Kristen VALE [address] [address]  Dept: [phone]  Dept Fax: [phone]  Loc: [phone]      Danielle Moe is a 64 y.o. White male. Yaa Zaldivar  presents to the [place] [address] clinic today for   Chief Complaint   Patient presents with    3 Month Follow-Up   , and;   1. PSA elevation    2. COVID-19    3. Pleuritic chest pain    4. Essential hypertension    5. Stage 3 chronic kidney disease, unspecified whether stage 3a or 3b CKD ([address]    6. Prostate cancer ([address]    7. Chronic deep vein thrombosis (DVT) of femoral vein of left lower extremity (HCC)    8. TIA (transient ischemic attack)    9. History of pulmonary embolism    10. Other intestinal malabsorption    11. Low glucose level    12. Encounter for herb and vitamin supplement management    13. Nocturnal leg cramps    14. SI (sacroiliac) joint inflammation (HCC)    15. CKD (chronic kidney disease), stage IV ([address]          I have reviewed Danielle Moe medical, surgical and other pertinent history in detail, and have updated medication and allergy information in the computerized patient record. Clinical Care Team:     -Referring Provider for today's consult: self  -Primary Care Provider: Kory Copeland DO    Medical/Surgical History:   He  has a past medical history of Arthritis, Cancer [facility]), Cerebral artery occlusion with cerebral infarction [facility]), DVT (deep venous thrombosis) ([address] GERD (gastroesophageal reflux disease), Hx of blood clots, Hypertension, Kidney disease, Prostate cancer ([address] Pulmonary emboli ([address] Scoliosis, and TIA (transient ischemic attack). His  has a past surgical history that includes Skin cancer excision (2010); other surgical history (Left, 10/11/2017); EXCISION / BIOPSY SKIN LESION OF ARM (Left, 10/11/2017); EGD (2019); Colonoscopy (2019); Prostate biopsy; Arm Surgery (Right, 12/18/2020);  Carpal tunnel release (Left, 02/2021); and Finger trigger release (Left, 02/2021). Family/Social History:     His family history includes Alzheimer's Disease in his mother; Cancer in his mother; Heart Disease in his mother; High Blood Pressure in his mother; Parkinsonism in his father. He  reports that he has never smoked. He has never used smokeless tobacco. He reports current alcohol use. He reports that he does not use drugs. Medications/Allergies/Immunizations:     His current medication(s) include   Current Outpatient Medications:     calcium carbonate 600 MG TABS tablet, Take 1 tablet by mouth daily as needed , Disp: , Rfl:     Omega-3 Fatty Acids (CVS FISH OIL PO), Take 1 tablet by mouth 4 times daily , Disp: , Rfl:     apixaban (ELIQUIS) 2.5 MG TABS tablet, Take 1 tablet by mouth 2 times daily, Disp: 180 tablet, Rfl: 1    Chromium-Cinnamon (CINNAMON PLUS CHROMIUM PO), Take 1 capsule by mouth 4 times daily (before meals and nightly) [facility] units per capsule, Disp: , Rfl:     B Complex-Biotin-FA (B COMPLEX 100 TR PO), Take 1 tablet by mouth 3 times daily (before meals) [id number] [facility], Disp: , Rfl:     Cholecalciferol (VITAMIN D3) 50 MCG (2000 UT) CAPS, Take 10 capsules by mouth every 14 days One daily but double Mon Wed Fridays, Disp: , Rfl:     magnesium cl-calcium carbonate (SLOW-MAG) 71.5-119 MG TBEC tablet, Take 2 tablets by mouth 4 times daily (after meals and at bedtime) 2 tabs threes times daily and 1 tab nightly, Disp: , Rfl:     Levomefolate Glucosamine (METHYLFOLATE PO), Take 15 mg by mouth every morning (before breakfast) Metabolic Maintenance at LocPlanet or amazon. com , Disp: , Rfl:     Methylcobalamin 5000 MCG SUBL, Place 1 tablet under the tongue nightly Vera at [employer]. com, Disp: , Rfl:     mupirocin (BACTROBAN) 2 % ointment, Apply topically Indications: Skin Abnormalities As needed for dermatologist appointments, Disp: , Rfl: 1  Allergies: Morphine, Bactrim [sulfamethoxazole-trimethoprim], Nsaids, Sulfamethoxazole, and Trimethoprim  Immunizations:   Immunization History   Administered Date(s) Administered    PPD Test 05/20/2015    Pneumococcal Conjugate 13-valent ([id number]) 08/29/2016    Pneumococcal Polysaccharide ([id number]) 06/05/2012        History of Present Illness:     Jordy's had concerns including 3 Month Follow-Up. Jennifer Ventura  presents to the [address] [place] [address] today for;   Chief Complaint   Patient presents with    3 Month Follow-Up   , abnormal labs follow up and these conditions as he  Is looking today for:     1. PSA elevation    2. COVID-19    3. Pleuritic chest pain    4. Essential hypertension    5. Stage 3 chronic kidney disease, unspecified whether stage 3a or 3b CKD ([facility]    6. Prostate cancer ([facility]    7. Chronic deep vein thrombosis (DVT) of femoral vein of left lower extremity (HCC)    8. TIA (transient ischemic attack)    9. History of pulmonary embolism    10. Other intestinal malabsorption    11. Low glucose level    12. Encounter for herb and vitamin supplement management    13. Nocturnal leg cramps    14. SI (sacroiliac) joint inflammation ([facility])    15. CKD (chronic kidney disease), stage IV ([facility])      HPI    Subjective:     Review of Systems   Constitutional: Positive for chills, fatigue and unexpected weight change. HENT: Negative. Eyes: Positive for visual disturbance. Respiratory: Negative. Cardiovascular: Negative. Gastrointestinal: Negative. Endocrine: Positive for cold intolerance. Genitourinary: Positive for difficulty urinating, frequency and urgency. Musculoskeletal: Positive for arthralgias. Skin: Negative. Allergic/Immunologic: Negative. Neurological: Negative. Hematological: Negative. Psychiatric/Behavioral: Positive for decreased concentration. The patient is nervous/anxious. All other systems reviewed and are negative.       Objective:     /78 (Site: Left to future visits for inclusion in their record    - 75 better food list reviewed & given to patient along with the omega 6 food list to avoid      - Gluten in corn and oats abstracts sheet reviewed and given to the patient today    - 23 Foods containing Latex-like proteins was reviewed and copy to be taken if desired     - Nutrient Supplements list provided and copyto be taken if desired    - [employer]. sailsquare web site offered to patient to review at their convenience by staff with login information    Note:  I have discussed with the patient that with all nutraceuticals, there is often mixed data and emerging research which needs to be monitored; as well as an array of NIH fact sheets on nutrients and supplements, available at www.nih,issue plus [place]. sailsquare plus www.evlyi,org. If I have recommended cinnamon at the request of this patient to assist them in control of their blood sugar, triglyceride, and/or weight issues. I discussed that the patient's clinical use of cinnamon bark, calcium, magnesium, Vitamin D, and pharmaceutical grade CVS omega 3 oil or triple-strength fish oil, and B-50/B-100 time-released B-complex by [address] [facility] will be for a time-limited trial to determine their individual effectiveness and safety in this patient. I also referred the patient to the NMCD: Nutrition, Metabolism, and Cardiovascular Diseases (SecuritiesCard.pl) and concerns about long-term use and hepatotoxicity of cinnamon and other nutrients. I suggested they frequently search nih.gov for the latest non-proprietary information on nutriceuticals as well as consider a subscription to [employer] for details on reviewed supplements, or at the least review the nutrient files at [facility], [address] bark, an insulin mimetic, reduces some High Carbohydrate Dietary Impacts.   Methylhydroxychalcone polymers insulin-enhancing properties in fat cells are responsible for enhanced glucose uptake, inhibiting hepatic HMG-CoA reductase and lowers lipids. www.jacn. org/content/20/4/327.full     But cinnamon with additives such as Cinnamon Extract are not effective as insulin mimetics.  :eStoreDirectory.at     Nutrients for Start up from byUs.com or DocVersecery for ease to get started now;  [employer] has some useable products;  - Triple Strength Fish Oil, enteric coated  - Vit D-3 5000 IU gel caps  - Iron ferrous sulfate 325 mg tabs  - Centrum Silver look-a-like for most patients, or  - Centrum plain look-a-like if need iron    Local pharmacies or chains such as [employer], [employer], have:  - Curasight pharmaceutical grade omega 3 is 90% EPA/DHA whereas most Triple strength fish oil are 75% EPA/DHA  - Triple Strength Fish Oil (enteric coated if available) or if not enteric coated, can take from freezer for less burps  - B-50 or B-100 released balanced B complex tabs by Ester Herr at [facility] bark 500 mg (without Chromium or extracts)   some brands list 1000 mg / serving of 2 capsules,    some brands have 1000 mg caps with the undesireable chromium extract  - Calcium carbonate/citrate, magnesium oxide/citrate, Vit D-3 as 3-4 tabs/caps/serving     Some Local Brands may contain Zinc which is acceptable for the first bottle or two  - Magnesium oxide 250 mg tabs for those having < 2 bowel movements daily  - Magnesium citrate 200 mg if having > 2 bowel movements/day  - Centrum Silver or look-a-like for most patients, Centrum plain or look-a-like with iron  - Vitamin D-3 comes as 1,000 IU or 2,000 IU or 5,000 IU gel caps or Liquid drops but keep Vitamin D levels <50 but >40     Some brands containing or derived from soy oil or corn oil are OK if not allergic to soy  - Elemental Iron 65 mg tabs at bedtime is available over the counter if need more iron     Usually turns bowel movements grey, green, or black but not a concern  - Apricot Kernel Oil (by Now) for dry skin sensitive perineal or perianal area skin    Nutrients for ongoing use by Mail order for less expense from www. GOOM ;  - Strength Fish Oil , 240 Softgels Item #247906  -B-100 time released balanced B complex Item #510678  - Cinnamon bark 500 mg without Chromium or extract Item #040143  - Calcium carbonate 1000 mg, Magnesium oxide 500 mg, Vit D-3 400 IU Item #783820  - Magnesium oxide 500 mg tabs Item #303638 if less than 2 bowel movements daily  - [employer] Sen[employer] Item #805045 for most patients, One Daily Item #739362 with iron  - Vit D 3  1,000 Item #955278      2,000 IU Item #662319   Item #142924     Some brands containing orderived from soy oil or corn oil are OK if not allergic to soy    Nutrients for Special Needs by Mail order for less expense from www. puritan.com;  -Elemental Iron 65 mg tabs Item #636183 if need more iron for low iron on labs    Usually turns bowel movements grey, green or black but not a concern  - Time released Niacin 250 mg Item #874445 for cold intolerance, low libido or impotence  - DHEA 50 mg Item #091730 for improving DHEA levels on labs if having Fatigue    If stools too loose substitute for your Magnesium oxide using;   Magnesium citrate 200 mg tabs (NOT liquid) at [employer]   Magnesium gluconate 550 mg by [employer] at VibeWrite or Kähu. com  Magnesium chloride foot soaks or body sprays  www.[employer]   Magnesium chloride flakes 14.99 Item #: [id number] if back-ordered, get spray  Magnesium threonate, Magtein also helps mental clarity and sleep    Food Drink Symptom Log;  I asked this patient to track these items and any other symptoms on their list on a weekly basis to documenttheir progress or lack of same.  This can be done on the symptom tracking sheet I gave them at today's visit but looks like this:                                                      Rate on scale of 0-10 with zero = not noticeable  Symptom: Week 1               2                 3                 4               Etc            Hair loss    Foot cramps    Paresthesia    Aches    IBS (irritable bowel)    Constipation    Diarrhea  Nocturia (up to bathroom at night)    Fatigue/Energy level  Stress      On the other side of the sheet they can track their food, drink, environment, activity, symptoms etc      Avoiding Latex-like proteins in my foods; Avocados, Bananas, Celery, Figs & Kiwi proteins have latex-like proteins to inflame our immune systems, plus 47 more foods  How Can I Have A Latex Allergy? Eating foods with latex-like protein exposes us to latex allergies. Our body cannot tell the differencebetween these latex-like proteins and latex from rubber products since many people are allergic to fruit, vegetables and latex. Read labels on pre-packaged foods. This list to avoid is only a guide if you are known allergicto latex or have a latex rash on your chin, cheeks and lines on your neck and chest. The amount of latex is different in each food product or fruit variety. Avoid out of Season if not grown locally:   Melon, Nectarine, Papaya, Cherry, Passion fruit, Plum, Chestnuts, and Tomato. Avocado, Banana, Celery, Figs, and Kiwi always contain Latex-like protein. Whats in Season? Strawberries taste better in June than December because June is strawberry season so buy locally grown produce \"in season\" for the best flavor, cost, and less Latex. Locally grown produce not only tastes great but also requires little or no ethylene exposure in food distribution so has less latex content. Out of season: use canned, frozen, or dried since those are processed ripe and latex content is lower!!!     Month     [place] Locally Grown Produce  January, February, March: use canned, frozen or dried fruits since lower in latex  April: asparagus, radishes  May: asparagus, broccoli, green onions, greens, peas, radishes, rhubarb  Rosa: asparagus, beets, beans, broccoli, cabbage, cantaloupe, carrots, green onions, greens, lettuce, onions, parsley, peas, radishes, rhubarb, strawberries, watermelons  July: beans, beets, blueberries, broccoli, cabbage, cantaloupe, carrots, cauliflower, celery, cucumbers, eggplant, grapes, green onions, greens, lettuce, onions, parsley, peas, peaches, bell peppers, potatoes, radishes, summer raspberries, squash, sweetcorn, tomatoes, turnips, watermelons  August: apples, beans, beets, blueberries, cabbage, cantaloupe, carrots, cauliflower, celery, cucumbers, eggplant, grapes, green onions, greens, lettuce, onions, parsley, peas, peaches, pears, bell peppers, potatoes, radishes, squash, sweet corn, tomatoes, turnips, watermelons  September: apples, beans, beets, blueberries, cabbage, cantaloupe, carrots, cauliflower, celery, cucumbers, eggplant, grapes, green onions, greens, lettuce, onions, parsley, peas, peaches, pears, bell peppers, plums, potatoes, pumpkins, radishes, fall red raspberries, squash, sweet corn, tomatoes, turnips, watermelons  October: apples, beets, broccoli, cabbage, carrots, cauliflower, celery, green onions, greens, lettuce, parsley, peas, pears, potatoes, pumpkins, radishes, fall red raspberries, squash, turnips  November: broccoli, cabbage, carrots, parsley, pears, peas  December: use canned, frozen or dried fruits since lower in latex    Upto half of latex-sensitive patients show allergic reactions to fruits (avocados, bananas, kiwifruits, papayas, peaches),   Annals of Allergy, 1994. These plants contain the same proteins that are allergens in latex. People with fruit allergies should warn physicians before undergoing procedures which may cause anaphylactic reaction if in contact with latex gloves.   Some of the common foods with defined cross-reactivity to latex are avocado, banana, kiwi, chestnut, raw potato, tomato, stone fruits (e.g., peach, cherry), hazelnut, melons, celery, carrot, apple, pear, papaya, and almond. Foods with less well-defined cross-reactivity to latex are peanuts, peppers, citrus fruits, coconut, pineapple, deysi, fig, passion fruit, Ugli fruit, and grape. This fruit/latex cross-reactivity is worsened by ethylene, a gas used to hasten commercial ripening. In nature, plants produce low levels of the hormone ethylene, which regulates germination, flowering, and ripening. Forced ripening by high ethylene concentrations, plants produce allergenic wound-repair proteins, which are similar to wound-repair proteins made during the tapping of rubber trees. Sensitive individuals who ingest the fruit get a higher dose and worse reaction. Some people may even first become sensitized to latex through fruit. Can food processing increase the concentrations of allergenic proteins? Latex-sensitized children (and adults) in [place] often experience allergic reactions after eating bananas ripened artificially with ethylene. In the [place], food distribution centers treat unripe bananas and other produce with ethylene to ripen; not commonly done in [facility] since fruit is tree-ripened there. Does treatment of food with ethylene induce banana proteins that cross-react with latex? (Alex et al.)    References:   Latex in Foods Allergy, http://ehp.niehs.nih.gov/members/2003/5811/5811.html    Search web for [employer] in Season \" for where you live or are at the time you food shop   Management of Latex, ://medicalcenter. osu.edu/  search for nih, latex-like proteins in foods

## 2022-01-20 ENCOUNTER — TELEPHONE (OUTPATIENT)
Dept: FAMILY MEDICINE CLINIC | Age: 77
End: 2022-01-20

## 2022-01-20 NOTE — TELEPHONE ENCOUNTER
Spoke with patient and 3 people reside in his household--himself, daughter and granddaughter.   Forms amended and re-faxed

## 2022-01-20 NOTE — TELEPHONE ENCOUNTER
Fax received from "[employer]" for patient assistance for Eliquis. Household size was not completed by patient and they cannot complete application until that is received. Forms printed and placed in nurse bin.     Attempted to speak with patient regarding, left vm message

## 2022-01-21 NOTE — TELEPHONE ENCOUNTER
Per Víctor Carson, amended fax will not go through. She spoke with Sumit Mora and they do not have an alternative fax#. They told her we could call in the information requested to # [phone]. I spoke with Nella Pool at the above phone# and gave her the household size of 3 that was requested by them to complete his assistance forms. She will reach out the processing team with this information so his form can be updated.

## 2022-02-24 ENCOUNTER — OFFICE VISIT (OUTPATIENT)
Dept: FAMILY MEDICINE CLINIC | Age: 77
End: 2022-02-24
Payer: MEDICARE

## 2022-02-24 VITALS
BODY MASS INDEX: 24.12 KG/M2 | RESPIRATION RATE: 12 BRPM | SYSTOLIC BLOOD PRESSURE: 132 MMHG | HEART RATE: 68 BPM | DIASTOLIC BLOOD PRESSURE: 74 MMHG | WEIGHT: 150.1 LBS | HEIGHT: 66 IN

## 2022-02-24 DIAGNOSIS — N18.4 CKD (CHRONIC KIDNEY DISEASE), STAGE IV (HCC): ICD-10-CM

## 2022-02-24 DIAGNOSIS — Z00.00 MEDICARE ANNUAL WELLNESS VISIT, SUBSEQUENT: Primary | ICD-10-CM

## 2022-02-24 DIAGNOSIS — C61 PROSTATE CANCER (HCC): ICD-10-CM

## 2022-02-24 DIAGNOSIS — Z86.718 HISTORY OF DVT (DEEP VEIN THROMBOSIS): ICD-10-CM

## 2022-02-24 PROCEDURE — 4040F PNEUMOC VAC/ADMIN/RCVD: CPT | Performed by: FAMILY MEDICINE

## 2022-02-24 PROCEDURE — G0439 PPPS, SUBSEQ VISIT: HCPCS | Performed by: FAMILY MEDICINE

## 2022-02-24 PROCEDURE — 1123F ACP DISCUSS/DSCN MKR DOCD: CPT | Performed by: FAMILY MEDICINE

## 2022-02-24 PROCEDURE — G8484 FLU IMMUNIZE NO ADMIN: HCPCS | Performed by: FAMILY MEDICINE

## 2022-02-24 ASSESSMENT — LIFESTYLE VARIABLES: HOW OFTEN DO YOU HAVE A DRINK CONTAINING ALCOHOL: NEVER

## 2022-02-24 ASSESSMENT — ENCOUNTER SYMPTOMS
GASTROINTESTINAL NEGATIVE: 1
RESPIRATORY NEGATIVE: 1

## 2022-02-24 ASSESSMENT — PATIENT HEALTH QUESTIONNAIRE - PHQ9
SUM OF ALL RESPONSES TO PHQ9 QUESTIONS 1 & 2: 0
SUM OF ALL RESPONSES TO PHQ QUESTIONS 1-9: 0
1. LITTLE INTEREST OR PLEASURE IN DOING THINGS: 0
2. FEELING DOWN, DEPRESSED OR HOPELESS: 0
SUM OF ALL RESPONSES TO PHQ QUESTIONS 1-9: 0

## 2022-02-24 NOTE — PATIENT INSTRUCTIONS
You may receive a survey about your visit with us today. The feedback from our patients helps us identify what is working well and where the service to all patients can be enhanced. Thank you! Personalized Preventive Plan for Sulma Moe - 2/24/2022  Medicare offers a range of preventive health benefits. Some of the tests and screenings are paid in full while other may be subject to a deductible, co-insurance, and/or copay. Some of these benefits include a comprehensive review of your medical history including lifestyle, illnesses that may run in your family, and various assessments and screenings as appropriate. After reviewing your medical record and screening and assessments performed today your provider may have ordered immunizations, labs, imaging, and/or referrals for you. A list of these orders (if applicable) as well as your Preventive Care list are included within your After Visit Summary for your review. Other Preventive Recommendations:    · A preventive eye exam performed by an eye specialist is recommended every 1-2 years to screen for glaucoma; cataracts, macular degeneration, and other eye disorders. · A preventive dental visit is recommended every 6 months. · Try to get at least 150 minutes of exercise per week or 10,000 steps per day on a pedometer . · Order or download the FREE \"Exercise & Physical Activity: Your Everyday Guide\" from [employer]. Call 7-706.165.1306 or search [employer] online. · You need 0530-8860 mg of calcium and 5808-6120 IU of vitamin D per day. It is possible to meet your calcium requirement with diet alone, but a vitamin D supplement is usually necessary to meet this goal.  · When exposed to the sun, use a sunscreen that protects against both UVA and UVB radiation with an SPF of 30 or greater. Reapply every 2 to 3 hours or after sweating, drying off with a towel, or swimming.   · Always wear a seat belt when traveling in a car. Always wear a helmet when riding a bicycle or motorcycle.

## 2022-02-24 NOTE — PROGRESS NOTES
2022    Kera Moe (:  1945) is a 64 y.o. male, here for a preventive medicine evaluation. Chief Complaint   Patient presents with   [place] [employer] AWV     AWV. Doing well overall. BPs and weight stable. BP Readings from Last 3 Encounters:   22 132/74   22 138/78   21 138/72     Wt Readings from Last 3 Encounters:   22 150 lb 1.6 oz (68.1 kg)   22 150 lb 12.8 oz (68.4 kg)   21 145 lb 12.8 oz (66.1 kg)     Continues to follow closely with Dr. Hermelindo Grant, labs every few months. Active surveillance for prostate ca, PSA stable. Has not seen Dr. Matthew Wylie in over a year. Lab Results   Component Value Date    PSA 12.78 (H) 2022    PSA 13.49 (H) 2021    PSA 11.53 (H) 2021         Patient Active Problem List   Diagnosis    Hypertension    TIA (transient ischemic attack)    GERD (gastroesophageal reflux disease)    Medication monitoring encounter    SI (sacroiliac) joint inflammation ([address] left    Dvt femoral (deep venous thrombosis) ([facility])    Chronic low back pain    Lung mass    Abnormal CT scan, chest, pulmonary nodule.  PSA elevation, bph elevation    Anticoagulated on Coumadin    Sciatica of left side associated with disorder of lumbar spine    Lumbar disc disease with radiculopathy    Lumbar spinal stenosis    Other spondylosis with radiculopathy, lumbar region    DDD (degenerative disc disease), lumbar    RAMAN (acute kidney injury) ([address]    CKD (chronic kidney disease) stage 3, GFR 30-59 ml/min ([facility])    Pleural effusion, left    Nocturnal leg cramps    Bilateral carpal tunnel syndrome    Prostate cancer ([address] [place] score 6.    History of CVA (cerebrovascular accident)    History of pulmonary embolism    CKD (chronic kidney disease), stage IV ([address]    COVID-19       Review of Systems   Constitutional: Negative. HENT: Negative. Respiratory: Negative. Cardiovascular: Negative.     Gastrointestinal: Negative. Musculoskeletal: Negative. All other systems reviewed and are negative. Prior to Visit Medications    Medication Sig Taking? Authorizing Provider   calcium carbonate 600 MG TABS tablet Take 1 tablet by mouth daily as needed  Yes Historical Provider, MD   Omega-3 Fatty Acids ([facility] FISH OIL PO) Take 1 tablet by mouth 4 times daily  Yes Historical Provider, MD   apixaban (ELIQUIS) 2.5 MG TABS tablet Take 1 tablet by mouth 2 times daily Yes Mickey Coppola MD   Chromium-Cinnamon (CINNAMON PLUS CHROMIUM PO) Take 1 capsule by mouth 4 times daily (before meals and nightly) [address] [facility] units per capsule Yes Historical Provider, MD   B Complex-Biotin-FA (B COMPLEX 100 TR PO) Take 1 tablet by mouth 3 times daily (before meals) [id number] [facility] at [address] [facility] Provider, MD   Cholecalciferol (VITAMIN D3) 50 MCG (2000 UT) CAPS Take 10 capsules by mouth every 14 days One daily but double Mon Wed Fridays Yes Historical Provider, MD   magnesium cl-calcium carbonate (SLOW-MAG) 71.5-119 MG TBEC tablet Take 2 tablets by mouth 4 times daily (after meals and at bedtime) 2 tabs threes times daily and 1 tab nightly Yes Historical Provider, MD   Levomefolate Glucosamine (METHYLFOLATE PO) Take 15 mg by mouth every morning (before breakfast) Metabolic Maintenance at [address]  Yes Historical Provider, MD   Methylcobalamin 5000 MCG SUBL Place 1 tablet under the tongue nightly [place] at [employer]. com Yes Historical Provider, MD   mupirocin (BACTROBAN) 2 % ointment Apply topically Indications: Skin Abnormalities As needed for dermatologist appointments Yes Historical Provider, MD        Allergies   Allergen Reactions    Morphine Other (See Comments)     Other reaction(s): Dizziness, Dizziness/nausea    Bactrim [Sulfamethoxazole-Trimethoprim] Other (See Comments)     weakness    Nsaids      Other reaction(s): REQUIRES CLARIFICATION    Sulfamethoxazole Nausea And Vomiting    Trimethoprim Nausea And Vomiting       Past Medical History:   Diagnosis Date    Arthritis     neck, Wrists , back    Cancer ([address]     skin (removed)    Cerebral artery occlusion with cerebral infarction ([address]     TIA    DVT (deep venous thrombosis) (HCC)     GERD (gastroesophageal reflux disease)     Hx of blood clots     PE    Hypertension     Kidney disease     [facility]     Pulmonary emboli ([facility] 5/7/16    Scoliosis     TIA (transient ischemic attack)        Past Surgical History:   Procedure Laterality Date    ARM SURGERY Right 12/18/2020    EXCISION FIBROXANTHOMA RIGHT FOREARM WITH SKIN GRAFT performed by Godfrey Medina MD at [place] [id number] Left 02/2021    COLONOSCOPY  2019    [facility]    EGD  2019    [address] / BIOPSY SKIN LESION OF ARM Left 10/11/2017    EXCISION 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 WITH FLAP AND FROZEN SECTION performed by Godfrey eMdina MD at [place] [address] Left 02/2021    left thumb     OTHER SURGICAL HISTORY Left 10/11/2017    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    PROSTATE BIOPSY      SKIN CANCER EXCISION  2010    Face skin cancer removal       Social History     Socioeconomic History    Marital status:       Spouse name: Not on file    Number of children: Not on file    Years of education: Not on file    Highest education level: Not on file   Occupational History    Not on file   Tobacco Use    Smoking status: Never Smoker    Smokeless tobacco: Never Used   Vaping Use    Vaping Use: Never used   Substance and Sexual Activity    Alcohol use: Yes     Comment: rarely    Drug use: No    Sexual activity: Not on file   Other Topics Concern    Not on file   Social History Narrative    Not on file     Social Determinants of Health     Financial Resource Strain: Low Risk     Difficulty of Paying Living Expenses: Not hard at all Food Insecurity: No Food Insecurity    Worried About Running Out of Food in the Last Year: Never true    Edwina of Food in the Last Year: Never true   Transportation Needs:     Lack of Transportation (Medical): Not on file    Lack of Transportation (Non-Medical): Not on file   Physical Activity: Insufficiently Active    Days of Exercise per Week: 3 days    Minutes of Exercise per Session: 20 min   Stress:     Feeling of Stress : Not on file   Social Connections:     Frequency of Communication with Friends and Family: Not on file    Frequency of Social Gatherings with Friends and Family: Not on file    Attends Latter day Services: Not on file    Active Member of Clubs or Organizations: Not on file    Attends Club or Organization Meetings: Not on file    Marital Status: Not on file   Intimate Partner Violence:     Fear of Current or Ex-Partner: Not on file    Emotionally Abused: Not on file    Physically Abused: Not on file    Sexually Abused: Not on file   Housing Stability:     Unable to Pay for Housing in the Last Year: Not on file    Number of [place] in the Last Year: Not on file    Unstable Housing in the Last Year: Not on file        Family History   Problem Relation Age of Onset    Cancer Mother     High Blood Pressure Mother     Alzheimer's Disease Mother     Heart Disease Mother     Parkinsonism Father     Diabetes Neg Hx     Kidney Disease Neg Hx     Stroke Neg Hx     Colon Cancer Neg Hx     Esophageal Cancer Neg Hx     Rectal Cancer Neg Hx     Stomach Cancer Neg Hx        ADVANCE DIRECTIVE: N, <no information>    Vitals:    02/24/22 1107   BP: 132/74   Site: Left Upper Arm   Position: Sitting   Cuff Size: Medium Adult   Pulse: 68   Resp: 12   Weight: 150 lb 1.6 oz (68.1 kg)   Height: 5' 6\" (1.676 m)     Estimated body mass index is 24.23 kg/m² as calculated from the following:    Height as of this encounter: 5' 6\" (1.676 m).     Weight as of this encounter: 150 lb 1.6 oz (68.1 kg). Physical Exam  Vitals and nursing note reviewed. Constitutional:       General: He is not in acute distress. Appearance: Normal appearance. He is well-developed. HENT:      Head: Normocephalic and atraumatic. Right Ear: Tympanic membrane normal.      Left Ear: Tympanic membrane normal.   Eyes:      Conjunctiva/sclera: Conjunctivae normal.   Cardiovascular:      Rate and Rhythm: Normal rate and regular rhythm. Heart sounds: Normal heart sounds. No murmur heard. Pulmonary:      Effort: Pulmonary effort is normal.      Breath sounds: Normal breath sounds. No wheezing, rhonchi or rales. Abdominal:      General: There is no distension. Musculoskeletal:      Cervical back: Neck supple. Skin:     General: Skin is warm and dry. Findings: No rash (on exposed surfaces). Neurological:      General: No focal deficit present. Mental Status: He is alert. Psychiatric:         Attention and Perception: Attention normal.         Mood and Affect: Mood normal.         Speech: Speech normal.         Behavior: Behavior normal. Behavior is cooperative. Thought Content: Thought content normal.         Judgment: Judgment normal.         No flowsheet data found.     Lab Results   Component Value Date    CHOL 219 01/13/2022    CHOL 214 09/24/2021    CHOL 199 03/23/2021    TRIG 95 01/13/2022    TRIG 126 09/24/2021    TRIG 81 03/23/2021    HDL 66 01/13/2022    HDL 56 09/24/2021    HDL 49 03/23/2021    HDL 41 05/05/2012    LDLCALC 134 01/13/2022    LDLCALC 133 09/24/2021    LDLCALC 134 03/23/2021    GLUCOSE 104 01/13/2022    GLUCOSE 96 07/23/2020    LABA1C 5.8 01/13/2022    LABA1C 5.7 03/23/2021    LABA1C 5.6 11/23/2020       The 10-year ASCVD risk score (Hanna Bustos et al., 2013) is: 29.4%    Values used to calculate the score:      Age: 64 years      Sex: Male      Is Non- : No      Diabetic: No      Tobacco smoker: No      Systolic Blood Pressure: 553 mmHg      Is BP treated: No      HDL Cholesterol: 66 mg/dL      Total Cholesterol: 219 mg/dL    Immunization History   Administered Date(s) Administered    PPD Test 05/20/2015    Pneumococcal Conjugate 13-valent ([id number]) 08/29/2016    Pneumococcal Polysaccharide ([id number]) 06/05/2012       Health Maintenance   Topic Date Due    Hepatitis C screen  Never done    COVID-19 Vaccine (1) Never done    DTaP/Tdap/Td vaccine (1 - Tdap) Never done    Shingles Vaccine (1 of 2) Never done   [employer] Visit (AWV)  08/25/2021    Flu vaccine (1) Never done    Depression Screen  02/23/2022    Potassium monitoring  01/13/2023    Creatinine monitoring  01/13/2023    PSA counseling  01/13/2023    Pneumococcal 65+ yrs at Risk Vaccine  Completed    Hepatitis A vaccine  Aged Out    Hepatitis B vaccine  Aged Out    Hib vaccine  Aged Out    Meningococcal (ACWY) vaccine  Aged Out       Assessment & Plan   Medicare annual wellness visit, subsequent  CKD (chronic kidney disease), stage IV ([address]  Prostate cancer ([address] [place] score 6. History of DVT (deep vein thrombosis)    -  Chronic medical problems stable  -  Labs in Epic reviewed  -  Continue current medications  -  Follow up with specialists as scheduled      Return for Medicare Annual Wellness Visit in 1 year. --Favian Culver, DO    Medicare Annual Wellness Visit    Ester Moe is here for Medicare [address] was seen today for medicare awv. Diagnoses and all orders for this visit:    Medicare annual wellness visit, subsequent    CKD (chronic kidney disease), stage IV ([address]    Prostate cancer ([address] [place] score 6.     History of DVT (deep vein thrombosis)         Recommendations for Preventive Services Due: see orders and patient instructions/AVS.  Recommended screening schedule for the next 5-10 years is provided to the patient in written form: see Patient Instructions/AVS.     Return for Medicare Annual Wellness Visit in 1 year. Subjective       Patient's complete Health Risk Assessment and screening values have been reviewed and are found in Flowsheets. The following problems were reviewed today and where indicated follow up appointments were made and/or referrals ordered. Positive Risk Factor Screenings with Interventions:    Fall Risk:  Do you feel unsteady or are you worried about falling? : (!) yes  2 or more falls in past year?: no  Fall with injury in past year?: no     Fall Risk Interventions:    · Home safety tips provided  · Home exercises provided to promote strength and balance              General Health and ACP:  General  In general, how would you say your health is?: Very Good  In the past 7 days, have you experienced any of the following: New or Increased Pain, New or Increased Fatigue, Loneliness, Social Isolation, Stress or Anger?: No  Do you get the social and emotional support that you need?: Yes  Do you have a Living Will?: Yes    Advance Directives     Power of  Living Will ACP-Advance Directive ACP-Power of     Not on File Not on File Not on File Filed      General Health Risk Interventions:  · NA              Objective   Vitals:    02/24/22 1107   BP: 132/74   Site: Left Upper Arm   Position: Sitting   Cuff Size: Medium Adult   Pulse: 68   Resp: 12   Weight: 150 lb 1.6 oz (68.1 kg)   Height: 5' 6\" (1.676 m)      Body mass index is 24.23 kg/m². Allergies   Allergen Reactions    Morphine Other (See Comments)     Other reaction(s): Dizziness, Dizziness/nausea    Bactrim [Sulfamethoxazole-Trimethoprim] Other (See Comments)     weakness    Nsaids      Other reaction(s): REQUIRES CLARIFICATION    Sulfamethoxazole Nausea And Vomiting    Trimethoprim Nausea And Vomiting     Prior to Visit Medications    Medication Sig Taking?  Authorizing Provider   calcium carbonate 600 MG TABS tablet Take 1 tablet by mouth daily as needed  Yes Historical Provider, MD Omega-3 Fatty Acids ([facility] FISH OIL PO) Take 1 tablet by mouth 4 times daily  Yes Historical Provider, MD   apixaban (ELIQUIS) 2.5 MG TABS tablet Take 1 tablet by mouth 2 times daily Yes Matty Tamayo MD   Chromium-Cinnamon (CINNAMON PLUS CHROMIUM PO) Take 1 capsule by mouth 4 times daily (before meals and nightly) [address] [facility] units per capsule Yes Historical Provider, MD   B Complex-Biotin-FA (B COMPLEX 100 TR PO) Take 1 tablet by mouth 3 times daily (before meals) [id number] [facility] [address] [facility] Provider, MD   Cholecalciferol (VITAMIN D3) 50 MCG (2000 UT) CAPS Take 10 capsules by mouth every 14 days One daily but double Mon Wed Fridays Yes Historical Provider, MD   magnesium cl-calcium carbonate (SLOW-MAG) 71.5-119 MG TBEC tablet Take 2 tablets by mouth 4 times daily (after meals and at bedtime) 2 tabs threes times daily and 1 tab nightly Yes Historical Provider, MD   Levomefolate Glucosamine (METHYLFOLATE PO) Take 15 mg by mouth every morning (before breakfast) Metabolic Maintenance at [employer] or [employer]  Yes Historical Provider, MD   Methylcobalamin 5000 MCG SUBL Place 1 tablet under the tongue nightly [place] at [employer]. com Yes Historical Provider, MD   mupirocin (BACTROBAN) 2 % ointment Apply topically Indications: Skin Abnormalities As needed for dermatologist appointments Yes Historical Provider, MD [place] (Including outside providers/suppliers regularly involved in providing care):   Patient Care Team:  Mandi Tse DO as PCP - General (Family Medicine)  Mandi Tse DO as PCP - [facility] Empaneled Provider  Ernesto Villatoro MD as Consulting Physician (Pulmonology)  Shashank Grande MD as Consulting Physician (Urology)  Chicoh Lackey MD as Consulting Physician (Gastroenterology)    Reviewed and updated this visit:  Tobacco  Allergies  Meds  Problems  Med Hx  Surg Hx  Soc Hx  Fam Hx

## 2022-05-16 ENCOUNTER — NURSE ONLY (OUTPATIENT)
Dept: LAB | Age: 77
End: 2022-05-16

## 2022-05-16 DIAGNOSIS — G45.9 TIA (TRANSIENT ISCHEMIC ATTACK): ICD-10-CM

## 2022-05-16 DIAGNOSIS — K90.89 OTHER INTESTINAL MALABSORPTION: ICD-10-CM

## 2022-05-16 DIAGNOSIS — R97.20 PSA ELEVATION: ICD-10-CM

## 2022-05-16 DIAGNOSIS — I82.512 CHRONIC DEEP VEIN THROMBOSIS (DVT) OF FEMORAL VEIN OF LEFT LOWER EXTREMITY (HCC): ICD-10-CM

## 2022-05-16 DIAGNOSIS — G47.62 NOCTURNAL LEG CRAMPS: ICD-10-CM

## 2022-05-16 DIAGNOSIS — R07.81 PLEURITIC CHEST PAIN: ICD-10-CM

## 2022-05-16 DIAGNOSIS — R73.09 LOW GLUCOSE LEVEL: ICD-10-CM

## 2022-05-16 DIAGNOSIS — N18.30 STAGE 3 CHRONIC KIDNEY DISEASE, UNSPECIFIED WHETHER STAGE 3A OR 3B CKD (HCC): ICD-10-CM

## 2022-05-16 DIAGNOSIS — Z71.89 ENCOUNTER FOR HERB AND VITAMIN SUPPLEMENT MANAGEMENT: ICD-10-CM

## 2022-05-16 DIAGNOSIS — I10 ESSENTIAL HYPERTENSION: ICD-10-CM

## 2022-05-16 DIAGNOSIS — Z86.711 HISTORY OF PULMONARY EMBOLISM: ICD-10-CM

## 2022-05-16 DIAGNOSIS — C61 PROSTATE CANCER (HCC): ICD-10-CM

## 2022-05-16 DIAGNOSIS — U07.1 COVID-19: ICD-10-CM

## 2022-05-16 LAB
ANION GAP SERPL CALCULATED.3IONS-SCNC: 12 MEQ/L (ref 8–16)
AVERAGE GLUCOSE: 102 MG/DL (ref 70–126)
BASOPHILS # BLD: 0.6 %
BASOPHILS ABSOLUTE: 0 THOU/MM3 (ref 0–0.1)
BUN BLDV-MCNC: 33 MG/DL (ref 7–22)
CALCIUM SERPL-MCNC: 9.1 MG/DL (ref 8.5–10.5)
CHLORIDE BLD-SCNC: 108 MEQ/L (ref 98–111)
CHOLESTEROL, TOTAL: 184 MG/DL (ref 100–199)
CO2: 21 MEQ/L (ref 23–33)
CREAT SERPL-MCNC: 2 MG/DL (ref 0.4–1.2)
EOSINOPHIL # BLD: 1 %
EOSINOPHILS ABSOLUTE: 0.1 THOU/MM3 (ref 0–0.4)
ERYTHROCYTE [DISTWIDTH] IN BLOOD BY AUTOMATED COUNT: 12.3 % (ref 11.5–14.5)
ERYTHROCYTE [DISTWIDTH] IN BLOOD BY AUTOMATED COUNT: 42.6 FL (ref 35–45)
GFR SERPL CREATININE-BSD FRML MDRD: 32 ML/MIN/1.73M2
GLUCOSE BLD-MCNC: 98 MG/DL (ref 70–108)
HBA1C MFR BLD: 5.4 % (ref 4.4–6.4)
HCT VFR BLD CALC: 41.4 % (ref 42–52)
HDLC SERPL-MCNC: 47 MG/DL
HEMOGLOBIN: 13.7 GM/DL (ref 14–18)
IMMATURE GRANS (ABS): 0.02 THOU/MM3 (ref 0–0.07)
IMMATURE GRANULOCYTES: 0.3 %
LDL CHOLESTEROL CALCULATED: 113 MG/DL
LIPASE: 40 U/L (ref 5.6–51.3)
LYMPHOCYTES # BLD: 25.8 %
LYMPHOCYTES ABSOLUTE: 1.8 THOU/MM3 (ref 1–4.8)
MAGNESIUM: 2.1 MG/DL (ref 1.6–2.4)
MCH RBC QN AUTO: 31.3 PG (ref 26–33)
MCHC RBC AUTO-ENTMCNC: 33.1 GM/DL (ref 32.2–35.5)
MCV RBC AUTO: 94.5 FL (ref 80–94)
MONOCYTES # BLD: 9 %
MONOCYTES ABSOLUTE: 0.6 THOU/MM3 (ref 0.4–1.3)
NUCLEATED RED BLOOD CELLS: 0 /100 WBC
PLATELET # BLD: 228 THOU/MM3 (ref 130–400)
PMV BLD AUTO: 9.7 FL (ref 9.4–12.4)
POTASSIUM SERPL-SCNC: 4.2 MEQ/L (ref 3.5–5.2)
PTH INTACT: 41.8 PG/ML (ref 15–65)
RBC # BLD: 4.38 MILL/MM3 (ref 4.7–6.1)
RHEUMATOID FACTOR: < 10 IU/ML (ref 0–13)
SEDIMENTATION RATE, ERYTHROCYTE: 10 MM/HR (ref 0–10)
SEG NEUTROPHILS: 63.3 %
SEGMENTED NEUTROPHILS ABSOLUTE COUNT: 4.5 THOU/MM3 (ref 1.8–7.7)
SODIUM BLD-SCNC: 141 MEQ/L (ref 135–145)
TRIGL SERPL-MCNC: 121 MG/DL (ref 0–199)
TSH SERPL DL<=0.05 MIU/L-ACNC: 0.59 UIU/ML (ref 0.4–4.2)
VITAMIN D 25-HYDROXY: 52 NG/ML (ref 30–100)
WBC # BLD: 7.1 THOU/MM3 (ref 4.8–10.8)

## 2022-05-17 LAB
C-REACTIVE PROTEIN, HIGH SENSITIVITY: 3.6 MG/L
HOMOCYSTEINE: 12.6 UMOL/L
T3 TOTAL: 91 NG/DL (ref 60–181)

## 2022-05-18 LAB
DHEAS (DHEA SULFATE): 107 UG/DL (ref 28–175)
GLIADIN PEPTIDE IGG: < 0.4 U/ML
THYROID PEROXIDASE ANTIBODY: < 4 IU/ML (ref 0–25)
THYROXINE (T4): 3.7 UG/DL (ref 4.5–10.9)

## 2022-05-19 ENCOUNTER — OFFICE VISIT (OUTPATIENT)
Dept: FAMILY MEDICINE CLINIC | Age: 77
End: 2022-05-19
Payer: MEDICARE

## 2022-05-19 VITALS
WEIGHT: 157.2 LBS | TEMPERATURE: 97.1 F | OXYGEN SATURATION: 98 % | HEIGHT: 66 IN | SYSTOLIC BLOOD PRESSURE: 142 MMHG | RESPIRATION RATE: 10 BRPM | DIASTOLIC BLOOD PRESSURE: 68 MMHG | BODY MASS INDEX: 25.26 KG/M2 | HEART RATE: 67 BPM

## 2022-05-19 DIAGNOSIS — C61 PROSTATE CANCER (HCC): Primary | ICD-10-CM

## 2022-05-19 DIAGNOSIS — R35.1 NOCTURIA: ICD-10-CM

## 2022-05-19 LAB
ANA SCREEN: NORMAL
TESTOSTERONE FREE: NORMAL

## 2022-05-19 PROCEDURE — 1123F ACP DISCUSS/DSCN MKR DOCD: CPT | Performed by: FAMILY MEDICINE

## 2022-05-19 PROCEDURE — 4040F PNEUMOC VAC/ADMIN/RCVD: CPT | Performed by: FAMILY MEDICINE

## 2022-05-19 PROCEDURE — G8427 DOCREV CUR MEDS BY ELIG CLIN: HCPCS | Performed by: FAMILY MEDICINE

## 2022-05-19 PROCEDURE — 1036F TOBACCO NON-USER: CPT | Performed by: FAMILY MEDICINE

## 2022-05-19 PROCEDURE — 99213 OFFICE O/P EST LOW 20 MIN: CPT | Performed by: FAMILY MEDICINE

## 2022-05-19 PROCEDURE — G8417 CALC BMI ABV UP PARAM F/U: HCPCS | Performed by: FAMILY MEDICINE

## 2022-05-19 ASSESSMENT — ENCOUNTER SYMPTOMS
ABDOMINAL DISTENTION: 1
DIARRHEA: 1
ALLERGIC/IMMUNOLOGIC NEGATIVE: 1
RESPIRATORY NEGATIVE: 1

## 2022-05-19 NOTE — PROGRESS NOTES
[address] [facility] [id number]  Dept: [phone]  Dept Fax: [phone]  Loc: [phone]      Suri Moe is a 64 y.o. White male. Debi Johnson  presents to the [place] [address] clinic today for   Chief Complaint   Patient presents with    Follow-up    Joint Pain   , and;   1. Prostate cancer ([address]    2. Nocturia          I have reviewed Suri Moe medical, surgical and other pertinent history in detail, and have updated medication and allergy information in the computerized patient record. Clinical Care Team:     -Referring Provider for today's consult: self  -Primary Care Provider: Eleanor Frias DO    Medical/Surgical History:   He  has a past medical history of Arthritis, Cancer [facility]), Cerebral artery occlusion with cerebral infarction [facility]), DVT (deep venous thrombosis) ([facility] [address] GERD (gastroesophageal reflux disease), Hx of blood clots, Hypertension, Kidney disease, Prostate cancer ([facility] [address] Pulmonary emboli ([facility] [address] Scoliosis, and TIA (transient ischemic attack). His  has a past surgical history that includes Skin cancer excision (2010); other surgical history (Left, 10/11/2017); EXCISION / BIOPSY SKIN LESION OF ARM (Left, 10/11/2017); EGD (2019); Colonoscopy (2019); Prostate biopsy; Arm Surgery (Right, 12/18/2020); Carpal tunnel release (Left, 02/2021); and Finger trigger release (Left, 02/2021). Family/Social History:     His family history includes Alzheimer's Disease in his mother; Cancer in his mother; Heart Disease in his mother; High Blood Pressure in his mother; Parkinsonism in his father. He  reports that he has never smoked. He has never used smokeless tobacco. He reports current alcohol use. He reports that he does not use drugs.     Medications/Allergies/Immunizations:     His current medication(s) include   Current Outpatient Medications:     NONFORMULARY, Take 1 caplet by mouth 2 times daily Magtein by [employer]. com, Disp: , Rfl:     calcium carbonate 600 MG TABS tablet, Take 1 tablet by mouth 2 times daily as needed , Disp: , Rfl:     Omega-3 Fatty Acids (CVS FISH OIL PO), Take 1 tablet by mouth 4 times daily , Disp: , Rfl:     apixaban (ELIQUIS) 2.5 MG TABS tablet, Take 1 tablet by mouth 2 times daily, Disp: 180 tablet, Rfl: 1    Chromium-Cinnamon (CINNAMON PLUS CHROMIUM PO), Take 1 capsule by mouth 4 times daily (before meals and nightly) [facility] units per capsule, Disp: , Rfl:     B Complex-Biotin-FA (B COMPLEX 100 TR PO), Take 1 tablet by mouth 2 times daily (before meals) [address] [facility], Disp: , Rfl:     Cholecalciferol (VITAMIN D3) 50 MCG (2000 UT) CAPS, Take 10 capsules by mouth every 14 days One daily but double Tues Thurs, Disp: , Rfl:     magnesium cl-calcium carbonate (SLOW-MAG) 71.5-119 MG TBEC tablet, Take 1 tablet by mouth 4 times daily (after meals and at bedtime) , Disp: , Rfl:     Levomefolate Glucosamine (METHYLFOLATE PO), Take 15 mg by mouth every morning (before breakfast) Metabolic Maintenance at [place] or amazon. com , Disp: , Rfl:     Methylcobalamin 5000 MCG SUBL, Place 1 tablet under the tongue nightly Vera at [employer]. com, Disp: , Rfl:     mupirocin (BACTROBAN) 2 % ointment, Apply topically Indications: Skin Abnormalities As needed for dermatologist appointments, Disp: , Rfl: 1  Allergies: Morphine, Bactrim [sulfamethoxazole-trimethoprim], Nsaids, Sulfamethoxazole, and Trimethoprim  Immunizations:   Immunization History   Administered Date(s) Administered    PPD Test 05/20/2015    Pneumococcal Conjugate 13-valent ([id number]) 08/29/2016    Pneumococcal Polysaccharide ([id number]) 06/05/2012        History of Present Illness:     Sussy had concerns including Follow-up and Joint Pain. Rupesh Jerez  presents to the [address] [place] [address] for;   Chief Complaint   Patient presents with    Follow-up    Joint Pain   , abnormal labs follow up and these conditions as he  Is looking today for:     1. Prostate cancer ([facility] [address]    2. Nocturia      HPI    Subjective:     Review of Systems   Constitutional: Positive for fatigue. HENT: Negative. Eyes: Positive for visual disturbance. Respiratory: Negative. Cardiovascular: Negative. Gastrointestinal: Positive for abdominal distention and diarrhea. Endocrine: Negative. Genitourinary: Positive for frequency. Musculoskeletal: Positive for arthralgias, gait problem and joint swelling. Skin: Negative. Allergic/Immunologic: Negative. Neurological: Positive for dizziness and light-headedness. Hematological: Negative. Psychiatric/Behavioral: Positive for decreased concentration and sleep disturbance. All other systems reviewed and are negative. Objective:     BP (!) 142/68 (Site: Left Upper Arm, Position: Sitting, Cuff Size: Medium Adult)   Pulse 67   Temp 97.1 °F (36.2 °C) (Temporal)   Resp 10   Ht 5' 6\" (1.676 m)   Wt 157 lb 3.2 oz (71.3 kg)   SpO2 98%   BMI 25.37 kg/m²   Physical Exam  Vitals and nursing note reviewed. Constitutional:       Appearance: Normal appearance. HENT:      Head: Normocephalic. Pulmonary:      Effort: Pulmonary effort is normal.   Neurological:      Mental Status: He is alert. Psychiatric:         Mood and Affect: Mood normal.         Thought Content: Thought content normal.            Laboratory Data:   Lab results were searched in Care Everywhere and/or those brought by the pateint were reviewed today with Saman Khanna and he has a copy of their most recent labs to take home with them as noted below;       Imaging Data:   Imaging Data:       Assessment & Plan:       Impression:  1. Prostate cancer ([facility] [address]    2.  Nocturia      Assessment and Plan:  After reviewing the patients chief complaints, reviewing their labfindings in great detail (with the patient and those accompanying them) which correlate to their chief complaints, symptoms, and or medical conditions; suggestions were made relating to changes in diet and or supplements which may improve the complaints and which will be reflected in their future lab findings; Chief Complaint   Patient presents with    Follow-up    Joint Pain   ;    Plans for the next visits:  - Abnormal and non-optimal Labs were ordered today to be repeated in the next 120-365 days to assess changes from adjustments in nutrition and or nutrients. - Patient instructed when having a blood draw to ask the  to divide their lab draws into multiple draws over several days if not feeling good at the time of the lab draw or if either prefers to do several smaller blood draws over several days  -Patient instructed to check with insurer before each lab draw and to go to the lab which the insurer directs them for the most cost effective lab draw with the least patient's cost  - Wilver Thomason  will be scheduled subsequent to those results. Leonora Miramontes will bring in his drink, food, supplement log to his next visit    Chronic Problems Addressed on this Visit:                                   1.  Intensity of Service; Uncontrolled items at this visit; Chief Complaint   Patient presents with    Follow-up    Joint Pain   ; Improved items at this visit and Stable items were discussed at this visit;  2. Patients food, drinks, supplements and symptoms were reviewed with the patient,       - Wilver Thomason will bring food, drink, supplements and symptoms log to next visit for inclusion in their record      - 75 better food list reviewed & given to patient with the omega 6 food list to avoid      - The 52 Latex foods list was reviewed and given to the patients with the information on carrageenan         - Gluten in corn and oats abstracts sheet reviewed and given to the patient today   3.    Greater than 20 minutes time was spent with the patient face to face on this visit; of which >50% was for counseling and coordination of care, as well as the time spent before and after the visit reviewing the chart, documenting the encounter, reviewing labs,reports, NIH listed studies, making phone calls, etc.      Patients food and drinks were reviewed with the patient,   - They will bring a food drink symptom log to future visits for inclusion in their record    - 75 better food list reviewed & given to patient along with the omega 6 food list to avoid      - Gluten in corn and oats abstracts sheet reviewed and given to the patient today    - 23 Foods containing Latex-like proteins was reviewed and copy to be taken if desired     - Nutrient Supplements list provided and copyto be taken if desired    - [id number]. Aptela web site offered to patient to review at their convenience by staff with login information    Note:  I have discussed with the patient that with all nutraceuticals, there is often mixed data and emerging research which needs to be monitored; as well as an array of NIH fact sheets on nutrients and supplements, available at www.nih,issue plus Mingle360. Aptela plus www.ShowMe VIdeokei,org. If I have recommended cinnamon at the request of this patient to assist them in control of their blood sugar, triglyceride, and/or weight issues. I discussed that the patient's clinical use of cinnamon bark, calcium, magnesium, Vitamin D, and pharmaceutical grade CVS omega 3 oil or triple-strength fish oil, and B-50/B-100 time-released B-complex by [address] [facility] will be for a time-limited trial to determine their individual effectiveness and safety in this patient. I also referred the patient to the NMCD: Nutrition, Metabolism, and Cardiovascular Diseases (SecuritiesCard.pl) and concerns about long-term use and hepatotoxicity of cinnamon and other nutrients.   I suggested they frequently search nih.gov for the latest non-proprietary information on nutriceuticals as well as consider a subscription to TidePool for details on reviewed supplements, or at the least review the nutrient files at [facility] at [facility], [address] bark, an insulin mimetic, reduces some High Carbohydrate Dietary Impacts. Methylhydroxychalcone polymers insulin-enhancing properties in fat cells are responsible for enhanced glucose uptake, inhibiting hepatic HMG-CoA reductase and lowers lipids. www.jacn. org/content/20/4/327.full     But cinnamon with additives such as Cinnamon Extract are not effective as insulin mimetics.  :eStoreDirectory.at     Nutrients for Start up from [employer] or [employer] for ease to get started now;  Bon Kim has some useable products;  - Triple Strength Fish Oil, enteric coated  - Vit D-3 5000 IU gel caps  - Iron ferrous sulfate 325 mg tabs  - Centrum Silver look-a-like for most patients, or  - Centrum plain look-a-like if need iron    Local pharmacies or chains such as [employer], have:  - [employer] pharmaceutical grade omega 3 is 90% EPA/DHA whereas most Triple strength fish oil are 75% EPA/DHA  - Triple Strength Fish Oil (enteric coated if available) or if not enteric coated, can take from freezer for less burps  - B-50 or B-100 released balanced B complex tabs by [address] [facility] bark 500 mg (without Chromium or extracts)   some brands list 1000 mg / serving of 2 capsules,    some brands have 1000 mg caps with the undesireable chromium extract  - Calcium carbonate/citrate, magnesium oxide/citrate, Vit D-3 as 3-4 tabs/caps/serving     Some Local Brands may contain Zinc which is acceptable for the first bottle or two  - Magnesium oxide 250 mg tabs for those having < 2 bowel movements daily  - Magnesium citrate 200 mg if having > 2 bowel movements/day  - Centrum Silver or look-a-like for most patients, Centrum plain or look-a-like with iron  - Vitamin D-3 comes as 1,000 IU or 2,000 IU or 5,000 IU gel caps or Liquid drops but keep Vitamin D levels <50 but >40     Some brands containing or derived from soy oil or corn oil are OK if not allergic to soy  - Elemental Iron 65 mg tabs at bedtime is available over the counter if need more iron     Usually turns bowel movements grey, green, or black but not a concern  - Apricot Kernel Oil (by Now) for dry skin sensitive perineal or perianal area skin    Nutrients for ongoing use by Mail order for less expense from Email Data Source ;  - Strength Fish Oil , 240 Softgels Item #104411  -B-100 time released balanced B complex Item #859067  - Cinnamon bark 500 mg without Chromium or extract Item #726436  - Calcium carbonate 1000 mg, Magnesium oxide 500 mg, Vit D-3 400 IU Item #330106  - Magnesium oxide 500 mg tabs Item #714033 if less than 2 bowel movements daily  - [employer] Sen[employer] Item #531189 for most patients, One Daily Item #817551 with iron  - Vit D 3  1,000 Item #172958      2,000 IU Item #435782   Item #089264     Some brands containing orderived from soy oil or corn oil are OK if not allergic to soy    Nutrients for Special Needs by Mail order for less expense from www. puritan.com;  -Elemental Iron 65 mg tabs Item #119537 if need more iron for low iron on labs    Usually turns bowel movements grey, green or black but not a concern  - Time released Niacin 250 mg Item #847099 for cold intolerance, low libido or impotence  - DHEA 50 mg Item #129314 for improving DHEA levels on labs if having Fatigue    If stools too loose substitute for your Magnesium oxide using;   Magnesium citrate 200 mg tabs (NOT liquid) at VitaminsL99.com. SwiftPayMD(TM) by [employer]   Magnesium gluconate 550 mg by Alisha at [employer] or K[employer]hu. com  Magnesium chloride foot soaks or body sprays  www.TIM Group   Magnesium chloride flakes 14.99 Item #: [id number] if back-ordered, get spray  Magnesium threonate, Magtein also helps mental clarity and sleep    Food Drink Symptom Log;  I asked this patient to track these items and any other symptoms on their list on a weekly basis to documenttheir progress or lack of same. This can be done on the symptom tracking sheet I gave them at today's visit but looks like this:                                                      Rate on scale of 0-10 with zero = not noticeable  Symptom:                            Week 1               2                 3                 4               Etc            Hair loss    Foot cramps    Paresthesia    Aches    IBS (irritable bowel)    Constipation    Diarrhea  Nocturia (up to bathroom at night)    Fatigue/Energy level  Stress      On the other side of the sheet they can track their food, drink, environment, activity, symptoms etc      Avoiding Latex-like proteins in my foods; Avocados, Bananas, Celery, Figs & Kiwi proteins have latex-like proteins to inflame our immune systems, plus 47 more foods  How Can I Have A Latex Allergy? Eating foods with latex-like protein exposes us to latex allergies. Our body cannot tell the differencebetween these latex-like proteins and latex from rubber products since many people are allergic to fruit, vegetables and latex. Read labels on pre-packaged foods. This list to avoid is only a guide if you are known allergicto latex or have a latex rash on your chin, cheeks and lines on your neck and chest. The amount of latex is different in each food product or fruit variety. Avoid out of Season if not grown locally:   Melon, Nectarine, Papaya, Cherry, Passion fruit, Plum, Chestnuts, and Tomato. Avocado, Banana, Celery, Figs, and Kiwi always contain Latex-like protein. Whats in Season? Strawberries taste better in June than December because June is strawberry season so buy locally grown produce \"in season\" for the best flavor, cost, and less Latex. Locally grown produce not only tastes great but also requires little or no ethylene exposure in food distribution so has less latex content.   Out allergies should warn physicians before undergoing procedures which may cause anaphylactic reaction if in contact with latex gloves. Some of the common foods with defined cross-reactivity to latex are avocado, banana, kiwi, chestnut, raw potato, tomato, stone fruits (e.g., peach, cherry), hazelnut, melons, celery, carrot, apple, pear, papaya, and almond. Foods with less well-defined cross-reactivity to latex are peanuts, peppers, citrus fruits, coconut, pineapple, deysi, fig, passion fruit, Ugli fruit, and grape. This fruit/latex cross-reactivity is worsened by ethylene, a gas used to hasten commercial ripening. In nature, plants produce low levels of the hormone ethylene, which regulates germination, flowering, and ripening. Forced ripening by high ethylene concentrations, plants produce allergenic wound-repair proteins, which are similar to wound-repair proteins made during the tapping of rubber trees. Sensitive individuals who ingest the fruit get a higher dose and worse reaction. Some people may even first become sensitized to latex through fruit. Can food processing increase the concentrations of allergenic proteins? Latex-sensitized children (and adults) in [place] often experience allergic reactions after eating bananas ripened artificially with ethylene. In the [place], food distribution centers treat unripe bananas and other produce with ethylene to ripen; not commonly done in [facility] since fruit is tree-ripened there. Does treatment of food with ethylene induce banana proteins that cross-react with latex? (Alex et al.)    References:   Latex in Foods Allergy, http://ehp.niehs.nih.gov/members/2003/5811/5811.html    Search web for [employer] in Season \" for where you live or are at the time you food shop   Management of Latex, ://medicalcenter. o[employer].edu/  search for nih, latex-like proteins in foods

## 2022-05-19 NOTE — PATIENT INSTRUCTIONS
Thank you   1. Thank you for trusting us with your healthcare needs. You may receive a survey regarding today's visit. It would help us out if you would take a few moments to provide your feedback. We value your input. 2. Please bring in ALL medications BOTTLES, including inhalers, herbal supplements, over the counter, prescribed & non-prescribed medicine. The office would like actual medication bottles and a list.   3. Please note our OFFICE POLICIES:   a. Prior to getting your labs drawn, please check with your insurance company for benefits and eligibility of lab services. Often, insurance companies cover certain tests for preventative visits only. It is patient's responsibility to see what is covered. b. We are unable to change a diagnosis after the test has been performed. c. Lab orders will not be re-printed. Please hold onto your original lab orders and take them to your lab to be completed. d. If you no show your scheduled appointment three times, you will be dismissed from this practice. e. Cate Duffy must be completed 24 hours prior to your schedule appointment. 4. If the list below has been completed, PLEASE FAX RECORDS TO OUR OFFICE @ [phone].  Once the records have been received we will update your records at our office:  Health Maintenance Due   Topic Date Due    COVID-19 Vaccine (1) Never done    Hepatitis C screen  Never done    DTaP/Tdap/Td vaccine (1 - Tdap) Never done    Shingles vaccine (1 of 2) Never done    Pneumococcal 65+ years Vaccine (3 - PPSV23 or PCV20) 08/29/2017

## 2022-05-27 LAB — DHEA UNCONJUGATED: 1.08 NG/ML (ref 0.63–4.7)

## 2022-06-07 ENCOUNTER — OFFICE VISIT (OUTPATIENT)
Dept: NEPHROLOGY | Age: 77
End: 2022-06-07
Payer: MEDICARE

## 2022-06-07 VITALS
DIASTOLIC BLOOD PRESSURE: 84 MMHG | OXYGEN SATURATION: 96 % | HEART RATE: 72 BPM | BODY MASS INDEX: 25.18 KG/M2 | WEIGHT: 156 LBS | SYSTOLIC BLOOD PRESSURE: 145 MMHG

## 2022-06-07 DIAGNOSIS — I10 ESSENTIAL HYPERTENSION: ICD-10-CM

## 2022-06-07 DIAGNOSIS — N18.32 STAGE 3B CHRONIC KIDNEY DISEASE (HCC): Primary | ICD-10-CM

## 2022-06-07 DIAGNOSIS — N28.1 BILATERAL RENAL CYSTS: ICD-10-CM

## 2022-06-07 PROCEDURE — 1123F ACP DISCUSS/DSCN MKR DOCD: CPT | Performed by: INTERNAL MEDICINE

## 2022-06-07 PROCEDURE — 1036F TOBACCO NON-USER: CPT | Performed by: INTERNAL MEDICINE

## 2022-06-07 PROCEDURE — G8417 CALC BMI ABV UP PARAM F/U: HCPCS | Performed by: INTERNAL MEDICINE

## 2022-06-07 PROCEDURE — 99213 OFFICE O/P EST LOW 20 MIN: CPT | Performed by: INTERNAL MEDICINE

## 2022-06-07 PROCEDURE — G8427 DOCREV CUR MEDS BY ELIG CLIN: HCPCS | Performed by: INTERNAL MEDICINE

## 2022-06-07 NOTE — PROGRESS NOTES
Renal Progress Note    Assessment and Plan:      Diagnosis Orders   1. Stage 3b chronic kidney disease ([address]     2. Essential hypertension     3. Bilateral renal cysts               PLAN:  1. Lab result reviewed with the patient. 2. He understood. 3. He had no questions. 4. Serum creatinine stable at 2.0 mg/dL. 5. Medications reviewed. 6. No changes. 7. Return visit in 6 months with labs          Patient Active Problem List   Diagnosis    Hypertension    TIA (transient ischemic attack)    GERD (gastroesophageal reflux disease)    Medication monitoring encounter    SI (sacroiliac) joint inflammation ([address] left    Dvt femoral [facility] thrombosis) ([facility])    Chronic low back pain    Lung mass    Abnormal CT scan, chest, pulmonary nodule.  PSA elevation, bph elevation    Anticoagulated on Coumadin    Sciatica of left side associated with disorder of lumbar spine    Lumbar disc disease with radiculopathy    Lumbar spinal stenosis    Other spondylosis with radiculopathy, lumbar region    DDD (degenerative disc disease), lumbar    RAMAN (acute kidney injury) ([address]    CKD (chronic kidney disease) stage 3, GFR 30-59 ml/min ([facility])    Pleural effusion, left    Nocturnal leg cramps    Bilateral carpal tunnel syndrome    Prostate cancer ([address] Justin score 6.    History of CVA (cerebrovascular accident)    History of pulmonary embolism    CKD (chronic kidney disease), stage IV ([address]    COVID-19           Subjective:   Chief complaint:  Chief Complaint   Patient presents with    Chronic Kidney Disease     Stage IIIb      HPI:This is a follow up visit for Mr. Cyrus Barros here today for return appointment. I see him for chronic kidney disease. Was last seen about 6 months ago. Doing well since then with no complaint. No chest pain. No shortness of breath. No difficulties with urination. The only new medications since last time I saw him is calcium supplement.     ROS:  Pertinent positives stated above in HPI. All other systems were reviewed and were negative. Medications:     Current Outpatient Medications   Medication Sig Dispense Refill    NONFORMULARY Take 1 caplet by mouth 2 times daily Magtein by [employer]      calcium carbonate 600 MG TABS tablet Take 1 tablet by mouth 2 times daily as needed       Omega-3 Fatty Acids (CVS FISH OIL PO) Take 1 tablet by mouth 4 times daily       apixaban (ELIQUIS) 2.5 MG TABS tablet Take 1 tablet by mouth 2 times daily 180 tablet 1    Chromium-Cinnamon (CINNAMON PLUS CHROMIUM PO) Take 1 capsule by mouth 4 times daily (before meals and nightly) [address] [facility] units per capsule      B Complex-Biotin-FA (B COMPLEX 100 TR PO) Take 1 tablet by mouth 2 times daily (before meals) [address] [facility] at [address] (VITAMIN D3) 50 MCG (2000 UT) CAPS Take 10 capsules by mouth every 14 days One daily but double Tues Thurs      magnesium cl-calcium carbonate (SLOW-MAG) 71.5-119 MG TBEC tablet Take 1 tablet by mouth 4 times daily (after meals and at bedtime)       Levomefolate Glucosamine (METHYLFOLATE PO) Take 15 mg by mouth every morning (before breakfast) Metabolic Maintenance at [employer] or amazon. com       Methylcobalamin 5000 MCG SUBL Place 1 tablet under the tongue nightly Vera at [employer]. com      mupirocin (BACTROBAN) 2 % ointment Apply topically Indications: Skin Abnormalities As needed for dermatologist appointments  1     No current facility-administered medications for this visit.        Lab Results:    CBC:   Lab Results   Component Value Date    WBC 7.1 05/16/2022    HGB 13.7 (L) 05/16/2022    HCT 41.4 (L) 05/16/2022    MCV 94.5 (H) 05/16/2022     05/16/2022     BMP:    Lab Results   Component Value Date     05/16/2022     01/13/2022     12/04/2021    K 4.2 05/16/2022    K 4.5 01/13/2022    K 3.9 12/04/2021     05/16/2022     01/13/2022    CL 98 12/04/2021    CO2 21 (L) 05/16/2022    CO2 25 01/13/2022    CO2 23 12/04/2021    BUN 33 (H) 05/16/2022    BUN 31 (H) 01/13/2022    BUN 23 (H) 12/04/2021    CREATININE 2.0 (H) 05/16/2022    CREATININE 1.7 (H) 01/13/2022    CREATININE 1.9 (H) 12/04/2021    GLUCOSE 98 05/16/2022    GLUCOSE 104 01/13/2022    GLUCOSE 116 (H) 12/04/2021      Hepatic:   Lab Results   Component Value Date    AST 44 (H) 12/04/2021    AST 32 09/24/2021    AST 25 11/23/2020    ALT 27 12/04/2021    ALT 29 09/24/2021    ALT 21 11/23/2020    BILITOT 0.4 12/04/2021    BILITOT 1.0 09/24/2021    BILITOT 0.6 11/23/2020    ALKPHOS 50 12/04/2021    ALKPHOS 60 09/24/2021    ALKPHOS 67 11/23/2020     BNP: No results found for: BNP  Lipids:   Lab Results   Component Value Date    CHOL 184 05/16/2022    HDL 47 05/16/2022     INR:   Lab Results   Component Value Date    INR 1.08 08/03/2020    INR 1.60 (H) 07/20/2020    INR 2.51 (H) 06/30/2020     URINE:   Lab Results   Component Value Date    PROTUR 18.2 11/29/2021     Lab Results   Component Value Date    NITRU Negative 12/10/2019    COLORU Yellow 12/10/2019    UROBILINOGEN 0.20 12/10/2019    BILIRUBINUR Negative 12/10/2019    BLOODU Trace-lysed 12/10/2019    GLUCOSEU Negative 12/10/2019    KETUA Negative 12/10/2019      Microalbumen/Creatinine ratio:  No components found for: RUCREAT    Objective:   Vitals: BP (!) 145/84 (Site: Left Upper Arm, Position: Sitting, Cuff Size: Medium Adult)   Pulse 72   Wt 156 lb (70.8 kg)   SpO2 96%   BMI 25.18 kg/m²      Constitutional:  Alert, awake, no apparent distress  Skin:normal with no rash or any significant lesions  HEENT:Pupils are reactive . Throat is clear.   Oral mucosa is moist.  Neck:supple with no thyromegaly, JVD, lymphadenopathy or bruit   Cardiovascular: Regular sinus rhythm without murmur, rubs or gallops   Respiratory:  Clear to auscultation with no wheezes or rales  Abdomen: Good bowel sound, soft, non tender and no bruit  Ext: No LE edema  Musculoskeletal:Intact  Neuro:Alert, awake and oriented with no obvious focal deficit. Speech is normal.    Electronically signed by Josee Odom MD on 6/7/2022 at 12:59 PM   **This report has been created using voice recognition software. It maycontain minor  errors which are inherent in voice recognition technology. **

## 2022-07-08 ENCOUNTER — TELEPHONE (OUTPATIENT)
Dept: FAMILY MEDICINE CLINIC | Age: 77
End: 2022-07-08

## 2022-07-08 DIAGNOSIS — Z86.718 HISTORY OF DVT (DEEP VEIN THROMBOSIS): Primary | ICD-10-CM

## 2022-09-01 NOTE — PROGRESS NOTES
Subjective:      Patient ID: Kalpesh Locke is a 66 y.o. male. HPI:    Chief Complaint   Patient presents with    Arm Pain     left - injury due to lifting  last night     Pt here for left arm injury. Was splitting wood, felt a sharp pain in the bicep region while lifting the wood. No real bruising but there is a small bump. Doing much better today, ROM has improved. Took a Tylenol which seemed to help, also iced. Denies weakness today. No shoulder or neck pain. Patient Active Problem List   Diagnosis    Hypertension    TIA (transient ischemic attack)    GERD (gastroesophageal reflux disease)    Medication monitoring encounter    SI (sacroiliac) joint inflammation ([address] left    [facility] femoral [facility] venous thrombosis) ([facility])    Chronic low back pain    Lung mass    Abnormal CT scan, chest, pulmonary nodule.     PSA elevation, bph elevation    Anticoagulated on Coumadin    Sciatica of left side associated with disorder of lumbar spine    Lumbar disc disease with radiculopathy    Lumbar spinal stenosis    Other spondylosis with radiculopathy, lumbar region    DDD (degenerative disc disease), lumbar    RAMAN (acute kidney injury) ([address]    CKD (chronic kidney disease) stage 3, GFR 30-59 ml/min ([facility])    Pleural effusion, left    Nocturnal leg cramps    Bilateral carpal tunnel syndrome    Prostate cancer ([address] Justin score 6.    History of CVA (cerebrovascular accident)    History of pulmonary embolism     Past Surgical History:   Procedure Laterality Date    COLONOSCOPY  2019    [address] [facility] [employer] [facility]    EGD  2019    [facility]    EXCISION / BIOPSY SKIN LESION OF ARM Left 10/11/2017    EXCISION 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 WITH FLAP AND FROZEN SECTION performed by Coreen Anderson MD at [place] [address] Left 10/11/2017    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    SKIN CANCER EXCISION  2010    Face skin cancer removal Tetracycline Counseling: Patient counseled regarding possible photosensitivity and increased risk for sunburn.  Patient instructed to avoid sunlight, if possible.  When exposed to sunlight, patients should wear protective clothing, sunglasses, and sunscreen.  The patient was instructed to call the office immediately if the following severe adverse effects occur:  hearing changes, easy bruising/bleeding, severe headache, or vision changes.  The patient verbalized understanding of the proper use and possible adverse effects of tetracycline.  All of the patient's questions and concerns were addressed. Patient understands to avoid pregnancy while on therapy due to potential birth defects.

## 2022-09-09 ENCOUNTER — TELEPHONE (OUTPATIENT)
Dept: FAMILY MEDICINE CLINIC | Age: 77
End: 2022-09-09

## 2022-09-09 DIAGNOSIS — Z86.73 HISTORY OF CVA (CEREBROVASCULAR ACCIDENT): ICD-10-CM

## 2022-09-09 DIAGNOSIS — G45.9 TIA (TRANSIENT ISCHEMIC ATTACK): ICD-10-CM

## 2022-09-09 DIAGNOSIS — G47.62 NOCTURNAL LEG CRAMPS: ICD-10-CM

## 2022-09-09 DIAGNOSIS — N18.4 CKD (CHRONIC KIDNEY DISEASE), STAGE IV (HCC): ICD-10-CM

## 2022-09-09 DIAGNOSIS — I10 ESSENTIAL HYPERTENSION: ICD-10-CM

## 2022-09-09 DIAGNOSIS — K90.89 OTHER INTESTINAL MALABSORPTION: ICD-10-CM

## 2022-09-09 DIAGNOSIS — Z86.718 HISTORY OF DVT (DEEP VEIN THROMBOSIS): Primary | ICD-10-CM

## 2022-09-09 DIAGNOSIS — R73.09 LOW GLUCOSE LEVEL: ICD-10-CM

## 2022-09-09 DIAGNOSIS — Z71.89 ENCOUNTER FOR HERB AND VITAMIN SUPPLEMENT MANAGEMENT: ICD-10-CM

## 2022-09-09 DIAGNOSIS — C61 PROSTATE CANCER (HCC): ICD-10-CM

## 2022-09-09 DIAGNOSIS — R35.1 NOCTURIA: ICD-10-CM

## 2022-09-13 ENCOUNTER — NURSE ONLY (OUTPATIENT)
Dept: LAB | Age: 77
End: 2022-09-13

## 2022-09-13 DIAGNOSIS — K90.89 OTHER INTESTINAL MALABSORPTION: ICD-10-CM

## 2022-09-13 DIAGNOSIS — R73.09 LOW GLUCOSE LEVEL: ICD-10-CM

## 2022-09-13 DIAGNOSIS — G47.62 NOCTURNAL LEG CRAMPS: ICD-10-CM

## 2022-09-13 DIAGNOSIS — I10 ESSENTIAL HYPERTENSION: ICD-10-CM

## 2022-09-13 DIAGNOSIS — G45.9 TIA (TRANSIENT ISCHEMIC ATTACK): ICD-10-CM

## 2022-09-13 DIAGNOSIS — Z86.73 HISTORY OF CVA (CEREBROVASCULAR ACCIDENT): ICD-10-CM

## 2022-09-13 DIAGNOSIS — R35.1 NOCTURIA: ICD-10-CM

## 2022-09-13 DIAGNOSIS — Z86.718 HISTORY OF DVT (DEEP VEIN THROMBOSIS): ICD-10-CM

## 2022-09-13 DIAGNOSIS — N18.4 CKD (CHRONIC KIDNEY DISEASE), STAGE IV (HCC): ICD-10-CM

## 2022-09-13 DIAGNOSIS — C61 PROSTATE CANCER (HCC): ICD-10-CM

## 2022-09-13 DIAGNOSIS — Z71.89 ENCOUNTER FOR HERB AND VITAMIN SUPPLEMENT MANAGEMENT: ICD-10-CM

## 2022-09-13 LAB
ALBUMIN SERPL-MCNC: 4.2 G/DL (ref 3.5–5.1)
ALP BLD-CCNC: 64 U/L (ref 38–126)
ALT SERPL-CCNC: 22 U/L (ref 11–66)
ANION GAP SERPL CALCULATED.3IONS-SCNC: 12 MEQ/L (ref 8–16)
AST SERPL-CCNC: 28 U/L (ref 5–40)
BASOPHILS # BLD: 0.5 %
BASOPHILS ABSOLUTE: 0 THOU/MM3 (ref 0–0.1)
BILIRUB SERPL-MCNC: 0.8 MG/DL (ref 0.3–1.2)
BILIRUBIN DIRECT: < 0.2 MG/DL (ref 0–0.3)
BUN BLDV-MCNC: 20 MG/DL (ref 7–22)
C-REACTIVE PROTEIN, HIGH SENSITIVITY: 2.4 MG/L
CALCIUM SERPL-MCNC: 9.3 MG/DL (ref 8.5–10.5)
CHLORIDE BLD-SCNC: 102 MEQ/L (ref 98–111)
CHOLESTEROL, TOTAL: 219 MG/DL (ref 100–199)
CO2: 25 MEQ/L (ref 23–33)
CREAT SERPL-MCNC: 1.9 MG/DL (ref 0.4–1.2)
EOSINOPHIL # BLD: 0.6 %
EOSINOPHILS ABSOLUTE: 0 THOU/MM3 (ref 0–0.4)
ERYTHROCYTE [DISTWIDTH] IN BLOOD BY AUTOMATED COUNT: 12.3 % (ref 11.5–14.5)
ERYTHROCYTE [DISTWIDTH] IN BLOOD BY AUTOMATED COUNT: 42.5 FL (ref 35–45)
GFR SERPL CREATININE-BSD FRML MDRD: 34 ML/MIN/1.73M2
GLUCOSE BLD-MCNC: 144 MG/DL (ref 70–108)
HCT VFR BLD CALC: 45.4 % (ref 42–52)
HDLC SERPL-MCNC: 55 MG/DL
HEMOGLOBIN: 15.5 GM/DL (ref 14–18)
HOMOCYSTEINE: 11.9 UMOL/L
IMMATURE GRANS (ABS): 0.02 THOU/MM3 (ref 0–0.07)
IMMATURE GRANULOCYTES: 0.3 %
LDL CHOLESTEROL CALCULATED: 138 MG/DL
LYMPHOCYTES # BLD: 23.7 %
LYMPHOCYTES ABSOLUTE: 1.6 THOU/MM3 (ref 1–4.8)
MAGNESIUM: 2.1 MG/DL (ref 1.6–2.4)
MCH RBC QN AUTO: 32 PG (ref 26–33)
MCHC RBC AUTO-ENTMCNC: 34.1 GM/DL (ref 32.2–35.5)
MCV RBC AUTO: 93.8 FL (ref 80–94)
MONOCYTES # BLD: 4.6 %
MONOCYTES ABSOLUTE: 0.3 THOU/MM3 (ref 0.4–1.3)
NUCLEATED RED BLOOD CELLS: 0 /100 WBC
PLATELET # BLD: 248 THOU/MM3 (ref 130–400)
PMV BLD AUTO: 9.5 FL (ref 9.4–12.4)
POTASSIUM SERPL-SCNC: 3.9 MEQ/L (ref 3.5–5.2)
PROSTATE SPECIFIC ANTIGEN: 11.76 NG/ML (ref 0–1)
PTH INTACT: 55.5 PG/ML (ref 15–65)
RBC # BLD: 4.84 MILL/MM3 (ref 4.7–6.1)
RHEUMATOID FACTOR: < 10 IU/ML (ref 0–13)
SEDIMENTATION RATE, ERYTHROCYTE: 5 MM/HR (ref 0–10)
SEG NEUTROPHILS: 70.3 %
SEGMENTED NEUTROPHILS ABSOLUTE COUNT: 4.6 THOU/MM3 (ref 1.8–7.7)
SODIUM BLD-SCNC: 139 MEQ/L (ref 135–145)
T3 FREE: 3.05 PG/ML (ref 2.02–4.43)
T3 TOTAL: 98 NG/DL (ref 60–181)
T4 FREE: 1.09 NG/DL (ref 0.93–1.76)
TOTAL PROTEIN: 7 G/DL (ref 6.1–8)
TRIGL SERPL-MCNC: 132 MG/DL (ref 0–199)
TSH SERPL DL<=0.05 MIU/L-ACNC: 1.11 UIU/ML (ref 0.4–4.2)
VITAMIN D 25-HYDROXY: 56 NG/ML (ref 30–100)
WBC # BLD: 6.6 THOU/MM3 (ref 4.8–10.8)

## 2022-09-14 LAB
DHEAS (DHEA SULFATE): 98.9 UG/DL (ref 28–175)
THYROXINE (T4): 5.3 UG/DL (ref 4.5–10.9)

## 2022-09-15 LAB
GLIADIN PEPTIDE IGG: < 0.4 U/ML
THYROID PEROXIDASE ANTIBODY: < 4 IU/ML (ref 0–25)
TISSUE TRANSGLUTAMINASE IGA: 0.8 U/ML

## 2022-09-16 LAB
ANA SCREEN: NORMAL
TESTOSTERONE FREE: NORMAL

## 2022-09-19 ENCOUNTER — NURSE ONLY (OUTPATIENT)
Dept: LAB | Age: 77
End: 2022-09-19

## 2022-09-19 ENCOUNTER — OFFICE VISIT (OUTPATIENT)
Dept: FAMILY MEDICINE CLINIC | Age: 77
End: 2022-09-19
Payer: MEDICARE

## 2022-09-19 VITALS
DIASTOLIC BLOOD PRESSURE: 66 MMHG | HEIGHT: 66 IN | SYSTOLIC BLOOD PRESSURE: 128 MMHG | WEIGHT: 149.4 LBS | TEMPERATURE: 97.9 F | OXYGEN SATURATION: 95 % | BODY MASS INDEX: 24.01 KG/M2 | RESPIRATION RATE: 10 BRPM | HEART RATE: 57 BPM

## 2022-09-19 DIAGNOSIS — B37.0 THRUSH: ICD-10-CM

## 2022-09-19 DIAGNOSIS — M46.1 SI (SACROILIAC) JOINT INFLAMMATION (HCC): ICD-10-CM

## 2022-09-19 DIAGNOSIS — R73.09 LOW GLUCOSE LEVEL: ICD-10-CM

## 2022-09-19 DIAGNOSIS — G47.62 NOCTURNAL LEG CRAMPS: ICD-10-CM

## 2022-09-19 DIAGNOSIS — R35.1 NOCTURIA: Primary | ICD-10-CM

## 2022-09-19 DIAGNOSIS — G45.9 TIA (TRANSIENT ISCHEMIC ATTACK): ICD-10-CM

## 2022-09-19 DIAGNOSIS — C61 PROSTATE CANCER (HCC): ICD-10-CM

## 2022-09-19 DIAGNOSIS — R76.8 ELEVATED ANTI-TISSUE TRANSGLUTAMINASE (TTG) IGA LEVEL: ICD-10-CM

## 2022-09-19 DIAGNOSIS — I82.512 CHRONIC DEEP VEIN THROMBOSIS (DVT) OF FEMORAL VEIN OF LEFT LOWER EXTREMITY (HCC): ICD-10-CM

## 2022-09-19 DIAGNOSIS — Z71.89 ENCOUNTER FOR HERB AND VITAMIN SUPPLEMENT MANAGEMENT: ICD-10-CM

## 2022-09-19 DIAGNOSIS — K90.89 OTHER INTESTINAL MALABSORPTION: ICD-10-CM

## 2022-09-19 DIAGNOSIS — I10 ESSENTIAL HYPERTENSION: ICD-10-CM

## 2022-09-19 PROCEDURE — G8420 CALC BMI NORM PARAMETERS: HCPCS | Performed by: FAMILY MEDICINE

## 2022-09-19 PROCEDURE — G8427 DOCREV CUR MEDS BY ELIG CLIN: HCPCS | Performed by: FAMILY MEDICINE

## 2022-09-19 PROCEDURE — 1036F TOBACCO NON-USER: CPT | Performed by: FAMILY MEDICINE

## 2022-09-19 PROCEDURE — 99213 OFFICE O/P EST LOW 20 MIN: CPT | Performed by: FAMILY MEDICINE

## 2022-09-19 PROCEDURE — 1123F ACP DISCUSS/DSCN MKR DOCD: CPT | Performed by: FAMILY MEDICINE

## 2022-09-19 RX ORDER — PHENOL 1.4 %
1 AEROSOL, SPRAY (ML) MUCOUS MEMBRANE 3 TIMES DAILY
Qty: 30 TABLET | Status: SHIPPED | COMMUNITY
Start: 2022-09-19

## 2022-09-19 RX ORDER — ACETAMINOPHEN 160 MG
1 TABLET,DISINTEGRATING ORAL
Qty: 30 CAPSULE | Status: SHIPPED | COMMUNITY
Start: 2022-09-19

## 2022-09-19 NOTE — PROGRESS NOTES
Blood Pressure in his mother; Parkinsonism in his father. He  reports that he has never smoked. He has never used smokeless tobacco. He reports current alcohol use. He reports that he does not use drugs. Medications/Allergies/Immunizations:     His current medication(s) include   Current Outpatient Medications:     Cholecalciferol (VITAMIN D3) 50 MCG (2000 UT) CAPS, Take 1 capsule by mouth daily (with breakfast) One daily but double Thurs, Disp: 30 capsule, Rfl:     calcium carbonate 600 MG TABS tablet, Take 1 tablet by mouth three times daily, Disp: 30 tablet, Rfl:     NONFORMULARY, Take 1 caplet by mouth 4 times daily (with meals and nightly) Magtein by Source [employer]. com, Disp: , Rfl:     Omega-3 Fatty Acids (CVS FISH OIL PO), Take 1 tablet by mouth 4 times daily , Disp: , Rfl:     apixaban (ELIQUIS) 2.5 MG TABS tablet, Take 1 tablet by mouth 2 times daily, Disp: 180 tablet, Rfl: 1    Chromium-Cinnamon (CINNAMON PLUS CHROMIUM PO), Take 1 capsule by mouth 4 times daily (before meals and nightly) [address] units per capsule, Disp: , Rfl:     B Complex-Biotin-FA (B COMPLEX 100 TR PO), Take 1 tablet by mouth 2 times daily (before meals) [address] [facility] at [employer], Disp: , Rfl:     magnesium cl-calcium carbonate (SLOW-MAG) 71.5-119 MG TBEC tablet, Take 1 tablet by mouth 4 times daily (after meals and at bedtime) , Disp: , Rfl:     Levomefolate Glucosamine (METHYLFOLATE PO), Take 15 mg by mouth every morning (before breakfast) Metabolic Maintenance at [employer] or amazon. com , Disp: , Rfl:     Methylcobalamin 5000 MCG SUBL, Place 1 tablet under the tongue nightly Vera at [employer]. com, Disp: , Rfl:     mupirocin (BACTROBAN) 2 % ointment, Apply topically Indications: Skin Abnormalities As needed for dermatologist appointments, Disp: , Rfl: 1  Allergies: Morphine, Bactrim [sulfamethoxazole-trimethoprim], Nsaids, Sulfamethoxazole, and Trimethoprim  Immunizations:   Immunization History malabsorption    4. SI (sacroiliac) joint inflammation (HCC)    5. Chronic deep vein thrombosis (DVT) of femoral vein of left lower extremity (HCC)    6. Prostate cancer ([address]    7. TIA (transient ischemic attack)    8. Essential hypertension    9. Nocturnal leg cramps    10. Thrush    11. Low glucose level      Assessment and Plan:  After reviewing the patients chief complaints, reviewing their labfindings in great detail (with the patient and those accompanying them) which correlate to their chief complaints, symptoms, and or medical conditions; suggestions were made relating to changes in diet and or supplements which may improve the complaints and which will be reflected in their future lab findings; Chief Complaint   Patient presents with    Follow-up   ;    Plans for the next visits:  - Abnormal and non-optimal Labs were ordered today to be repeated in the next 120-365 days to assess changes from adjustments in nutrition and or nutrients. - Patient instructed when having a blood draw to ask the  to divide their lab draws into multiple draws over several days if not feeling good at the time of the lab draw or if either prefers to do several smaller blood draws over several days  -Patient instructed to check with insurer before each lab draw and to go to the lab which the insurer directs them for the most cost effective lab draw with the least patient's cost  - Pau Dash  will be scheduled subsequent to those results. Megan Malloy will bring in his drink, food, supplement log to his next visit    Chronic Problems Addressed on this Visit:                                   1.  Intensity of Service; Uncontrolled items at this visit; Chief Complaint   Patient presents with    Follow-up   ; Improved items at this visit and Stable items were discussed at this visit;  2.  Patients food, drinks, supplements and symptoms were reviewed with the patient,       - Pau Dash will bring food, drink, Kam Yousif will be for a time-limited trial to determine their individual effectiveness and safety in this patient. I also referred the patient to the NMCD: Nutrition, Metabolism, and Cardiovascular Diseases (SecuritiesCard.pl) and concerns about long-term use and hepatotoxicity of cinnamon and other nutrients. I suggested they frequently search nih.gov for the latest non-proprietary information on nutriceuticals as well as consider a subscription to [employer] for details on reviewed supplements, or at the least review the nutrient files at [facility], [address] bark, an insulin mimetic, reduces some High Carbohydrate Dietary Impacts. Methylhydroxychalcone polymers insulin-enhancing properties in fat cells are responsible for enhanced glucose uptake, inhibiting hepatic HMG-CoA reductase and lowers lipids. www.jacn. org/content/20/4/327.full     But cinnamon with additives such as Cinnamon Extract are not effective as insulin mimetics.  :eStoreDirectory.at     Nutrients for Start up from [place] or [employer] for ease to get started now;  Bon Kim has some useable products;  - Triple Strength Fish Oil, enteric coated  - Vit D-3 5000 IU gel caps  - Iron ferrous sulfate 325 mg tabs  - Centrum Silver look-a-like for most patients, or  - Centrum plain look-a-like if need iron    Local pharmacies or chains such as [employer], have:  - Rounds pharmaceutical grade omega 3 is 90% EPA/DHA whereas most Triple strength fish oil are 75% EPA/DHA  - Triple Strength Fish Oil (enteric coated if available) or if not enteric coated, can take from freezer for less burps  - B-50 or B-100 released balanced B complex tabs by [address] [facility] bark 500 mg (without Chromium or extracts)   some brands list 1000 mg / serving of 2 capsules,    some brands have 1000 mg caps with the undesireable chromium extract  - Calcium carbonate/citrate, magnesium oxide/citrate, Vit D-3 as 3-4 tabs/caps/serving     Some Local Brands may contain Zinc which is acceptable for the first bottle or two  - Magnesium oxide 250 mg tabs for those having < 2 bowel movements daily  - Magnesium citrate 200 mg if having > 2 bowel movements/day  - Centrum Silver or look-a-like for most patients, Centrum plain or look-a-like with iron  - Vitamin D-3 comes as 1,000 IU or 2,000 IU or 5,000 IU gel caps or Liquid drops but keep Vitamin D levels <50 but >40     Some brands containing or derived from soy oil or corn oil are OK if not allergic to soy  - Elemental Iron 65 mg tabs at bedtime is available over the counter if need more iron     Usually turns bowel movements grey, green, or black but not a concern  - Apricot Kernel Oil (by Now) for dry skin sensitive perineal or perianal area skin    Nutrients for ongoing use by Mail order for less expense from [employer]Modify ;  - Strength Fish Oil , 240 Softgels Item #079990  -B-100 time released balanced B complex Item #408051  - Cinnamon bark 500 mg without Chromium or extract Item #[id number]  - Calcium carbonate 1000 mg, Magnesium oxide 500 mg, Vit D-3 400 IU Item #[id number]  - Magnesium oxide 500 mg tabs Item #082332 if less than 2 bowel movements daily  - [employer] Item #327160 for most patients, One Daily Item #517938 with iron  - Vit D 3  1,000 Item #[id number]      2,000 IU Item #752877   Item #879930     Some brands containing orderived from soy oil or corn oil are OK if not allergic to soy    Nutrients for Special Needs by Mail order for less expense from www. puritan.com;  -Elemental Iron 65 mg tabs Item #064689 if need more iron for low iron on labs    Usually turns bowel movements grey, green or black but not a concern  - Time released Niacin 250 mg Item #619890 for cold intolerance, low libido or impotence  - DHEA 50 mg Item #351073 for improving DHEA levels on labs if having Fatigue    If stools too loose substitute for your Magnesium oxide using;   Magnesium citrate 200 mg tabs (NOT liquid) at [employer]. [employer]   Magnesium gluconate 550 mg by Hilary Vanegas at [employer] or [employer]. com  Magnesium chloride foot soaks or body sprays  www.noFeeRealEstateSales.com. [employer]   Magnesium chloride flakes 14.99 Item #: [id number] if back-ordered, get spray  Magnesium threonate, Magtein also helps mental clarity and sleep    Food Drink Symptom Log;  I asked this patient to track these items and any other symptoms on their list on a weekly basis to documenttheir progress or lack of same. This can be done on the symptom tracking sheet I gave them at today's visit but looks like this:                                                      Rate on scale of 0-10 with zero = not noticeable  Symptom:                            Week 1               2                 3                 4               Etc            Hair loss    Foot cramps    Paresthesia    Aches    IBS (irritable bowel)    Constipation    Diarrhea  Nocturia (up to bathroom at night)    Fatigue/Energy level  Stress      On the other side of the sheet they can track their food, drink, environment, activity, symptoms etc      Avoiding Latex-like proteins in my foods; Avocados, Bananas, Celery, Figs & Kiwi proteins have latex-like proteins to inflame our immune systems, plus 47 more foods  How Can I Have A Latex Allergy? Eating foods with latex-like protein exposes us to latex allergies. Our body cannot tell the differencebetween these latex-like proteins and latex from rubber products since many people are allergic to fruit, vegetables and latex. Read labels on pre-packaged foods. This list to avoid is only a guide if you are known allergicto latex or have a latex rash on your chin, cheeks and lines on your neck and chest. The amount of latex is different in each food product or fruit variety.   Avoid out of Season if not grown locally:   [place], [address], [address], [facility], [facility] fruit, Plum, Chestnuts, and Tomato. Avocado, Banana, Celery, Figs, and Kiwi always contain Latex-like protein. Whats in Season? Strawberries taste better in June than December because June is strawberry season so buy locally grown produce \"in season\" for the best flavor, cost, and less Latex. Locally grown produce not only tastes great but also requires little or no ethylene exposure in food distribution so has less latex content. Out of season: use canned, frozen, or dried since those are processed ripe and latex content is lower!!!     Month     [place] Locally Grown Produce  January, February, March: use canned, frozen or dried fruits since lower in latex  April: asparagus, radishes  May: asparagus, broccoli, green onions, greens, peas, radishes, rhubarb  June: asparagus, beets, beans, broccoli, cabbage, cantaloupe, carrots, green onions, greens, lettuce, onions, parsley, peas, radishes, rhubarb, strawberries, watermelons  July: beans, beets, blueberries, broccoli, cabbage, cantaloupe, carrots, cauliflower, celery, cucumbers, eggplant, grapes, green onions, greens, lettuce, onions, parsley, peas, peaches, bell peppers, potatoes, radishes, summer raspberries, squash, sweetcorn, tomatoes, turnips, watermelons  August: apples, beans, beets, blueberries, cabbage, cantaloupe, carrots, cauliflower, celery, cucumbers, eggplant, grapes, green onions, greens, lettuce, onions, parsley, peas, peaches, pears, bell peppers, potatoes, radishes, squash, sweet corn, tomatoes, turnips, watermelons  September: apples, beans, beets, blueberries, cabbage, cantaloupe, carrots, cauliflower, celery, cucumbers, eggplant, grapes, green onions, greens, lettuce, onions, parsley, peas, peaches, pears, bell peppers, plums, potatoes, pumpkins, radishes, fall red raspberries, squash, sweet corn, tomatoes, turnips, watermelons  October: apples, beets, broccoli, cabbage, carrots, cauliflower, celery, green onions, greens, lettuce, parsley, peas, pears, potatoes, pumpkins, radishes, fall red raspberries, squash, turnips  November: broccoli, cabbage, carrots, parsley, pears, peas  December: use canned, frozen or dried fruits since lower in latex    Upto half of latex-sensitive patients show allergic reactions to fruits (avocados, bananas, kiwifruits, papayas, peaches),   Annals of Allergy, 1994. These plants contain the same proteins that are allergens in latex. People with fruit allergies should warn physicians before undergoing procedures which may cause anaphylactic reaction if in contact with latex gloves. Some of the common foods with defined cross-reactivity to latex are avocado, banana, kiwi, chestnut, raw potato, tomato, stone fruits (e.g., peach, cherry), hazelnut, melons, celery, carrot, apple, pear, papaya, and almond. Foods with less well-defined cross-reactivity to latex are peanuts, peppers, citrus fruits, coconut, pineapple, deysi, fig, passion fruit, Ugli fruit, and grape. This fruit/latex cross-reactivity is worsened by ethylene, a gas used to hasten commercial ripening. In nature, plants produce low levels of the hormone ethylene, which regulates germination, flowering, and ripening. Forced ripening by high ethylene concentrations, plants produce allergenic wound-repair proteins, which are similar to wound-repair proteins made during the tapping of rubber trees. Sensitive individuals who ingest the fruit get a higher dose and worse reaction. Some people may even first become sensitized to latex through fruit. Can food processing increase the concentrations of allergenic proteins? Latex-sensitized children (and adults) in [place] often experience allergic reactions after eating bananas ripened artificially with ethylene. In the [place], food distribution centers treat unripe bananas and other produce with ethylene to ripen; not commonly done in [facility] since fruit is tree-ripened there.  Does treatment of food with ethylene induce banana proteins that cross-react with latex? (Alex et al.)    References:   Latex in Foods Allergy, http://ehp.niehs.nih.gov/members/2003/5811/5811.html    Search web for [employer] in Season \" for where you live or are at the time you food shop   Management of Latex, ://[email or web address]/  search for nih, latex-like proteins in foods

## 2022-09-22 LAB
DHEA UNCONJUGATED: 1.04 NG/ML (ref 0.63–4.7)
MISC. #1 REFERENCE GROUP TEST: ABNORMAL

## 2022-10-18 ENCOUNTER — HOSPITAL ENCOUNTER (OUTPATIENT)
Dept: PHARMACY | Age: 77
Setting detail: THERAPIES SERIES
Discharge: HOME | End: 2022-10-18
Payer: MEDICARE

## 2022-10-18 PROCEDURE — 99211 OFF/OP EST MAY X REQ PHY/QHP: CPT | Performed by: PHARMACIST

## 2022-10-18 NOTE — PROGRESS NOTES
Medication Management [facility]  Anticoagulation Clinic  [phone] (phone)  [phone] (fax)      Irish Shanks is a 76 y.o. male with PMHx significant for TIA/DVT/PE who presents to clinic 10/18/2022 for anticoagulation management and education. DOAC Therapy: apixaban   Current Dosage: 2.5mg BID  Anticoagulation Indication(s):  DVT, PE    Referring Physician:   Dr. Bryant Godinez  Intended duration of Anticoagulation Therapy:  indefinite    Pertinent Laboratory Results  Recent CBC Results (baseline and q12mo):  Lab Results   Component Value Date    INR 1.08 08/03/2020    INR 1.60 (H) 07/20/2020    INR 2.51 (H) 06/30/2020     Recent SCr Results (baseline and q12mo):  1.9 on 9/13/22  Calculated CrCl = 28 ml/min  Recent LFT Results (baseline and q12mo):   Latest Reference Range & Units 9/13/22 14:16   Albumin 3.5 - 5.1 g/dL 4.2   Alk Phos 38 - 126 U/L 64   ALT 11 - 66 U/L 22   AST 5 - 40 U/L 28   Bilirubin 0.3 - 1.2 mg/dL 0.8   Bilirubin, Direct 0.0 - 0.3 mg/dL <0.2   Total Protein 6.1 - 8.0 g/dL 7.0   Triglycerides 0 - 199 mg/dL 132     Patients current weight= 67.8 kg    Patient Findings:    Yessi Candy Abhi initiated therapy in 2020 and reports the following:  S/sxs of bleeding: Denies bleeding in nose, sputum, emesis, urine, stool, bruising  Other adverse side effects from therapy: Denies dyspepsia  S/sxs of DVT/PE:  Denies chest pain, shortness of breath, leg/calf pain, swelling or redness  S/sxs of CVA/TIA: Denies headache, dizziness, numbness, vision changes, confusion, slurred speech, facial droop  Compliance: Taken therapy as prescribed - yes. Missed doses - occasional, discussed how to manage missed doses. Affordability: obtains thru [employer] drug assistance program, patient aware to reapply for this.   Refills: per Dr. Bryant Godinez thru [facility]    Recent falls/injuries, hospitalizations or significant changes in overall health or pregnancy: none  Medication (including RX, OTC, and herbals) changes since last visit: none  Potential drug interactions with prescribed NOAC: None  Upcoming scheduled surgeries or procedures: None    Assessment/Plan:  DOAC therapy, dose, and duration remain appropriate based on labs and patient findings. Medication list (including RX, OTC, and herbals) reviewed with patient and evaluated for potential drug interactions (P-gp inhibitors/inducers interact with dabigatran or edoxaban, dual P-gp/CY inhibitors interact with rivaroxaban or apixaban, anti-platelets and NSAIDs may increase bleeding risk)  Recommend repeating CBC,  Scr, LFTs annually; current labs were ordered by other MDs, reviewed all. Patient was instructed to continue current regimen and reminded to call the clinic with any changes in medications, health, pregnancy, insurance/ financial status, planned procedures or surgeries, s/sxs of [id number] bleeding. Patient was instructed to call 911 or go to the ER with any s/sxs of serious or uncontrolled bleeding, stroke, or blood clots. Education Provided:  Reviewed the following education with patient in detail: Reason for therapy, mechanism of action, intended duration of treatment, dosing, frequency, importance of compliance, what to do when a dose is missed, s/sxs of bleeding and thromboembolism, potential for medication interactions and to call clinic when new medications are started, avoidance of NSAIDs, lab monitoring required, perioperative management for surgeries/procedures. Provided patient with written educational materials. Total time spent with patient: 20 min (with >50% of time providing education)    Next Clinic Appointment:  discussed return options, patient wants to continue with annual visits. Please call [facility] at [phone] with any questions.       Carolina Cano, PharmD, BCPS, CACP, CTTS 10/18/2022 1:45 PM      For Pharmacy Admin Tracking Only    Intervention Detail: Adherence Monitorin  Total # of Interventions Recommended: 1  Total # of Interventions Accepted: 1  Time Spent (min): 20

## 2022-11-30 ENCOUNTER — NURSE ONLY (OUTPATIENT)
Dept: LAB | Age: 77
End: 2022-11-30

## 2022-11-30 DIAGNOSIS — N18.32 STAGE 3B CHRONIC KIDNEY DISEASE (HCC): ICD-10-CM

## 2022-11-30 LAB
ANION GAP SERPL CALCULATED.3IONS-SCNC: 12 MEQ/L (ref 8–16)
BUN BLDV-MCNC: 33 MG/DL (ref 7–22)
CALCIUM SERPL-MCNC: 10.2 MG/DL (ref 8.5–10.5)
CHLORIDE BLD-SCNC: 103 MEQ/L (ref 98–111)
CO2: 26 MEQ/L (ref 23–33)
CREAT SERPL-MCNC: 1.8 MG/DL (ref 0.4–1.2)
GFR SERPL CREATININE-BSD FRML MDRD: 38 ML/MIN/1.73M2
GLUCOSE BLD-MCNC: 111 MG/DL (ref 70–108)
POTASSIUM SERPL-SCNC: 4.6 MEQ/L (ref 3.5–5.2)
PTH INTACT: 31.2 PG/ML (ref 15–65)
SODIUM BLD-SCNC: 141 MEQ/L (ref 135–145)
VITAMIN D 25-HYDROXY: 44 NG/ML (ref 30–100)

## 2022-12-06 ENCOUNTER — OFFICE VISIT (OUTPATIENT)
Dept: NEPHROLOGY | Age: 77
End: 2022-12-06
Payer: MEDICARE

## 2022-12-06 VITALS
DIASTOLIC BLOOD PRESSURE: 77 MMHG | HEART RATE: 68 BPM | SYSTOLIC BLOOD PRESSURE: 148 MMHG | HEIGHT: 67 IN | OXYGEN SATURATION: 96 % | WEIGHT: 156 LBS | BODY MASS INDEX: 24.48 KG/M2

## 2022-12-06 DIAGNOSIS — N18.32 STAGE 3B CHRONIC KIDNEY DISEASE (HCC): Primary | ICD-10-CM

## 2022-12-06 DIAGNOSIS — I10 PRIMARY HYPERTENSION: ICD-10-CM

## 2022-12-06 PROCEDURE — 3074F SYST BP LT 130 MM HG: CPT | Performed by: INTERNAL MEDICINE

## 2022-12-06 PROCEDURE — 3078F DIAST BP <80 MM HG: CPT | Performed by: INTERNAL MEDICINE

## 2022-12-06 PROCEDURE — G8420 CALC BMI NORM PARAMETERS: HCPCS | Performed by: INTERNAL MEDICINE

## 2022-12-06 PROCEDURE — 99213 OFFICE O/P EST LOW 20 MIN: CPT | Performed by: INTERNAL MEDICINE

## 2022-12-06 PROCEDURE — 1123F ACP DISCUSS/DSCN MKR DOCD: CPT | Performed by: INTERNAL MEDICINE

## 2022-12-06 PROCEDURE — 1036F TOBACCO NON-USER: CPT | Performed by: INTERNAL MEDICINE

## 2022-12-06 PROCEDURE — G8427 DOCREV CUR MEDS BY ELIG CLIN: HCPCS | Performed by: INTERNAL MEDICINE

## 2022-12-06 PROCEDURE — G8484 FLU IMMUNIZE NO ADMIN: HCPCS | Performed by: INTERNAL MEDICINE

## 2022-12-06 NOTE — PROGRESS NOTES
Renal Progress Note    Assessment and Plan:      Diagnosis Orders   1. Stage 3b chronic kidney disease ([address]        2. Primary hypertension                  PLAN:  Lab results reviewed with the patient. Reviewed with him together in epic. He understood well. Serum creatinine is improved to 1.8 mg/dL from 2.0 mg/dL. PTH is normal.  Vitamin D level is normal.  Medications reviewed  No changes  Return visit in 6 months and 12 months thereafter if kidney function improved more  Will defer mild right inguinal discomfort to the primary care provider. Discussed with the patient          Patient Active Problem List   Diagnosis    Hypertension    TIA (transient ischemic attack)    GERD (gastroesophageal reflux disease)    Medication monitoring encounter    SI (sacroiliac) joint inflammation ([facility]), left    Dvt femoral (deep venous thrombosis) ([facility])    Chronic low back pain    Lung mass    Abnormal CT scan, chest, pulmonary nodule. PSA elevation, bph elevation    Anticoagulated on Coumadin    Sciatica of left side associated with disorder of lumbar spine    Lumbar disc disease with radiculopathy    Lumbar spinal stenosis    Other spondylosis with radiculopathy, lumbar region    DDD (degenerative disc disease), lumbar    RAMAN (acute kidney injury) ([facility])    CKD (chronic kidney disease) stage 3, GFR 30-59 ml/min ([facility])    Pleural effusion, left    Nocturnal leg cramps    Bilateral carpal tunnel syndrome    Prostate cancer ([address] Justin score 6. History of CVA (cerebrovascular accident)    History of pulmonary embolism    CKD (chronic kidney disease), stage IV ([address]    COVID-19           Subjective:   Chief complaint:  Chief Complaint   Patient presents with    Chronic Kidney Disease     iiib      HPI:This is a follow up visit for Mr Sarahann Baumgarten here today for return appointment. I see him for chronic kidney disease. He was last seen about 6 months ago.   He complains of some pain in the right inguinal area mild and every now and again. .  Otherwise nothing new. Denies any chest pain or shortness of breath. Appetite is good. No problems with urination. ROS:  Pertinent positives stated above in HPI. All other systems were reviewed and were negative. Medications:     Current Outpatient Medications   Medication Sig Dispense Refill    Cholecalciferol (VITAMIN D3) 50 MCG (2000 UT) CAPS Take 1 capsule by mouth daily (with breakfast) One daily but double Thurs 30 capsule     calcium carbonate 600 MG TABS tablet Take 1 tablet by mouth three times daily 30 tablet     NONFORMULARY Take 1 caplet by mouth 4 times daily (with meals and nightly) [employer] by [employer] com      Omega-3 Fatty Acids ([employer] FISH OIL PO) Take 1 tablet by mouth 4 times daily       apixaban (ELIQUIS) 2.5 MG TABS tablet Take 1 tablet by mouth 2 times daily 180 tablet 1    Chromium-Cinnamon (CINNAMON PLUS CHROMIUM PO) Take 1 capsule by mouth 4 times daily (before meals and nightly) [address] units per capsule      B Complex-Biotin-FA (B COMPLEX 100 TR PO) Take 1 tablet by mouth 2 times daily (before meals) [address] [facility] [place]      magnesium cl-calcium carbonate (SLOW-MAG) 71.5-119 MG TBEC tablet Take 1 tablet by mouth 4 times daily (after meals and at bedtime)       Levomefolate Glucosamine (METHYLFOLATE PO) Take 15 mg by mouth every morning (before breakfast) Metabolic Maintenance at [employer] or [employer]. com       Methylcobalamin 5000 MCG SUBL Place 1 tablet under the tongue nightly [place] at [employer]. com      mupirocin (BACTROBAN) 2 % ointment Apply topically Indications: Skin Abnormalities As needed for dermatologist appointments  1     No current facility-administered medications for this visit.        Lab Results:    CBC:   Lab Results   Component Value Date    WBC 6.6 09/13/2022    HGB 15.5 09/13/2022    HCT 45.4 09/13/2022    MCV 93.8 09/13/2022     09/13/2022     BMP:    Lab Results   Component Value Date     11/30/2022     09/13/2022     05/16/2022    K 4.6 11/30/2022    K 3.9 09/13/2022    K 4.2 05/16/2022     11/30/2022     09/13/2022     05/16/2022    CO2 26 11/30/2022    CO2 25 09/13/2022    CO2 21 (L) 05/16/2022    BUN 33 (H) 11/30/2022    BUN 20 09/13/2022    BUN 33 (H) 05/16/2022    CREATININE 1.8 (H) 11/30/2022    CREATININE 1.9 (H) 09/13/2022    CREATININE 2.0 (H) 05/16/2022    GLUCOSE 111 (H) 11/30/2022    GLUCOSE 144 (H) 09/13/2022    GLUCOSE 98 05/16/2022      Hepatic:   Lab Results   Component Value Date    AST 28 09/13/2022    AST 44 (H) 12/04/2021    AST 32 09/24/2021    ALT 22 09/13/2022    ALT 27 12/04/2021    ALT 29 09/24/2021    BILITOT 0.8 09/13/2022    BILITOT 0.4 12/04/2021    BILITOT 1.0 09/24/2021    ALKPHOS 64 09/13/2022    ALKPHOS 50 12/04/2021    ALKPHOS 60 09/24/2021     BNP: No results found for: BNP  Lipids:   Lab Results   Component Value Date    CHOL 219 (H) 09/13/2022    HDL 55 09/13/2022     INR:   Lab Results   Component Value Date    INR 1.08 08/03/2020    INR 1.60 (H) 07/20/2020    INR 2.51 (H) 06/30/2020     URINE:   Lab Results   Component Value Date/Time    PROTUR 18.2 11/29/2021 04:33 PM     Lab Results   Component Value Date/Time    NITRU Negative 12/10/2019 03:48 PM    COLORU Yellow 12/10/2019 03:48 PM    UROBILINOGEN 0.20 12/10/2019 03:48 PM    BILIRUBINUR Negative 12/10/2019 03:48 PM    BLOODU Trace-lysed 12/10/2019 03:48 PM    GLUCOSEU Negative 12/10/2019 03:48 PM    KETUA Negative 12/10/2019 03:48 PM      Microalbumen/Creatinine ratio:  No components found for: RUCREAT    Objective:   Vitals: BP (!) 148/77 (Site: Left Upper Arm, Position: Sitting, Cuff Size: Small Adult)   Pulse 68   Ht 5' 7\" (1.702 m)   Wt 156 lb (70.8 kg)   SpO2 96%   BMI 24.43 kg/m²      Constitutional:  Alert, awake, no apparent distress  Skin:normal with no rash or any significant lesions  HEENT:Pupils are reactive . Throat is clear.   Oral mucosa is moist.  Neck:supple

## 2023-01-05 ENCOUNTER — TELEPHONE (OUTPATIENT)
Dept: FAMILY MEDICINE CLINIC | Age: 78
End: 2023-01-05

## 2023-01-05 NOTE — TELEPHONE ENCOUNTER
Fax received from [facility] Patient Assistance for Eliquis stating they are missing both pages of the patient's application. Please have the patient complete their application and resend.   Form in nurse nohelia    I attempted to speak with patient regarding, left vm message

## 2023-01-09 ENCOUNTER — TELEPHONE (OUTPATIENT)
Dept: FAMILY MEDICINE CLINIC | Age: 78
End: 2023-01-09

## 2023-01-09 NOTE — TELEPHONE ENCOUNTER
LM that Pt Assistance forms for Eliquis are at the . Dr Sravan Naqvi completed the provider sections and the rest of the form has to be done by the patient.

## 2023-01-09 NOTE — TELEPHONE ENCOUNTER
Pt returned call and was notified. Says to disregard the Pt Assistance paperwork, he makes too much money, so he does not qualify. Pt requested an appt to discuss Eliquis use, cost and options. Appt scheduled tomorrow.

## 2023-01-10 ENCOUNTER — TELEMEDICINE (OUTPATIENT)
Dept: FAMILY MEDICINE CLINIC | Age: 78
End: 2023-01-10
Payer: MEDICARE

## 2023-01-10 DIAGNOSIS — I82.512 CHRONIC DEEP VEIN THROMBOSIS (DVT) OF FEMORAL VEIN OF LEFT LOWER EXTREMITY (HCC): Primary | ICD-10-CM

## 2023-01-10 PROCEDURE — 99213 OFFICE O/P EST LOW 20 MIN: CPT | Performed by: FAMILY MEDICINE

## 2023-01-10 PROCEDURE — 1123F ACP DISCUSS/DSCN MKR DOCD: CPT | Performed by: FAMILY MEDICINE

## 2023-01-10 PROCEDURE — G8428 CUR MEDS NOT DOCUMENT: HCPCS | Performed by: FAMILY MEDICINE

## 2023-01-10 ASSESSMENT — ENCOUNTER SYMPTOMS
GASTROINTESTINAL NEGATIVE: 1
RESPIRATORY NEGATIVE: 1

## 2023-01-10 NOTE — PROGRESS NOTES
Michael Moe (:  1945) is a Established patient, here for evaluation of the following:         Subjective   HPI:   Chief Complaint   Patient presents with    Discuss Medications     Pt presents to discuss Eliquis use. Patient Active Problem List   Diagnosis    Hypertension    TIA (transient ischemic attack)    GERD (gastroesophageal reflux disease)    Medication monitoring encounter    SI (sacroiliac) joint inflammation ([facility]), left    Dvt femoral (deep venous thrombosis) ([facility])    Chronic low back pain    Lung mass    Abnormal CT scan, chest, pulmonary nodule. PSA elevation, bph elevation    Anticoagulated on Coumadin    Sciatica of left side associated with disorder of lumbar spine    Lumbar disc disease with radiculopathy    Lumbar spinal stenosis    Other spondylosis with radiculopathy, lumbar region    DDD (degenerative disc disease), lumbar    RAMAN (acute kidney injury) ([facility])    CKD (chronic kidney disease) stage 3, GFR 30-59 ml/min ([facility])    Pleural effusion, left    Nocturnal leg cramps    Bilateral carpal tunnel syndrome    Prostate cancer ([address] [place] score 6.     History of CVA (cerebrovascular accident)    History of pulmonary embolism    CKD (chronic kidney disease), stage IV ([address]    COVID-19     Past Surgical History:   Procedure Laterality Date    ARM SURGERY Right 2020    EXCISION FIBROXANTHOMA RIGHT FOREARM WITH SKIN GRAFT performed by Ubaldo Gloden MD at [address] [place] [address] Left 2021    COLONOSCOPY  2019    [facility]    EGD  2019    [facility] / BIOPSY SKIN LESION OF ARM Left 10/11/2017    EXCISION 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 WITH FLAP AND FROZEN SECTION performed by Ubaldo Golden MD at [place] [address] Left 2021    left thumb     OTHER SURGICAL HISTORY Left 10/11/2017    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 HENT: Negative. Respiratory: Negative. Cardiovascular: Negative. Gastrointestinal: Negative. Musculoskeletal: Negative. All other systems reviewed and are negative. Objective   Patient-Reported Vitals  No data recorded     Physical Exam  Constitutional:       General: He is not in acute distress. Appearance: Normal appearance. He is well-developed. He is not ill-appearing. HENT:      Head: Normocephalic and atraumatic. Right Ear: External ear normal.      Left Ear: External ear normal.   Eyes:      Conjunctiva/sclera: Conjunctivae normal.   Pulmonary:      Effort: Pulmonary effort is normal. No respiratory distress. Skin:     Findings: No rash (on exposed surfaces). Neurological:      Mental Status: He is alert and oriented to person, place, and time. Psychiatric:         Mood and Affect: Mood normal.         Behavior: Behavior normal.         Thought Content: Thought content normal.         Judgment: Judgment normal.     Assessment & Plan   Below is the assessment and plan developed based on review of pertinent history, physical exam, labs, studies, and medications. 1. Chronic deep vein thrombosis (DVT) of femoral vein of left lower extremity ([address]    -  Concerned with cost of the Eliquis, did not qualify for pt assistance  -  Option to switch to warfarin discussed, declines at this time  -  Has 2 mos of Eliquis at home, will give him time to think about it    Return for as needed. Denilson Moe, was evaluated through a synchronous (real-time) audio-video encounter.  The patient (or guardian if applicable) is aware that this is a billable service, which includes applicable co-pays. This Virtual Visit was conducted with patient's (and/or legal guardian's) consent. The visit was conducted pursuant to the emergency declaration under the [facility] [address] and the [employer] [facility] [employer] General Act. Patient identification was verified, and a caregiver was present when appropriate. The patient was located at Home: [address] [place] [address] [phone]. Provider was located at [facility] (Appt Dept): [address]  [facility] [address]RAULIO YUAN II.KANDICE,  [address].         --Gadiel Fried DO

## 2023-02-27 ENCOUNTER — OFFICE VISIT (OUTPATIENT)
Dept: FAMILY MEDICINE CLINIC | Age: 78
End: 2023-02-27

## 2023-02-27 VITALS
DIASTOLIC BLOOD PRESSURE: 72 MMHG | HEIGHT: 67 IN | HEART RATE: 68 BPM | SYSTOLIC BLOOD PRESSURE: 134 MMHG | RESPIRATION RATE: 16 BRPM | WEIGHT: 149.5 LBS | BODY MASS INDEX: 23.46 KG/M2

## 2023-02-27 DIAGNOSIS — M46.1 SI (SACROILIAC) JOINT INFLAMMATION (HCC): ICD-10-CM

## 2023-02-27 DIAGNOSIS — M19.049 HAND ARTHRITIS: ICD-10-CM

## 2023-02-27 DIAGNOSIS — N18.4 CKD (CHRONIC KIDNEY DISEASE), STAGE IV (HCC): ICD-10-CM

## 2023-02-27 DIAGNOSIS — Z00.00 MEDICARE ANNUAL WELLNESS VISIT, SUBSEQUENT: Primary | ICD-10-CM

## 2023-02-27 DIAGNOSIS — I82.512 CHRONIC DEEP VEIN THROMBOSIS (DVT) OF FEMORAL VEIN OF LEFT LOWER EXTREMITY (HCC): ICD-10-CM

## 2023-02-27 DIAGNOSIS — M51.16 LUMBAR DISC DISEASE WITH RADICULOPATHY: ICD-10-CM

## 2023-02-27 DIAGNOSIS — M51.36 DDD (DEGENERATIVE DISC DISEASE), LUMBAR: ICD-10-CM

## 2023-02-27 DIAGNOSIS — I10 ESSENTIAL HYPERTENSION: ICD-10-CM

## 2023-02-27 DIAGNOSIS — C61 PROSTATE CANCER (HCC): ICD-10-CM

## 2023-02-27 PROBLEM — R20.2 PARESTHESIA OF HAND, BILATERAL: Status: ACTIVE | Noted: 2023-02-27

## 2023-02-27 SDOH — ECONOMIC STABILITY: HOUSING INSECURITY
IN THE LAST 12 MONTHS, WAS THERE A TIME WHEN YOU DID NOT HAVE A STEADY PLACE TO SLEEP OR SLEPT IN A SHELTER (INCLUDING NOW)?: NO

## 2023-02-27 SDOH — ECONOMIC STABILITY: FOOD INSECURITY: WITHIN THE PAST 12 MONTHS, YOU WORRIED THAT YOUR FOOD WOULD RUN OUT BEFORE YOU GOT MONEY TO BUY MORE.: NEVER TRUE

## 2023-02-27 SDOH — ECONOMIC STABILITY: INCOME INSECURITY: HOW HARD IS IT FOR YOU TO PAY FOR THE VERY BASICS LIKE FOOD, HOUSING, MEDICAL CARE, AND HEATING?: NOT HARD AT ALL

## 2023-02-27 SDOH — ECONOMIC STABILITY: FOOD INSECURITY: WITHIN THE PAST 12 MONTHS, THE FOOD YOU BOUGHT JUST DIDN'T LAST AND YOU DIDN'T HAVE MONEY TO GET MORE.: NEVER TRUE

## 2023-02-27 ASSESSMENT — PATIENT HEALTH QUESTIONNAIRE - PHQ9
SUM OF ALL RESPONSES TO PHQ QUESTIONS 1-9: 0
SUM OF ALL RESPONSES TO PHQ9 QUESTIONS 1 & 2: 0
1. LITTLE INTEREST OR PLEASURE IN DOING THINGS: 0
SUM OF ALL RESPONSES TO PHQ QUESTIONS 1-9: 0
2. FEELING DOWN, DEPRESSED OR HOPELESS: 0

## 2023-02-27 ASSESSMENT — LIFESTYLE VARIABLES: HOW OFTEN DO YOU HAVE A DRINK CONTAINING ALCOHOL: NEVER

## 2023-02-27 ASSESSMENT — ENCOUNTER SYMPTOMS
RESPIRATORY NEGATIVE: 1
BACK PAIN: 1
ABDOMINAL PAIN: 1

## 2023-02-27 NOTE — PROGRESS NOTES
2023    Devon Moe (:  1945) is a 76 y.o. male, here for a preventive medicine evaluation. Chief Complaint   Patient presents with    Medicare AWV    Discuss Medications     Eliquis     AWV. Doing well overall but does have a few concerns for me today. Pt having an issue with the cost of the Eliquis. Considering switching to warfarin once he runs out over the next month. Has been on warfarin in the past.    Pt having occasional LBP with radiate into the right lower abdomen region. This comes and goes. Pain in abd correlates with the back pain. Has been working 12 hr days recently to finish a project. Pt also c/o bl hand pain. Known arthritis. Hx of prostate cancer, has not seen Urology for years. Active surveillance. Wishes to re-establish. BP Readings from Last 3 Encounters:   23 134/72   22 (!) 148/77   22 128/66     Wt Readings from Last 3 Encounters:   23 149 lb 8 oz (67.8 kg)   22 156 lb (70.8 kg)   22 149 lb 6.4 oz (67.8 kg)         Patient Active Problem List   Diagnosis    Hypertension    TIA (transient ischemic attack)    GERD (gastroesophageal reflux disease)    Medication monitoring encounter    SI (sacroiliac) joint inflammation ([facility] left    Dvt femoral (deep venous thrombosis) ([facility])    Chronic low back pain    Lung mass    Abnormal CT scan, chest, pulmonary nodule. PSA elevation, bph elevation    Anticoagulated on Coumadin    Sciatica of left side associated with disorder of lumbar spine    Lumbar disc disease with radiculopathy    Lumbar spinal stenosis    Other spondylosis with radiculopathy, lumbar region    DDD (degenerative disc disease), lumbar    RAMAN (acute kidney injury) ([facility])    CKD (chronic kidney disease) stage 3, GFR 30-59 ml/min ([facility])    Pleural effusion, left    Nocturnal leg cramps    Bilateral carpal tunnel syndrome    Prostate cancer ([facility] Justin score 6.     History of CVA (cerebrovascular accident)    History of pulmonary embolism    CKD (chronic kidney disease), stage IV (HCC)    COVID-19    Paresthesia of hand, bilateral       Review of Systems   Constitutional: Negative. HENT: Negative. Respiratory: Negative. Cardiovascular: Negative. Gastrointestinal:  Positive for abdominal pain (RLQ). Musculoskeletal:  Positive for arthralgias (bl hand pain) and back pain. All other systems reviewed and are negative. Prior to Visit Medications    Medication Sig Taking? Authorizing Provider   Cholecalciferol (VITAMIN D3) 50 MCG (2000 UT) CAPS Take 1 capsule by mouth daily (with breakfast) One daily but double Thurs Yes Solange Veliz MD   calcium carbonate 600 MG TABS tablet Take 1 tablet by mouth three times daily Yes Solange Veliz MD   NONFORMULARY Take 1 caplet by mouth 4 times daily (with meals and nightly) [facility] [employer] Yes Historical Provider, MD   Omega-3 Fatty Acids ([facility] FISH OIL PO) Take 1 tablet by mouth 4 times daily  Yes Historical Provider, MD   apixaban (ELIQUIS) 2.5 MG TABS tablet Take 1 tablet by mouth 2 times daily Yes Ivette Ramachandran MD   Chromium-Cinnamon (CINNAMON PLUS CHROMIUM PO) Take 1 capsule by mouth 4 times daily (before meals and nightly) [address] [facility] units per capsule Yes Historical Provider, MD   B Complex-Biotin-FA (B COMPLEX 100 TR PO) Take 1 tablet by mouth 2 times daily (before meals) [id number] [facility] at [address] [facility] Provider, MD   magnesium cl-calcium carbonate (SLOW-MAG) 71.5-119 MG TBEC tablet Take 1 tablet by mouth 4 times daily (after meals and at bedtime)  Yes Historical Provider, MD   Levomefolate Glucosamine (METHYLFOLATE PO) Take 15 mg by mouth every morning (before breakfast) Metabolic Maintenance at [address] or [email or web address]  Yes Historical Provider, MD   Methylcobalamin 5000 MCG SUBL Place 1 tablet under the tongue nightly [place] at [employer]. com Yes Historical Provider, MD   mupirocin (BACTROBAN) 2 % ointment Apply topically Indications: Skin Abnormalities As needed for dermatologist appointments  Patient not taking: Reported on 2/27/2023  Historical Provider, MD        Allergies   Allergen Reactions    Morphine Other (See Comments)     Other reaction(s): Dizziness, Dizziness/nausea    Bactrim [Sulfamethoxazole-Trimethoprim] Other (See Comments)     weakness    Nsaids      Other reaction(s): REQUIRES CLARIFICATION    Sulfamethoxazole Nausea And Vomiting    Trimethoprim Nausea And Vomiting       Past Medical History:   Diagnosis Date    Arthritis     neck, Wrists , back    Cancer ([address]     skin (removed)    Cerebral artery occlusion with cerebral infarction ([address]     TIA    DVT (deep venous thrombosis) (HCC)     GERD (gastroesophageal reflux disease)     Hx of blood clots     PE    Hypertension     Kidney disease     Prostate cancer ([address]     Pulmonary emboli ([address] 5/7/16    Scoliosis     TIA (transient ischemic attack)        Past Surgical History:   Procedure Laterality Date    ARM SURGERY Right 12/18/2020    EXCISION FIBROXANTHOMA RIGHT FOREARM WITH SKIN GRAFT performed by Arron Garvey MD at [address] [place] [address] Left 02/2021    COLONOSCOPY  2019    [facility]    EGD  2019    [facility] / BIOPSY SKIN LESION OF ARM Left 10/11/2017    EXCISION 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 WITH FLAP AND FROZEN SECTION performed by Arron Garvey MD at [place] [address] Left 02/2021    left thumb     OTHER SURGICAL HISTORY Left 10/11/2017    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    PROSTATE BIOPSY      SKIN CANCER EXCISION  2010    Face skin cancer removal       Social History     Socioeconomic History    Marital status:       Spouse name: Not on file    Number of children: Not on file    Years of education: Not on file    Highest education level: Not on file   Occupational History    Not on file   Tobacco Use    Smoking status: Never    Smokeless tobacco: Never   Vaping Use    Vaping Use: Never used   Substance and Sexual Activity    Alcohol use: Yes     Comment: rarely    Drug use: No    Sexual activity: Not Currently   Other Topics Concern    Not on file   Social History Narrative    Not on file     Social Determinants of Health     Financial Resource Strain: Low Risk     Difficulty of Paying Living Expenses: Not hard at all   Food Insecurity: No Food Insecurity    Worried About [address] in the Last Year: Never true    [address] in the Last Year: Never true   Transportation Needs: Unknown    Lack of Transportation (Medical): Not on file    Lack of Transportation (Non-Medical): No   Physical Activity: Insufficiently Active    Days of Exercise per Week: 3 days    Minutes of Exercise per Session: 30 min   Stress: Not on file   Social Connections: Not on file   Intimate Partner Violence: Not on file   Housing Stability: Unknown    Unable to Pay for Housing in the Last Year: Not on file    Number of Places Lived in the Last Year: Not on file    Unstable Housing in the Last Year: No        Family History   Problem Relation Age of Onset    Cancer Mother     High Blood Pressure Mother     Alzheimer's Disease Mother     Heart Disease Mother     Parkinsonism Father     Diabetes Neg Hx     Kidney Disease Neg Hx     Stroke Neg Hx     Colon Cancer Neg Hx     Esophageal Cancer Neg Hx     Rectal Cancer Neg Hx     Stomach Cancer Neg Hx        ADVANCE DIRECTIVE: N, <no information>    Vitals:    02/27/23 1311   BP: 134/72   Site: Left Upper Arm   Position: Sitting   Cuff Size: Medium Adult   Pulse: 68   Resp: 16   Weight: 149 lb 8 oz (67.8 kg)   Height: 5' 7\" (1.702 m)     Estimated body mass index is 23.42 kg/m² as calculated from the following:    Height as of this encounter: 5' 7\" (1.702 m). Weight as of this encounter: 149 lb 8 oz (67.8 kg). Physical Exam  Vitals and nursing note reviewed. Constitutional:       General: He is not in acute distress. Appearance: Normal appearance. He is well-developed. HENT:      Head: Normocephalic and atraumatic. Right Ear: Tympanic membrane normal.      Left Ear: Tympanic membrane normal.   Eyes:      Conjunctiva/sclera: Conjunctivae normal.   Cardiovascular:      Rate and Rhythm: Normal rate and regular rhythm. Heart sounds: Normal heart sounds. No murmur heard. Pulmonary:      Effort: Pulmonary effort is normal.      Breath sounds: Normal breath sounds. No wheezing, rhonchi or rales. Abdominal:      General: There is no distension. Musculoskeletal:      Cervical back: Neck supple. Skin:     General: Skin is warm and dry. Findings: No rash (on exposed surfaces). Neurological:      General: No focal deficit present. Mental Status: He is alert. Psychiatric:         Attention and Perception: Attention normal.         Mood and Affect: Mood normal.         Speech: Speech normal.         Behavior: Behavior normal. Behavior is cooperative. Thought Content: Thought content normal.         Judgment: Judgment normal.       No flowsheet data found.     Lab Results   Component Value Date/Time    CHOL 219 09/13/2022 02:16 PM    CHOL 184 05/16/2022 03:55 PM    CHOL 219 01/13/2022 01:31 PM    TRIG 132 09/13/2022 02:16 PM    TRIG 121 05/16/2022 03:55 PM    TRIG 95 01/13/2022 01:31 PM    HDL 55 09/13/2022 02:16 PM    HDL 47 05/16/2022 03:55 PM    HDL 66 01/13/2022 01:31 PM    HDL 41 05/05/2012 12:00 AM    LDLCALC 138 09/13/2022 02:16 PM    LDLCALC 113 05/16/2022 03:55 PM    LDLCALC 134 01/13/2022 01:31 PM    GLUCOSE 111 11/30/2022 12:20 PM    GLUCOSE 96 07/23/2020 01:17 PM    LABA1C 5.4 05/16/2022 03:55 PM    LABA1C 5.8 01/13/2022 01:32 PM    LABA1C 5.7 03/23/2021 10:13 AM       The 10-year ASCVD risk score (Demario GILMORE, et al., 2019) is: 33%    Values used to calculate the score:      Age: 76 years      Sex: Male      Is Non- : No      Diabetic: No      Tobacco smoker: No      Systolic Blood Pressure: 589 mmHg      Is BP treated: No      HDL Cholesterol: 55 mg/dL      Total Cholesterol: 219 mg/dL    Immunization History   Administered Date(s) Administered    PPD Test 05/20/2015    Pneumococcal Conjugate 13-valent (Almrwre84) 08/29/2016    Pneumococcal Polysaccharide ([id number]) 06/05/2012       Health Maintenance   Topic Date Due    COVID-19 Vaccine (1) Never done    Hepatitis C screen  Never done    DTaP/Tdap/Td vaccine (1 - Tdap) Never done    Shingles vaccine (1 of 2) Never done    Flu vaccine (1) Never done    Depression Screen  02/24/2023    Prostate Specific Antigen (PSA) Screening or Monitoring  09/13/2023    Annual Wellness Visit (AWV)  02/28/2024    Pneumococcal 65+ years Vaccine  Completed    Hepatitis A vaccine  Aged Out    Hib vaccine  Aged Out    Meningococcal (ACWY) vaccine  Aged Out       Assessment & Plan   Medicare annual wellness visit, subsequent  Prostate cancer ([facility]  -     Ben Edouard MD, Urology, ELIJAH YUAN II.KANDICE MOORE (sacroiliac) joint inflammation [facility])  CKD (chronic kidney disease), stage IV ([facility]  Chronic deep vein thrombosis (DVT) of femoral vein of left lower extremity ([facility]  Essential hypertension  DDD (degenerative disc disease), lumbar  Lumbar disc disease with radiculopathy  Hand arthritis    -  Chronic medical problems stable  -  Continue current medications  -  Follow up with specialists as scheduled  -  Has several labs ordered by Dr. Isabella Alcala  -  Recommend Tylenol prn pain in back and hands  -  Refer to Urology    Return for Medicare Annual Wellness Visit in 1 year.          --Daryn Gentile, DO      Medicare Annual Wellness Visit    Stanley Opal Nevarezskbaldo is here for Medicare AWV and Discuss Medications (Eliquis)    Assessment & Plan   Medicare annual wellness visit, subsequent  Prostate cancer [facility])  -     Ben Edouard MD, Urology, ELIJAH YUAN II.VIERTEL  SI (sacroiliac) joint inflammation [facility])  CKD (chronic kidney disease), stage IV (HCC)  Chronic deep vein thrombosis (DVT) of femoral vein of left lower extremity (HCC)  Essential hypertension  DDD (degenerative disc disease), lumbar  Lumbar disc disease with radiculopathy  Hand arthritis      Recommendations for Preventive Services Due: see orders and patient instructions/AVS.  Recommended screening schedule for the next 5-10 years is provided to the patient in written form: see Patient Instructions/AVS.     Return for Medicare Annual Wellness Visit in 1 year. Subjective       Patient's complete Health Risk Assessment and screening values have been reviewed and are found in Flowsheets. The following problems were reviewed today and where indicated follow up appointments were made and/or referrals ordered. Positive Risk Factor Screenings with Interventions:    Fall Risk:  Do you feel unsteady or are you worried about falling? : (!) yes  2 or more falls in past year?: no  Fall with injury in past year?: no     Interventions:    See AVS for additional education material            General HRA Questions:  Select all that apply: (!) Stress, Anger    Stress Interventions:  See AVS for additional education material    Anger Interventions:  See AVS for additional education material         Hearing Screen:  Do you or your family notice any trouble with your hearing that hasn't been managed with hearing aids?: (!) Yes    Interventions:  Patient declines any further evaluation or treatment     Safety:  Do you always fasten your seatbelt when you are in a car?: (!) No  Interventions:  See AVS for additional education material                     Objective   Vitals:    02/27/23 1311   BP: 134/72   Site: Left Upper Arm   Position: Sitting   Cuff Size: Medium Adult   Pulse: 68   Resp: 16   Weight: 149 lb 8 oz (67.8 kg)   Height: 5' 7\" (1.702 m)      Body mass index is 23.42 kg/m².              Allergies   Allergen Reactions    Morphine Other (See Comments)     Other reaction(s): Dizziness, Dizziness/nausea    Bactrim [Sulfamethoxazole-Trimethoprim] Other (See Comments)     weakness    Nsaids      Other reaction(s): REQUIRES CLARIFICATION    Sulfamethoxazole Nausea And Vomiting    Trimethoprim Nausea And Vomiting     Prior to Visit Medications    Medication Sig Taking? Authorizing Provider   Cholecalciferol (VITAMIN D3) 50 MCG (2000 UT) CAPS Take 1 capsule by mouth daily (with breakfast) One daily but double Thurs Yes Dale Ganrica MD   calcium carbonate 600 MG TABS tablet Take 1 tablet by mouth three times daily Yes Dale Garnica MD   NONFORMULARY Take 1 caplet by mouth 4 times daily (with meals and nightly) [employer] by [employer] Natural [employer] com Yes Historical Provider, MD   Omega-3 Fatty Acids ([facility] FISH OIL PO) Take 1 tablet by mouth 4 times daily  Yes Historical Provider, MD   apixaban (ELIQUIS) 2.5 MG TABS tablet Take 1 tablet by mouth 2 times daily Yes Bob Smith MD   Chromium-Cinnamon (CINNAMON PLUS CHROMIUM PO) Take 1 capsule by mouth 4 times daily (before meals and nightly) [address] [facility] units per capsule Yes Historical Provider, MD   B Complex-Biotin-FA (B COMPLEX 100 TR PO) Take 1 tablet by mouth 2 times daily (before meals) [address] [facility] at [address] Provider, MD   magnesium cl-calcium carbonate (SLOW-MAG) 71.5-119 MG TBEC tablet Take 1 tablet by mouth 4 times daily (after meals and at bedtime)  Yes Historical Provider, MD   Levomefolate Glucosamine (METHYLFOLATE PO) Take 15 mg by mouth every morning (before breakfast) Metabolic Maintenance at [address] [employer] or [employer]  Yes Historical Provider, MD   Methylcobalamin 5000 MCG SUBL Place 1 tablet under the tongue nightly [place] at [employer]. com Yes Historical Provider, MD   mupirocin (BACTROBAN) 2 % ointment Apply topically Indications: Skin Abnormalities As needed for dermatologist appointments  Patient not taking: Reported on 2/27/2023  Historical ProviderMD Allen (Including outside providers/suppliers regularly involved in providing care):   Patient Care Team:  Jairo Marcus DO as PCP - General (Family Medicine)  Jairo Marcus DO as PCP - Empaneled Provider  Ivanna Granados MD as Consulting Physician (Pulmonology)  Simin Ribeiro MD as Consulting Physician (Urology)  Cindi Alegria MD as Consulting Physician (Gastroenterology)     Reviewed and updated this visit:  Tobacco  Allergies  Meds  Problems  Med Hx  Surg Hx  Soc Hx  Fam Hx               Jairo Marcus DO

## 2023-02-27 NOTE — PATIENT INSTRUCTIONS
You may receive a survey regarding the care you received during your visit. Your input is valuable to us. We encourage you to complete and return your survey. We hope you will choose us in the future for your healthcare needs. Preventing Falls: Care Instructions  Overview     Getting around your home safely can be a challenge if you have injuries or health problems that make it easy for you to fall. Loose rugs and furniture in walkways are among the dangers for many older people who have problems walking or who have poor eyesight. People who have conditions such as arthritis, osteoporosis, or dementia also have to be careful not to fall. You can make your home safer with a few simple measures. Follow-up care is a key part of your treatment and safety. Be sure to make and go to all appointments, and call your doctor if you are having problems. It's also a good idea to know your test results and keep a list of the medicines you take. How can you care for yourself at home? Taking care of yourself  Exercise regularly to improve your strength, muscle tone, and balance. Walk if you can. Swimming may be a good choice if you cannot walk easily. Have your vision and hearing checked each year or any time you notice a change. If you have trouble seeing and hearing, you might not be able to avoid objects and could lose your balance. Know the side effects of the medicines you take. Ask your doctor or pharmacist whether the medicines you take can affect your balance. Sleeping pills or sedatives can affect your balance. Limit the amount of alcohol you drink. Alcohol can impair your balance and other senses. Ask your doctor whether calluses or corns on your feet need to be removed. If you wear loose-fitting shoes because of calluses or corns, you can lose your balance and fall. Talk to your doctor if you have numbness in your feet. You may get dizzy if you do not drink enough water.  To prevent dehydration, drink plenty of fluids. Choose water and other clear liquids. If you have kidney, heart, or liver disease and have to limit fluids, talk with your doctor before you increase the amount of fluids you drink. Preventing falls at home  Remove raised doorway thresholds, throw rugs, and clutter. Repair loose carpet or raised areas in the floor. Move furniture and electrical cords to keep them out of walking paths. Use nonskid floor wax, and wipe up spills right away, especially on ceramic tile floors. If you use a walker or cane, put rubber tips on it. If you use crutches, clean the bottoms of them regularly with an abrasive pad, such as steel wool. Keep your house well lit, especially stairways, porches, and outside walkways. Use night-lights in areas such as hallways and bathrooms. Add extra light switches or use remote switches (such as switches that go on or off when you clap your hands) to make it easier to turn lights on if you have to get up during the night. Install sturdy handrails on stairways. Move items in your cabinets so that the things you use a lot are on the lower shelves (about waist level). Keep a cordless phone and a flashlight with new batteries by your bed. If possible, put a phone in each of the main rooms of your house, or carry a cell phone in case you fall and cannot reach a phone. Or, you can wear a device around your neck or wrist. You push a button that sends a signal for help. Wear low-heeled shoes that fit well and give your feet good support. Use footwear with nonskid soles. Check the heels and soles of your shoes for wear. Repair or replace worn heels or soles. Do not wear socks without shoes on smooth floors, such as wood. Walk on the grass when the sidewalks are slippery. If you live in an area that gets snow and ice in the winter, sprinkle salt on slippery steps and sidewalks. Or ask a family member or friend to do this for you.   Preventing falls in the bath  Install grab bars and nonskid mats inside and outside your shower or tub and near the toilet and sinks. Use shower chairs and bath benches. Use a hand-held shower head that will allow you to sit while showering. Get into a tub or shower by putting the weaker leg in first. Get out of a tub or shower with your strong side first.  Repair loose toilet seats and consider installing a raised toilet seat to make getting on and off the toilet easier. Keep your bathroom door unlocked while you are in the shower. Where can you learn more? Go to http://www.silva.com/ and enter G117 to learn more about \"Preventing Falls: Care Instructions. \"  Current as of: May 4, 2022               Content Version: 13.5  © 1476-5343 Healthwise, [employer]. Care instructions adapted under license by [facility] ([facility]). If you have questions about a medical condition or this instruction, always ask your healthcare professional. [address] any warranty or liability for your use of this information. Learning About Stress  What is stress? Stress is what you feel when you have to handle more than you are used to. Stress is a fact of life for most people, and it affects everyone differently. What causes stress for you may not be stressful for someone else. A lot of things can cause stress. You may feel stress when you go on a job interview, take a test, or run a race. This kind of short-term stress is normal and even useful. It can help you if you need to work hard or react quickly. For example, stress can help you finish an important job on time. Stress also can last a long time. Long-term stress is caused by stressful situations or events. Examples of long-term stress include long-term health problems, ongoing problems at work, or conflicts in your family. Long-term stress can harm your health. How does stress affect your health? When you are stressed, your body responds as though you are in danger.  It makes hormones that speed up your heart, make you breathe faster, and give you a burst of energy. This is called the fight-or-flight stress response. If the stress is over quickly, your body goes back to normal and no harm is done. But if stress happens too often or lasts too long, it can have bad effects. Long-term stress can make you more likely to get sick, and it can make symptoms of some diseases worse. If you tense up when you are stressed, you may develop neck, shoulder, or low back pain. Stress is linked to high blood pressure and heart disease. Stress also harms your emotional health. It can make you mauricio, tense, or depressed. Your relationships may suffer, and you may not do well at work or school. What can you do to manage stress? How to relax your mind   Write. It may help to write about things that are bothering you. This helps you find out how much stress you feel and what is causing it. When you know this, you can find better ways to cope. Let your feelings out. Talk, laugh, cry, and express anger when you need to. Talking with friends, family, a counselor, or a member of the clergy about your feelings is a healthy way to relieve stress. Do something you enjoy. For example, listen to music or go to a movie. Practice your hobby or do volunteer work. Meditate. This can help you relax, because you are not worrying about what happened before or what may happen in the future. Do guided imagery. Imagine yourself in any setting that helps you feel calm. You can use audiotapes, books, or a teacher to guide you. How to relax your body   Do something active. Exercise or activity can help reduce stress. Walking is a great way to get started. Even everyday activities such as housecleaning or yard work can help. Do breathing exercises. For example:  From a standing position, bend forward from the waist with your knees slightly bent. Let your arms dangle close to the floor.   Breathe in slowly and deeply as you return to a standing position. Roll up slowly and lift your head last.  Hold your breath for just a few seconds in the standing position. Breathe out slowly and bend forward from the waist.  Try yoga or alexis chi. These techniques combine exercise and meditation. You may need some training at first to learn them. What can you do to prevent stress? Manage your time. This helps you find time to do the things you want and need to do. Get enough sleep. Your body recovers from the stresses of the day while you are sleeping. Get support. Your family, friends, and community can make a difference in how you experience stress. Where can you learn more? Go to http://www.silva.com/ and enter N032 to learn more about \"Learning About Stress. \"  Current as of: October 6, 2021               Content Version: 13.5  © 2006-2022 [email or web address]. Care instructions adapted under license by [facility] ([facility]). If you have questions about a medical condition or this instruction, always ask your healthcare professional. [place] [address] any warranty or liability for your use of this information. Learning About Managing Anger  What causes anger? Many things can cause anger: Stress at work or at home. Social situations that make you angry. A response to everyday events. Anger signals your body to prepare for a fight. This reaction is often called \"fight or flight. \" When you get angry, adrenaline and other hormones are released into your blood. Then your blood pressure goes up, your heart beats faster, and you breathe faster. When you express anger in a healthy way, it can inspire change and make you productive. But if you don't have the skills to express anger in a healthy way, anger can build up. You may hurt others--and yourself--emotionally and even physically. Violent behavior often starts with verbal threats or fairly minor incidents.  But over time, it can involve physical harm. It can include physical, verbal, or sexual abuse of an intimate partner (domestic violence), a child (child abuse), or an older adult (elder abuse). It can also make you sick. Anger and constant hostility keep your blood pressure high. They increase your chances of having another health problem, such as depression, a heart attack, or a stroke. Some people with post-traumatic stress disorder (PTSD) feel angry and on alert all the time. It may feel like there are no other ways to react when you are angry. But when you learn to work with anger in appropriate and healthy ways, your anger no longer controls you. How can you manage your anger? The first step to managing anger is to be more aware of it. Note the thoughts, feelings, and emotions that you have when you get angry. Practice noticing these signs of anger when you are calm. If you are more aware of the signs of anger, you can take steps to manage it. Here are a few tips: Think before you act. Take time to stop and cool down when you feel yourself getting angry. Count to 10 while you take slow, steady breaths. Practice some other form of mental relaxation. Learn the feelings that lead to angry outbursts. Anger and hostility may be a symptom of unhappy feelings or depression about your job, your relationship, or other aspects of your personal life. Avoid situations that lead to angry outbursts. If standing in line bothers you, do errands at less busy times. Express anger in a healthy way. You might:  Go for a short walk or jog. Draw, paint, or listen to music to release the anger. Write in a daily journal.  Use \"I\" statements, not \"you\" statements, to discuss your anger. Say \"I don't feel valued when my needs are not being met\" instead of \"You make me mad when you are so inconsiderate. \"  Take care of yourself. Exercise regularly. Eat a variety of healthy foods. Don't skip meals. Try to get 8 hours of sleep each night.   Limit your use of alcohol, and don't use drugs. Practice yoga, meditation, or alexis chi to relax. Explore other resources that may be available through your job or your community. Contact your human resources department at work. You might be able to get services through an employee assistance program.  Contact your local hospital, mental health facility, or health department. Ask what types of programs or support groups are available in your area. Do not keep guns in your home. If you must have guns in your home, unload them and lock them up. Lock ammunition in a separate place. Keep guns away from children. Where can you find help? If anger or stress starts to harm your work or personal relationships, you might seek help. You can learn ways to manage your feelings and actions. Talk to someone you trust, or find a counselor. There are groups in your area that can connect you with people to talk to. [facility] . This service from the national Substance Abuse and [employer]  can help you find local counselors. Search online at [employer]. samhsa.gov or call 2-071-181-HVVA (246 044 150), or [employer] 4-352.465.2498. Parents Anonymous. Self-help groups that serve parents under stress, as well as children who have been abused, are available throughout the [facility] ([facility]), and [employer]. To find a group in your area, search online or in your phone book under Parents Anonymous or call (022) 250-6890. Where can you learn more? Go to http://www.silva.com/ and enter Z357 to learn more about \"Learning About Managing Anger. \"  Current as of: February 9, 2022               Content Version: 13.5  © 4932-3004 Healthwise, Incorporated. Care instructions adapted under license by [facility] ([facility]).  If you have questions about a medical condition or this instruction, always ask your healthcare professional. [address] any warranty or liability for your use of this information. Hearing Loss: Care Instructions  Overview     Hearing loss is a sudden or slow decrease in how well you hear. It can range from mild to severe. Permanent hearing loss can occur with aging. It also can happen when you are exposed long-term to loud noise. Examples include listening to loud music, riding motorcycles, or being around other loud machines. Hearing loss can affect your work and home life. It can make you feel lonely or depressed. You may feel that you have lost your independence. But hearing aids and other devices can help you hear better and feel connected to others. Follow-up care is a key part of your treatment and safety. Be sure to make and go to all appointments, and call your doctor if you are having problems. It's also a good idea to know your test results and keep a list of the medicines you take. How can you care for yourself at home? Avoid loud noises whenever possible. This helps keep your hearing from getting worse. Always wear hearing protection around loud noises. Wear a hearing aid as directed. See a professional who can help you pick a hearing aid that fits you. Have hearing tests as your doctor suggests. They can show whether your hearing has changed. Your hearing aid may need to be adjusted. Use other devices as needed. These may include:  Telephone amplifiers and hearing aids that can connect to a television, stereo, radio, or microphone. Devices that use lights or vibrations. These alert you to the doorbell, a ringing telephone, or a baby monitor. Television closed-captioning. This shows the words at the bottom of the screen. Most new TVs can do this. TTY (text telephone). This lets you type messages back and forth on the telephone instead of talking or listening. These devices are also called TDD. When messages are typed on the keyboard, they are sent over the phone line to a receiving TTY. The message is shown on a monitor.   Use text messaging, social media, and email if it is hard for you to communicate by telephone. Try to learn a listening technique called speechreading. It is not lipreading. You pay attention to people's gestures, expressions, posture, and tone of voice. These clues can help you understand what a person is saying. Face the person you are talking to, and have them face you. Make sure the lighting is good. You need to see the other person's face clearly. Think about counseling if you need help to adjust to your hearing loss. When should you call for help? Watch closely for changes in your health, and be sure to contact your doctor if:    You think your hearing is getting worse.     You have new symptoms, such as dizziness or nausea. Where can you learn more? Go to http://www.silva.com/ and enter R798 to learn more about \"Hearing Loss: Care Instructions. \"  Current as of: May 4, 2022               Content Version: 13.5  © 2006-2022 [employer]. Care instructions adapted under license by [facility] ([facility]). If you have questions about a medical condition or this instruction, always ask your healthcare professional. [place] [address] any warranty or liability for your use of this information. Advance Directives: Care Instructions  Overview  An advance directive is a legal way to state your wishes at the end of your life. It tells your family and your doctor what to do if you can't say what you want. There are two main types of advance directives. You can change them any time your wishes change. Living will. This form tells your family and your doctor your wishes about life support and other treatment. The form is also called a declaration. Medical power of . This form lets you name a person to make treatment decisions for you when you can't speak for yourself. This person is called a health care agent (health care proxy, health care surrogate).  The form is also called a durable power of  for health care. If you do not have an advance directive, decisions about your medical care may be made by a family member, or by a doctor or a  who doesn't know you. It may help to think of an advance directive as a gift to the people who care for you. If you have one, they won't have to make tough decisions by themselves. For more information, including forms for your state, see the [address] website (www.caringinfo.org/planning/advance-directives/). Follow-up care is a key part of your treatment and safety. Be sure to make and go to all appointments, and call your doctor if you are having problems. It's also a good idea to know your test results and keep a list of the medicines you take. What should you include in an advance directive? Many states have a unique advance directive form. (It may ask you to address specific issues.) Or you might use a universal form that's approved by many states. If your form doesn't tell you what to address, it may be hard to know what to include in your advance directive. Use the questions below to help you get started. Who do you want to make decisions about your medical care if you are not able to? What life-support measures do you want if you have a serious illness that gets worse over time or can't be cured? What are you most afraid of that might happen? (Maybe you're afraid of having pain, losing your independence, or being kept alive by machines.)  Where would you prefer to die? (Your home? A hospital? A nursing home?)  Do you want to donate your organs when you die? Do you want certain Taoism practices performed before you die? When should you call for help? Be sure to contact your doctor if you have any questions. Where can you learn more? Go to http://www.GoodRx.com/ and enter R264 to learn more about \"Advance Directives: Care Instructions. \"  Current as of: June 16, 2022               Content Version: 13.5  © 5754-9844 Healthwise, Incorporated. Care instructions adapted under license by [facility] ([facility]). If you have questions about a medical condition or this instruction, always ask your healthcare professional. [address] any warranty or liability for your use of this information. A Healthy Heart: Care Instructions  Your Care Instructions     Coronary artery disease, also called heart disease, occurs when a substance called plaque builds up in the vessels that supply oxygen-rich blood to your heart muscle. This can narrow the blood vessels and reduce blood flow. A heart attack happens when blood flow is completely blocked. A high-fat diet, smoking, and other factors increase the risk of heart disease. Your doctor has found that you have a chance of having heart disease. You can do lots of things to keep your heart healthy. It may not be easy, but you can change your diet, exercise more, and quit smoking. These steps really work to lower your chance of heart disease. Follow-up care is a key part of your treatment and safety. Be sure to make and go to all appointments, and call your doctor if you are having problems. It's also a good idea to know your test results and keep a list of the medicines you take. How can you care for yourself at home? Diet    Use less salt when you cook and eat. This helps lower your blood pressure. Taste food before salting. Add only a little salt when you think you need it. With time, your taste buds will adjust to less salt.     Eat fewer snack items, fast foods, canned soups, and other high-salt, high-fat, processed foods.     Read food labels and try to avoid saturated and trans fats. They increase your risk of heart disease by raising cholesterol levels.     Limit the amount of solid fat-butter, margarine, and shortening-you eat. Use olive, peanut, or canola oil when you cook.  Bake, broil, and steam foods instead of frying them.     Eat a variety of fruit and vegetables every day. Dark green, deep orange, red, or yellow fruits and vegetables are especially good for you. Examples include spinach, carrots, peaches, and berries.     Foods high in fiber can reduce your cholesterol and provide important vitamins and minerals. High-fiber foods include whole-grain cereals and breads, oatmeal, beans, brown rice, citrus fruits, and apples.     Eat lean proteins. Heart-healthy proteins include seafood, lean meats and poultry, eggs, beans, peas, nuts, seeds, and soy products.     Limit drinks and foods with added sugar. These include candy, desserts, and soda pop. Lifestyle changes    If your doctor recommends it, get more exercise. Walking is a good choice. Bit by bit, increase the amount you walk every day. Try for at least 30 minutes on most days of the week. You also may want to swim, bike, or do other activities.     Do not smoke. If you need help quitting, talk to your doctor about stop-smoking programs and medicines. These can increase your chances of quitting for good. Quitting smoking may be the most important step you can take to protect your heart. It is never too late to quit.     Limit alcohol to 2 drinks a day for men and 1 drink a day for women. Too much alcohol can cause health problems.     Manage other health problems such as diabetes, high blood pressure, and high cholesterol. If you think you may have a problem with alcohol or drug use, talk to your doctor. Medicines    Take your medicines exactly as prescribed. Call your doctor if you think you are having a problem with your medicine.     If your doctor recommends aspirin, take the amount directed each day. Make sure you take aspirin and not another kind of pain reliever, such as acetaminophen (Tylenol). When should you call for help? Call 911 if you have symptoms of a heart attack.  These may include:    Chest pain or pressure, or a strange feeling in the chest.     Sweating.     Shortness of breath.     Pain, pressure, or a strange feeling in the back, neck, jaw, or upper belly or in one or both shoulders or arms.     Lightheadedness or sudden weakness.     A fast or irregular heartbeat. After you call 911, the  may tell you to chew 1 adult-strength or 2 to 4 low-dose aspirin. Wait for an ambulance. Do not try to drive yourself. Watch closely for changes in your health, and be sure to contact your doctor if you have any problems. Where can you learn more? Go to http://www.silva.com/ and enter F075 to learn more about \"A Healthy Heart: Care Instructions. \"  Current as of: September 7, 2022               Content Version: 13.5  © 4119-1877 Healthwise, [employer]. Care instructions adapted under license by [facility] ([facility]). If you have questions about a medical condition or this instruction, always ask your healthcare professional. [place] [address] any warranty or liability for your use of this information. Personalized Preventive Plan for Zac Moe - 2/27/2023  Medicare offers a range of preventive health benefits. Some of the tests and screenings are paid in full while other may be subject to a deductible, co-insurance, and/or copay. Some of these benefits include a comprehensive review of your medical history including lifestyle, illnesses that may run in your family, and various assessments and screenings as appropriate. After reviewing your medical record and screening and assessments performed today your provider may have ordered immunizations, labs, imaging, and/or referrals for you. A list of these orders (if applicable) as well as your Preventive Care list are included within your After Visit Summary for your review. Other Preventive Recommendations:    A preventive eye exam performed by an eye specialist is recommended every 1-2 years to screen for glaucoma; cataracts, macular degeneration, and other eye disorders.   A preventive dental visit is recommended every 6 months. Try to get at least 150 minutes of exercise per week or 10,000 steps per day on a pedometer . Order or download the FREE \"Exercise & Physical Activity: Your Everyday Guide\" from [employer]. Call 9-138.582.2513 or search [employer] online. You need 0217-2297 mg of calcium and 8893-1910 IU of vitamin D per day. It is possible to meet your calcium requirement with diet alone, but a vitamin D supplement is usually necessary to meet this goal.  When exposed to the sun, use a sunscreen that protects against both UVA and UVB radiation with an SPF of 30 or greater. Reapply every 2 to 3 hours or after sweating, drying off with a towel, or swimming. Always wear a seat belt when traveling in a car. Always wear a helmet when riding a bicycle or motorcycle.

## 2023-03-07 ENCOUNTER — NURSE ONLY (OUTPATIENT)
Dept: LAB | Age: 78
End: 2023-03-07

## 2023-03-07 DIAGNOSIS — Z71.89 ENCOUNTER FOR HERB AND VITAMIN SUPPLEMENT MANAGEMENT: ICD-10-CM

## 2023-03-07 DIAGNOSIS — N18.30 STAGE 3 CHRONIC KIDNEY DISEASE, UNSPECIFIED WHETHER STAGE 3A OR 3B CKD (HCC): ICD-10-CM

## 2023-03-07 DIAGNOSIS — B37.0 THRUSH: ICD-10-CM

## 2023-03-07 DIAGNOSIS — I82.512 CHRONIC DEEP VEIN THROMBOSIS (DVT) OF FEMORAL VEIN OF LEFT LOWER EXTREMITY (HCC): ICD-10-CM

## 2023-03-07 DIAGNOSIS — K90.89 OTHER INTESTINAL MALABSORPTION: ICD-10-CM

## 2023-03-07 DIAGNOSIS — R35.1 NOCTURIA: ICD-10-CM

## 2023-03-07 DIAGNOSIS — R76.8 ELEVATED ANTI-TISSUE TRANSGLUTAMINASE (TTG) IGA LEVEL: ICD-10-CM

## 2023-03-07 DIAGNOSIS — G47.62 NOCTURNAL LEG CRAMPS: ICD-10-CM

## 2023-03-07 DIAGNOSIS — M46.1 SI (SACROILIAC) JOINT INFLAMMATION (HCC): ICD-10-CM

## 2023-03-07 DIAGNOSIS — G45.9 TIA (TRANSIENT ISCHEMIC ATTACK): ICD-10-CM

## 2023-03-07 DIAGNOSIS — I10 ESSENTIAL HYPERTENSION: ICD-10-CM

## 2023-03-07 DIAGNOSIS — C61 PROSTATE CANCER (HCC): ICD-10-CM

## 2023-03-07 DIAGNOSIS — R73.09 LOW GLUCOSE LEVEL: ICD-10-CM

## 2023-03-07 LAB
25(OH)D3 SERPL-MCNC: 43 NG/ML (ref 30–100)
ALBUMIN SERPL BCG-MCNC: 4.1 G/DL (ref 3.5–5.1)
ALP SERPL-CCNC: 77 U/L (ref 38–126)
ALT SERPL W/O P-5'-P-CCNC: 28 U/L (ref 11–66)
ANION GAP SERPL CALC-SCNC: 10 MEQ/L (ref 8–16)
AST SERPL-CCNC: 29 U/L (ref 5–40)
BASOPHILS ABSOLUTE: 0.1 THOU/MM3 (ref 0–0.1)
BASOPHILS NFR BLD AUTO: 0.9 %
BILIRUB CONJ SERPL-MCNC: < 0.2 MG/DL (ref 0–0.3)
BILIRUB SERPL-MCNC: 0.7 MG/DL (ref 0.3–1.2)
BUN SERPL-MCNC: 30 MG/DL (ref 7–22)
CALCIUM SERPL-MCNC: 9.1 MG/DL (ref 8.5–10.5)
CHLORIDE SERPL-SCNC: 107 MEQ/L (ref 98–111)
CHOLEST SERPL-MCNC: 197 MG/DL (ref 100–199)
CO2 SERPL-SCNC: 24 MEQ/L (ref 23–33)
CREAT SERPL-MCNC: 1.6 MG/DL (ref 0.4–1.2)
DEPRECATED MEAN GLUCOSE BLD GHB EST-ACNC: 105 MG/DL (ref 70–126)
DEPRECATED RDW RBC AUTO: 42 FL (ref 35–45)
EOSINOPHIL NFR BLD AUTO: 3.4 %
EOSINOPHILS ABSOLUTE: 0.2 THOU/MM3 (ref 0–0.4)
ERYTHROCYTE [DISTWIDTH] IN BLOOD BY AUTOMATED COUNT: 12.3 % (ref 11.5–14.5)
ERYTHROCYTE [SEDIMENTATION RATE] IN BLOOD BY WESTERGREN METHOD: 19 MM/HR (ref 0–10)
GFR SERPL CREATININE-BSD FRML MDRD: 43 ML/MIN/1.73M2
GLUCOSE SERPL-MCNC: 95 MG/DL (ref 70–108)
HBA1C MFR BLD HPLC: 5.5 % (ref 4.4–6.4)
HCT VFR BLD AUTO: 41.3 % (ref 42–52)
HDLC SERPL-MCNC: 51 MG/DL
HGB BLD-MCNC: 13.8 GM/DL (ref 14–18)
IMM GRANULOCYTES # BLD AUTO: 0.01 THOU/MM3 (ref 0–0.07)
IMM GRANULOCYTES NFR BLD AUTO: 0.2 %
LDLC SERPL CALC-MCNC: 120 MG/DL
LIPASE SERPL-CCNC: 40.1 U/L (ref 5.6–51.3)
LYMPHOCYTES ABSOLUTE: 1.5 THOU/MM3 (ref 1–4.8)
LYMPHOCYTES NFR BLD AUTO: 26.9 %
MAGNESIUM SERPL-MCNC: 2.1 MG/DL (ref 1.6–2.4)
MCH RBC QN AUTO: 31.2 PG (ref 26–33)
MCHC RBC AUTO-ENTMCNC: 33.4 GM/DL (ref 32.2–35.5)
MCV RBC AUTO: 93.2 FL (ref 80–94)
MONOCYTES ABSOLUTE: 0.6 THOU/MM3 (ref 0.4–1.3)
MONOCYTES NFR BLD AUTO: 11.6 %
NEUTROPHILS NFR BLD AUTO: 57 %
NRBC BLD AUTO-RTO: 0 /100 WBC
PLATELET # BLD AUTO: 275 THOU/MM3 (ref 130–400)
PMV BLD AUTO: 9.4 FL (ref 9.4–12.4)
POTASSIUM SERPL-SCNC: 4.3 MEQ/L (ref 3.5–5.2)
PROT SERPL-MCNC: 6.9 G/DL (ref 6.1–8)
PSA SERPL-MCNC: 12.01 NG/ML (ref 0–1)
PTH-INTACT SERPL-MCNC: 48.4 PG/ML (ref 15–65)
RBC # BLD AUTO: 4.43 MILL/MM3 (ref 4.7–6.1)
RHEUMATOID FACT SERPL-ACNC: < 10 IU/ML (ref 0–13)
SEGMENTED NEUTROPHILS ABSOLUTE COUNT: 3.2 THOU/MM3 (ref 1.8–7.7)
SODIUM SERPL-SCNC: 141 MEQ/L (ref 135–145)
TRIGL SERPL-MCNC: 129 MG/DL (ref 0–199)
TSH SERPL DL<=0.005 MIU/L-ACNC: 1.17 UIU/ML (ref 0.4–4.2)
WBC # BLD AUTO: 5.6 THOU/MM3 (ref 4.8–10.8)

## 2023-03-08 LAB
C-REACTIVE PROTEIN, HIGH SENSITIVITY: 9.6 MG/L
HOMOCYSTEINE: 12.2 UMOL/L

## 2023-03-09 LAB
DHEA-S SERPL-MCNC: 82 UG/DL (ref 28–175)
T3 TOTAL: 79 NG/DL (ref 60–181)
T4 SERPL-MCNC: 4.7 UG/DL (ref 4.5–10.9)

## 2023-03-10 LAB
GLIADIN IGG SER IA-ACNC: < 0.4 U/ML
NUCLEAR IGG SER QL IA: NORMAL
TESTOSTERONE FREE: NORMAL
TTG IGA SER IA-ACNC: 1 U/ML
TTG IGG SER IA-ACNC: 0.7 U/ML

## 2023-03-12 LAB
DHEA SERPL-MCNC: 1.27 NG/ML (ref 0.63–4.7)
THYROPEROXIDASE AB SERPL IA-ACNC: < 4 IU/ML (ref 0–25)

## 2023-03-21 ENCOUNTER — TELEPHONE (OUTPATIENT)
Dept: UROLOGY | Age: 78
End: 2023-03-21

## 2023-03-21 ENCOUNTER — OFFICE VISIT (OUTPATIENT)
Dept: FAMILY MEDICINE CLINIC | Age: 78
End: 2023-03-21

## 2023-03-21 ENCOUNTER — OFFICE VISIT (OUTPATIENT)
Dept: UROLOGY | Age: 78
End: 2023-03-21
Payer: MEDICARE

## 2023-03-21 VITALS
WEIGHT: 148 LBS | DIASTOLIC BLOOD PRESSURE: 68 MMHG | BODY MASS INDEX: 23.23 KG/M2 | HEIGHT: 67 IN | SYSTOLIC BLOOD PRESSURE: 130 MMHG

## 2023-03-21 VITALS
HEART RATE: 75 BPM | TEMPERATURE: 98.1 F | DIASTOLIC BLOOD PRESSURE: 72 MMHG | RESPIRATION RATE: 10 BRPM | SYSTOLIC BLOOD PRESSURE: 150 MMHG | BODY MASS INDEX: 23.1 KG/M2 | WEIGHT: 147.2 LBS | OXYGEN SATURATION: 99 % | HEIGHT: 67 IN

## 2023-03-21 DIAGNOSIS — N18.4 CKD (CHRONIC KIDNEY DISEASE), STAGE IV (HCC): ICD-10-CM

## 2023-03-21 DIAGNOSIS — R76.8 ELEVATED ANTI-TISSUE TRANSGLUTAMINASE (TTG) IGA LEVEL: ICD-10-CM

## 2023-03-21 DIAGNOSIS — R73.09 LOW GLUCOSE LEVEL: ICD-10-CM

## 2023-03-21 DIAGNOSIS — C61 PROSTATE CANCER (HCC): ICD-10-CM

## 2023-03-21 DIAGNOSIS — C61 PROSTATE CANCER (HCC): Primary | ICD-10-CM

## 2023-03-21 DIAGNOSIS — R35.1 NOCTURIA: ICD-10-CM

## 2023-03-21 DIAGNOSIS — K90.89 OTHER INTESTINAL MALABSORPTION: ICD-10-CM

## 2023-03-21 DIAGNOSIS — I10 ESSENTIAL HYPERTENSION: ICD-10-CM

## 2023-03-21 DIAGNOSIS — Z71.89 ENCOUNTER FOR HERB AND VITAMIN SUPPLEMENT MANAGEMENT: ICD-10-CM

## 2023-03-21 DIAGNOSIS — N40.1 BENIGN LOCALIZED PROSTATIC HYPERPLASIA WITH LOWER URINARY TRACT SYMPTOMS (LUTS): Primary | ICD-10-CM

## 2023-03-21 PROCEDURE — G8484 FLU IMMUNIZE NO ADMIN: HCPCS | Performed by: UROLOGY

## 2023-03-21 PROCEDURE — 1123F ACP DISCUSS/DSCN MKR DOCD: CPT | Performed by: UROLOGY

## 2023-03-21 PROCEDURE — G8420 CALC BMI NORM PARAMETERS: HCPCS | Performed by: UROLOGY

## 2023-03-21 PROCEDURE — 99204 OFFICE O/P NEW MOD 45 MIN: CPT | Performed by: UROLOGY

## 2023-03-21 PROCEDURE — 3078F DIAST BP <80 MM HG: CPT | Performed by: UROLOGY

## 2023-03-21 PROCEDURE — 1036F TOBACCO NON-USER: CPT | Performed by: UROLOGY

## 2023-03-21 PROCEDURE — G8427 DOCREV CUR MEDS BY ELIG CLIN: HCPCS | Performed by: UROLOGY

## 2023-03-21 PROCEDURE — 3075F SYST BP GE 130 - 139MM HG: CPT | Performed by: UROLOGY

## 2023-03-21 NOTE — PROGRESS NOTES
Laura Kenny MD.    [facility] [address] [facility] [address]  Dept: [phone]  Dept Fax: [phone]  Loc: [phone] [facility] Urology Office Note -     Patient:  Ronaldo Moe  YOB: 1945    The patient is a 76 y.o. male who presents today for evaluation of the following problems:   Chief Complaint   Patient presents with    Prostate Cancer    Urinary Frequency    Benign Prostatic Hypertrophy    New Patient    referred/consultation requested by Luci Rod DO. History of Present Illness:    Prostate cancer  Was on active surveillance  Was lost to follow up. BPH  [address]  Worsening  Urgency worsening  Not on meds-- could not tolerate flomax  Prostate 40g on last mri    Summary of Previous Records: FINAL DIAGNOSIS:   A-C, E. Prostate, right apex, right mid, right base, and left mid,   needle core biopsies:    Benign prostatic tissue. Negative for malignancy. D.  Prostate, left apex, needle core biopsy:       Adenocarcinoma of the prostate, involving 1 out of 2 cores, [place]       score 3+3 = 6 (measuring 1 mm/33 mm, 3%). F.  Prostate, left base, needle core biopsy:       Adenocarcinoma of the prostate, involving 1 out of 2 cores, Justin       score 3+3 = 6 (measuring 1 mm/33 mm, 3%). Requested/reviewed records from Luci Rod DO office and/or outside physician/EMR    (Patient's old records have been requested, reviewed and pertinent findings summarized in today's note.)    Procedures Today: N/A      Last several PSA's:  Lab Results   Component Value Date    PSA 12.01 (H) 03/07/2023    PSA 11.76 (H) 09/13/2022    PSA 12.78 (H) 01/13/2022       Last total testosterone:  No results found for: TESTOSTERONE    Urinalysis today:  No results found for this visit on 03/21/23.     Last BUN and creatinine:  Lab Results   Component Value Date    BUN 30 (H) 03/07/2023     Lab

## 2023-03-21 NOTE — PATIENT INSTRUCTIONS
Thank you   Thank you for trusting us with your healthcare needs. You may receive a survey regarding today's visit. It would help us out if you would take a few moments to provide your feedback. We value your input. Please bring in ALL medications BOTTLES, including inhalers, herbal supplements, over the counter, prescribed & non-prescribed medicine. The office would like actual medication bottles and a list.   Please note our OFFICE POLICIES:   Prior to getting your labs drawn, please check with your insurance company for benefits and eligibility of lab services. Often, insurance companies cover certain tests for preventative visits only. It is patient's responsibility to see what is covered. We are unable to change a diagnosis after the test has been performed. Lab orders will not be re-printed. Please hold onto your original lab orders and take them to your lab to be completed. If you no show your scheduled appointment three times, you will be dismissed from this practice. Reschedules must be completed 24 hours prior to your schedule appointment. If the list below has been completed, PLEASE FAX RECORDS TO OUR OFFICE @ [phone].  Once the records have been received we will update your records at our office:  Health Maintenance Due   Topic Date Due    COVID-19 Vaccine (1) Never done    Hepatitis C screen  Never done    DTaP/Tdap/Td vaccine (1 - Tdap) Never done    Shingles vaccine (1 of 2) Never done    Flu vaccine (1) Never done

## 2023-03-21 NOTE — TELEPHONE ENCOUNTER
Patient scheduled for MRI PROSTATE W WO  at [facility] MR on 4/17/2023 ARRIVAL OF 200PM FOR A 230PM SCAN.     Order mailed with instructions  to the patient

## 2023-03-21 NOTE — PROGRESS NOTES
and vitamin supplement management    6. Other intestinal malabsorption    7. Low glucose level    8. Elevated anti-tissue transglutaminase (tTG) IgA level      Assessment and Plan:  After reviewing the patients chief complaints, reviewing their labfindings in great detail (with the patient and those accompanying them) which correlate to their chief complaints, symptoms, and or medical conditions; suggestions were made relating to changes in diet and or supplements which may improve the complaints and which will be reflected in their future lab findings; Chief Complaint   Patient presents with    [address]   ;    Plans for the next visits:  - Abnormal and non-optimal Labs were ordered today to be repeated in the next 120-365 days to assess changes from adjustments in nutrition and or nutrients. - Patient instructed when having a blood draw to ask the  to divide their lab draws into multiple draws over several days if not feeling good at the time of the lab draw or if either prefers to do several smaller blood draws over several days  -Patient instructed to check with insurer before each lab draw and to go to the lab which the insurer directs them for the most cost effective lab draw with the least patient's cost  - Albertina Dolan  will be scheduled subsequent to those results. Nani Moraes will bring in his drink, food, supplement log to his next visit    Chronic Problems Addressed on this Visit:                                   1.  Intensity of Service; Uncontrolled items at this visit; Chief Complaint   Patient presents with    [address]   ; Improved items at this visit and Stable items were discussed at this visit;  2.  Patients food, drinks, supplements and symptoms were reviewed with the patient,       - Albertina Dolan will bring food, drink, supplements and symptoms log to next visit for inclusion in their record      - 75 better food list reviewed &

## 2023-04-17 ENCOUNTER — HOSPITAL ENCOUNTER (OUTPATIENT)
Dept: MRI IMAGING | Age: 78
Discharge: HOME | End: 2023-04-17
Payer: MEDICARE

## 2023-04-17 DIAGNOSIS — C61 PROSTATE CANCER (HCC): ICD-10-CM

## 2023-04-17 LAB — POC CREATININE WHOLE BLOOD: 1.9 MG/DL (ref 0.5–1.2)

## 2023-04-17 PROCEDURE — 82565 ASSAY OF CREATININE: CPT

## 2023-04-17 PROCEDURE — 76377 3D RENDER W/INTRP POSTPROCES: CPT

## 2023-04-17 PROCEDURE — 6360000004 HC RX CONTRAST MEDICATION: Performed by: UROLOGY

## 2023-04-17 PROCEDURE — A9579 GAD-BASE MR CONTRAST NOS,1ML: HCPCS | Performed by: UROLOGY

## 2023-04-17 RX ADMIN — GADOTERIDOL 15 ML: 279.3 INJECTION, SOLUTION INTRAVENOUS at 15:43

## 2023-04-24 ENCOUNTER — TELEPHONE (OUTPATIENT)
Dept: UROLOGY | Age: 78
End: 2023-04-24

## 2023-04-24 ENCOUNTER — OFFICE VISIT (OUTPATIENT)
Dept: UROLOGY | Age: 78
End: 2023-04-24
Payer: MEDICARE

## 2023-04-24 VITALS
DIASTOLIC BLOOD PRESSURE: 84 MMHG | WEIGHT: 149.8 LBS | SYSTOLIC BLOOD PRESSURE: 138 MMHG | BODY MASS INDEX: 23.51 KG/M2 | HEIGHT: 67 IN

## 2023-04-24 DIAGNOSIS — N40.1 BENIGN LOCALIZED PROSTATIC HYPERPLASIA WITH LOWER URINARY TRACT SYMPTOMS (LUTS): Primary | ICD-10-CM

## 2023-04-24 DIAGNOSIS — C61 PROSTATE CANCER (HCC): Primary | ICD-10-CM

## 2023-04-24 DIAGNOSIS — R31.29 MICROSCOPIC HEMATURIA: ICD-10-CM

## 2023-04-24 LAB
BILIRUBIN URINE: NEGATIVE
BLOOD URINE, POC: NORMAL
CHARACTER, URINE: CLEAR
COLOR, URINE: YELLOW
GLUCOSE URINE: NEGATIVE MG/DL
KETONES, URINE: NEGATIVE
LEUKOCYTE CLUMPS, URINE: NEGATIVE
NITRITE, URINE: NEGATIVE
PH, URINE: 6 (ref 5–9)
POST VOID RESIDUAL (PVR): 11 ML
PROTEIN, URINE: NEGATIVE MG/DL
SPECIFIC GRAVITY, URINE: 1.02 (ref 1–1.03)
UROBILINOGEN, URINE: 0.2 EU/DL (ref 0–1)

## 2023-04-24 PROCEDURE — 3078F DIAST BP <80 MM HG: CPT | Performed by: PHYSICIAN ASSISTANT

## 2023-04-24 PROCEDURE — G8420 CALC BMI NORM PARAMETERS: HCPCS | Performed by: PHYSICIAN ASSISTANT

## 2023-04-24 PROCEDURE — 1036F TOBACCO NON-USER: CPT | Performed by: PHYSICIAN ASSISTANT

## 2023-04-24 PROCEDURE — 51798 US URINE CAPACITY MEASURE: CPT | Performed by: PHYSICIAN ASSISTANT

## 2023-04-24 PROCEDURE — 1123F ACP DISCUSS/DSCN MKR DOCD: CPT | Performed by: PHYSICIAN ASSISTANT

## 2023-04-24 PROCEDURE — 81003 URINALYSIS AUTO W/O SCOPE: CPT | Performed by: PHYSICIAN ASSISTANT

## 2023-04-24 PROCEDURE — 3074F SYST BP LT 130 MM HG: CPT | Performed by: PHYSICIAN ASSISTANT

## 2023-04-24 PROCEDURE — 99214 OFFICE O/P EST MOD 30 MIN: CPT | Performed by: PHYSICIAN ASSISTANT

## 2023-04-24 PROCEDURE — G8427 DOCREV CUR MEDS BY ELIG CLIN: HCPCS | Performed by: PHYSICIAN ASSISTANT

## 2023-04-24 RX ORDER — VIBEGRON 75 MG/1
TABLET, FILM COATED ORAL DAILY
COMMUNITY

## 2023-04-24 NOTE — TELEPHONE ENCOUNTER
Patient scheduled for PET CT PSMA  at [facility] on 6/5/2023 ARRIVAL OF 1045AM FOR  AN 11AM SCAN.     Order mailed with instructions to the patient

## 2023-04-24 NOTE — PROGRESS NOTES
[address] [facility] [id number]  Dept: [phone]  Loc: [phone]      Mr. Moody Barnes was seen in follow up for   Chief Complaint   Patient presents with    Benign Prostatic Hypertrophy     Review MRI and discuss how Gemtesa        HPI:  Mr. Moody Barnes is a 64-year-old male with a history of Justin 3+3=6 adenocarcinoma of the prostate diagnosed in January 2019. He is status post TRUS prostate biopsy in January 2019 under the auspices of Dr. Munira Kennedy. His pathology was significant for Ewing 3+3=6 adenocarcinoma, with very low risk Oncotype Dx profile. His PSA prior to his biopsy was 8.91. Since this time, it has fluctuated from 8.62 to a high of 13.49 in 9/21. His most recent value in March 2022 was 12.01. Unfortunately, Mr. Moody Barnes did not follow-up in our office many times after his prostate biopsy. He states that his PCP has been following his levels since this time. He has been reluctant to consider re-biopsy or re-imaging. He reports worsening lower back pain and pain extending from his right mid quadrant that radiates towards his right inguinal area, primarily when lifting, pushing, or pulling. He does report mild to moderate irritative and obstructive symptomatology. He states that he tried Flomax in the past, but he did not like the way It made him feel. He recently tried samples of daily Gemtesa, but did not notice a considerable difference in his urgency, frequency, or weakened stream. He denies any bowel irregularity. Denies fever/chills, night sweats, cough, dyspnea, chest pain, leg pain. He reports constant low back pain for which he sees a chiropractor. Otherwise, he denies any other concerns today. He presents today to review his MRI of the prostate results.      Past Medical History:   Diagnosis Date    Arthritis     neck, Wrists , back    Cancer ([address]     skin (removed)    Cerebral artery

## 2023-04-24 NOTE — TELEPHONE ENCOUNTER
MRI FUSION GUIDED PROSTATE ULTRASOUND/BIOPSY WITH DR. DAILY Moe      1945    You are scheduled for the above procedure on:    5/23/2023   at:    1:00PM  Your follow up appointment for your biopsy results is on:    5/30/2023     At:   3:30PM    Please note that you will be responsible for any charges that are not paid by your insurance. The prostate biopsy specimens are sent to a Lab and their charges are billed to you separate from the office charges. DESCRIPTION OF PROSTATIC ULTRASOUND AND BIOPSY  Ultrasound uses harmless sound waves to give us pictures of the prostate, and allows us to accurately guide a biopsy needle to areas of concern. The procedure is done in the office. Initially, a complete prostate exam is done. Next the ultrasound probe, finger-like in size and shape, is placed into the rectum. With slight movement of the probe, many different views are obtained. A prostate block may or may not be given at this time. A spring loaded fine needle is place through the probe and directed into the prostate. Six or more biopsies are usually taken. These biopsies are not usually painful. The entire exam takes 20-30 minutes. POSSIBLE RISKS OF THE PROCEDURE  Blood may be noted in the stool and urine for a few days. Blood may be seen in the semen for up to a month. Infection of the prostate or in the urine can occur even with antibiotic preparation. You should call if you develop fever, chills, severe pain, or have continuous or significant bleeding. If you have known hemorrhoids, you may have more bleeding and discomfort in the rectal area following the biopsy. This may last for 3-5 days afterwards. If any concerns arise, please call the office.     PREPARATION** PLEASE EAT A REGULAR LUNCH OR BREAKFAST**    1.  DO NOT TAKE: ASPIRIN, MOTRIN,  IBUPROFEN, MOBIC/ MELOXICAM, COUMADIN( WARFARIN) FISH OIL, AND VITAMIN E FOR 5 DAYS BEFORE THE BIOPSY AND 3 DAYS AFTER THE

## 2023-04-25 RX ORDER — CIPROFLOXACIN 500 MG/1
500 TABLET, FILM COATED ORAL 2 TIMES DAILY
Qty: 6 TABLET | Refills: 0 | Status: SHIPPED | OUTPATIENT
Start: 2023-04-25 | End: 2023-04-28

## 2023-04-25 NOTE — TELEPHONE ENCOUNTER
Patient advised cipro was sent to the pharmacy to start the day prior to his procedure. He voiced understanding.

## 2023-05-16 ENCOUNTER — TELEPHONE (OUTPATIENT)
Dept: FAMILY MEDICINE CLINIC | Age: 78
End: 2023-05-16

## 2023-05-16 DIAGNOSIS — I82.512 CHRONIC DEEP VEIN THROMBOSIS (DVT) OF FEMORAL VEIN OF LEFT LOWER EXTREMITY (HCC): Primary | ICD-10-CM

## 2023-05-16 NOTE — TELEPHONE ENCOUNTER
Called and updated the patient, he stated that he did take Pepto-Bismol last Wednesday and Thursday, states at least 4 times. Wednesday is when the black stool started.

## 2023-05-16 NOTE — TELEPHONE ENCOUNTER
Black stools is a common side effect from the [facility] and likely cause. Will give it another day.
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
## 2023-05-16 NOTE — TELEPHONE ENCOUNTER
Continue to hold Eliquis and methocarbamol at this time. Is he taking any Pepto-bismol by chance? If not, will continue to monitor since he is asymptomatic. Contact office with update tomorrow morning.

## 2023-05-16 NOTE — TELEPHONE ENCOUNTER
Pt was notified and voiced understanding. He wants to clarify that he is to stay off the Eliquis and Robaxin still. Please advise.

## 2023-05-19 NOTE — TELEPHONE ENCOUNTER
Called and spoke with the patient, he states that he does not want to go back on the Coumadin. States that he \"really wants to get off of it\". Asked him if he would be able to pay out of pocket for the Eliquis and he stated that he could pay some. Asked again if the Eliquis was to expensive would he go back on the Coumadin and he stated no. Please advise.

## 2023-05-19 NOTE — TELEPHONE ENCOUNTER
Patient called and \"everything is back to normal.\" He stated that he was to call with an update. Patient also stated that he was to inform us when he was getting low on is eliquis as he made to much last year for financial help.

## 2023-05-19 NOTE — TELEPHONE ENCOUNTER
Pt agrees with the referral to Hematology and is aware someone in that office will contact him to schedule. Please advise. Cartilage Graft Text: The defect edges were debeveled with a #15 scalpel blade.  Given the location of the defect, shape of the defect, the fact the defect involved a full thickness cartilage defect a cartilage graft was deemed most appropriate.  An appropriate donor site was identified, cleansed, and anesthetized. The cartilage graft was then harvested and transferred to the recipient site, oriented appropriately and then sutured into place.  The secondary defect was then repaired using a primary closure.

## 2023-06-02 ENCOUNTER — NURSE ONLY (OUTPATIENT)
Dept: LAB | Age: 78
End: 2023-06-02

## 2023-06-02 DIAGNOSIS — N18.4 CKD (CHRONIC KIDNEY DISEASE), STAGE IV (HCC): ICD-10-CM

## 2023-06-02 DIAGNOSIS — K90.89 OTHER INTESTINAL MALABSORPTION: ICD-10-CM

## 2023-06-02 DIAGNOSIS — R35.1 NOCTURIA: ICD-10-CM

## 2023-06-02 DIAGNOSIS — Z71.89 ENCOUNTER FOR HERB AND VITAMIN SUPPLEMENT MANAGEMENT: ICD-10-CM

## 2023-06-02 DIAGNOSIS — C61 PROSTATE CANCER (HCC): ICD-10-CM

## 2023-06-02 DIAGNOSIS — R73.09 LOW GLUCOSE LEVEL: ICD-10-CM

## 2023-06-02 DIAGNOSIS — R76.8 ELEVATED ANTI-TISSUE TRANSGLUTAMINASE (TTG) IGA LEVEL: ICD-10-CM

## 2023-06-02 DIAGNOSIS — N18.32 STAGE 3B CHRONIC KIDNEY DISEASE (HCC): ICD-10-CM

## 2023-06-02 DIAGNOSIS — I10 ESSENTIAL HYPERTENSION: ICD-10-CM

## 2023-06-02 LAB
25(OH)D3 SERPL-MCNC: 42 NG/ML (ref 30–100)
ANION GAP SERPL CALC-SCNC: 18 MEQ/L (ref 8–16)
BASOPHILS ABSOLUTE: 0.1 THOU/MM3 (ref 0–0.1)
BASOPHILS NFR BLD AUTO: 0.9 %
BUN SERPL-MCNC: 27 MG/DL (ref 7–22)
CALCIUM SERPL-MCNC: 9.5 MG/DL (ref 8.5–10.5)
CHLORIDE SERPL-SCNC: 101 MEQ/L (ref 98–111)
CO2 SERPL-SCNC: 20 MEQ/L (ref 23–33)
CREAT SERPL-MCNC: 1.7 MG/DL (ref 0.4–1.2)
CREAT UR-MCNC: 103.1 MG/DL
DEPRECATED MEAN GLUCOSE BLD GHB EST-ACNC: 111 MG/DL (ref 70–126)
DEPRECATED RDW RBC AUTO: 43.4 FL (ref 35–45)
EOSINOPHIL NFR BLD AUTO: 0.7 %
EOSINOPHILS ABSOLUTE: 0 THOU/MM3 (ref 0–0.4)
ERYTHROCYTE [DISTWIDTH] IN BLOOD BY AUTOMATED COUNT: 12.5 % (ref 11.5–14.5)
ERYTHROCYTE [SEDIMENTATION RATE] IN BLOOD BY WESTERGREN METHOD: 10 MM/HR (ref 0–10)
GFR SERPL CREATININE-BSD FRML MDRD: 40 ML/MIN/1.73M2
GLUCOSE SERPL-MCNC: 95 MG/DL (ref 70–108)
HBA1C MFR BLD HPLC: 5.7 % (ref 4.4–6.4)
HCT VFR BLD AUTO: 43.3 % (ref 42–52)
HGB BLD-MCNC: 14.7 GM/DL (ref 14–18)
IMM GRANULOCYTES # BLD AUTO: 0.01 THOU/MM3 (ref 0–0.07)
IMM GRANULOCYTES NFR BLD AUTO: 0.2 %
LYMPHOCYTES ABSOLUTE: 1.6 THOU/MM3 (ref 1–4.8)
LYMPHOCYTES NFR BLD AUTO: 29 %
MAGNESIUM SERPL-MCNC: 2.4 MG/DL (ref 1.6–2.4)
MCH RBC QN AUTO: 32.2 PG (ref 26–33)
MCHC RBC AUTO-ENTMCNC: 33.9 GM/DL (ref 32.2–35.5)
MCV RBC AUTO: 94.7 FL (ref 80–94)
MONOCYTES ABSOLUTE: 0.6 THOU/MM3 (ref 0.4–1.3)
MONOCYTES NFR BLD AUTO: 9.9 %
NEUTROPHILS NFR BLD AUTO: 59.3 %
NRBC BLD AUTO-RTO: 0 /100 WBC
PLATELET # BLD AUTO: 269 THOU/MM3 (ref 130–400)
PMV BLD AUTO: 9.3 FL (ref 9.4–12.4)
POTASSIUM SERPL-SCNC: 4.5 MEQ/L (ref 3.5–5.2)
PROT UR-MCNC: 6.8 MG/DL
PROT/CREAT 24H UR: 0.07 MG/G{CREAT}
PTH-INTACT SERPL-MCNC: 54.2 PG/ML (ref 15–65)
RBC # BLD AUTO: 4.57 MILL/MM3 (ref 4.7–6.1)
SEGMENTED NEUTROPHILS ABSOLUTE COUNT: 3.3 THOU/MM3 (ref 1.8–7.7)
SODIUM SERPL-SCNC: 139 MEQ/L (ref 135–145)
WBC # BLD AUTO: 5.6 THOU/MM3 (ref 4.8–10.8)

## 2023-06-03 LAB — C-REACTIVE PROTEIN, HIGH SENSITIVITY: 3.5 MG/L

## 2023-06-06 ENCOUNTER — OFFICE VISIT (OUTPATIENT)
Dept: NEPHROLOGY | Age: 78
End: 2023-06-06
Payer: MEDICARE

## 2023-06-06 VITALS
BODY MASS INDEX: 23.23 KG/M2 | OXYGEN SATURATION: 96 % | HEART RATE: 65 BPM | DIASTOLIC BLOOD PRESSURE: 74 MMHG | SYSTOLIC BLOOD PRESSURE: 128 MMHG | WEIGHT: 148 LBS | HEIGHT: 67 IN

## 2023-06-06 DIAGNOSIS — N28.1 BILATERAL RENAL CYSTS: ICD-10-CM

## 2023-06-06 DIAGNOSIS — C61 PROSTATE CANCER (HCC): ICD-10-CM

## 2023-06-06 DIAGNOSIS — E87.20 METABOLIC ACIDOSIS: ICD-10-CM

## 2023-06-06 DIAGNOSIS — N18.32 STAGE 3B CHRONIC KIDNEY DISEASE (HCC): Primary | ICD-10-CM

## 2023-06-06 LAB — THYROPEROXIDASE AB SERPL IA-ACNC: < 4 IU/ML (ref 0–25)

## 2023-06-06 PROCEDURE — 3074F SYST BP LT 130 MM HG: CPT | Performed by: INTERNAL MEDICINE

## 2023-06-06 PROCEDURE — G8420 CALC BMI NORM PARAMETERS: HCPCS | Performed by: INTERNAL MEDICINE

## 2023-06-06 PROCEDURE — 1036F TOBACCO NON-USER: CPT | Performed by: INTERNAL MEDICINE

## 2023-06-06 PROCEDURE — G8427 DOCREV CUR MEDS BY ELIG CLIN: HCPCS | Performed by: INTERNAL MEDICINE

## 2023-06-06 PROCEDURE — 1123F ACP DISCUSS/DSCN MKR DOCD: CPT | Performed by: INTERNAL MEDICINE

## 2023-06-06 PROCEDURE — 99214 OFFICE O/P EST MOD 30 MIN: CPT | Performed by: INTERNAL MEDICINE

## 2023-06-06 PROCEDURE — 3078F DIAST BP <80 MM HG: CPT | Performed by: INTERNAL MEDICINE

## 2023-06-06 RX ORDER — SODIUM BICARBONATE 650 MG/1
650 TABLET ORAL 2 TIMES DAILY
Qty: 60 TABLET | Refills: 11 | Status: SHIPPED | OUTPATIENT
Start: 2023-06-06 | End: 2024-06-05

## 2023-06-06 NOTE — PROGRESS NOTES
Complaint   Patient presents with    Chronic Kidney Disease     iiib      HPI:This is a follow up visit for Mr. Sarahann Baumgarten here today for return appointment. I see him for chronic kidney disease. He was last seen in December 2022. Doing well with no complaint. Appetite is good. No difficulty with urination. Denies nausea, vomiting. Denies chest pain or shortness of breath. .  He has adenocarcinoma of the prostate gland and is followed by urology. He is scheduled for PET scan. ROS:  Pertinent positives stated above in HPI. All other systems were reviewed and were negative. Medications:     Current Outpatient Medications   Medication Sig Dispense Refill    Cholecalciferol (VITAMIN D3) 50 MCG (2000 UT) CAPS Take 1 capsule by mouth daily (with breakfast) One daily but double Thurs 30 capsule     calcium carbonate 600 MG TABS tablet Take 1 tablet by mouth 4 times daily (before meals and nightly) 30 tablet     NONFORMULARY Take 6 caplets by mouth Daily      Omega-3 Fatty Acids ([employer] FISH OIL PO) Take 1 tablet by mouth 4 times daily       apixaban (ELIQUIS) 2.5 MG TABS tablet Take 1 tablet by mouth 2 times daily 180 tablet 1    Chromium-Cinnamon (CINNAMON PLUS CHROMIUM PO) Take 1 capsule by mouth 4 times daily (before meals and nightly) [facility] units per capsule      B Complex-Biotin-FA (B COMPLEX 100 TR PO) Take 1 tablet by mouth 3 times daily (before meals) [id number] [facility] [employer]      magnesium cl-calcium carbonate (SLOW-MAG) 71.5-119 MG TBEC tablet Take 1 tablet by mouth 4 times daily (after meals and at bedtime)      Levomefolate Glucosamine (METHYLFOLATE PO) Take 15 mg by mouth every morning (before breakfast) Metabolic Maintenance at [employer] or [employer]       Methylcobalamin 5000 MCG SUBL Place 1 tablet under the tongue nightly Chuy at [employer]      mupirocin (BACTROBAN) 2 % ointment Apply topically Indications: Skin Abnormalities As needed for dermatologist appointments  1

## 2023-06-12 ENCOUNTER — HOSPITAL ENCOUNTER (EMERGENCY)
Age: 78
Discharge: HOME | End: 2023-06-12
Payer: MEDICARE

## 2023-06-12 VITALS
HEART RATE: 70 BPM | TEMPERATURE: 97 F | OXYGEN SATURATION: 98 % | RESPIRATION RATE: 16 BRPM | SYSTOLIC BLOOD PRESSURE: 196 MMHG | DIASTOLIC BLOOD PRESSURE: 94 MMHG

## 2023-06-12 DIAGNOSIS — H61.22 IMPACTED CERUMEN OF LEFT EAR: ICD-10-CM

## 2023-06-12 DIAGNOSIS — H92.02 OTALGIA OF LEFT EAR: Primary | ICD-10-CM

## 2023-06-12 PROCEDURE — 99213 OFFICE O/P EST LOW 20 MIN: CPT
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
## 2023-06-12 NOTE — ED PROVIDER NOTES
calculated from the following:    Height as of 6/6/23: 5' 7\" (1.702 m). Weight as of 6/6/23: 148 lb (67.1 kg). ,No LMP for male patient. Physical Exam  Vitals and nursing note reviewed. Constitutional:       Appearance: Normal appearance. He is normal weight. HENT:      Head: Normocephalic. Right Ear: Tympanic membrane normal.      Left Ear: There is impacted cerumen. Nose: No congestion. Cardiovascular:      Rate and Rhythm: Normal rate and regular rhythm. Heart sounds: Normal heart sounds. Pulmonary:      Effort: Pulmonary effort is normal.      Breath sounds: Normal breath sounds. Skin:     General: Skin is warm and dry. Neurological:      Mental Status: He is alert. DIAGNOSTIC RESULTS     Labs:No results found for this visit on 06/12/23. IMAGING:    No orders to display         EKG:      URGENT CARE COURSE:     Vitals:    06/12/23 1022   BP: (!) 196/94   Pulse: 70   Resp: 16   Temp: 97 °F (36.1 °C)   SpO2: 98%       Medications - No data to display         PROCEDURES:  None    FINAL IMPRESSION      1. Otalgia of left ear    2. Impacted cerumen of left ear          DISPOSITION/ PLAN   Patient diagnosed with otalgia of left ear as well as impacted cerumen of left ear. Patient educated to keep ears clean and dry. Patient educated to use otc Debrox ear drops to help loosen wax. Patient educated to follow up with PCP as needed. Patient denies any questions or concerns at this time.          PATIENT REFERRED TO:  DO Abdirahman [facility], [address] / [facility] [address]      DISCHARGE MEDICATIONS:  New Prescriptions    No medications on file       Discontinued Medications    No medications on file       Current Discharge Medication List          KRISTINA Myers CNP    (Please note that portions of this note were completed with a voice recognition program. Efforts were made to edit the dictations but occasionally words are mis-transcribed.)            [facility]

## 2023-06-13 ENCOUNTER — HOSPITAL ENCOUNTER (OUTPATIENT)
Dept: INFUSION THERAPY | Age: 78
Discharge: HOME | End: 2023-06-13
Payer: MEDICARE

## 2023-06-13 VITALS
OXYGEN SATURATION: 95 % | HEART RATE: 85 BPM | SYSTOLIC BLOOD PRESSURE: 174 MMHG | TEMPERATURE: 97.8 F | RESPIRATION RATE: 16 BRPM | DIASTOLIC BLOOD PRESSURE: 94 MMHG

## 2023-06-13 PROCEDURE — 99211 OFF/OP EST MAY X REQ PHY/QHP: CPT

## 2023-06-20 ENCOUNTER — SCHEDULED TELEPHONE ENCOUNTER (OUTPATIENT)
Dept: UROLOGY | Age: 78
End: 2023-06-20
Payer: MEDICARE

## 2023-06-20 ENCOUNTER — OFFICE VISIT (OUTPATIENT)
Dept: FAMILY MEDICINE CLINIC | Age: 78
End: 2023-06-20
Payer: MEDICARE

## 2023-06-20 VITALS
HEIGHT: 72 IN | SYSTOLIC BLOOD PRESSURE: 148 MMHG | WEIGHT: 149.6 LBS | TEMPERATURE: 98.1 F | OXYGEN SATURATION: 96 % | BODY MASS INDEX: 20.26 KG/M2 | RESPIRATION RATE: 10 BRPM | HEART RATE: 59 BPM | DIASTOLIC BLOOD PRESSURE: 86 MMHG

## 2023-06-20 DIAGNOSIS — C61 PROSTATE CANCER (HCC): ICD-10-CM

## 2023-06-20 DIAGNOSIS — E43 SEVERE PROTEIN-CALORIE MALNUTRITION (HCC): ICD-10-CM

## 2023-06-20 DIAGNOSIS — N18.4 CKD (CHRONIC KIDNEY DISEASE), STAGE IV (HCC): ICD-10-CM

## 2023-06-20 DIAGNOSIS — I82.512 CHRONIC DEEP VEIN THROMBOSIS (DVT) OF FEMORAL VEIN OF LEFT LOWER EXTREMITY (HCC): Primary | ICD-10-CM

## 2023-06-20 DIAGNOSIS — K90.89 OTHER INTESTINAL MALABSORPTION: ICD-10-CM

## 2023-06-20 DIAGNOSIS — R35.1 NOCTURIA: ICD-10-CM

## 2023-06-20 DIAGNOSIS — I10 ESSENTIAL HYPERTENSION: ICD-10-CM

## 2023-06-20 DIAGNOSIS — Z71.89 ENCOUNTER FOR HERB AND VITAMIN SUPPLEMENT MANAGEMENT: ICD-10-CM

## 2023-06-20 DIAGNOSIS — R73.09 LOW GLUCOSE LEVEL: ICD-10-CM

## 2023-06-20 PROBLEM — E46 PROTEIN CALORIE MALNUTRITION (HCC): Status: ACTIVE | Noted: 2023-06-20

## 2023-06-20 PROCEDURE — 3077F SYST BP >= 140 MM HG: CPT | Performed by: FAMILY MEDICINE

## 2023-06-20 PROCEDURE — G8420 CALC BMI NORM PARAMETERS: HCPCS | Performed by: FAMILY MEDICINE

## 2023-06-20 PROCEDURE — 99213 OFFICE O/P EST LOW 20 MIN: CPT | Performed by: FAMILY MEDICINE

## 2023-06-20 PROCEDURE — 1036F TOBACCO NON-USER: CPT | Performed by: FAMILY MEDICINE

## 2023-06-20 PROCEDURE — 99442 PR PHYS/QHP TELEPHONE EVALUATION 11-20 MIN: CPT | Performed by: UROLOGY

## 2023-06-20 PROCEDURE — 3079F DIAST BP 80-89 MM HG: CPT | Performed by: FAMILY MEDICINE

## 2023-06-20 PROCEDURE — G8427 DOCREV CUR MEDS BY ELIG CLIN: HCPCS | Performed by: FAMILY MEDICINE

## 2023-06-20 PROCEDURE — 1123F ACP DISCUSS/DSCN MKR DOCD: CPT | Performed by: FAMILY MEDICINE

## 2023-06-20 RX ORDER — METHOCARBAMOL 750 MG/1
TABLET, FILM COATED ORAL
COMMUNITY
Start: 2023-05-12

## 2023-06-20 NOTE — PROGRESS NOTES
[address] [facility] [address] [address]  Dept: [phone]  Dept Fax: [phone]  Loc: [phone]      Mannie Moe is a 76 y.o. White male. Burt Louis  presents to the [place] [address] clinic today for   Chief Complaint   Patient presents with    [address]     Had resent blood work to see if he can get off Eliquist.  Mri and pet scan    Other     Loss of muscle mass   , and;   1. Chronic deep vein thrombosis (DVT) of femoral vein of left lower extremity (HCC)    2. Prostate cancer ([facility])    3. CKD (chronic kidney disease), stage IV ([facility])    4. Essential hypertension    5. Nocturia    6. Encounter for herb and vitamin supplement management    7. Low glucose level    8. Other intestinal malabsorption    9. Severe protein-calorie malnutrition ([facility])          I have reviewed Mannie Moe medical, surgical and other pertinent history in detail, and have updated medication and allergy information in the computerized patient record. Clinical Care Team:     -Referring Provider for today's consult: self  -Primary Care Provider: Edwar Granado DO    Medical/Surgical History:   He  has a past medical history of Arthritis, Cancer [facility]), Cerebral artery occlusion with cerebral infarction [facility]), DVT (deep venous thrombosis) ([facility]), GERD (gastroesophageal reflux disease), Hx of blood clots, Hypertension, Kidney disease, Prostate cancer ([facility]), Pulmonary emboli ([facility]), Scoliosis, and TIA (transient ischemic attack). His  has a past surgical history that includes Skin cancer excision (2010); other surgical history (Left, 10/11/2017); EXCISION / BIOPSY SKIN LESION OF ARM (Left, 10/11/2017); EGD (2019); Colonoscopy (2019); Prostate biopsy; Arm Surgery (Right, 12/18/2020); Carpal tunnel release (Left, 02/2021); and Finger trigger release (Left, 02/2021).     Family/Social History:     His family

## 2023-06-20 NOTE — PATIENT INSTRUCTIONS
Thank you   Thank you for trusting us with your healthcare needs. You may receive a survey regarding today's visit. It would help us out if you would take a few moments to provide your feedback. We value your input. Please bring in ALL medications BOTTLES, including inhalers, herbal supplements, over the counter, prescribed & non-prescribed medicine. The office would like actual medication bottles and a list.   Please note our OFFICE POLICIES:   Prior to getting your labs drawn, please check with your insurance company for benefits and eligibility of lab services. Often, insurance companies cover certain tests for preventative visits only. It is patient's responsibility to see what is covered. We are unable to change a diagnosis after the test has been performed. Lab orders will not be re-printed. Please hold onto your original lab orders and take them to your lab to be completed. If you no show your scheduled appointment three times, you will be dismissed from this practice. Reschedules must be completed 24 hours prior to your schedule appointment. If the list below has been completed, PLEASE FAX RECORDS TO OUR OFFICE @ [phone].  Once the records have been received we will update your records at our office:  Health Maintenance Due   Topic Date Due    COVID-19 Vaccine (1) Never done    Hepatitis C screen  Never done    DTaP/Tdap/Td vaccine (1 - Tdap) Never done    Shingles vaccine (1 of 2) Never done

## 2023-06-21 NOTE — PROGRESS NOTES
Luis Moe is a 76 y.o. male evaluated via telephone on 6/20/2023 for    Documentation:  I communicated with the patient and/or health care decision maker about prostate cancer. Details of this discussion including any medical advice provided:     Psma shows no metastases  Pt with rising psa on active surv was lost to follow up for years   Has not had confirmatory biopsy to see if cancer has progressed  Seeing [facility] soon  Will follow up for biopsy based off MRI if he chooses. He will call to schedule this    Total Time: minutes: 11-20 minutes    Luis Moe was evaluated through a synchronous (real-time) audio encounter. Patient identification was verified at the start of the visit. He (or guardian if applicable) is aware that this is a billable service, which includes applicable co-pays. This visit was conducted with the patient's (and/or legal guardian's) verbal consent. He has not had a related appointment within my department in the past 7 days or scheduled within the next 24 hours. The patient was located at Home: [address] [place] [address] Road [facility] [address]. The provider was located at [facility] ([facility] Dept): [address] [place] [address] [address]  [facility],  [address].     Note: not billable if this call serves to triage the patient into an appointment for the relevant concern    Angelica Arango MD

## 2023-06-23 ENCOUNTER — TELEPHONE (OUTPATIENT)
Dept: FAMILY MEDICINE CLINIC | Age: 78
End: 2023-06-23

## 2023-06-23 DIAGNOSIS — I82.512 CHRONIC DEEP VEIN THROMBOSIS (DVT) OF FEMORAL VEIN OF LEFT LOWER EXTREMITY (HCC): ICD-10-CM

## 2023-06-23 NOTE — TELEPHONE ENCOUNTER
Patient wanting to know if he can stop his Eliquis or not. He states he has talked to you in the past regarding and he recently had labs done with [facility]. He also states he does not want to take Warfarin.   Please advise

## 2023-06-23 NOTE — TELEPHONE ENCOUNTER
Patient notified of above. He is requesting refill to [facility] for 30 days.   He will call his Hematologist today

## 2023-06-23 NOTE — TELEPHONE ENCOUNTER
Recommend referral to Hematology to get their opinion on long-term risks off the Eliquis if willing?

## 2023-06-28 ENCOUNTER — OFFICE VISIT (OUTPATIENT)
Dept: FAMILY MEDICINE CLINIC | Age: 78
End: 2023-06-28

## 2023-06-28 VITALS
WEIGHT: 150.2 LBS | RESPIRATION RATE: 18 BRPM | SYSTOLIC BLOOD PRESSURE: 136 MMHG | HEART RATE: 76 BPM | HEIGHT: 72 IN | BODY MASS INDEX: 20.34 KG/M2 | DIASTOLIC BLOOD PRESSURE: 64 MMHG

## 2023-06-28 DIAGNOSIS — H92.02 ACUTE OTALGIA, LEFT: Primary | ICD-10-CM

## 2023-06-28 DIAGNOSIS — H69.82 EUSTACHIAN TUBE DYSFUNCTION, LEFT: ICD-10-CM

## 2023-06-29 ASSESSMENT — ENCOUNTER SYMPTOMS
RESPIRATORY NEGATIVE: 1
GASTROINTESTINAL NEGATIVE: 1

## 2023-06-30 ENCOUNTER — TELEPHONE (OUTPATIENT)
Dept: FAMILY MEDICINE CLINIC | Age: 78
End: 2023-06-30

## 2023-06-30 DIAGNOSIS — I82.512 CHRONIC DEEP VEIN THROMBOSIS (DVT) OF FEMORAL VEIN OF LEFT LOWER EXTREMITY (HCC): ICD-10-CM

## 2023-08-29 ENCOUNTER — OFFICE VISIT (OUTPATIENT)
Dept: FAMILY MEDICINE CLINIC | Age: 78
End: 2023-08-29
Payer: MEDICARE

## 2023-08-29 VITALS
RESPIRATION RATE: 16 BRPM | HEART RATE: 64 BPM | SYSTOLIC BLOOD PRESSURE: 136 MMHG | DIASTOLIC BLOOD PRESSURE: 72 MMHG | WEIGHT: 152.1 LBS | BODY MASS INDEX: 20.63 KG/M2

## 2023-08-29 DIAGNOSIS — I82.512 CHRONIC DEEP VEIN THROMBOSIS (DVT) OF FEMORAL VEIN OF LEFT LOWER EXTREMITY (HCC): ICD-10-CM

## 2023-08-29 DIAGNOSIS — C61 PROSTATE CANCER (HCC): ICD-10-CM

## 2023-08-29 DIAGNOSIS — N18.4 CKD (CHRONIC KIDNEY DISEASE), STAGE IV (HCC): ICD-10-CM

## 2023-08-29 DIAGNOSIS — I10 ESSENTIAL HYPERTENSION: ICD-10-CM

## 2023-08-29 DIAGNOSIS — G56.01 CARPAL TUNNEL SYNDROME ON RIGHT: ICD-10-CM

## 2023-08-29 DIAGNOSIS — M65.30 TRIGGER FINGER OF RIGHT HAND, UNSPECIFIED FINGER: ICD-10-CM

## 2023-08-29 DIAGNOSIS — Z01.818 PRE-OP EVALUATION: Primary | ICD-10-CM

## 2023-08-29 DIAGNOSIS — G56.21 CUBITAL TUNNEL SYNDROME ON RIGHT: ICD-10-CM

## 2023-08-29 PROCEDURE — 99214 OFFICE O/P EST MOD 30 MIN: CPT | Performed by: FAMILY MEDICINE

## 2023-08-29 PROCEDURE — 3074F SYST BP LT 130 MM HG: CPT | Performed by: FAMILY MEDICINE

## 2023-08-29 PROCEDURE — G8427 DOCREV CUR MEDS BY ELIG CLIN: HCPCS | Performed by: FAMILY MEDICINE

## 2023-08-29 PROCEDURE — 3078F DIAST BP <80 MM HG: CPT | Performed by: FAMILY MEDICINE

## 2023-08-29 PROCEDURE — 1123F ACP DISCUSS/DSCN MKR DOCD: CPT | Performed by: FAMILY MEDICINE

## 2023-08-29 PROCEDURE — 1036F TOBACCO NON-USER: CPT | Performed by: FAMILY MEDICINE

## 2023-08-29 PROCEDURE — G8420 CALC BMI NORM PARAMETERS: HCPCS | Performed by: FAMILY MEDICINE

## 2023-08-29 ASSESSMENT — PATIENT HEALTH QUESTIONNAIRE - PHQ9
SUM OF ALL RESPONSES TO PHQ QUESTIONS 1-9: 0
SUM OF ALL RESPONSES TO PHQ QUESTIONS 1-9: 0
2. FEELING DOWN, DEPRESSED OR HOPELESS: 0
SUM OF ALL RESPONSES TO PHQ QUESTIONS 1-9: 0
1. LITTLE INTEREST OR PLEASURE IN DOING THINGS: 0
SUM OF ALL RESPONSES TO PHQ QUESTIONS 1-9: 0
SUM OF ALL RESPONSES TO PHQ9 QUESTIONS 1 & 2: 0

## 2023-08-29 NOTE — PROGRESS NOTES
Jeannie Moe (:  1945) is a 78 y.o. male,Established patient, here for evaluation of the following chief complaint(s):  Pre-op Exam (Right Carpal tunnel surgery on 23 with Dr. Jarod Aguillon. No pre-op testing done)          Subjective   SUBJECTIVE/OBJECTIVE:  HPI  Chief Complaint   Patient presents with    Pre-op Exam     Right Carpal tunnel surgery on 23 with Dr. Jarod Aguillon. No pre-op testing done     Pre-op evaluation. Scheduled for right carpal tunnel surgery with Dr. Esvin Bright on . Pt denies CP, chest tightness, SOB. Able to perform 4 METs with no cardiac symptoms. BP Readings from Last 3 Encounters:   23 136/72   23 136/64   23 (!) 148/86     Denies hx of general anesthesia complications. Patient Active Problem List   Diagnosis    Hypertension    TIA (transient ischemic attack)    GERD (gastroesophageal reflux disease)    Medication monitoring encounter    SI (sacroiliac) joint inflammation ([facility]), left    Dvt femoral (deep venous thrombosis) ([facility])    Chronic low back pain    Lung mass    Abnormal CT scan, chest, pulmonary nodule. PSA elevation, bph elevation    Anticoagulated on Coumadin    Sciatica of left side associated with disorder of lumbar spine    Lumbar disc disease with radiculopathy    Lumbar spinal stenosis    Other spondylosis with radiculopathy, lumbar region    DDD (degenerative disc disease), lumbar    RAMAN (acute kidney injury) ([facility])    CKD (chronic kidney disease) stage 3, GFR 30-59 ml/min ([facility])    Pleural effusion, left    Nocturnal leg cramps    Bilateral carpal tunnel syndrome    Prostate cancer ([address]), Justin score 6.     History of CVA (cerebrovascular accident)    History of pulmonary embolism    CKD (chronic kidney disease), stage IV ([facility])    COVID-19    Paresthesia of hand, bilateral    Protein calorie malnutrition       Current Outpatient Medications   Medication Sig Dispense Refill    apixaban (ELIQUIS) 2.5 MG TABS tablet Take

## 2023-08-30 ASSESSMENT — ENCOUNTER SYMPTOMS
RESPIRATORY NEGATIVE: 1
GASTROINTESTINAL NEGATIVE: 1

## 2023-10-13 ENCOUNTER — NURSE ONLY (OUTPATIENT)
Dept: LAB | Age: 78
End: 2023-10-13

## 2023-10-13 DIAGNOSIS — N18.4 CKD (CHRONIC KIDNEY DISEASE), STAGE IV (HCC): ICD-10-CM

## 2023-10-13 DIAGNOSIS — R35.1 NOCTURIA: ICD-10-CM

## 2023-10-13 DIAGNOSIS — I10 ESSENTIAL HYPERTENSION: ICD-10-CM

## 2023-10-13 DIAGNOSIS — R73.09 LOW GLUCOSE LEVEL: ICD-10-CM

## 2023-10-13 DIAGNOSIS — C61 PROSTATE CANCER (HCC): ICD-10-CM

## 2023-10-13 DIAGNOSIS — K90.89 OTHER INTESTINAL MALABSORPTION: ICD-10-CM

## 2023-10-13 DIAGNOSIS — Z71.89 ENCOUNTER FOR HERB AND VITAMIN SUPPLEMENT MANAGEMENT: ICD-10-CM

## 2023-10-13 DIAGNOSIS — I82.512 CHRONIC DEEP VEIN THROMBOSIS (DVT) OF FEMORAL VEIN OF LEFT LOWER EXTREMITY (HCC): ICD-10-CM

## 2023-10-13 LAB
25(OH)D3 SERPL-MCNC: 46 NG/ML (ref 30–100)
BASOPHILS ABSOLUTE: 0 THOU/MM3 (ref 0–0.1)
BASOPHILS NFR BLD AUTO: 0.8 %
C-REACTIVE PROTEIN, HIGH SENSITIVITY: 4.5 MG/L
CALCIUM SERPL-MCNC: 9.7 MG/DL (ref 8.5–10.5)
DEPRECATED RDW RBC AUTO: 42 FL (ref 35–45)
EOSINOPHIL NFR BLD AUTO: 1.2 %
EOSINOPHILS ABSOLUTE: 0.1 THOU/MM3 (ref 0–0.4)
ERYTHROCYTE [DISTWIDTH] IN BLOOD BY AUTOMATED COUNT: 12 % (ref 11.5–14.5)
ERYTHROCYTE [SEDIMENTATION RATE] IN BLOOD BY WESTERGREN METHOD: 14 MM/HR (ref 0–10)
HCT VFR BLD AUTO: 44.9 % (ref 42–52)
HGB BLD-MCNC: 15 GM/DL (ref 14–18)
HOMOCYSTEINE: 12.3 UMOL/L
IMM GRANULOCYTES # BLD AUTO: 0.02 THOU/MM3 (ref 0–0.07)
IMM GRANULOCYTES NFR BLD AUTO: 0.4 %
LYMPHOCYTES ABSOLUTE: 1.8 THOU/MM3 (ref 1–4.8)
LYMPHOCYTES NFR BLD AUTO: 34.9 %
MAGNESIUM SERPL-MCNC: 2.3 MG/DL (ref 1.6–2.4)
MCH RBC QN AUTO: 31.8 PG (ref 26–33)
MCHC RBC AUTO-ENTMCNC: 33.4 GM/DL (ref 32.2–35.5)
MCV RBC AUTO: 95.3 FL (ref 80–94)
MONOCYTES ABSOLUTE: 0.5 THOU/MM3 (ref 0.4–1.3)
MONOCYTES NFR BLD AUTO: 10 %
NEUTROPHILS NFR BLD AUTO: 52.7 %
NRBC BLD AUTO-RTO: 0 /100 WBC
PLATELET # BLD AUTO: 269 THOU/MM3 (ref 130–400)
PMV BLD AUTO: 9.5 FL (ref 9.4–12.4)
PTH-INTACT SERPL-MCNC: 56.2 PG/ML (ref 15–65)
RBC # BLD AUTO: 4.71 MILL/MM3 (ref 4.7–6.1)
SEGMENTED NEUTROPHILS ABSOLUTE COUNT: 2.7 THOU/MM3 (ref 1.8–7.7)
WBC # BLD AUTO: 5.1 THOU/MM3 (ref 4.8–10.8)

## 2023-10-17 ENCOUNTER — HOSPITAL ENCOUNTER (OUTPATIENT)
Dept: PHARMACY | Age: 78
Setting detail: THERAPIES SERIES
Discharge: HOME | End: 2023-10-17
Payer: MEDICARE

## 2023-10-17 PROCEDURE — 99211 OFF/OP EST MAY X REQ PHY/QHP: CPT | Performed by: PHARMACIST

## 2023-10-17 NOTE — PROGRESS NOTES
Medication Management [address] [place] [address]  Anticoagulation Clinic  [phone] (phone)  [phone] (fax)      Myles Li is a 78 y.o. male with PMHx significant for history of DVT who presents to clinic 10/17/2023 for anticoagulation management and education. DOAC Therapy: apixaban   Current Dosage: 2.5mg BID  Anticoagulation Indication(s):  DVT    Referring Physician:   Dr. Kulwinder Smith  Intended duration of Anticoagulation Therapy:  indefinite    Pertinent Laboratory Results  Recent CBC Results (baseline and q12mo):   Latest Reference Range & Units 06/02/23 13:13 10/13/23 11:25   WBC 4.8 - 10.8 thou/mm3 5.6 5.1   RBC 4.70 - 6.10 mill/mm3 4.57 (L) 4.71   Hemoglobin Quant 14.0 - 18.0 gm/dl 14.7 15.0   Hematocrit 42.0 - 52.0 % 43.3 44.9   MCV 80.0 - 94.0 fL 94.7 (H) 95.3 (H)   MCH 26.0 - 33.0 pg 32.2 31.8   MCHC 32.2 - 35.5 gm/dl 33.9 33.4   MPV 9.4 - 12.4 fL 9.3 (L) 9.5   RDW-CV 11.5 - 14.5 % 12.5 12.0   RDW-SD 35.0 - 45.0 fL 43.4 42.0   Platelet Count 553 - 400 thou/mm3 269 269   (L): Data is abnormally low  (H): Data is abnormally high    Lab Results   Component Value Date    INR 1.08 08/03/2020    INR 1.60 (H) 07/20/2020    INR 2.51 (H) 06/30/2020     Recent SCr Results (baseline and q12mo):   Latest Reference Range & Units 09/13/22 14:16 11/30/22 12:20 03/07/23 13:22 06/02/23 13:11   Creatinine 0.4 - 1.2 mg/dL 1.9 (H) 1.8 (H) 1.6 (H) 1.7 (H)   (H): Data is abnormally high  Calculated CrCl = 33 ml/min  Recent LFT Results (baseline and q12mo):   Latest Reference Range & Units 03/07/23 13:22   Albumin 3.5 - 5.1 g/dL 4.1   Alk Phos 38 - 126 U/L 77   ALT 11 - 66 U/L 28   AST 5 - 40 U/L 29   BILIRUBIN TOTAL 0.3 - 1.2 mg/dL 0.7   Bilirubin, Direct 0.0 - 0.3 mg/dL <0.2   Lipase 5.6 - 51.3 U/L 40.1   Total Protein 6.1 - 8.0 g/dL 6.9   Triglycerides 0 - 199 mg/dL 129       Patient Findings:    Brittny Moe initiated therapy >2 years ago.   S/sxs of bleeding: Denies bleeding in nose, sputum, emesis,

## 2023-10-19 LAB — TTG IGA SER IA-ACNC: 0.9 U/ML

## 2023-10-20 LAB
THYROPEROXIDASE AB SERPL IA-ACNC: < 4 IU/ML (ref 0–25)
TTG IGG SER IA-ACNC: 1 U/ML

## 2023-10-23 ENCOUNTER — OFFICE VISIT (OUTPATIENT)
Dept: FAMILY MEDICINE CLINIC | Age: 78
End: 2023-10-23
Payer: MEDICARE

## 2023-10-23 VITALS
SYSTOLIC BLOOD PRESSURE: 150 MMHG | HEART RATE: 68 BPM | BODY MASS INDEX: 19.99 KG/M2 | TEMPERATURE: 97.6 F | HEIGHT: 72 IN | RESPIRATION RATE: 10 BRPM | DIASTOLIC BLOOD PRESSURE: 80 MMHG | OXYGEN SATURATION: 98 % | WEIGHT: 147.6 LBS

## 2023-10-23 DIAGNOSIS — C61 PROSTATE CANCER (HCC): Primary | ICD-10-CM

## 2023-10-23 DIAGNOSIS — R35.1 NOCTURIA: ICD-10-CM

## 2023-10-23 DIAGNOSIS — Z71.89 ENCOUNTER FOR HERB AND VITAMIN SUPPLEMENT MANAGEMENT: ICD-10-CM

## 2023-10-23 DIAGNOSIS — I82.512 CHRONIC DEEP VEIN THROMBOSIS (DVT) OF FEMORAL VEIN OF LEFT LOWER EXTREMITY (HCC): ICD-10-CM

## 2023-10-23 DIAGNOSIS — H69.82 EUSTACHIAN TUBE DYSFUNCTION, LEFT: ICD-10-CM

## 2023-10-23 DIAGNOSIS — G56.01 CARPAL TUNNEL SYNDROME ON RIGHT: ICD-10-CM

## 2023-10-23 DIAGNOSIS — I10 ESSENTIAL HYPERTENSION: ICD-10-CM

## 2023-10-23 DIAGNOSIS — N18.4 CKD (CHRONIC KIDNEY DISEASE), STAGE IV (HCC): ICD-10-CM

## 2023-10-23 DIAGNOSIS — G47.62 NOCTURNAL LEG CRAMPS: ICD-10-CM

## 2023-10-23 DIAGNOSIS — M65.30 TRIGGER FINGER OF RIGHT HAND, UNSPECIFIED FINGER: ICD-10-CM

## 2023-10-23 DIAGNOSIS — E43 SEVERE PROTEIN-CALORIE MALNUTRITION (HCC): ICD-10-CM

## 2023-10-23 DIAGNOSIS — K90.89 OTHER INTESTINAL MALABSORPTION: ICD-10-CM

## 2023-10-23 DIAGNOSIS — R73.09 LOW GLUCOSE LEVEL: ICD-10-CM

## 2023-10-23 DIAGNOSIS — G56.21 CUBITAL TUNNEL SYNDROME ON RIGHT: ICD-10-CM

## 2023-10-23 PROCEDURE — G8420 CALC BMI NORM PARAMETERS: HCPCS | Performed by: FAMILY MEDICINE

## 2023-10-23 PROCEDURE — 3079F DIAST BP 80-89 MM HG: CPT | Performed by: FAMILY MEDICINE

## 2023-10-23 PROCEDURE — 3077F SYST BP >= 140 MM HG: CPT | Performed by: FAMILY MEDICINE

## 2023-10-23 PROCEDURE — G8427 DOCREV CUR MEDS BY ELIG CLIN: HCPCS | Performed by: FAMILY MEDICINE

## 2023-10-23 PROCEDURE — 1036F TOBACCO NON-USER: CPT | Performed by: FAMILY MEDICINE

## 2023-10-23 PROCEDURE — G8484 FLU IMMUNIZE NO ADMIN: HCPCS | Performed by: FAMILY MEDICINE

## 2023-10-23 PROCEDURE — 99214 OFFICE O/P EST MOD 30 MIN: CPT | Performed by: FAMILY MEDICINE

## 2023-10-23 PROCEDURE — 1123F ACP DISCUSS/DSCN MKR DOCD: CPT | Performed by: FAMILY MEDICINE

## 2023-10-23 RX ORDER — VITAMIN B COMPLEX
1 CAPSULE ORAL
COMMUNITY

## 2023-10-23 NOTE — PROGRESS NOTES
hormone ethylene, which regulates germination, flowering, and ripening. Forced ripening by high ethylene concentrations, plants produce allergenic wound-repair proteins, which are similar to wound-repair proteins made during the tapping of rubber trees. Sensitive individuals who ingest the fruit get a higher dose and worse reaction. Some people may even first become sensitized to latex through fruit. Can food processing increase the concentrations of allergenic proteins? Latex-sensitized children (and adults) in [facility] often experience allergic reactions after eating bananas ripened artificially with ethylene. In the [facility], food distribution centers treat unripe bananas and other produce with ethylene to ripen; not commonly done in [facility] since fruit is tree-ripened there. Does treatment of food with ethylene induce banana proteins that cross-react with latex? (Alex et al.)    References:   Latex in Foods Allergy, http://ehp.niehs.nih.gov/members/2003/5811/5811.html    Search web for [employer] in Season \" for where you live or are at the time you food shop   Management of Latex, ://[email or web address] [facility].[email or web address]/  search for nih, latex-like proteins in foods

## 2023-11-29 ENCOUNTER — NURSE ONLY (OUTPATIENT)
Dept: LAB | Age: 78
End: 2023-11-29

## 2023-11-29 DIAGNOSIS — N18.32 STAGE 3B CHRONIC KIDNEY DISEASE (HCC): ICD-10-CM

## 2023-11-29 LAB
25(OH)D3 SERPL-MCNC: 44 NG/ML (ref 30–100)
ANION GAP SERPL CALC-SCNC: 10 MEQ/L (ref 8–16)
BUN SERPL-MCNC: 35 MG/DL (ref 7–22)
CALCIUM SERPL-MCNC: 9.4 MG/DL (ref 8.5–10.5)
CHLORIDE SERPL-SCNC: 103 MEQ/L (ref 98–111)
CO2 SERPL-SCNC: 25 MEQ/L (ref 23–33)
CREAT SERPL-MCNC: 1.8 MG/DL (ref 0.4–1.2)
CREAT UR-MCNC: 127.6 MG/DL
GFR SERPL CREATININE-BSD FRML MDRD: 38 ML/MIN/1.73M2
GLUCOSE SERPL-MCNC: 106 MG/DL (ref 70–108)
POTASSIUM SERPL-SCNC: 4.7 MEQ/L (ref 3.5–5.2)
PROT UR-MCNC: 7.7 MG/DL
PROT/CREAT 24H UR: 0.06 MG/G{CREAT}
PTH-INTACT SERPL-MCNC: 63.7 PG/ML (ref 15–65)
SODIUM SERPL-SCNC: 138 MEQ/L (ref 135–145)

## 2023-12-05 ENCOUNTER — OFFICE VISIT (OUTPATIENT)
Dept: NEPHROLOGY | Age: 78
End: 2023-12-05
Payer: MEDICARE

## 2023-12-05 VITALS
WEIGHT: 154 LBS | DIASTOLIC BLOOD PRESSURE: 83 MMHG | HEART RATE: 68 BPM | BODY MASS INDEX: 24.17 KG/M2 | OXYGEN SATURATION: 95 % | HEIGHT: 67 IN | SYSTOLIC BLOOD PRESSURE: 158 MMHG

## 2023-12-05 DIAGNOSIS — I10 PRIMARY HYPERTENSION: ICD-10-CM

## 2023-12-05 DIAGNOSIS — N18.32 STAGE 3B CHRONIC KIDNEY DISEASE (HCC): Primary | ICD-10-CM

## 2023-12-05 DIAGNOSIS — E87.20 METABOLIC ACIDOSIS: ICD-10-CM

## 2023-12-05 DIAGNOSIS — N28.1 BILATERAL RENAL CYSTS: ICD-10-CM

## 2023-12-05 PROCEDURE — 1036F TOBACCO NON-USER: CPT | Performed by: INTERNAL MEDICINE

## 2023-12-05 PROCEDURE — 3079F DIAST BP 80-89 MM HG: CPT | Performed by: INTERNAL MEDICINE

## 2023-12-05 PROCEDURE — G8484 FLU IMMUNIZE NO ADMIN: HCPCS | Performed by: INTERNAL MEDICINE

## 2023-12-05 PROCEDURE — 1123F ACP DISCUSS/DSCN MKR DOCD: CPT | Performed by: INTERNAL MEDICINE

## 2023-12-05 PROCEDURE — G8420 CALC BMI NORM PARAMETERS: HCPCS | Performed by: INTERNAL MEDICINE

## 2023-12-05 PROCEDURE — G8427 DOCREV CUR MEDS BY ELIG CLIN: HCPCS | Performed by: INTERNAL MEDICINE

## 2023-12-05 PROCEDURE — 99213 OFFICE O/P EST LOW 20 MIN: CPT | Performed by: INTERNAL MEDICINE

## 2023-12-05 PROCEDURE — 3077F SYST BP >= 140 MM HG: CPT | Performed by: INTERNAL MEDICINE

## 2023-12-05 NOTE — PROGRESS NOTES
Renal Progress Note    Assessment and Plan:      Diagnosis Orders   1. Stage 3b chronic kidney disease ([address])        2. Bilateral renal cysts        3. Primary hypertension        4. Metabolic acidosis                  PLAN:  Labs reviewed with the patient   He understood well  I addressed his questions  Serum creatinine is stable at 1.8 mg/dL. PTH is good at 63.7  Vitamin D level is good at 44  Medications reviewed  No changes  Low-salt diet  Continue to avoid nonsteroidal anti-inflammatory drugs  Return visit in 6 months with labs          Patient Active Problem List   Diagnosis    Hypertension    TIA (transient ischemic attack)    GERD (gastroesophageal reflux disease)    Medication monitoring encounter    SI (sacroiliac) joint inflammation ([address]), left    Dvt femoral (deep venous thrombosis) ([facility])    Chronic low back pain    Lung mass    Abnormal CT scan, chest, pulmonary nodule. PSA elevation, bph elevation    Anticoagulated on Coumadin    Sciatica of left side associated with disorder of lumbar spine    Lumbar disc disease with radiculopathy    Lumbar spinal stenosis    Other spondylosis with radiculopathy, lumbar region    DDD (degenerative disc disease), lumbar    RAMAN (acute kidney injury) ([facility])    CKD (chronic kidney disease) stage 3, GFR 30-59 ml/min ([facility])    Pleural effusion, left    Nocturnal leg cramps    Bilateral carpal tunnel syndrome    Prostate cancer ([address]), [place] score 6. History of CVA (cerebrovascular accident)    History of pulmonary embolism    CKD (chronic kidney disease), stage IV ([facility])    COVID-19    Paresthesia of hand, bilateral    Protein calorie malnutrition           Subjective:   Chief complaint:  Chief Complaint   Patient presents with    Follow-up     Ckd iiib      HPI:This is a follow up visit for Mr Mitch Alvarado here today for return appointment. I see him for chronic kidney disease among other things. He was last seen in June 2023. Doing well with no complaint.

## 2023-12-05 NOTE — PATIENT INSTRUCTIONS
Please check your blood pressure twice a day at home for at least 5 days and call us with the report.

## 2023-12-13 ENCOUNTER — TELEPHONE (OUTPATIENT)
Dept: UROLOGY | Age: 78
End: 2023-12-13

## 2023-12-13 NOTE — TELEPHONE ENCOUNTER
Patient scheduled for a MRI FUSION GUIDED PROSTATE BIOPSY IN THE OFFICE with DR. Elise Phillips on 1/23/2024. We are asking for [address]   [facility]

## 2023-12-13 NOTE — TELEPHONE ENCOUNTER
MRI FUSION GUIDED PROSTATE ULTRASOUND/BIOPSY WITH DR Marley Peterson Salomon Abhi      1945    You are scheduled for the above procedure on:    1/23/2024   at:    1:00PM  Your follow up appointment for your biopsy results is on:    1/30/2024     At:   3:30PM    Please note that you will be responsible for any charges that are not paid by your insurance. The prostate biopsy specimens are sent to a Lab and their charges are billed to you separate from the office charges. DESCRIPTION OF PROSTATIC ULTRASOUND AND BIOPSY  Ultrasound uses harmless sound waves to give us pictures of the prostate, and allows us to accurately guide a biopsy needle to areas of concern. The procedure is done in the office. Initially, a complete prostate exam is done. Next the ultrasound probe, finger-like in size and shape, is placed into the rectum. With slight movement of the probe, many different views are obtained. A prostate block may or may not be given at this time. A spring loaded fine needle is place through the probe and directed into the prostate. Six or more biopsies are usually taken. These biopsies are not usually painful. The entire exam takes 20-30 minutes. POSSIBLE RISKS OF THE PROCEDURE  Blood may be noted in the stool and urine for a few days. Blood may be seen in the semen for up to a month. Infection of the prostate or in the urine can occur even with antibiotic preparation. You should call if you develop fever, chills, severe pain, or have continuous or significant bleeding. If you have known hemorrhoids, you may have more bleeding and discomfort in the rectal area following the biopsy. This may last for 3-5 days afterwards. If any concerns arise, please call the office.     PREPARATION** PLEASE EAT A REGULAR LUNCH OR BREAKFAST**    HOLD ELIQUIS FOR 3 DAYS PRIOR TO BIOPSY    1.  DO NOT TAKE: ASPIRIN, MOTRIN,  IBUPROFEN, MOBIC/ MELOXICAM, COUMADIN( WARFARIN) FISH OIL, AND VITAMIN E FOR 5 DAYS

## 2024-01-22 ENCOUNTER — TELEPHONE (OUTPATIENT)
Dept: UROLOGY | Age: 79
End: 2024-01-22

## 2024-01-22 DIAGNOSIS — C61 PROSTATE CANCER (HCC): Primary | ICD-10-CM

## 2024-01-22 NOTE — TELEPHONE ENCOUNTER
Patient has concerns with taking cipro due to stage 3 kidney disease and problems with his tendons.He is scheduled for biopsy tomorrow. He wanted to make sure it was ok to take.    When should he have a PSA drawn?

## 2024-01-22 NOTE — TELEPHONE ENCOUNTER
Dosing appropriate for most recent GFR of 38. Short prophylactic dosing is very unlikely to impact him. Benefit of prophylactic coverage to avoid sepsis outweighs risk.

## 2024-01-23 ENCOUNTER — PROCEDURE VISIT (OUTPATIENT)
Dept: UROLOGY | Age: 79
End: 2024-01-23

## 2024-01-23 VITALS — HEIGHT: 67 IN | RESPIRATION RATE: 16 BRPM | WEIGHT: 154 LBS | BODY MASS INDEX: 24.17 KG/M2

## 2024-01-23 DIAGNOSIS — C61 PROSTATE CANCER (HCC): Primary | ICD-10-CM

## 2024-01-23 RX ORDER — TOBRAMYCIN 40 MG/ML
80 INJECTION INTRAMUSCULAR; INTRAVENOUS ONCE
Status: COMPLETED | OUTPATIENT
Start: 2024-01-23 | End: 2024-01-23

## 2024-01-23 RX ADMIN — TOBRAMYCIN 80 MG: 40 INJECTION INTRAMUSCULAR; INTRAVENOUS at 13:40

## 2024-01-23 NOTE — PROGRESS NOTES
Procedure: Trus/Biopsy  Pt name and birth date verified Yes  Patient agrees Dr. Hendricks is taking biopsies of the prostate Yes  Patient took Enema 2 hours prior to procedure Yes  Patient took 2 pill(s) of Cipro day before procedure, day of, and will the day after Yes  Has patient eaten today?  No  Patient stopped all blood thinners prior to surgery Yes       Patient has given me verbal consent to perform Tobramycin Injection  Yes    Following Dr. Hendricks plan of care.  Tobramycin 80MG/2ML GIVEN I.M right UOQ HIP  Lot Number: [id number]  Expiration Date: 05/20/2026  NDC #: 23900-119-18    Tobramycin supplied by office.

## 2024-01-30 ENCOUNTER — OFFICE VISIT (OUTPATIENT)
Dept: UROLOGY | Age: 79
End: 2024-01-30
Payer: MEDICARE

## 2024-01-30 VITALS — RESPIRATION RATE: 16 BRPM | BODY MASS INDEX: 24.17 KG/M2 | WEIGHT: 154 LBS | HEIGHT: 67 IN

## 2024-01-30 DIAGNOSIS — N40.1 BENIGN LOCALIZED PROSTATIC HYPERPLASIA WITH LOWER URINARY TRACT SYMPTOMS (LUTS): ICD-10-CM

## 2024-01-30 DIAGNOSIS — C61 PROSTATE CANCER (HCC): Primary | ICD-10-CM

## 2024-01-30 PROCEDURE — G8420 CALC BMI NORM PARAMETERS: HCPCS | Performed by: UROLOGY

## 2024-01-30 PROCEDURE — 99214 OFFICE O/P EST MOD 30 MIN: CPT | Performed by: UROLOGY

## 2024-01-30 PROCEDURE — 1036F TOBACCO NON-USER: CPT | Performed by: UROLOGY

## 2024-01-30 PROCEDURE — G8484 FLU IMMUNIZE NO ADMIN: HCPCS | Performed by: UROLOGY

## 2024-01-30 PROCEDURE — G8427 DOCREV CUR MEDS BY ELIG CLIN: HCPCS | Performed by: UROLOGY

## 2024-01-30 PROCEDURE — 1123F ACP DISCUSS/DSCN MKR DOCD: CPT | Performed by: UROLOGY

## 2024-01-30 NOTE — PROGRESS NOTES
Geovany Hendricks MD.    [facility] [place] [facility]  [facility]OLOGY  [address]  [address]  [facility] [address]  Dept: [phone]  Dept Fax: [phone]  Loc: [phone]    [facility] Urology Office Note -     Patient:  Jordy Moe  YOB: 1945    The patient is a 78 y.o. male who presents today for evaluation of the following problems:   Chief Complaint   Patient presents with    Results     Review BX path    referred/consultation requested by Bobby June DO.    History of Present Illness:    Prostate cancer  Has favorable intermediate now  Was lost to follow up and was on active surveillance    BPH  Did not like gemtesa  Not good at taking pills  Urgency bothersome  Not on meds-- could not tolerate flomax  Prostate 40g on last mri    Summary of Previous Records:    FINAL DIAGNOSIS:   A-C, E. Prostate, right apex, right mid, right base, and left mid,   needle core biopsies:    Benign prostatic tissue.       Negative for malignancy.   D.  Prostate, left apex, needle core biopsy:       Adenocarcinoma of the prostate, involving 1 out of 2 cores, Justin       score 3+3 = 6 (measuring 1 mm/33 mm, 3%).   F.  Prostate, left base, needle core biopsy:       Adenocarcinoma of the prostate, involving 1 out of 2 cores, Justin       score 3+3 = 6 (measuring 1 mm/33 mm, 3%).         Requested/reviewed records from Bobby June DO office and/or outside physician/EMR    (Patient's old records have been requested, reviewed and pertinent findings summarized in today's note.)    Procedures Today: N/A      Last several PSA's:  Lab Results   Component Value Date    PSA 12.01 (H) 03/07/2023    PSA 11.76 (H) 09/13/2022    PSA 12.78 (H) 01/13/2022       Last total testosterone:  No results found for: \"TESTOSTERONE\"    Urinalysis today:  No results found for this visit on 01/30/24.    Last BUN and creatinine:  Lab Results   Component Value Date    BUN 35 (H) 11/29/2023

## 2024-02-28 ENCOUNTER — OFFICE VISIT (OUTPATIENT)
Dept: FAMILY MEDICINE CLINIC | Age: 79
End: 2024-02-28

## 2024-02-28 ENCOUNTER — OFFICE VISIT (OUTPATIENT)
Dept: UROLOGY | Age: 79
End: 2024-02-28
Payer: MEDICARE

## 2024-02-28 VITALS
HEART RATE: 80 BPM | WEIGHT: 155.3 LBS | DIASTOLIC BLOOD PRESSURE: 80 MMHG | BODY MASS INDEX: 24.96 KG/M2 | HEIGHT: 66 IN | SYSTOLIC BLOOD PRESSURE: 172 MMHG | RESPIRATION RATE: 16 BRPM

## 2024-02-28 VITALS — RESPIRATION RATE: 16 BRPM | BODY MASS INDEX: 24.17 KG/M2 | HEIGHT: 67 IN | WEIGHT: 154 LBS

## 2024-02-28 DIAGNOSIS — C61 PROSTATE CANCER (HCC): ICD-10-CM

## 2024-02-28 DIAGNOSIS — I82.512 CHRONIC DEEP VEIN THROMBOSIS (DVT) OF FEMORAL VEIN OF LEFT LOWER EXTREMITY (HCC): ICD-10-CM

## 2024-02-28 DIAGNOSIS — N40.1 BENIGN LOCALIZED PROSTATIC HYPERPLASIA WITH LOWER URINARY TRACT SYMPTOMS (LUTS): ICD-10-CM

## 2024-02-28 DIAGNOSIS — C61 PROSTATE CANCER (HCC): Primary | ICD-10-CM

## 2024-02-28 DIAGNOSIS — Z00.00 MEDICARE ANNUAL WELLNESS VISIT, SUBSEQUENT: Primary | ICD-10-CM

## 2024-02-28 DIAGNOSIS — M46.1 SI (SACROILIAC) JOINT INFLAMMATION (HCC): ICD-10-CM

## 2024-02-28 DIAGNOSIS — N18.4 CKD (CHRONIC KIDNEY DISEASE), STAGE IV (HCC): ICD-10-CM

## 2024-02-28 DIAGNOSIS — I10 ESSENTIAL HYPERTENSION: ICD-10-CM

## 2024-02-28 DIAGNOSIS — D68.59 HYPERCOAGULABLE STATE (HCC): ICD-10-CM

## 2024-02-28 PROBLEM — E46 PROTEIN CALORIE MALNUTRITION (HCC): Status: RESOLVED | Noted: 2023-06-20 | Resolved: 2024-02-28

## 2024-02-28 PROCEDURE — 1036F TOBACCO NON-USER: CPT

## 2024-02-28 PROCEDURE — 99214 OFFICE O/P EST MOD 30 MIN: CPT

## 2024-02-28 PROCEDURE — G8420 CALC BMI NORM PARAMETERS: HCPCS

## 2024-02-28 PROCEDURE — G8428 CUR MEDS NOT DOCUMENT: HCPCS

## 2024-02-28 PROCEDURE — G8484 FLU IMMUNIZE NO ADMIN: HCPCS

## 2024-02-28 PROCEDURE — 1123F ACP DISCUSS/DSCN MKR DOCD: CPT

## 2024-02-28 SDOH — ECONOMIC STABILITY: FOOD INSECURITY: WITHIN THE PAST 12 MONTHS, THE FOOD YOU BOUGHT JUST DIDN'T LAST AND YOU DIDN'T HAVE MONEY TO GET MORE.: NEVER TRUE

## 2024-02-28 SDOH — ECONOMIC STABILITY: INCOME INSECURITY: HOW HARD IS IT FOR YOU TO PAY FOR THE VERY BASICS LIKE FOOD, HOUSING, MEDICAL CARE, AND HEATING?: NOT HARD AT ALL

## 2024-02-28 SDOH — ECONOMIC STABILITY: FOOD INSECURITY: WITHIN THE PAST 12 MONTHS, YOU WORRIED THAT YOUR FOOD WOULD RUN OUT BEFORE YOU GOT MONEY TO BUY MORE.: NEVER TRUE

## 2024-02-28 ASSESSMENT — PATIENT HEALTH QUESTIONNAIRE - PHQ9
SUM OF ALL RESPONSES TO PHQ QUESTIONS 1-9: 1
2. FEELING DOWN, DEPRESSED OR HOPELESS: 1
SUM OF ALL RESPONSES TO PHQ QUESTIONS 1-9: 1
SUM OF ALL RESPONSES TO PHQ9 QUESTIONS 1 & 2: 1
1. LITTLE INTEREST OR PLEASURE IN DOING THINGS: 0
SUM OF ALL RESPONSES TO PHQ QUESTIONS 1-9: 1
SUM OF ALL RESPONSES TO PHQ QUESTIONS 1-9: 1

## 2024-02-28 ASSESSMENT — ENCOUNTER SYMPTOMS
GASTROINTESTINAL NEGATIVE: 1
RESPIRATORY NEGATIVE: 1

## 2024-02-28 ASSESSMENT — LIFESTYLE VARIABLES
HOW OFTEN DO YOU HAVE A DRINK CONTAINING ALCOHOL: NEVER
HOW MANY STANDARD DRINKS CONTAINING ALCOHOL DO YOU HAVE ON A TYPICAL DAY: PATIENT DOES NOT DRINK

## 2024-02-28 NOTE — PROGRESS NOTES
[facility] [place] [facility]  [facility] UROLOGY  [address]  [address]  [facility] [address]  Dept: [phone]  Loc: [phone]  Visit Date: 2/28/2024    HPI  Jordy Moe is a 78 y.o. male that presents to the urology clinic for prostate cancer follow-up.    Initially diagnosed with Justin 3+3=6, Grade Group 1 prostate cancer in 2019 by Dr. Collins and was undergoing active surveillance, however- during COVID patient was lost to follow-up. No re-established and has undergone confirmatory biopsy and has been upstaged to Justin 3+4=7, Grade Group 2 prostate cancer. Low Risk Decipher score.    PSA of 12.01 on most recent check- 3/7/23.    Decipher- 0.23 (Low Risk).      LUTS  + Urgency and frequency. Has been tried on Gemtesa which he did not like the SE profile of. Patient has trouble taking pills and is not currently interested in going back on medications for urinary symptoms.        PSA Trend  12.01   (03/07/23)  11.76   (09/13.22)  12.78   (01/13/22)  13.49   (09/24/21)  11.53   (03/23/21)  10.04   (11/23/20)        Last BUN and creatinine:  Lab Results   Component Value Date    BUN 35 (H) 11/29/2023     Lab Results   Component Value Date    CREATININE 1.8 (H) 11/29/2023           PAST MEDICAL, FAMILY AND SOCIAL HISTORY UPDATE:  Past Medical History:   Diagnosis Date    Arthritis     neck, Wrists , back    Cancer (HCC)     skin (removed)    Cerebral artery occlusion with cerebral infarction (HCC)     TIA    DVT (deep venous thrombosis) (HCC)     GERD (gastroesophageal reflux disease)     Hx of blood clots     PE    Hypertension     Kidney disease     Prostate cancer (HCC)     Pulmonary emboli (HCC) 5/7/16    Scoliosis     TIA (transient ischemic attack)      Past Surgical History:   Procedure Laterality Date    ARM SURGERY Right 12/18/2020    EXCISION FIBROXANTHOMA RIGHT FOREARM WITH SKIN GRAFT performed by Luis Gudino MD at [facility] [place]    CARPAL TUNNEL

## 2024-02-28 NOTE — PATIENT INSTRUCTIONS
10,000 steps per day on a pedometer .  Order or download the FREE \"Exercise & Physical Activity: Your Everyday Guide\" from [employer]. Call 1-462.892.7326 or search [employer] online.  You need 3742-0032 mg of calcium and 6770-7191 IU of vitamin D per day. It is possible to meet your calcium requirement with diet alone, but a vitamin D supplement is usually necessary to meet this goal.  When exposed to the sun, use a sunscreen that protects against both UVA and UVB radiation with an SPF of 30 or greater. Reapply every 2 to 3 hours or after sweating, drying off with a towel, or swimming.  Always wear a seat belt when traveling in a car. Always wear a helmet when riding a bicycle or motorcycle.

## 2024-02-28 NOTE — PROGRESS NOTES
2024    Jordy Moe (:  1945) is a 78 y.o. male, here for a preventive medicine evaluation.  Chief Complaint   Patient presents with    Medicare AWV     AWV.      BP elevated again today, always high in our office.  Declines treatment.  BP Readings from Last 3 Encounters:   24 (!) 172/80   23 (!) 158/83   10/23/23 (!) 150/80     Wt Readings from Last 3 Encounters:   24 70.4 kg (155 lb 4.8 oz)   24 69.9 kg (154 lb)   24 69.9 kg (154 lb)     Continues to follow closely with Urology and Nephro as well as Dr. Lance.  Labs in near future.    Patient Active Problem List   Diagnosis    Hypertension    TIA (transient ischemic attack)    GERD (gastroesophageal reflux disease)    Medication monitoring encounter    SI (sacroiliac) joint inflammation ([facility]), left    Dvt femoral (deep venous thrombosis) ([facility])    Chronic low back pain    Lung mass    Abnormal CT scan, chest, pulmonary nodule.    PSA elevation, bph elevation    Anticoagulated on Coumadin    Sciatica of left side associated with disorder of lumbar spine    Lumbar disc disease with radiculopathy    Lumbar spinal stenosis    Other spondylosis with radiculopathy, lumbar region    DDD (degenerative disc disease), lumbar    RAMAN (acute kidney injury) ([facility])    CKD (chronic kidney disease) stage 3, GFR 30-59 ml/min ([facility])    Pleural effusion, left    Nocturnal leg cramps    Bilateral carpal tunnel syndrome    Prostate cancer ([facility]), Justin score 6.    History of CVA (cerebrovascular accident)    History of pulmonary embolism    CKD (chronic kidney disease), stage IV ([facility])    COVID-19    Paresthesia of hand, bilateral    Hypercoagulable state ([facility])       Review of Systems   Constitutional: Negative.    HENT: Negative.     Respiratory: Negative.     Cardiovascular: Negative.    Gastrointestinal: Negative.    Musculoskeletal: Negative.    All other systems reviewed and are negative.      Prior to Visit Medications

## 2024-03-16 NOTE — PROGRESS NOTES
Mr. Moe was seen in follow up for his prostate biopsy. The biopsy is being done with MRI / Ultrasound fusion using the UroNav system.  The biopsy was done without difficulty or complication.    TRUS prostate biopsy note:  After obtaining informed consent, the rectal ultrasound probe was passed per rectum and the prostate visualized.  A local block was performed by instilling 2% lidocaine at the base.  Measurements were taken for a total volume of 50 cc.    At this point, prostate biopsy was performed.  Using UroNav, the ultrasound and MRI images were fused and then biopsies of the lesions identified by the radiologist were taken.  Three cores were taken from each of the 3 lesions seen on MRI.  The rectal probe was removed and there was minimal bleeding.  Mr. Moe tolerated the procedure well and there were no complications.    Prostate size: 50 cc  Cores taken:  6  in addition to    3 cores each from 3 lesions    15 total cores  Complications: none      Assessment:      Prostate biopsy performed without difficulty.  This was done with UroNav MRI fused guidance.        Plan:        I will see Jordy back to discuss the pathology in 1-2 weeks.  Further recommendations will be based on these results.

## 2024-04-15 ENCOUNTER — NURSE ONLY (OUTPATIENT)
Dept: LAB | Age: 79
End: 2024-04-15

## 2024-04-15 DIAGNOSIS — C61 PROSTATE CANCER (HCC): ICD-10-CM

## 2024-04-15 DIAGNOSIS — G56.21 CUBITAL TUNNEL SYNDROME ON RIGHT: ICD-10-CM

## 2024-04-15 DIAGNOSIS — Z71.89 ENCOUNTER FOR HERB AND VITAMIN SUPPLEMENT MANAGEMENT: ICD-10-CM

## 2024-04-15 DIAGNOSIS — G56.01 CARPAL TUNNEL SYNDROME ON RIGHT: ICD-10-CM

## 2024-04-15 DIAGNOSIS — H69.92 EUSTACHIAN TUBE DYSFUNCTION, LEFT: ICD-10-CM

## 2024-04-15 DIAGNOSIS — M65.30 TRIGGER FINGER OF RIGHT HAND, UNSPECIFIED FINGER: ICD-10-CM

## 2024-04-15 DIAGNOSIS — R73.09 LOW GLUCOSE LEVEL: ICD-10-CM

## 2024-04-15 DIAGNOSIS — R35.1 NOCTURIA: ICD-10-CM

## 2024-04-15 DIAGNOSIS — I10 ESSENTIAL HYPERTENSION: ICD-10-CM

## 2024-04-15 DIAGNOSIS — E43 SEVERE PROTEIN-CALORIE MALNUTRITION (HCC): ICD-10-CM

## 2024-04-15 DIAGNOSIS — N18.4 CKD (CHRONIC KIDNEY DISEASE), STAGE IV (HCC): ICD-10-CM

## 2024-04-15 DIAGNOSIS — I82.512 CHRONIC DEEP VEIN THROMBOSIS (DVT) OF FEMORAL VEIN OF LEFT LOWER EXTREMITY (HCC): ICD-10-CM

## 2024-04-15 DIAGNOSIS — K90.89 OTHER INTESTINAL MALABSORPTION: ICD-10-CM

## 2024-04-15 DIAGNOSIS — G47.62 NOCTURNAL LEG CRAMPS: ICD-10-CM

## 2024-04-15 LAB
25(OH)D3 SERPL-MCNC: 41 NG/ML (ref 30–100)
ANION GAP SERPL CALC-SCNC: 11 MEQ/L (ref 8–16)
BASOPHILS ABSOLUTE: 0.1 THOU/MM3 (ref 0–0.1)
BASOPHILS NFR BLD AUTO: 1 %
BUN SERPL-MCNC: 33 MG/DL (ref 7–22)
CALCIUM SERPL-MCNC: 9 MG/DL (ref 8.5–10.5)
CHLORIDE SERPL-SCNC: 102 MEQ/L (ref 98–111)
CHOLEST SERPL-MCNC: 202 MG/DL (ref 100–199)
CO2 SERPL-SCNC: 24 MEQ/L (ref 23–33)
CREAT SERPL-MCNC: 1.7 MG/DL (ref 0.4–1.2)
DEPRECATED RDW RBC AUTO: 42.3 FL (ref 35–45)
EOSINOPHIL NFR BLD AUTO: 0.8 %
EOSINOPHILS ABSOLUTE: 0 THOU/MM3 (ref 0–0.4)
ERYTHROCYTE [DISTWIDTH] IN BLOOD BY AUTOMATED COUNT: 12.2 % (ref 11.5–14.5)
ERYTHROCYTE [SEDIMENTATION RATE] IN BLOOD BY WESTERGREN METHOD: 10 MM/HR (ref 0–10)
GFR SERPL CREATININE-BSD FRML MDRD: 40 ML/MIN/1.73M2
GLUCOSE SERPL-MCNC: 109 MG/DL (ref 70–108)
HCT VFR BLD AUTO: 43.4 % (ref 42–52)
HDLC SERPL-MCNC: 51 MG/DL
HGB BLD-MCNC: 14.7 GM/DL (ref 14–18)
IMM GRANULOCYTES # BLD AUTO: 0.01 THOU/MM3 (ref 0–0.07)
IMM GRANULOCYTES NFR BLD AUTO: 0.2 %
LDLC SERPL CALC-MCNC: 132 MG/DL
LYMPHOCYTES ABSOLUTE: 1.8 THOU/MM3 (ref 1–4.8)
LYMPHOCYTES NFR BLD AUTO: 34.8 %
MAGNESIUM SERPL-MCNC: 2.3 MG/DL (ref 1.6–2.4)
MCH RBC QN AUTO: 32.2 PG (ref 26–33)
MCHC RBC AUTO-ENTMCNC: 33.9 GM/DL (ref 32.2–35.5)
MCV RBC AUTO: 95 FL (ref 80–94)
MONOCYTES ABSOLUTE: 0.5 THOU/MM3 (ref 0.4–1.3)
MONOCYTES NFR BLD AUTO: 9.7 %
NEUTROPHILS NFR BLD AUTO: 53.5 %
NRBC BLD AUTO-RTO: 0 /100 WBC
PLATELET # BLD AUTO: 240 THOU/MM3 (ref 130–400)
PMV BLD AUTO: 9.6 FL (ref 9.4–12.4)
POTASSIUM SERPL-SCNC: 4.1 MEQ/L (ref 3.5–5.2)
PTH-INTACT SERPL-MCNC: 64.7 PG/ML (ref 15–65)
RBC # BLD AUTO: 4.57 MILL/MM3 (ref 4.7–6.1)
RHEUMATOID FACT SERPL-ACNC: < 10 IU/ML (ref 0–13)
SEGMENTED NEUTROPHILS ABSOLUTE COUNT: 2.8 THOU/MM3 (ref 1.8–7.7)
SODIUM SERPL-SCNC: 137 MEQ/L (ref 135–145)
T4 FREE SERPL-MCNC: 1.18 NG/DL (ref 0.93–1.68)
TRIGL SERPL-MCNC: 94 MG/DL (ref 0–199)
TSH SERPL DL<=0.005 MIU/L-ACNC: 1.03 UIU/ML (ref 0.4–4.2)
WBC # BLD AUTO: 5.2 THOU/MM3 (ref 4.8–10.8)

## 2024-04-16 LAB
C-REACTIVE PROTEIN, HIGH SENSITIVITY: 2.6 MG/L (ref 0–3)
DHEA-S SERPL-MCNC: 159 UG/DL (ref 16.2–123)
HOMOCYSTEINE: 13.4 UMOL/L (ref 0–15)
PSA SERPL-MCNC: 13.6 NG/ML (ref 0–1)
T3 TOTAL: 94 NG/DL (ref 80–200)
T3FREE SERPL-MCNC: 3.1 PG/ML (ref 2–4.4)
T4 SERPL-MCNC: 6.2 UG/DL (ref 4.5–11.7)
THYROPEROXIDASE AB SERPL IA-ACNC: < 4 IU/ML (ref 0–25)

## 2024-04-17 ENCOUNTER — OFFICE VISIT (OUTPATIENT)
Dept: UROLOGY | Age: 79
End: 2024-04-17
Payer: MEDICARE

## 2024-04-17 VITALS
SYSTOLIC BLOOD PRESSURE: 140 MMHG | BODY MASS INDEX: 24.91 KG/M2 | DIASTOLIC BLOOD PRESSURE: 80 MMHG | RESPIRATION RATE: 16 BRPM | WEIGHT: 155 LBS | HEIGHT: 66 IN

## 2024-04-17 DIAGNOSIS — N32.81 OAB (OVERACTIVE BLADDER): ICD-10-CM

## 2024-04-17 DIAGNOSIS — N40.1 BENIGN LOCALIZED PROSTATIC HYPERPLASIA WITH LOWER URINARY TRACT SYMPTOMS (LUTS): ICD-10-CM

## 2024-04-17 DIAGNOSIS — C61 PROSTATE CANCER (HCC): Primary | ICD-10-CM

## 2024-04-17 LAB
BILIRUBIN URINE: NEGATIVE
BLOOD URINE, POC: NEGATIVE
CHARACTER, URINE: CLEAR
COLOR, URINE: YELLOW
GLUCOSE URINE: NEGATIVE MG/DL
KETONES, URINE: NEGATIVE
LEUKOCYTE CLUMPS, URINE: NEGATIVE
NITRITE, URINE: NEGATIVE
NUCLEAR IGG SER QL IA: NORMAL
PH, URINE: 6 (ref 5–9)
POST VOID RESIDUAL (PVR): 0 ML
PROTEIN, URINE: NEGATIVE MG/DL
SPECIFIC GRAVITY, URINE: 1.01 (ref 1–1.03)
TESTOSTERONE FREE: NORMAL
UROBILINOGEN, URINE: 0.2 EU/DL (ref 0–1)

## 2024-04-17 PROCEDURE — 81003 URINALYSIS AUTO W/O SCOPE: CPT

## 2024-04-17 PROCEDURE — 1036F TOBACCO NON-USER: CPT

## 2024-04-17 PROCEDURE — 99214 OFFICE O/P EST MOD 30 MIN: CPT

## 2024-04-17 PROCEDURE — G8427 DOCREV CUR MEDS BY ELIG CLIN: HCPCS

## 2024-04-17 PROCEDURE — 1123F ACP DISCUSS/DSCN MKR DOCD: CPT

## 2024-04-17 PROCEDURE — 51798 US URINE CAPACITY MEASURE: CPT

## 2024-04-17 PROCEDURE — 3079F DIAST BP 80-89 MM HG: CPT

## 2024-04-17 PROCEDURE — 3077F SYST BP >= 140 MM HG: CPT

## 2024-04-17 PROCEDURE — G8419 CALC BMI OUT NRM PARAM NOF/U: HCPCS

## 2024-04-17 NOTE — PROGRESS NOTES
Mybetriq 50mg given o pt.  
[facility]UR[facility] OR    CARPAL TUNNEL RELEASE Left 02/2021    also had one 2023    COLONOSCOPY  2019    [facility]    EGD  2019    [facility]    EXCISION / BIOPSY SKIN LESION OF ARM Left 10/11/2017    EXCISION 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 WITH FLAP AND FROZEN SECTION performed by Luis Gudino MD at [facility]    FINGER TRIGGER RELEASE Left 02/2021    left thumb     OTHER SURGICAL HISTORY Left 10/11/2017    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    PROSTATE BIOPSY  01/23/2024    SKIN CANCER EXCISION  2010    Face skin cancer removal    SKIN CANCER EXCISION  2024    facial cancer excision     Family History   Problem Relation Age of Onset    Cancer Mother     High Blood Pressure Mother     Alzheimer's Disease Mother     Heart Disease Mother     Parkinsonism Father     Diabetes Neg Hx     Kidney Disease Neg Hx     Stroke Neg Hx     Colon Cancer Neg Hx     Esophageal Cancer Neg Hx     Rectal Cancer Neg Hx     Stomach Cancer Neg Hx      Outpatient Medications Marked as Taking for the 4/17/24 encounter (Office Visit) with Beto Vásquez PA-C   Medication Sig Dispense Refill    b complex vitamins capsule Take 1 capsule by mouth 2 times daily (before meals) B Complex Plus by [employer]      apixaban (ELIQUIS) 2.5 MG TABS tablet Take 1 tablet by mouth 2 times daily 180 tablet 3    sodium bicarbonate 650 MG tablet Take 1 tablet by mouth 2 times daily 60 tablet 11    Cholecalciferol (VITAMIN D3) 50 MCG (2000 UT) CAPS Take 1 capsule by mouth daily (with breakfast) double Mon Thurs 30 capsule     NONFORMULARY Take 1 caplet by mouth 4 times daily (before meals and nightly) Magtein by [employer]      Omega-3 Fatty Acids ([employer] FISH OIL PO) Take 1 tablet by mouth 4 times daily      Chromium-Cinnamon (CINNAMON PLUS CHROMIUM PO) Take 1 capsule by mouth 4 times daily (before meals and nightly) Scotty Damian Cinnamon      magnesium cl-calcium carbonate (SLOW-MAG)

## 2024-04-18 LAB — DHEA SERPL-MCNC: 1.26 NG/ML (ref 0.63–4.7)

## 2024-04-22 NOTE — PROGRESS NOTES
[facility]  [address]  [facility] [address]  Dept: [phone]  Dept Fax: [phone]  Loc: [phone]      Jordy Moe is a 78 y.o. White male. Jordy  presents to the [facility] clinic today for   Chief Complaint   Patient presents with    Discuss Labs    Knee Pain     Right, in the back of leg. Not red that he knows of, not warm to touch   , and;   1. Prostate cancer (HCC)    2. Hypercoagulable state (HCC)    3. CKD (chronic kidney disease), stage IV (HCC)    4. Chronic deep vein thrombosis (DVT) of femoral vein of left lower extremity (HCC)    5. Essential hypertension    6. Trigger finger of right hand, unspecified finger    7. Eustachian tube dysfunction, left    8. Nocturia    9. Encounter for herb and vitamin supplement management    10. Low glucose level    11. Other intestinal malabsorption          I have reviewed Jordy Moe medical, surgical and other pertinent history in detail, and have updated medication and allergy information in the computerized patient record.     Clinical Care Team:     -Referring Provider for today's consult: self  -Primary Care Provider: Bobby June,     Medical/Surgical History:   He  has a past medical history of Arthritis, Cancer (HCC), Cerebral artery occlusion with cerebral infarction (HCC), DVT (deep venous thrombosis) (HCC), GERD (gastroesophageal reflux disease), Hx of blood clots, Hypertension, Kidney disease, Prostate cancer (HCC), Pulmonary emboli (HCC), Restless legs syndrome, Scoliosis, and TIA (transient ischemic attack).  His  has a past surgical history that includes Skin cancer excision (2010); other surgical history (Left, 10/11/2017); EXCISION / BIOPSY SKIN LESION OF ARM (Left, 10/11/2017); EGD (2019); Colonoscopy (2019); Prostate biopsy (01/23/2024); Arm Surgery (Right, 12/18/2020); Carpal tunnel release (Left, 02/2021); Finger

## 2024-04-23 ENCOUNTER — OFFICE VISIT (OUTPATIENT)
Dept: FAMILY MEDICINE CLINIC | Age: 79
End: 2024-04-23

## 2024-04-23 VITALS
WEIGHT: 154.8 LBS | RESPIRATION RATE: 10 BRPM | BODY MASS INDEX: 24.88 KG/M2 | HEART RATE: 68 BPM | SYSTOLIC BLOOD PRESSURE: 138 MMHG | TEMPERATURE: 97.8 F | HEIGHT: 66 IN | OXYGEN SATURATION: 97 % | DIASTOLIC BLOOD PRESSURE: 68 MMHG

## 2024-04-23 DIAGNOSIS — R73.09 LOW GLUCOSE LEVEL: ICD-10-CM

## 2024-04-23 DIAGNOSIS — Z71.89 ENCOUNTER FOR HERB AND VITAMIN SUPPLEMENT MANAGEMENT: ICD-10-CM

## 2024-04-23 DIAGNOSIS — I10 ESSENTIAL HYPERTENSION: ICD-10-CM

## 2024-04-23 DIAGNOSIS — R35.1 NOCTURIA: ICD-10-CM

## 2024-04-23 DIAGNOSIS — N18.4 CKD (CHRONIC KIDNEY DISEASE), STAGE IV (HCC): ICD-10-CM

## 2024-04-23 DIAGNOSIS — M65.30 TRIGGER FINGER OF RIGHT HAND, UNSPECIFIED FINGER: ICD-10-CM

## 2024-04-23 DIAGNOSIS — H69.92 EUSTACHIAN TUBE DYSFUNCTION, LEFT: ICD-10-CM

## 2024-04-23 DIAGNOSIS — K90.89 OTHER INTESTINAL MALABSORPTION: ICD-10-CM

## 2024-04-23 DIAGNOSIS — C61 PROSTATE CANCER (HCC): Primary | ICD-10-CM

## 2024-04-23 DIAGNOSIS — I82.512 CHRONIC DEEP VEIN THROMBOSIS (DVT) OF FEMORAL VEIN OF LEFT LOWER EXTREMITY (HCC): ICD-10-CM

## 2024-04-23 DIAGNOSIS — D68.59 HYPERCOAGULABLE STATE (HCC): ICD-10-CM

## 2024-04-23 RX ORDER — PHENOL 1.4 %
1 AEROSOL, SPRAY (ML) MUCOUS MEMBRANE DAILY
COMMUNITY

## 2024-05-02 ENCOUNTER — TELEPHONE (OUTPATIENT)
Dept: FAMILY MEDICINE CLINIC | Age: 79
End: 2024-05-02

## 2024-05-02 NOTE — TELEPHONE ENCOUNTER
No need for ER.  I can see him next week or he can go to the walk-in clinic at [facility] tomorrow morning.  Zion has nothing scheduled after 10:45, not sure if he would be willing to see him tomorrow at 11:00?

## 2024-05-02 NOTE — TELEPHONE ENCOUNTER
Pt called office stating he injured his right knee a week ago and it had gotten better. Pt says last night he injured his right knee again \"by just walking\". Pt is asking if he should schedule an appt with you or if you can order testing. Or should he just go to the ED for eval. Please advise.

## 2024-05-02 NOTE — TELEPHONE ENCOUNTER
Pt was notified and voiced understanding. But says he had this same pain in the same area years ago and it ended up being a blood clot. Pt says he is on a blood thinner now and was not when he had this pain years ago. Should he go to the ED?    [facility] to respond, he will check it later tonight.

## 2024-05-03 ENCOUNTER — OFFICE VISIT (OUTPATIENT)
Dept: FAMILY MEDICINE CLINIC | Age: 79
End: 2024-05-03
Payer: MEDICARE

## 2024-05-03 VITALS
SYSTOLIC BLOOD PRESSURE: 152 MMHG | BODY MASS INDEX: 24.88 KG/M2 | DIASTOLIC BLOOD PRESSURE: 80 MMHG | RESPIRATION RATE: 16 BRPM | WEIGHT: 153 LBS | HEART RATE: 84 BPM

## 2024-05-03 DIAGNOSIS — M23.91 ACUTE INTERNAL DERANGEMENT OF RIGHT KNEE: ICD-10-CM

## 2024-05-03 DIAGNOSIS — M25.561 ACUTE PAIN OF RIGHT KNEE: Primary | ICD-10-CM

## 2024-05-03 PROCEDURE — G2211 COMPLEX E/M VISIT ADD ON: HCPCS | Performed by: FAMILY MEDICINE

## 2024-05-03 PROCEDURE — 99213 OFFICE O/P EST LOW 20 MIN: CPT | Performed by: FAMILY MEDICINE

## 2024-05-03 PROCEDURE — 3077F SYST BP >= 140 MM HG: CPT | Performed by: FAMILY MEDICINE

## 2024-05-03 PROCEDURE — G8420 CALC BMI NORM PARAMETERS: HCPCS | Performed by: FAMILY MEDICINE

## 2024-05-03 PROCEDURE — 1123F ACP DISCUSS/DSCN MKR DOCD: CPT | Performed by: FAMILY MEDICINE

## 2024-05-03 PROCEDURE — G8427 DOCREV CUR MEDS BY ELIG CLIN: HCPCS | Performed by: FAMILY MEDICINE

## 2024-05-03 PROCEDURE — 3079F DIAST BP 80-89 MM HG: CPT | Performed by: FAMILY MEDICINE

## 2024-05-03 PROCEDURE — 1036F TOBACCO NON-USER: CPT | Performed by: FAMILY MEDICINE

## 2024-05-03 RX ORDER — METHYLPREDNISOLONE 4 MG/1
TABLET ORAL
Qty: 1 KIT | Refills: 0 | Status: SHIPPED | OUTPATIENT
Start: 2024-05-03 | End: 2024-05-09

## 2024-05-03 RX ORDER — TRAMADOL HYDROCHLORIDE 50 MG/1
50 TABLET ORAL EVERY 8 HOURS PRN
Qty: 15 TABLET | Refills: 0 | Status: SHIPPED | OUTPATIENT
Start: 2024-05-03 | End: 2024-05-08

## 2024-05-03 NOTE — PROGRESS NOTES
Jordy Moe (:  1945) is a 78 y.o. male,Established patient, here for evaluation of the following chief complaint(s):  Knee Pain (Right x 10 days)          Subjective   SUBJECTIVE/OBJECTIVE:  HPI  Chief Complaint   Patient presents with    Knee Pain     Right x 10 days     Pt c/o right knee pain for the last 10 days.  Was kneeling in the beginning of the pain, got better until he walked on it a lot.    Now swollen and very tender.      Iced last night and slightly better today.      No OTC pain meds.        Patient Active Problem List   Diagnosis    Hypertension    TIA (transient ischemic attack)    GERD (gastroesophageal reflux disease)    Medication monitoring encounter    SI (sacroiliac) joint inflammation ([facility]), left    Dvt femoral (deep venous thrombosis) ([facility])    Chronic low back pain    Lung mass    Abnormal CT scan, chest, pulmonary nodule.    PSA elevation, bph elevation    Anticoagulated on Coumadin    Sciatica of left side associated with disorder of lumbar spine    Lumbar disc disease with radiculopathy    Lumbar spinal stenosis    Other spondylosis with radiculopathy, lumbar region    DDD (degenerative disc disease), lumbar    RAMAN (acute kidney injury) ([facility])    CKD (chronic kidney disease) stage 3, GFR 30-59 ml/min ([facility])    Pleural effusion, left    Nocturnal leg cramps    Bilateral carpal tunnel syndrome    Prostate cancer ([facility]), [place] score 6.    History of CVA (cerebrovascular accident)    History of pulmonary embolism    CKD (chronic kidney disease), stage IV ([facility])    COVID-19    Paresthesia of hand, bilateral    Hypercoagulable state ([facility])       Current Outpatient Medications   Medication Sig Dispense Refill    methylPREDNISolone (MEDROL, ERICK,) 4 MG tablet As directed 1 kit 0    traMADol (ULTRAM) 50 MG tablet Take 1 tablet by mouth every 8 hours as needed for Pain for up to 5 days. Max Daily Amount: 150 mg 15 tablet 0    mirabegron (MYRBETRIQ) 50 MG TB24 Take 50 mg by mouth daily

## 2024-05-06 ASSESSMENT — ENCOUNTER SYMPTOMS
GASTROINTESTINAL NEGATIVE: 1
RESPIRATORY NEGATIVE: 1

## 2024-05-10 ENCOUNTER — TELEPHONE (OUTPATIENT)
Dept: FAMILY MEDICINE CLINIC | Age: 79
End: 2024-05-10

## 2024-05-10 NOTE — TELEPHONE ENCOUNTER
Patient calling with update regarding right knee.  Things are improving and he will continue to monitor and call if needed.

## 2024-05-31 ENCOUNTER — NURSE ONLY (OUTPATIENT)
Dept: LAB | Age: 79
End: 2024-05-31

## 2024-05-31 DIAGNOSIS — N18.32 STAGE 3B CHRONIC KIDNEY DISEASE (HCC): ICD-10-CM

## 2024-05-31 LAB
25(OH)D3 SERPL-MCNC: 39 NG/ML (ref 30–100)
ANION GAP SERPL CALC-SCNC: 13 MEQ/L (ref 8–16)
BUN SERPL-MCNC: 31 MG/DL (ref 7–22)
CALCIUM SERPL-MCNC: 9.5 MG/DL (ref 8.5–10.5)
CHLORIDE SERPL-SCNC: 103 MEQ/L (ref 98–111)
CO2 SERPL-SCNC: 25 MEQ/L (ref 23–33)
CREAT SERPL-MCNC: 1.7 MG/DL (ref 0.4–1.2)
CREAT UR-MCNC: 78.4 MG/DL
GFR SERPL CREATININE-BSD FRML MDRD: 40 ML/MIN/1.73M2
GLUCOSE SERPL-MCNC: 100 MG/DL (ref 70–108)
POTASSIUM SERPL-SCNC: 4.6 MEQ/L (ref 3.5–5.2)
PROT UR-MCNC: 6.1 MG/DL
PROT/CREAT 24H UR: 0.08 MG/G{CREAT}
PTH-INTACT SERPL-MCNC: 55.6 PG/ML (ref 15–65)
SODIUM SERPL-SCNC: 141 MEQ/L (ref 135–145)

## 2024-06-06 ENCOUNTER — OFFICE VISIT (OUTPATIENT)
Dept: NEPHROLOGY | Age: 79
End: 2024-06-06
Payer: MEDICARE

## 2024-06-06 VITALS
BODY MASS INDEX: 24.59 KG/M2 | HEIGHT: 66 IN | WEIGHT: 153 LBS | DIASTOLIC BLOOD PRESSURE: 87 MMHG | OXYGEN SATURATION: 95 % | SYSTOLIC BLOOD PRESSURE: 141 MMHG | HEART RATE: 83 BPM

## 2024-06-06 DIAGNOSIS — E55.9 VITAMIN D DEFICIENCY: ICD-10-CM

## 2024-06-06 DIAGNOSIS — N31.2 ATONIC BLADDER: ICD-10-CM

## 2024-06-06 DIAGNOSIS — N18.32 STAGE 3B CHRONIC KIDNEY DISEASE (HCC): Primary | ICD-10-CM

## 2024-06-06 PROCEDURE — 3079F DIAST BP 80-89 MM HG: CPT | Performed by: INTERNAL MEDICINE

## 2024-06-06 PROCEDURE — G8427 DOCREV CUR MEDS BY ELIG CLIN: HCPCS | Performed by: INTERNAL MEDICINE

## 2024-06-06 PROCEDURE — 3077F SYST BP >= 140 MM HG: CPT | Performed by: INTERNAL MEDICINE

## 2024-06-06 PROCEDURE — 1036F TOBACCO NON-USER: CPT | Performed by: INTERNAL MEDICINE

## 2024-06-06 PROCEDURE — 1123F ACP DISCUSS/DSCN MKR DOCD: CPT | Performed by: INTERNAL MEDICINE

## 2024-06-06 PROCEDURE — 99213 OFFICE O/P EST LOW 20 MIN: CPT | Performed by: INTERNAL MEDICINE

## 2024-06-06 PROCEDURE — G8420 CALC BMI NORM PARAMETERS: HCPCS | Performed by: INTERNAL MEDICINE

## 2024-06-06 NOTE — PROGRESS NOTES
complaint.  He has good appetite.  Urinates well.  No hospitalizations since I saw him.  Denies any recent chest pain or shortness of breath.  He brought a record of his home blood pressure which I reviewed.  They are mostly similar to what we have in the office today.    ROS:  Pertinent positives stated above in HPI. All other systems were reviewed and were negative.  Medications:     Current Outpatient Medications   Medication Sig Dispense Refill    calcium carbonate 600 MG TABS tablet Take 1 tablet by mouth daily      b complex vitamins capsule Take 1 capsule by mouth 2 times daily (before meals) B Complex Plus by Pure Encapsulations      apixaban (ELIQUIS) 2.5 MG TABS tablet Take 1 tablet by mouth 2 times daily 180 tablet 3    Cholecalciferol (VITAMIN D3) 50 MCG (2000 UT) CAPS Take 1 capsule by mouth daily (with breakfast) double Mon Thurs 30 capsule     NONFORMULARY Take 1 caplet by mouth 4 times daily (before meals and nightly) Magtein by Source Natural      Omega-3 Fatty Acids ([employer]S FISH OIL PO) Take 1 tablet by mouth 4 times daily      Chromium-Cinnamon (CINNAMON PLUS CHROMIUM PO) Take 1 capsule by mouth 4 times daily (before meals and nightly) [place] Cinnamon      magnesium cl-calcium carbonate (SLOW-MAG) 71.5-119 MG TBEC tablet Take 1 tablet by mouth 4 times daily (after meals and at bedtime)      Levomefolate Glucosamine (METHYLFOLATE PO) Take 15 mg by mouth every morning (before breakfast) Metabolic Maintenance at [email or web address] or amazon.com       Methylcobalamin 5000 MCG SUBL Place 1 tablet under the tongue once a week Vera at [place]      mupirocin (BACTROBAN) 2 % ointment Apply topically Indications: Skin Abnormalities As needed for dermatologist appointments  1     No current facility-administered medications for this visit.       Lab Results:    CBC:   Lab Results   Component Value Date    WBC 5.2 04/15/2024    HGB 14.7 04/15/2024    HCT 43.4 04/15/2024    MCV 95.0 (H) 04/15/2024

## 2024-07-05 ENCOUNTER — TELEPHONE (OUTPATIENT)
Dept: FAMILY MEDICINE CLINIC | Age: 79
End: 2024-07-05

## 2024-07-05 NOTE — TELEPHONE ENCOUNTER
We received a fax from [facility] in [facility] requesting a refill of the Eliquis 2.5mg. They do NOT accept e-scribe. They need a verbal rx called to them at [phone]. Or this fax can be completed and faxed back to them at [phone]. Fax placed on your desk for review. Please advise.

## 2024-08-28 ENCOUNTER — OFFICE VISIT (OUTPATIENT)
Dept: FAMILY MEDICINE CLINIC | Age: 79
End: 2024-08-28
Payer: MEDICARE

## 2024-08-28 VITALS
DIASTOLIC BLOOD PRESSURE: 64 MMHG | HEART RATE: 72 BPM | RESPIRATION RATE: 16 BRPM | WEIGHT: 151.1 LBS | SYSTOLIC BLOOD PRESSURE: 146 MMHG | BODY MASS INDEX: 24.39 KG/M2

## 2024-08-28 DIAGNOSIS — N18.4 CKD (CHRONIC KIDNEY DISEASE), STAGE IV (HCC): Primary | ICD-10-CM

## 2024-08-28 DIAGNOSIS — C61 PROSTATE CANCER (HCC): ICD-10-CM

## 2024-08-28 DIAGNOSIS — D68.59 HYPERCOAGULABLE STATE (HCC): ICD-10-CM

## 2024-08-28 DIAGNOSIS — I82.512 CHRONIC DEEP VEIN THROMBOSIS (DVT) OF FEMORAL VEIN OF LEFT LOWER EXTREMITY (HCC): ICD-10-CM

## 2024-08-28 DIAGNOSIS — I10 ESSENTIAL HYPERTENSION: ICD-10-CM

## 2024-08-28 PROCEDURE — 3078F DIAST BP <80 MM HG: CPT | Performed by: FAMILY MEDICINE

## 2024-08-28 PROCEDURE — 99214 OFFICE O/P EST MOD 30 MIN: CPT | Performed by: FAMILY MEDICINE

## 2024-08-28 PROCEDURE — G8427 DOCREV CUR MEDS BY ELIG CLIN: HCPCS | Performed by: FAMILY MEDICINE

## 2024-08-28 PROCEDURE — 1123F ACP DISCUSS/DSCN MKR DOCD: CPT | Performed by: FAMILY MEDICINE

## 2024-08-28 PROCEDURE — 3077F SYST BP >= 140 MM HG: CPT | Performed by: FAMILY MEDICINE

## 2024-08-28 PROCEDURE — G2211 COMPLEX E/M VISIT ADD ON: HCPCS | Performed by: FAMILY MEDICINE

## 2024-08-28 PROCEDURE — G8420 CALC BMI NORM PARAMETERS: HCPCS | Performed by: FAMILY MEDICINE

## 2024-08-28 PROCEDURE — 1036F TOBACCO NON-USER: CPT | Performed by: FAMILY MEDICINE

## 2024-08-28 ASSESSMENT — ENCOUNTER SYMPTOMS
GASTROINTESTINAL NEGATIVE: 1
RESPIRATORY NEGATIVE: 1

## 2024-08-28 NOTE — PROGRESS NOTES
Jordy Moe (:  1945) is a 79 y.o. male,Established patient, here for evaluation of the following chief complaint(s):  6 Month Follow-Up (CKD)          Subjective   SUBJECTIVE/OBJECTIVE:  HPI  Chief Complaint   Patient presents with    6 Month Follow-Up     CKD     6 month eval.      Doing well, no new concerns.    BP Readings from Last 3 Encounters:   24 (!) 146/64   24 (!) 141/87   24 (!) 152/80     Wt Readings from Last 3 Encounters:   24 68.5 kg (151 lb 1.6 oz)   24 69.4 kg (153 lb)   24 69.4 kg (153 lb)       Patient Active Problem List   Diagnosis    Hypertension    TIA (transient ischemic attack)    GERD (gastroesophageal reflux disease)    Medication monitoring encounter    SI (sacroiliac) joint inflammation ([facility]), left    Dvt femoral (deep venous thrombosis) ([facility])    Chronic low back pain    Lung mass    Abnormal CT scan, chest, pulmonary nodule.    PSA elevation, bph elevation    Anticoagulated on Coumadin    Sciatica of left side associated with disorder of lumbar spine    Lumbar disc disease with radiculopathy    Lumbar spinal stenosis    Other spondylosis with radiculopathy, lumbar region    DDD (degenerative disc disease), lumbar    RAMAN (acute kidney injury) ([facility])    CKD (chronic kidney disease) stage 3, GFR 30-59 ml/min ([facility])    Pleural effusion, left    Nocturnal leg cramps    Bilateral carpal tunnel syndrome    Prostate cancer ([facility]), [place] score 6.    History of CVA (cerebrovascular accident)    History of pulmonary embolism    CKD (chronic kidney disease), stage IV ([facility])    COVID-19    Paresthesia of hand, bilateral    Hypercoagulable state ([facility])       Current Outpatient Medications   Medication Sig Dispense Refill    calcium carbonate 600 MG TABS tablet Take 1 tablet by mouth daily      b complex vitamins capsule Take 1 capsule by mouth 2 times daily (before meals) B Complex Plus by Pure Encapsulations      apixaban (ELIQUIS) 2.5 MG TABS tablet

## 2024-09-24 ENCOUNTER — LAB (OUTPATIENT)
Dept: LAB | Age: 79
End: 2024-09-24

## 2024-09-24 DIAGNOSIS — C61 PROSTATE CANCER (HCC): ICD-10-CM

## 2024-09-24 DIAGNOSIS — K90.89 OTHER INTESTINAL MALABSORPTION: ICD-10-CM

## 2024-09-24 DIAGNOSIS — Z71.89 ENCOUNTER FOR HERB AND VITAMIN SUPPLEMENT MANAGEMENT: ICD-10-CM

## 2024-09-24 DIAGNOSIS — I10 ESSENTIAL HYPERTENSION: ICD-10-CM

## 2024-09-24 DIAGNOSIS — N18.4 CKD (CHRONIC KIDNEY DISEASE), STAGE IV (HCC): ICD-10-CM

## 2024-09-24 DIAGNOSIS — M65.30 TRIGGER FINGER OF RIGHT HAND, UNSPECIFIED FINGER: ICD-10-CM

## 2024-09-24 DIAGNOSIS — R35.1 NOCTURIA: ICD-10-CM

## 2024-09-24 DIAGNOSIS — I82.512 CHRONIC DEEP VEIN THROMBOSIS (DVT) OF FEMORAL VEIN OF LEFT LOWER EXTREMITY (HCC): ICD-10-CM

## 2024-09-24 DIAGNOSIS — R73.09 LOW GLUCOSE LEVEL: ICD-10-CM

## 2024-09-24 DIAGNOSIS — H69.92 EUSTACHIAN TUBE DYSFUNCTION, LEFT: ICD-10-CM

## 2024-09-24 DIAGNOSIS — D68.59 HYPERCOAGULABLE STATE (HCC): ICD-10-CM

## 2024-09-24 LAB
25(OH)D3 SERPL-MCNC: 49 NG/ML (ref 30–100)
ANION GAP SERPL CALC-SCNC: 10 MEQ/L (ref 8–16)
BASOPHILS ABSOLUTE: 0 THOU/MM3 (ref 0–0.1)
BASOPHILS NFR BLD AUTO: 0.9 %
BUN SERPL-MCNC: 31 MG/DL (ref 7–22)
C-REACTIVE PROTEIN, HIGH SENSITIVITY: 5.6 MG/L (ref 0–3)
CALCIUM SERPL-MCNC: 9 MG/DL (ref 8.5–10.5)
CHLORIDE SERPL-SCNC: 102 MEQ/L (ref 98–111)
CHOLEST SERPL-MCNC: 202 MG/DL (ref 100–199)
CO2 SERPL-SCNC: 25 MEQ/L (ref 23–33)
CREAT SERPL-MCNC: 1.6 MG/DL (ref 0.4–1.2)
DEPRECATED MEAN GLUCOSE BLD GHB EST-ACNC: 117 MG/DL (ref 70–126)
DEPRECATED RDW RBC AUTO: 42.5 FL (ref 35–45)
EOSINOPHIL NFR BLD AUTO: 1.7 %
EOSINOPHILS ABSOLUTE: 0.1 THOU/MM3 (ref 0–0.4)
ERYTHROCYTE [DISTWIDTH] IN BLOOD BY AUTOMATED COUNT: 12.2 % (ref 11.5–14.5)
ERYTHROCYTE [SEDIMENTATION RATE] IN BLOOD BY WESTERGREN METHOD: 15 MM/HR (ref 0–10)
GFR SERPL CREATININE-BSD FRML MDRD: 43 ML/MIN/1.73M2
GLUCOSE SERPL-MCNC: 94 MG/DL (ref 70–108)
HBA1C MFR BLD HPLC: 5.9 % (ref 4.4–6.4)
HCT VFR BLD AUTO: 42.3 % (ref 42–52)
HDLC SERPL-MCNC: 45 MG/DL
HGB BLD-MCNC: 14.3 GM/DL (ref 14–18)
HOMOCYSTEINE: 12 UMOL/L (ref 0–15)
IMM GRANULOCYTES # BLD AUTO: 0.01 THOU/MM3 (ref 0–0.07)
IMM GRANULOCYTES NFR BLD AUTO: 0.2 %
LDLC SERPL CALC-MCNC: 133 MG/DL
LYMPHOCYTES ABSOLUTE: 1.7 THOU/MM3 (ref 1–4.8)
LYMPHOCYTES NFR BLD AUTO: 32.1 %
MAGNESIUM SERPL-MCNC: 2 MG/DL (ref 1.6–2.4)
MCH RBC QN AUTO: 31.9 PG (ref 26–33)
MCHC RBC AUTO-ENTMCNC: 33.8 GM/DL (ref 32.2–35.5)
MCV RBC AUTO: 94.4 FL (ref 80–94)
MONOCYTES ABSOLUTE: 0.6 THOU/MM3 (ref 0.4–1.3)
MONOCYTES NFR BLD AUTO: 12.1 %
NEUTROPHILS ABSOLUTE: 2.8 THOU/MM3 (ref 1.8–7.7)
NEUTROPHILS NFR BLD AUTO: 53 %
NRBC BLD AUTO-RTO: 0 /100 WBC
PLATELET # BLD AUTO: 232 THOU/MM3 (ref 130–400)
PMV BLD AUTO: 9.9 FL (ref 9.4–12.4)
POTASSIUM SERPL-SCNC: 4.4 MEQ/L (ref 3.5–5.2)
PSA SERPL-MCNC: 12.98 NG/ML (ref 0–1)
PTH-INTACT SERPL-MCNC: 56.3 PG/ML (ref 15–65)
RBC # BLD AUTO: 4.48 MILL/MM3 (ref 4.7–6.1)
SODIUM SERPL-SCNC: 137 MEQ/L (ref 135–145)
TRIGL SERPL-MCNC: 122 MG/DL (ref 0–199)
WBC # BLD AUTO: 5.3 THOU/MM3 (ref 4.8–10.8)

## 2024-09-25 LAB — THYROPEROXIDASE AB SERPL IA-ACNC: < 4 IU/ML (ref 0–25)

## 2024-09-29 NOTE — PROGRESS NOTES
lines on your neck and chest. The amount of latex is different in each food product or fruit variety.  Avoid out of Season if not grown locally:   Melon, Nectarine, Papaya, Cherry, Passion fruit, Plum, Chestnuts, and Tomato. Avocado, Banana, Celery, Figs, and Kiwi always contain Latex-like protein.    What’s in Season?  Strawberries taste better in June than December because June is strawberry season so buy locally grown produce \"in season\" for the best flavor, cost, and less Latex. Locally grown produce not only tastes great but also requires little or no ethylene exposure in food distribution so has less latex content.  Out of season: use canned, frozen, or dried since those are processed ripe and latex content is lower!!!    Month     [place] Locally Grown Produce  January, February, March: use canned, frozen or dried fruits since lower in latex  April: asparagus, radishes  May: asparagus, broccoli, green onions, greens, peas, radishes, rhubarb  Rosa: asparagus, beets, beans, broccoli, cabbage, cantaloupe, carrots, green onions, greens, lettuce, onions, parsley, peas, radishes, rhubarb, strawberries, watermelons  July: beans, beets, blueberries, broccoli, cabbage, cantaloupe, carrots, cauliflower, celery, cucumbers, eggplant, grapes, green onions, greens, lettuce, onions, parsley, peas, peaches, bell peppers, potatoes, radishes, summer raspberries, squash, sweetcorn, tomatoes, turnips, watermelons  August: apples, beans, beets, blueberries, cabbage, cantaloupe, carrots, cauliflower, celery, cucumbers, eggplant, grapes, green onions, greens, lettuce, onions, parsley, peas, peaches, pears, bell peppers, potatoes, radishes, squash, sweet corn, tomatoes, turnips, watermelons  September: apples, beans, beets, blueberries, cabbage, cantaloupe, carrots, cauliflower, celery, cucumbers, eggplant, grapes, green onions, greens, lettuce, onions, parsley, peas, peaches, pears, bell peppers, plums, potatoes, pumpkins, radishes,

## 2024-09-30 ENCOUNTER — OFFICE VISIT (OUTPATIENT)
Dept: FAMILY MEDICINE CLINIC | Age: 79
End: 2024-09-30
Payer: MEDICARE

## 2024-09-30 VITALS
DIASTOLIC BLOOD PRESSURE: 82 MMHG | RESPIRATION RATE: 10 BRPM | HEART RATE: 67 BPM | SYSTOLIC BLOOD PRESSURE: 148 MMHG | HEIGHT: 66 IN | BODY MASS INDEX: 23.98 KG/M2 | OXYGEN SATURATION: 98 % | WEIGHT: 149.2 LBS | TEMPERATURE: 97.7 F

## 2024-09-30 DIAGNOSIS — R76.8 ELEVATED ANTI-TISSUE TRANSGLUTAMINASE (TTG) IGA LEVEL: ICD-10-CM

## 2024-09-30 DIAGNOSIS — N18.4 CKD (CHRONIC KIDNEY DISEASE), STAGE IV (HCC): Primary | ICD-10-CM

## 2024-09-30 DIAGNOSIS — D68.59 HYPERCOAGULABLE STATE (HCC): ICD-10-CM

## 2024-09-30 DIAGNOSIS — Z71.89 ENCOUNTER FOR HERB AND VITAMIN SUPPLEMENT MANAGEMENT: ICD-10-CM

## 2024-09-30 DIAGNOSIS — R35.1 NOCTURIA: ICD-10-CM

## 2024-09-30 DIAGNOSIS — C61 PROSTATE CANCER (HCC): ICD-10-CM

## 2024-09-30 DIAGNOSIS — K90.89 OTHER INTESTINAL MALABSORPTION: ICD-10-CM

## 2024-09-30 DIAGNOSIS — I82.512 CHRONIC DEEP VEIN THROMBOSIS (DVT) OF FEMORAL VEIN OF LEFT LOWER EXTREMITY (HCC): ICD-10-CM

## 2024-09-30 DIAGNOSIS — I10 ESSENTIAL HYPERTENSION: ICD-10-CM

## 2024-09-30 DIAGNOSIS — R73.09 LOW GLUCOSE LEVEL: ICD-10-CM

## 2024-09-30 PROCEDURE — 1036F TOBACCO NON-USER: CPT | Performed by: FAMILY MEDICINE

## 2024-09-30 PROCEDURE — 3079F DIAST BP 80-89 MM HG: CPT | Performed by: FAMILY MEDICINE

## 2024-09-30 PROCEDURE — G8420 CALC BMI NORM PARAMETERS: HCPCS | Performed by: FAMILY MEDICINE

## 2024-09-30 PROCEDURE — 99215 OFFICE O/P EST HI 40 MIN: CPT | Performed by: FAMILY MEDICINE

## 2024-09-30 PROCEDURE — 1123F ACP DISCUSS/DSCN MKR DOCD: CPT | Performed by: FAMILY MEDICINE

## 2024-09-30 PROCEDURE — G8427 DOCREV CUR MEDS BY ELIG CLIN: HCPCS | Performed by: FAMILY MEDICINE

## 2024-09-30 PROCEDURE — 3077F SYST BP >= 140 MM HG: CPT | Performed by: FAMILY MEDICINE

## 2024-10-10 ENCOUNTER — APPOINTMENT (OUTPATIENT)
Dept: GENERAL RADIOLOGY | Age: 79
End: 2024-10-10
Payer: MEDICARE

## 2024-10-10 ENCOUNTER — HOSPITAL ENCOUNTER (EMERGENCY)
Age: 79
Discharge: HOME | End: 2024-10-10
Attending: STUDENT IN AN ORGANIZED HEALTH CARE EDUCATION/TRAINING PROGRAM
Payer: MEDICARE

## 2024-10-10 VITALS
DIASTOLIC BLOOD PRESSURE: 65 MMHG | TEMPERATURE: 98.7 F | HEART RATE: 77 BPM | SYSTOLIC BLOOD PRESSURE: 171 MMHG | RESPIRATION RATE: 18 BRPM | OXYGEN SATURATION: 100 %

## 2024-10-10 DIAGNOSIS — S53.005A RADIAL HEAD DISLOCATION, LEFT, INITIAL ENCOUNTER: Primary | ICD-10-CM

## 2024-10-10 DIAGNOSIS — S41.112A LACERATION OF LEFT UPPER EXTREMITY, INITIAL ENCOUNTER: ICD-10-CM

## 2024-10-10 DIAGNOSIS — S59.902A INJURY OF LEFT ELBOW, INITIAL ENCOUNTER: ICD-10-CM

## 2024-10-10 PROCEDURE — 73080 X-RAY EXAM OF ELBOW: CPT

## 2024-10-10 PROCEDURE — 2500000003 HC RX 250 WO HCPCS: Performed by: EMERGENCY MEDICINE

## 2024-10-10 PROCEDURE — 90715 TDAP VACCINE 7 YRS/> IM: CPT

## 2024-10-10 PROCEDURE — 6370000000 HC RX 637 (ALT 250 FOR IP): Performed by: EMERGENCY MEDICINE

## 2024-10-10 PROCEDURE — 99283 EMERGENCY DEPT VISIT LOW MDM: CPT

## 2024-10-10 PROCEDURE — 90471 IMMUNIZATION ADMIN: CPT

## 2024-10-10 PROCEDURE — 99215 OFFICE O/P EST HI 40 MIN: CPT

## 2024-10-10 PROCEDURE — 6360000002 HC RX W HCPCS

## 2024-10-10 PROCEDURE — 99214 OFFICE O/P EST MOD 30 MIN: CPT

## 2024-10-10 RX ORDER — GINSENG 100 MG
CAPSULE ORAL 3 TIMES DAILY
Status: DISCONTINUED | OUTPATIENT
Start: 2024-10-10 | End: 2024-10-11 | Stop reason: HOSPADM

## 2024-10-10 RX ORDER — CEPHALEXIN 500 MG/1
500 CAPSULE ORAL 2 TIMES DAILY
Qty: 14 CAPSULE | Refills: 0 | Status: SHIPPED | OUTPATIENT
Start: 2024-10-10 | End: 2024-10-17

## 2024-10-10 RX ORDER — LIDOCAINE HYDROCHLORIDE AND EPINEPHRINE 10; 10 MG/ML; UG/ML
20 INJECTION, SOLUTION INFILTRATION; PERINEURAL ONCE
Status: COMPLETED | OUTPATIENT
Start: 2024-10-10 | End: 2024-10-10

## 2024-10-10 RX ADMIN — BACITRACIN: 500 OINTMENT TOPICAL at 21:50

## 2024-10-10 RX ADMIN — TETANUS TOXOID, REDUCED DIPHTHERIA TOXOID AND ACELLULAR PERTUSSIS VACCINE, ADSORBED 0.5 ML: 5; 2.5; 8; 8; 2.5 SUSPENSION INTRAMUSCULAR at 17:49

## 2024-10-10 RX ADMIN — LIDOCAINE HYDROCHLORIDE AND EPINEPHRINE 20 ML: 10; 10 INJECTION, SOLUTION INFILTRATION; PERINEURAL at 20:43

## 2024-10-10 ASSESSMENT — PAIN SCALES - GENERAL
PAINLEVEL_OUTOF10: 3
PAINLEVEL_OUTOF10: 4

## 2024-10-10 ASSESSMENT — PAIN DESCRIPTION - LOCATION: LOCATION: ELBOW

## 2024-10-10 ASSESSMENT — PAIN DESCRIPTION - ORIENTATION: ORIENTATION: LEFT

## 2024-10-10 ASSESSMENT — PAIN - FUNCTIONAL ASSESSMENT
PAIN_FUNCTIONAL_ASSESSMENT: 0-10

## 2024-10-10 NOTE — DISCHARGE INSTRUCTIONS
You were seen at [facility] emergency department after a fall.  Your left elbow laceration was repaired with absorbable sutures that you do not need to come back to have removed.  Because it was a complex skin tear, some of the skin may die and fall off instead of healing, and you will likely have a scar.  Please monitor for signs of infection such as swelling, redness, warmth, pus drainage.  If you see any of these return to the ER.  Please take the entire course of your antibiotics as directed. You should follow-up with the orthopedic institute [facility] as soon as possible regarding your x-ray results.  Because you are able to fully move your elbow, we did not need to reduce it in the ER, however, an orthopedic surgeon should evaluate you for further recommendations.  They have walk-in hours Monday through Friday starting at 8 AM.  If you experience worsening pain or swelling, return to the ER.

## 2024-10-10 NOTE — ED PROVIDER NOTES
B COMPLEX VITAMINS CAPSULE    Take 1 capsule by mouth 2 times daily (before meals) B Complex Plus by Pure Encapsulations    CALCIUM CARBONATE 600 MG TABS TABLET    Take 1 tablet by mouth daily    CHOLECALCIFEROL (VITAMIN D3) 50 MCG (2000 UT) CAPS    Take 1 capsule by mouth daily (with breakfast) double Mon Thurs    CHROMIUM-CINNAMON (CINNAMON PLUS CHROMIUM PO)    Take 1 capsule by mouth 4 times daily (before meals and nightly) [place] Cinnamon    LEVOMEFOLATE GLUCOSAMINE (METHYLFOLATE PO)    Take 15 mg by mouth every morning (before breakfast) Metabolic Maintenance at [email or web address] or amazon.com     MAGNESIUM CL-CALCIUM CARBONATE (SLOW-MAG) 71.5-119 MG TBEC TABLET    Take 1 tablet by mouth 4 times daily (after meals and at bedtime)    METHYLCOBALAMIN 5000 MCG SUBL    Place 1 tablet under the tongue once a week [facility] at [facility]    MUPIROCIN (BACTROBAN) 2 % OINTMENT    Apply topically Indications: Skin Abnormalities As needed for dermatologist appointments    NONFORMULARY    Take 1 caplet by mouth 4 times daily (before meals and nightly) Magtein by [employer] Natural    OMEGA-3 FATTY ACIDS (CVS FISH OIL PO)    Take 1 tablet by mouth 4 times daily       ALLERGIES     Patient is is allergic to morphine, bactrim [sulfamethoxazole-trimethoprim], nsaids, sulfamethoxazole, and trimethoprim.    FAMILY HISTORY     Patient'sfamily history includes Alzheimer's Disease in his mother; Cancer in his mother; Heart Disease in his mother; High Blood Pressure in his mother; Parkinsonism in his father.    SOCIAL HISTORY     Patient  reports that he has never smoked. He has been exposed to tobacco smoke. He has never used smokeless tobacco. He reports current alcohol use. He reports that he does not use drugs.    PHYSICAL EXAM     ED TRIAGE VITALS  BP: (!) 159/86, Temp: 98.5 °F (36.9 °C), Pulse: 86, Respirations: 16, SpO2: 97 %  Physical Exam  Vitals and nursing note reviewed.   Constitutional:       Appearance: Normal  Discussed with patient he will need transfer to  the emergency room for potential reduction. Patient declined ambulance transfer and reports he will have his neighbor drive him to the ER. Attempted to call report to [facility] although was unsuccessful.     PATIENT REFERRED TO:  [facility] EMERGENCY DEPT  [address] [place] [address]  [phone]  Go to   Go directly to [facility] ER    DISCHARGE MEDICATIONS:  New Prescriptions    No medications on file     Current Discharge Medication List          KRISTINA Ernandez CNP, Alecksa N, APRN - CNP  10/10/24 1801

## 2024-10-10 NOTE — ED TRIAGE NOTES
Pt presents to  for c/o  fall off a ladder (1-2ft). Pt states he fell onto his feet but then lost balance and fell on his left side onto gravel. Pt has a laceration on the left elbow with c/o elbow pain. Pt denies LOC or hitting head, does take anticoagulants, drove self.

## 2024-10-11 ENCOUNTER — TELEPHONE (OUTPATIENT)
Dept: FAMILY MEDICINE CLINIC | Age: 79
End: 2024-10-11

## 2024-10-11 NOTE — TELEPHONE ENCOUNTER
Patient called and stated that he went to the \"[facility]\" and they told him his elbow was dislocated and arm was broken.   He stated that when he got the ER they did xray and they state the elbow isn't dislocated and arm is not broke.     He stated they recommend patient to go to [facility]. He wanted to know if you could look at the xray and give your opinion.   He stated he can move the arm.

## 2024-10-11 NOTE — ED NOTES
Patient resting in bed. Respirations easy and unlabored. No distress noted. Call light within reach. Left elbow wrapped

## 2024-10-11 NOTE — ED TRIAGE NOTES
Pt presents to ED as urgent care transfer after a 1-2 ft fall off a ladder. Pt states he fell onto his feet then lost his balance and fell on his left elbow onto gravel. Pt has a left elbow laceration. Denies LOC, hitting head. On thinners.

## 2024-10-11 NOTE — TELEPHONE ENCOUNTER
Pt was notified. Says he is not having any pain. Last night before he wrapped the elbow he was able to bend it with no pain. Not sure of full range of motion, since arm is wrapped and he is afraid to rip the sutures out. Please advise.

## 2024-10-11 NOTE — ED NOTES
Dr. Hardwick at bedside. Patient resting in bed. Respirations easy and unlabored. No distress noted. Call light within reach.

## 2024-10-11 NOTE — TELEPHONE ENCOUNTER
Xray overall looks ok, possible small chip fracture noted.  No need to follow with [facility] if not having significant pain and has full range of motion.

## 2024-10-15 ENCOUNTER — OFFICE VISIT (OUTPATIENT)
Dept: UROLOGY | Age: 79
End: 2024-10-15
Payer: MEDICARE

## 2024-10-15 VITALS — HEIGHT: 66 IN | RESPIRATION RATE: 18 BRPM | WEIGHT: 149 LBS | BODY MASS INDEX: 23.95 KG/M2

## 2024-10-15 DIAGNOSIS — N32.81 OAB (OVERACTIVE BLADDER): ICD-10-CM

## 2024-10-15 DIAGNOSIS — N40.1 BENIGN LOCALIZED PROSTATIC HYPERPLASIA WITH LOWER URINARY TRACT SYMPTOMS (LUTS): ICD-10-CM

## 2024-10-15 DIAGNOSIS — C61 PROSTATE CANCER (HCC): Primary | ICD-10-CM

## 2024-10-15 LAB
BILIRUBIN, URINE: NEGATIVE
BLOOD URINE, POC: NEGATIVE
CHARACTER, URINE: CLEAR
COLOR, UA: YELLOW
GLUCOSE URINE: NEGATIVE MG/DL
KETONES, URINE: NEGATIVE
LEUKOCYTE CLUMPS, URINE: NEGATIVE
NITRITE, URINE: NEGATIVE
PH, URINE: 5.5 (ref 5–9)
PROTEIN, URINE: NEGATIVE MG/DL
SPECIFIC GRAVITY UA: 1.01 (ref 1–1.03)
UROBILINOGEN, URINE: 0.2 EU/DL (ref 0–1)

## 2024-10-15 PROCEDURE — 99214 OFFICE O/P EST MOD 30 MIN: CPT

## 2024-10-15 PROCEDURE — 81003 URINALYSIS AUTO W/O SCOPE: CPT

## 2024-10-15 PROCEDURE — G8484 FLU IMMUNIZE NO ADMIN: HCPCS

## 2024-10-15 PROCEDURE — G8428 CUR MEDS NOT DOCUMENT: HCPCS

## 2024-10-15 PROCEDURE — 1123F ACP DISCUSS/DSCN MKR DOCD: CPT

## 2024-10-15 PROCEDURE — 1036F TOBACCO NON-USER: CPT

## 2024-10-15 PROCEDURE — G8420 CALC BMI NORM PARAMETERS: HCPCS

## 2024-10-15 NOTE — PROGRESS NOTES
Med list reviewed by patient on mychart  
   also had one 2023    COLONOSCOPY  2019    [facility]    EGD  2019    [facility]    EXCISION / BIOPSY SKIN LESION OF ARM Left 10/11/2017    EXCISION 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 WITH FLAP AND FROZEN SECTION performed by Luis Gudino MD at [facility] OR    FINGER TRIGGER RELEASE Left 02/2021    left thumb     OTHER SURGICAL HISTORY Left 10/11/2017    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    PROSTATE BIOPSY  01/23/2024    SKIN CANCER EXCISION  2010    Face skin cancer removal    SKIN CANCER EXCISION  2024    facial cancer excision     Family History   Problem Relation Age of Onset    Cancer Mother     High Blood Pressure Mother     Alzheimer's Disease Mother     Heart Disease Mother     Parkinsonism Father     Diabetes Neg Hx     Kidney Disease Neg Hx     Stroke Neg Hx     Colon Cancer Neg Hx     Esophageal Cancer Neg Hx     Rectal Cancer Neg Hx     Stomach Cancer Neg Hx      No outpatient medications have been marked as taking for the 10/15/24 encounter (Office Visit) with Beto Vásquez PA-C.       Morphine, Bactrim [sulfamethoxazole-trimethoprim], Nsaids, Sulfamethoxazole, and Trimethoprim  Social History     Tobacco Use   Smoking Status Never    Passive exposure: Past   Smokeless Tobacco Never       Social History     Substance and Sexual Activity   Alcohol Use Yes    Comment: rarely       REVIEW OF SYSTEMS:  Constitutional: negative  Eyes: negative  Respiratory: negative  Cardiovascular: negative  Gastrointestinal: negative  Musculoskeletal: negative  Genitourinary: negative except for what is in HPI  Skin: negative   Neurological: negative  Hematological/Lymphatic: negative  Psychological: negative    Physical Exam:      Vitals:    10/15/24 1423   Resp: 18     Patient is a 79 y.o. male in no acute distress and alert and oriented to person, place and time.    Pulmonary: Non-labored respiration.  Cardiovascular: Normal rate, regular rhythm, normal

## 2024-10-16 ENCOUNTER — OFFICE VISIT (OUTPATIENT)
Dept: FAMILY MEDICINE CLINIC | Age: 79
End: 2024-10-16

## 2024-10-16 VITALS
RESPIRATION RATE: 16 BRPM | DIASTOLIC BLOOD PRESSURE: 62 MMHG | HEART RATE: 68 BPM | SYSTOLIC BLOOD PRESSURE: 136 MMHG | WEIGHT: 145.8 LBS | BODY MASS INDEX: 23.54 KG/M2

## 2024-10-16 DIAGNOSIS — S50.02XA CONTUSION OF LEFT ELBOW, INITIAL ENCOUNTER: ICD-10-CM

## 2024-10-16 DIAGNOSIS — S51.012A ELBOW LACERATION, LEFT, INITIAL ENCOUNTER: Primary | ICD-10-CM

## 2024-10-16 DIAGNOSIS — Z91.81 HISTORY OF RECENT FALL: ICD-10-CM

## 2024-10-16 DIAGNOSIS — S53.005A DISLOCATION OF LEFT RADIAL HEAD, INITIAL ENCOUNTER: ICD-10-CM

## 2024-10-16 NOTE — PROGRESS NOTES
Jordy Moe (:  1945) is a 79 y.o. male,Established patient, here for evaluation of the following chief complaint(s):  Fall (Pt fell last week on Thursday 10/10/24 and went to [facility] UC, from there they sent pt to [facility] ED for eval and sutures placed. ) and Follow-up (Follow up [facility] ED 10/10/24)          Subjective   SUBJECTIVE/OBJECTIVE:  HPI  Chief Complaint   Patient presents with    Fall     Pt fell last week on Thursday 10/10/24 and went to [facility] UC, from there they sent pt to [facility] ED for eval and sutures placed.     Follow-up     Follow up [facility] ED 10/10/24     UC and ER follow up.    CHIEF COMPLAINT            Chief Complaint   Patient presents with    Fall    Laceration         Nurses Notes reviewed and I agree except as noted in the HPI.  HISTORY OF PRESENT ILLNESS   Jordy Moe is a 79 y.o. male who presents to urgent care following a fall.  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  Patient denies hitting his head or losing consciousness.  Patient is on Eliquis daily.  Patient presents with laceration and discomfort to left elbow.  Reports injury happened at approximately 5 PM.    DIAGNOSTIC RESULTS   Labs:No results found for this visit on 10/10/24.     IMAGING:  XR ELBOW LEFT (MIN 3 VIEWS)   Final Result   1. Probable nondisplaced fracture at the base of the small posterior    olecranon spur.   2. Dorsal dislocation of the proximal radius.   3. Multiple debris/foreign bodies in the dorsal soft tissues.   4. No definite joint effusion.         FINAL IMPRESSION       1. Radial head dislocation, left, initial encounter      Wt Readings from Last 3 Encounters:   10/16/24 66.1 kg (145 lb 12.8 oz)   10/15/24 67.6 kg (149 lb)   24 67.7 kg (149 lb 3.2 oz)       Patient Active Problem List   Diagnosis    Hypertension    TIA (transient ischemic attack)    GERD

## 2024-10-17 ASSESSMENT — ENCOUNTER SYMPTOMS
GASTROINTESTINAL NEGATIVE: 1
RESPIRATORY NEGATIVE: 1

## 2024-11-15 ENCOUNTER — TELEPHONE (OUTPATIENT)
Dept: FAMILY MEDICINE CLINIC | Age: 79
End: 2024-11-15

## 2024-11-15 DIAGNOSIS — M25.561 ACUTE PAIN OF RIGHT KNEE: ICD-10-CM

## 2024-11-15 RX ORDER — TRAMADOL HYDROCHLORIDE 50 MG/1
50 TABLET ORAL EVERY 8 HOURS PRN
Qty: 15 TABLET | Refills: 0 | Status: SHIPPED | OUTPATIENT
Start: 2024-11-15 | End: 2024-11-20

## 2024-11-15 RX ORDER — METHYLPREDNISOLONE 4 MG/1
TABLET ORAL
Qty: 1 KIT | Refills: 0 | Status: SHIPPED | OUTPATIENT
Start: 2024-11-15 | End: 2024-11-21

## 2024-11-15 NOTE — TELEPHONE ENCOUNTER
The patient called in and stated that he was seen a few months ago for right knee pain and was given medication that helped.  He stated that yesterday he started having right knee pain again and now.  Today his right his has swelling just above the knee cap and the pain is deep in his knee.  He is not able to put much weight on it so he is using a walker.  Please advise.      The patient uses [email or web address].      If no call back the patient will check with his pharmacy after 1pm.

## 2024-11-25 ENCOUNTER — TELEPHONE (OUTPATIENT)
Dept: FAMILY MEDICINE CLINIC | Age: 79
End: 2024-11-25

## 2024-11-25 NOTE — TELEPHONE ENCOUNTER
Patient called and stated that he was placed on methylprednisolone the other day for knee pain and swelling. He stated that the swelling is better but not gone away, and slight pain depending on how he put pressure/weight on the leg.   He didn't know what would be the next thing to do.

## 2024-11-27 ENCOUNTER — LAB (OUTPATIENT)
Dept: LAB | Age: 79
End: 2024-11-27

## 2024-11-27 ENCOUNTER — OFFICE VISIT (OUTPATIENT)
Dept: FAMILY MEDICINE CLINIC | Age: 79
End: 2024-11-27

## 2024-11-27 VITALS
HEART RATE: 80 BPM | RESPIRATION RATE: 16 BRPM | SYSTOLIC BLOOD PRESSURE: 114 MMHG | HEIGHT: 66 IN | BODY MASS INDEX: 23.32 KG/M2 | WEIGHT: 145.1 LBS | DIASTOLIC BLOOD PRESSURE: 70 MMHG

## 2024-11-27 DIAGNOSIS — N18.32 STAGE 3B CHRONIC KIDNEY DISEASE (HCC): ICD-10-CM

## 2024-11-27 DIAGNOSIS — E55.9 VITAMIN D DEFICIENCY: ICD-10-CM

## 2024-11-27 DIAGNOSIS — M25.461 KNEE EFFUSION, RIGHT: ICD-10-CM

## 2024-11-27 DIAGNOSIS — M25.561 CHRONIC PAIN OF RIGHT KNEE: Primary | ICD-10-CM

## 2024-11-27 DIAGNOSIS — M65.90 SYNOVITIS: ICD-10-CM

## 2024-11-27 DIAGNOSIS — G89.29 CHRONIC PAIN OF RIGHT KNEE: Primary | ICD-10-CM

## 2024-11-27 LAB
25(OH)D3 SERPL-MCNC: 47 NG/ML (ref 30–100)
ANION GAP SERPL CALC-SCNC: 13 MEQ/L (ref 8–16)
BUN SERPL-MCNC: 38 MG/DL (ref 7–22)
CALCIUM SERPL-MCNC: 9.3 MG/DL (ref 8.5–10.5)
CHLORIDE SERPL-SCNC: 100 MEQ/L (ref 98–111)
CO2 SERPL-SCNC: 26 MEQ/L (ref 23–33)
CREAT SERPL-MCNC: 1.7 MG/DL (ref 0.4–1.2)
GFR SERPL CREATININE-BSD FRML MDRD: 40 ML/MIN/1.73M2
GLUCOSE SERPL-MCNC: 135 MG/DL (ref 70–108)
POTASSIUM SERPL-SCNC: 4.2 MEQ/L (ref 3.5–5.2)
SODIUM SERPL-SCNC: 139 MEQ/L (ref 135–145)

## 2024-11-27 RX ORDER — PREDNISONE 20 MG/1
20 TABLET ORAL 2 TIMES DAILY
Qty: 14 TABLET | Refills: 0 | Status: SHIPPED | OUTPATIENT
Start: 2024-11-27 | End: 2024-12-04

## 2024-11-27 ASSESSMENT — ENCOUNTER SYMPTOMS
GASTROINTESTINAL NEGATIVE: 1
RESPIRATORY NEGATIVE: 1

## 2024-11-27 NOTE — PROGRESS NOTES
Encapsulations      apixaban (ELIQUIS) 2.5 MG TABS tablet Take 1 tablet by mouth 2 times daily 180 tablet 3    Cholecalciferol (VITAMIN D3) 50 MCG (2000 UT) CAPS Take 1 capsule by mouth daily (with breakfast) double Mon Thurs 30 capsule     NONFORMULARY Take 1 caplet by mouth 4 times daily (before meals and nightly) Magtein by [employer]      Omega-3 Fatty Acids ([employer] FISH OIL PO) Take 1 tablet by mouth 4 times daily      Chromium-Cinnamon (CINNAMON PLUS CHROMIUM PO) Take 1 capsule by mouth 4 times daily (before meals and nightly) Scotty Gloster Cinnamon      magnesium cl-calcium carbonate (SLOW-MAG) 71.5-119 MG TBEC tablet Take 1 tablet by mouth 4 times daily (after meals and at bedtime)      Levomefolate Glucosamine (METHYLFOLATE PO) Take 15 mg by mouth every morning (before breakfast) Metabolic Maintenance at [employer] or [employer]       Methylcobalamin 5000 MCG SUBL Place 1 tablet under the tongue once a week Vera at [employer]      mupirocin (BACTROBAN) 2 % ointment Apply topically Indications: Skin Abnormalities As needed for dermatologist appointments  1     No current facility-administered medications for this visit.       Past Surgical History:   Procedure Laterality Date    ARM SURGERY Right 12/18/2020    EXCISION FIBROXANTHOMA RIGHT FOREARM WITH SKIN GRAFT performed by Luis Gudino MD at [facility] [place]    CARPAL TUNNEL RELEASE Left 02/2021    also had one 2023    COLONOSCOPY  2019    [facility]    EGD  2019    [facility]    EXCISION / BIOPSY SKIN LESION OF ARM Left 10/11/2017    EXCISION 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 WITH FLAP AND FROZEN SECTION performed by Luis Gudino MD at [facility] OR    FINGER TRIGGER RELEASE Left 02/2021    left thumb     OTHER SURGICAL HISTORY Left 10/11/2017    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    PROSTATE BIOPSY  01/23/2024    SKIN CANCER EXCISION  2010    Face skin cancer removal    SKIN CANCER

## 2024-12-04 ENCOUNTER — TELEPHONE (OUTPATIENT)
Dept: FAMILY MEDICINE CLINIC | Age: 79
End: 2024-12-04

## 2024-12-04 DIAGNOSIS — G89.29 CHRONIC PAIN OF LEFT KNEE: Primary | ICD-10-CM

## 2024-12-04 DIAGNOSIS — M25.562 CHRONIC PAIN OF LEFT KNEE: Primary | ICD-10-CM

## 2024-12-04 NOTE — TELEPHONE ENCOUNTER
Called patient and informed. He verbalized understanding. Given patient central scheduling number to schedule both MRI's.     He wanted to know your thought on if he needs surgery who would be a provider you would recommend.     He stated that we can send him a my chart message with the recommendation

## 2024-12-04 NOTE — TELEPHONE ENCOUNTER
Patient called and stated that he is to have an MRI completed of his knee. He stated that he was seen for his right knee however both knees bother him. He stated that the left knee is just as bad as the right knee.   He would like to have the MRI completed of both knees.

## 2024-12-05 ENCOUNTER — OFFICE VISIT (OUTPATIENT)
Dept: NEPHROLOGY | Age: 79
End: 2024-12-05
Payer: MEDICARE

## 2024-12-05 VITALS
SYSTOLIC BLOOD PRESSURE: 128 MMHG | HEIGHT: 65 IN | OXYGEN SATURATION: 97 % | WEIGHT: 147.8 LBS | BODY MASS INDEX: 24.62 KG/M2 | DIASTOLIC BLOOD PRESSURE: 60 MMHG | HEART RATE: 84 BPM

## 2024-12-05 DIAGNOSIS — E55.9 VITAMIN D DEFICIENCY: ICD-10-CM

## 2024-12-05 DIAGNOSIS — N18.32 STAGE 3B CHRONIC KIDNEY DISEASE (HCC): Primary | ICD-10-CM

## 2024-12-05 DIAGNOSIS — N31.2 ATONIC BLADDER: ICD-10-CM

## 2024-12-05 PROCEDURE — G8484 FLU IMMUNIZE NO ADMIN: HCPCS | Performed by: INTERNAL MEDICINE

## 2024-12-05 PROCEDURE — 3074F SYST BP LT 130 MM HG: CPT | Performed by: INTERNAL MEDICINE

## 2024-12-05 PROCEDURE — 1036F TOBACCO NON-USER: CPT | Performed by: INTERNAL MEDICINE

## 2024-12-05 PROCEDURE — 99214 OFFICE O/P EST MOD 30 MIN: CPT | Performed by: INTERNAL MEDICINE

## 2024-12-05 PROCEDURE — 1123F ACP DISCUSS/DSCN MKR DOCD: CPT | Performed by: INTERNAL MEDICINE

## 2024-12-05 PROCEDURE — 1159F MED LIST DOCD IN RCRD: CPT | Performed by: INTERNAL MEDICINE

## 2024-12-05 PROCEDURE — 3078F DIAST BP <80 MM HG: CPT | Performed by: INTERNAL MEDICINE

## 2024-12-05 PROCEDURE — G8420 CALC BMI NORM PARAMETERS: HCPCS | Performed by: INTERNAL MEDICINE

## 2024-12-05 PROCEDURE — G8427 DOCREV CUR MEDS BY ELIG CLIN: HCPCS | Performed by: INTERNAL MEDICINE

## 2024-12-05 NOTE — PROGRESS NOTES
Renal Progress Note    Assessment and Plan:      Diagnosis Orders   1. Stage 3b chronic kidney disease (HCC)        2. Vitamin D deficiency        3. Atonic bladder                  PLAN:  Serum creatinine is mostly stable at 1.7 mg/dL.  Vitamin D level is good at 47.  Atonic bladder is managed by urology.  Medications reviewed  No changes  Continue to avoid any nonsteroidal anti-inflammatory medications  Emergency room records reviewed.  Return visit in 6 months with labs          Patient Active Problem List   Diagnosis    Hypertension    TIA (transient ischemic attack)    GERD (gastroesophageal reflux disease)    Medication monitoring encounter    SI (sacroiliac) joint inflammation ([facility]), left    Dvt femoral (deep venous thrombosis) ([facility])    Chronic low back pain    Lung mass    Abnormal CT scan, chest, pulmonary nodule.    PSA elevation, bph elevation    Anticoagulated on Coumadin    Sciatica of left side associated with disorder of lumbar spine    Lumbar disc disease with radiculopathy    Lumbar spinal stenosis    Other spondylosis with radiculopathy, lumbar region    DDD (degenerative disc disease), lumbar    RAMAN (acute kidney injury) ([facility])    CKD (chronic kidney disease) stage 3, GFR 30-59 ml/min ([facility])    Pleural effusion, left    Nocturnal leg cramps    Bilateral carpal tunnel syndrome    Prostate cancer ([facility]), [place] score 6.    History of CVA (cerebrovascular accident)    History of pulmonary embolism    CKD (chronic kidney disease), stage IV ([facility])    COVID-19    Paresthesia of hand, bilateral    Hypercoagulable state ([facility])           Subjective:   Chief complaint:No chief complaint on file.     HPI:This is a follow up visit for Mr. Sunshine here today for return appointment.  I see him for chronic kidney disease.  He also has atonic bladder.  He was last seen in June 2024.  He was in the emergency department about 8 weeks ago after a fall.  X-ray revealed a nondisplaced fracture and also dorsal dislocation

## 2024-12-05 NOTE — PROGRESS NOTES
Timo says that he hurt his knee and will have a MRI soon.   Timo says that Dr. Mcdowell previously had him on sodium carbonate but he has been out for several months and is not sure if he should be on it.

## 2024-12-30 ENCOUNTER — HOSPITAL ENCOUNTER (OUTPATIENT)
Dept: MRI IMAGING | Age: 79
Discharge: HOME | End: 2024-12-30
Attending: FAMILY MEDICINE
Payer: MEDICARE

## 2024-12-30 DIAGNOSIS — M25.562 CHRONIC PAIN OF LEFT KNEE: ICD-10-CM

## 2024-12-30 DIAGNOSIS — G89.29 CHRONIC PAIN OF LEFT KNEE: ICD-10-CM

## 2024-12-30 DIAGNOSIS — G89.29 CHRONIC PAIN OF RIGHT KNEE: ICD-10-CM

## 2024-12-30 DIAGNOSIS — M25.461 KNEE EFFUSION, RIGHT: ICD-10-CM

## 2024-12-30 DIAGNOSIS — M25.561 CHRONIC PAIN OF RIGHT KNEE: ICD-10-CM

## 2024-12-30 PROCEDURE — 73721 MRI JNT OF LWR EXTRE W/O DYE: CPT

## 2025-01-02 DIAGNOSIS — I82.512 CHRONIC DEEP VEIN THROMBOSIS (DVT) OF FEMORAL VEIN OF LEFT LOWER EXTREMITY (HCC): ICD-10-CM

## 2025-01-02 NOTE — TELEPHONE ENCOUNTER
Patient called in. He is on his last tablet of eliquis. He placed an order from [employer] over a month ago, but the mail is on strike. He wondered if you could send in a small supply to [facility] on [address]. Please advise. Script pending if appropriate.

## 2025-02-03 ENCOUNTER — HOSPITAL ENCOUNTER (EMERGENCY)
Age: 80
Discharge: HOME | End: 2025-02-03
Payer: MEDICARE

## 2025-02-03 VITALS
RESPIRATION RATE: 16 BRPM | HEART RATE: 71 BPM | DIASTOLIC BLOOD PRESSURE: 81 MMHG | OXYGEN SATURATION: 95 % | TEMPERATURE: 98 F | SYSTOLIC BLOOD PRESSURE: 170 MMHG

## 2025-02-03 DIAGNOSIS — R03.0 ELEVATED BLOOD PRESSURE READING: ICD-10-CM

## 2025-02-03 DIAGNOSIS — I82.512 CHRONIC DEEP VEIN THROMBOSIS (DVT) OF FEMORAL VEIN OF LEFT LOWER EXTREMITY (HCC): ICD-10-CM

## 2025-02-03 DIAGNOSIS — Z76.0 ENCOUNTER FOR MEDICATION REFILL: Primary | ICD-10-CM

## 2025-02-03 LAB
ANION GAP SERPL CALC-SCNC: 15 MEQ/L (ref 8–16)
BASOPHILS ABSOLUTE: 0.1 THOU/MM3 (ref 0–0.1)
BASOPHILS NFR BLD AUTO: 0.8 %
BUN SERPL-MCNC: 32 MG/DL (ref 7–22)
CALCIUM SERPL-MCNC: 9.7 MG/DL (ref 8.5–10.5)
CHLORIDE SERPL-SCNC: 102 MEQ/L (ref 98–111)
CO2 SERPL-SCNC: 26 MEQ/L (ref 23–33)
CREAT SERPL-MCNC: 1.7 MG/DL (ref 0.4–1.2)
DEPRECATED RDW RBC AUTO: 40.1 FL (ref 35–45)
EKG ATRIAL RATE: 98 BPM
EKG P AXIS: 69 DEGREES
EKG P-R INTERVAL: 176 MS
EKG Q-T INTERVAL: 356 MS
EKG QRS DURATION: 78 MS
EKG QTC CALCULATION (BAZETT): 454 MS
EKG R AXIS: -13 DEGREES
EKG T AXIS: 60 DEGREES
EKG VENTRICULAR RATE: 98 BPM
EOSINOPHIL NFR BLD AUTO: 0.8 %
EOSINOPHILS ABSOLUTE: 0.1 THOU/MM3 (ref 0–0.4)
ERYTHROCYTE [DISTWIDTH] IN BLOOD BY AUTOMATED COUNT: 12 % (ref 11.5–14.5)
GFR SERPL CREATININE-BSD FRML MDRD: 40 ML/MIN/1.73M2
GLUCOSE SERPL-MCNC: 111 MG/DL (ref 70–108)
HCT VFR BLD AUTO: 43.9 % (ref 42–52)
HGB BLD-MCNC: 15.2 GM/DL (ref 14–18)
IMM GRANULOCYTES # BLD AUTO: 0.01 THOU/MM3 (ref 0–0.07)
IMM GRANULOCYTES NFR BLD AUTO: 0.2 %
LYMPHOCYTES ABSOLUTE: 1.6 THOU/MM3 (ref 1–4.8)
LYMPHOCYTES NFR BLD AUTO: 25 %
MCH RBC QN AUTO: 31.5 PG (ref 26–33)
MCHC RBC AUTO-ENTMCNC: 34.6 GM/DL (ref 32.2–35.5)
MCV RBC AUTO: 91.1 FL (ref 80–94)
MONOCYTES ABSOLUTE: 0.6 THOU/MM3 (ref 0.4–1.3)
MONOCYTES NFR BLD AUTO: 8.9 %
NEUTROPHILS ABSOLUTE: 4.2 THOU/MM3 (ref 1.8–7.7)
NEUTROPHILS NFR BLD AUTO: 64.3 %
NRBC BLD AUTO-RTO: 0 /100 WBC
OSMOLALITY SERPL CALC.SUM OF ELEC: 292.6 MOSMOL/KG (ref 275–300)
PLATELET # BLD AUTO: 224 THOU/MM3 (ref 130–400)
PMV BLD AUTO: 9.4 FL (ref 9.4–12.4)
POTASSIUM SERPL-SCNC: 4.1 MEQ/L (ref 3.5–5.2)
RBC # BLD AUTO: 4.82 MILL/MM3 (ref 4.7–6.1)
SODIUM SERPL-SCNC: 143 MEQ/L (ref 135–145)
TROPONIN, HIGH SENSITIVITY: 30 NG/L (ref 0–12)
TROPONIN, HIGH SENSITIVITY: 31 NG/L (ref 0–12)
WBC # BLD AUTO: 6.5 THOU/MM3 (ref 4.8–10.8)

## 2025-02-03 PROCEDURE — 36415 COLL VENOUS BLD VENIPUNCTURE: CPT

## 2025-02-03 PROCEDURE — 93010 ELECTROCARDIOGRAM REPORT: CPT | Performed by: NUCLEAR MEDICINE

## 2025-02-03 PROCEDURE — 96374 THER/PROPH/DIAG INJ IV PUSH: CPT

## 2025-02-03 PROCEDURE — 93005 ELECTROCARDIOGRAM TRACING: CPT | Performed by: NURSE PRACTITIONER

## 2025-02-03 PROCEDURE — 6370000000 HC RX 637 (ALT 250 FOR IP): Performed by: NURSE PRACTITIONER

## 2025-02-03 PROCEDURE — 99284 EMERGENCY DEPT VISIT MOD MDM: CPT

## 2025-02-03 PROCEDURE — 80048 BASIC METABOLIC PNL TOTAL CA: CPT

## 2025-02-03 PROCEDURE — 6360000002 HC RX W HCPCS: Performed by: NURSE PRACTITIONER

## 2025-02-03 PROCEDURE — 84484 ASSAY OF TROPONIN QUANT: CPT

## 2025-02-03 PROCEDURE — 85025 COMPLETE CBC W/AUTO DIFF WBC: CPT

## 2025-02-03 RX ORDER — HYDRALAZINE HYDROCHLORIDE 20 MG/ML
10 INJECTION INTRAMUSCULAR; INTRAVENOUS ONCE
Status: COMPLETED | OUTPATIENT
Start: 2025-02-03 | End: 2025-02-03

## 2025-02-03 RX ADMIN — HYDRALAZINE HYDROCHLORIDE 10 MG: 20 INJECTION INTRAMUSCULAR; INTRAVENOUS at 05:30

## 2025-02-03 RX ADMIN — APIXABAN 2.5 MG: 2.5 TABLET, FILM COATED ORAL at 02:46

## 2025-02-03 NOTE — ED PROVIDER NOTES
[facility] EMERGENCY DEPARTMENT      EMERGENCY MEDICINE     Pt Name: Jordy Moe  MRN: [id number]  Birthdate 1945  Date of evaluation: 2/3/2025  Provider: KRISTINA Kenny CNP    CHIEF COMPLAINT       Chief Complaint   Patient presents with    Medication Refill     HISTORY OF PRESENT ILLNESS   Jordy Moe is a pleasant 79 y.o. male who presents to the emergency department from home with c/o needing a dose of his eliquis until his rx comes tomorrow.  States he has been out two days and he is worried so he wanted to get a dose of it tonight.  Patient denies chest pain, SOB, leg pain or any other symptoms.  Patient states that he is a little anxious over the situation with the eliquis but otherwise feels well.  States he is not on BP medications.        History is obtained from:  patient  PASTMEDICAL HISTORY     Past Medical History:   Diagnosis Date    Arthritis     neck, Wrists , back    Cancer (HCC)     skin (removed)    Cerebral artery occlusion with cerebral infarction ([facility])     TIA    DVT (deep venous thrombosis) ([facility])     GERD (gastroesophageal reflux disease)     Hx of blood clots     PE    Hypertension     Kidney disease     Prostate cancer ([facility]) 2019    Pulmonary emboli ([facility]) 05/07/2016    Restless legs syndrome     Scoliosis     TIA (transient ischemic attack)        Patient Active Problem List   Diagnosis Code    Hypertension I10    TIA (transient ischemic attack) G45.9    GERD (gastroesophageal reflux disease) K21.9    Medication monitoring encounter Z51.81    SI (sacroiliac) joint inflammation ([facility]), left M46.1    Dvt femoral (deep venous thrombosis) ([facility]) I82.419    Chronic low back pain M54.50, G89.29    Lung mass R91.8    Abnormal CT scan, chest, pulmonary nodule. R93.89    PSA elevation, bph elevation R97.20    Anticoagulated on Coumadin Z79.01    Sciatica of left side associated with disorder of lumbar spine M53.86    Lumbar disc disease with radiculopathy M51.16

## 2025-02-03 NOTE — ED PROVIDER NOTES
Addendum: Care was assumed at 6 AM.  Patient had a second troponin done.  At this point the patient's troponin is normal.  Blood pressure starting to trend down.  Did write him another week of Eliquis.  We discharged home in stable condition    Final diagnosis  1.  Medication refill             Bill Hoyos PA  02/03/25 0802

## 2025-02-03 NOTE — ED TRIAGE NOTES
Pt arrives to ED via private for a medication refill. Pt reports he is needing a dose of his 2.5 mg apixaban until he receives it tomorrow. Pt reports he has a history of hypertension and reports it is worse tonight due to the anxiety/stress of his medication situation.

## 2025-02-05 ENCOUNTER — OFFICE VISIT (OUTPATIENT)
Dept: FAMILY MEDICINE CLINIC | Age: 80
End: 2025-02-05
Payer: MEDICARE

## 2025-02-05 VITALS
BODY MASS INDEX: 25.16 KG/M2 | DIASTOLIC BLOOD PRESSURE: 100 MMHG | RESPIRATION RATE: 16 BRPM | HEART RATE: 80 BPM | SYSTOLIC BLOOD PRESSURE: 196 MMHG | WEIGHT: 151.2 LBS

## 2025-02-05 DIAGNOSIS — C61 PROSTATE CANCER (HCC): ICD-10-CM

## 2025-02-05 DIAGNOSIS — I10 HYPERTENSION, UNSPECIFIED TYPE: Primary | ICD-10-CM

## 2025-02-05 DIAGNOSIS — F41.9 ANXIETY: ICD-10-CM

## 2025-02-05 DIAGNOSIS — R79.89 ELEVATED TROPONIN: ICD-10-CM

## 2025-02-05 DIAGNOSIS — F43.0 ACUTE STRESS REACTION: ICD-10-CM

## 2025-02-05 DIAGNOSIS — N18.4 CKD (CHRONIC KIDNEY DISEASE), STAGE IV (HCC): ICD-10-CM

## 2025-02-05 PROCEDURE — G8419 CALC BMI OUT NRM PARAM NOF/U: HCPCS | Performed by: FAMILY MEDICINE

## 2025-02-05 PROCEDURE — 1123F ACP DISCUSS/DSCN MKR DOCD: CPT | Performed by: FAMILY MEDICINE

## 2025-02-05 PROCEDURE — 1036F TOBACCO NON-USER: CPT | Performed by: FAMILY MEDICINE

## 2025-02-05 PROCEDURE — 1159F MED LIST DOCD IN RCRD: CPT | Performed by: FAMILY MEDICINE

## 2025-02-05 PROCEDURE — G2211 COMPLEX E/M VISIT ADD ON: HCPCS | Performed by: FAMILY MEDICINE

## 2025-02-05 PROCEDURE — G8427 DOCREV CUR MEDS BY ELIG CLIN: HCPCS | Performed by: FAMILY MEDICINE

## 2025-02-05 PROCEDURE — 3077F SYST BP >= 140 MM HG: CPT | Performed by: FAMILY MEDICINE

## 2025-02-05 PROCEDURE — 3080F DIAST BP >= 90 MM HG: CPT | Performed by: FAMILY MEDICINE

## 2025-02-05 PROCEDURE — 99214 OFFICE O/P EST MOD 30 MIN: CPT | Performed by: FAMILY MEDICINE

## 2025-02-05 RX ORDER — AMLODIPINE BESYLATE 5 MG/1
5 TABLET ORAL DAILY
Qty: 30 TABLET | Refills: 0 | Status: SHIPPED | OUTPATIENT
Start: 2025-02-05

## 2025-02-05 SDOH — ECONOMIC STABILITY: FOOD INSECURITY: WITHIN THE PAST 12 MONTHS, YOU WORRIED THAT YOUR FOOD WOULD RUN OUT BEFORE YOU GOT MONEY TO BUY MORE.: NEVER TRUE

## 2025-02-05 SDOH — ECONOMIC STABILITY: FOOD INSECURITY: WITHIN THE PAST 12 MONTHS, THE FOOD YOU BOUGHT JUST DIDN'T LAST AND YOU DIDN'T HAVE MONEY TO GET MORE.: NEVER TRUE

## 2025-02-05 ASSESSMENT — PATIENT HEALTH QUESTIONNAIRE - PHQ9
SUM OF ALL RESPONSES TO PHQ QUESTIONS 1-9: 0
1. LITTLE INTEREST OR PLEASURE IN DOING THINGS: NOT AT ALL
SUM OF ALL RESPONSES TO PHQ QUESTIONS 1-9: 0
2. FEELING DOWN, DEPRESSED OR HOPELESS: NOT AT ALL
SUM OF ALL RESPONSES TO PHQ QUESTIONS 1-9: 0
SUM OF ALL RESPONSES TO PHQ QUESTIONS 1-9: 0
SUM OF ALL RESPONSES TO PHQ9 QUESTIONS 1 & 2: 0

## 2025-02-05 ASSESSMENT — ENCOUNTER SYMPTOMS
RESPIRATORY NEGATIVE: 1
GASTROINTESTINAL NEGATIVE: 1

## 2025-02-05 NOTE — PROGRESS NOTES
Jordy Moe (:  1945) is a 79 y.o. male,Established patient, here for evaluation of the following chief complaint(s):  ED Follow-up          Subjective   SUBJECTIVE/OBJECTIVE:  HPI  Chief Complaint   Patient presents with    ED Follow-up     CHIEF COMPLAINT            Chief Complaint   Patient presents with    Medication Refill      HISTORY OF PRESENT ILLNESS   Jordy Moe is a pleasant 79 y.o. male who presents to the emergency department from home with c/o needing a dose of his eliquis until his rx comes tomorrow.  States he has been out two days and he is worried so he wanted to get a dose of it tonight.  Patient denies chest pain, SOB, leg pain or any other symptoms.  Patient states that he is a little anxious over the situation with the eliquis but otherwise feels well.  States he is not on BP medications.      FINAL IMPRESSION       1. Encounter for medication refill    2. Elevated blood pressure reading      BP still high.  Pt under a lot of stress with family.  Has a few kids that are struggling with drug addictions.  BP Readings from Last 3 Encounters:   25 (!) 196/100   25 (!) 170/81   24 128/60       Patient Active Problem List   Diagnosis    Hypertension    TIA (transient ischemic attack)    GERD (gastroesophageal reflux disease)    Medication monitoring encounter    SI (sacroiliac) joint inflammation ([facility]), left    Dvt femoral (deep venous thrombosis) ([facility])    Chronic low back pain    Lung mass    Abnormal CT scan, chest, pulmonary nodule.    PSA elevation, bph elevation    Anticoagulated on Coumadin    Sciatica of left side associated with disorder of lumbar spine    Lumbar disc disease with radiculopathy    Lumbar spinal stenosis    Other spondylosis with radiculopathy, lumbar region    DDD (degenerative disc disease), lumbar    RAMAN (acute kidney injury) ([facility])    CKD (chronic kidney disease) stage 3, GFR 30-59 ml/min ([facility])    Pleural effusion, left    Nocturnal

## 2025-03-01 SDOH — HEALTH STABILITY: PHYSICAL HEALTH: ON AVERAGE, HOW MANY DAYS PER WEEK DO YOU ENGAGE IN MODERATE TO STRENUOUS EXERCISE (LIKE A BRISK WALK)?: 3 DAYS

## 2025-03-01 ASSESSMENT — PATIENT HEALTH QUESTIONNAIRE - PHQ9
SUM OF ALL RESPONSES TO PHQ QUESTIONS 1-9: 0
1. LITTLE INTEREST OR PLEASURE IN DOING THINGS: NOT AT ALL
SUM OF ALL RESPONSES TO PHQ QUESTIONS 1-9: 0
2. FEELING DOWN, DEPRESSED OR HOPELESS: NOT AT ALL

## 2025-03-01 ASSESSMENT — LIFESTYLE VARIABLES
HOW OFTEN DO YOU HAVE A DRINK CONTAINING ALCOHOL: NEVER
HOW OFTEN DO YOU HAVE SIX OR MORE DRINKS ON ONE OCCASION: 1
HOW OFTEN DO YOU HAVE A DRINK CONTAINING ALCOHOL: 1
HOW MANY STANDARD DRINKS CONTAINING ALCOHOL DO YOU HAVE ON A TYPICAL DAY: 0
HOW MANY STANDARD DRINKS CONTAINING ALCOHOL DO YOU HAVE ON A TYPICAL DAY: PATIENT DOES NOT DRINK

## 2025-03-03 ENCOUNTER — OFFICE VISIT (OUTPATIENT)
Dept: FAMILY MEDICINE CLINIC | Age: 80
End: 2025-03-03

## 2025-03-03 VITALS
BODY MASS INDEX: 25.77 KG/M2 | RESPIRATION RATE: 16 BRPM | SYSTOLIC BLOOD PRESSURE: 168 MMHG | HEART RATE: 76 BPM | DIASTOLIC BLOOD PRESSURE: 72 MMHG | HEIGHT: 65 IN | WEIGHT: 154.7 LBS

## 2025-03-03 DIAGNOSIS — I10 HYPERTENSION, UNSPECIFIED TYPE: ICD-10-CM

## 2025-03-03 DIAGNOSIS — I82.512 CHRONIC DEEP VEIN THROMBOSIS (DVT) OF FEMORAL VEIN OF LEFT LOWER EXTREMITY (HCC): ICD-10-CM

## 2025-03-03 DIAGNOSIS — F43.0 ACUTE STRESS REACTION: ICD-10-CM

## 2025-03-03 DIAGNOSIS — F41.9 ANXIETY: ICD-10-CM

## 2025-03-03 DIAGNOSIS — N18.4 CKD (CHRONIC KIDNEY DISEASE), STAGE IV (HCC): ICD-10-CM

## 2025-03-03 DIAGNOSIS — C61 PROSTATE CANCER (HCC): ICD-10-CM

## 2025-03-03 DIAGNOSIS — Z00.00 MEDICARE ANNUAL WELLNESS VISIT, SUBSEQUENT: Primary | ICD-10-CM

## 2025-03-03 RX ORDER — AMLODIPINE BESYLATE 10 MG/1
10 TABLET ORAL DAILY
Qty: 90 TABLET | Refills: 3 | Status: SHIPPED | OUTPATIENT
Start: 2025-03-03

## 2025-03-03 ASSESSMENT — ENCOUNTER SYMPTOMS
GASTROINTESTINAL NEGATIVE: 1
RESPIRATORY NEGATIVE: 1

## 2025-03-03 NOTE — PROGRESS NOTES
for dermatologist appointments Yes Provider, MD Tiffany Augustine (Including outside providers/suppliers regularly involved in providing care):   Patient Care Team:  Bobby June DO as PCP - General (Family Medicine)  Bobby June DO as PCP - Empaneled Provider  Liam Simon MD as Consulting Physician (Pulmonology)  Germain Collins MD as Consulting Physician (Urology)  Adis Lindsey MD as Consulting Physician (Gastroenterology)     Recommendations for Preventive Services Due: see orders and patient instructions/AVS.  Recommended screening schedule for the next 5-10 years is provided to the patient in written form: see Patient Instructions/AVS.     Reviewed and updated this visit:  Tobacco  Allergies  Meds  Problems

## 2025-04-10 ENCOUNTER — TELEPHONE (OUTPATIENT)
Dept: FAMILY MEDICINE CLINIC | Age: 80
End: 2025-04-10

## 2025-04-10 ENCOUNTER — LAB (OUTPATIENT)
Dept: LAB | Age: 80
End: 2025-04-10

## 2025-04-10 DIAGNOSIS — R76.8 ELEVATED ANTI-TISSUE TRANSGLUTAMINASE (TTG) IGA LEVEL: ICD-10-CM

## 2025-04-10 DIAGNOSIS — R35.1 NOCTURIA: ICD-10-CM

## 2025-04-10 DIAGNOSIS — F43.0 ACUTE STRESS REACTION: ICD-10-CM

## 2025-04-10 DIAGNOSIS — G89.29 CHRONIC PAIN OF LEFT KNEE: ICD-10-CM

## 2025-04-10 DIAGNOSIS — M25.561 CHRONIC PAIN OF RIGHT KNEE: ICD-10-CM

## 2025-04-10 DIAGNOSIS — R79.89 ELEVATED TROPONIN: ICD-10-CM

## 2025-04-10 DIAGNOSIS — N18.4 CKD (CHRONIC KIDNEY DISEASE), STAGE IV (HCC): ICD-10-CM

## 2025-04-10 DIAGNOSIS — S53.005A DISLOCATION OF LEFT RADIAL HEAD, INITIAL ENCOUNTER: ICD-10-CM

## 2025-04-10 DIAGNOSIS — F41.9 ANXIETY: ICD-10-CM

## 2025-04-10 DIAGNOSIS — S50.02XA CONTUSION OF LEFT ELBOW, INITIAL ENCOUNTER: ICD-10-CM

## 2025-04-10 DIAGNOSIS — Z71.89 ENCOUNTER FOR HERB AND VITAMIN SUPPLEMENT MANAGEMENT: ICD-10-CM

## 2025-04-10 DIAGNOSIS — Z91.81 HISTORY OF RECENT FALL: ICD-10-CM

## 2025-04-10 DIAGNOSIS — M25.562 CHRONIC PAIN OF LEFT KNEE: ICD-10-CM

## 2025-04-10 DIAGNOSIS — I82.512 CHRONIC DEEP VEIN THROMBOSIS (DVT) OF FEMORAL VEIN OF LEFT LOWER EXTREMITY: ICD-10-CM

## 2025-04-10 DIAGNOSIS — I10 HYPERTENSION, UNSPECIFIED TYPE: ICD-10-CM

## 2025-04-10 DIAGNOSIS — E16.2 LOW GLUCOSE LEVEL: ICD-10-CM

## 2025-04-10 DIAGNOSIS — M65.90 SYNOVITIS: ICD-10-CM

## 2025-04-10 DIAGNOSIS — G89.29 CHRONIC PAIN OF RIGHT KNEE: ICD-10-CM

## 2025-04-10 DIAGNOSIS — K90.89 OTHER INTESTINAL MALABSORPTION: ICD-10-CM

## 2025-04-10 DIAGNOSIS — E78.89 LIPIDS ABNORMAL: ICD-10-CM

## 2025-04-10 DIAGNOSIS — M25.461 KNEE EFFUSION, RIGHT: ICD-10-CM

## 2025-04-10 DIAGNOSIS — S51.012A ELBOW LACERATION, LEFT, INITIAL ENCOUNTER: ICD-10-CM

## 2025-04-10 DIAGNOSIS — M25.561 ACUTE PAIN OF RIGHT KNEE: ICD-10-CM

## 2025-04-10 DIAGNOSIS — C61 PROSTATE CANCER (HCC): ICD-10-CM

## 2025-04-10 LAB
25(OH)D3 SERPL-MCNC: 49 NG/ML (ref 30–100)
ALBUMIN SERPL BCG-MCNC: 4.3 G/DL (ref 3.4–4.9)
ALP SERPL-CCNC: 64 U/L (ref 40–129)
ALT SERPL W/O P-5'-P-CCNC: 23 U/L (ref 10–50)
ANION GAP SERPL CALC-SCNC: 12 MEQ/L (ref 8–16)
AST SERPL-CCNC: 27 U/L (ref 10–50)
BASOPHILS ABSOLUTE: 0.1 THOU/MM3 (ref 0–0.1)
BASOPHILS NFR BLD AUTO: 1 %
BILIRUB CONJ SERPL-MCNC: 0.2 MG/DL (ref 0–0.2)
BILIRUB SERPL-MCNC: 0.4 MG/DL (ref 0.3–1.2)
BUN SERPL-MCNC: 34 MG/DL (ref 8–23)
CALCIUM SERPL-MCNC: 9.3 MG/DL (ref 8.8–10.2)
CHLORIDE SERPL-SCNC: 103 MEQ/L (ref 98–111)
CO2 SERPL-SCNC: 25 MEQ/L (ref 22–29)
CREAT SERPL-MCNC: 1.9 MG/DL (ref 0.7–1.2)
DEPRECATED RDW RBC AUTO: 41.7 FL (ref 35–45)
EOSINOPHIL NFR BLD AUTO: 1.5 %
EOSINOPHILS ABSOLUTE: 0.1 THOU/MM3 (ref 0–0.4)
ERYTHROCYTE [DISTWIDTH] IN BLOOD BY AUTOMATED COUNT: 12.4 % (ref 11.5–14.5)
ERYTHROCYTE [SEDIMENTATION RATE] IN BLOOD BY WESTERGREN METHOD: 9 MM/HR (ref 0–10)
GFR SERPL CREATININE-BSD FRML MDRD: 35 ML/MIN/1.73M2
GLUCOSE SERPL-MCNC: 88 MG/DL (ref 74–109)
HCT VFR BLD AUTO: 41.7 % (ref 42–52)
HGB BLD-MCNC: 14.4 GM/DL (ref 14–18)
IMM GRANULOCYTES # BLD AUTO: 0.01 THOU/MM3 (ref 0–0.07)
IMM GRANULOCYTES NFR BLD AUTO: 0.1 %
LYMPHOCYTES ABSOLUTE: 2.4 THOU/MM3 (ref 1–4.8)
LYMPHOCYTES NFR BLD AUTO: 30.7 %
MAGNESIUM SERPL-MCNC: 2.2 MG/DL (ref 1.6–2.6)
MCH RBC QN AUTO: 31.8 PG (ref 26–33)
MCHC RBC AUTO-ENTMCNC: 34.5 GM/DL (ref 32.2–35.5)
MCV RBC AUTO: 92.1 FL (ref 80–94)
MONOCYTES ABSOLUTE: 0.8 THOU/MM3 (ref 0.4–1.3)
MONOCYTES NFR BLD AUTO: 10.1 %
NEUTROPHILS ABSOLUTE: 4.4 THOU/MM3 (ref 1.8–7.7)
NEUTROPHILS NFR BLD AUTO: 56.6 %
NRBC BLD AUTO-RTO: 0 /100 WBC
PLATELET # BLD AUTO: 282 THOU/MM3 (ref 130–400)
PMV BLD AUTO: 9.6 FL (ref 9.4–12.4)
POTASSIUM SERPL-SCNC: 4.1 MEQ/L (ref 3.5–5.2)
PROT SERPL-MCNC: 7.4 G/DL (ref 6.4–8.3)
PSA SERPL-MCNC: 17.5 NG/ML (ref 0–1)
PTH-INTACT SERPL-MCNC: 69 PG/ML (ref 15–65)
RBC # BLD AUTO: 4.53 MILL/MM3 (ref 4.7–6.1)
RHEUMATOID FACT SERPL-ACNC: < 10 IU/ML (ref 0–13)
SODIUM SERPL-SCNC: 140 MEQ/L (ref 135–145)
WBC # BLD AUTO: 7.8 THOU/MM3 (ref 4.8–10.8)

## 2025-04-10 NOTE — TELEPHONE ENCOUNTER
Pt came to office today.  Was upset because I could not come up right away and help him.  I was on the phone, then needed to discharge a new pt from 12 noon whom needed the My Chart explained and set up.    He went to the lab and doesn't have lab orders. Wants to know why?  I asked if we gave him any lab orders from the last visit?  He said no.  I asked what symptoms he is having?  Said none, nothing different than before.  Asked if he would like anything in particular like cholesterol, any dizziness, heart issues?    He then said  he has dizziness occaionally, doesn't sleep well, at night has some leg cramps, no too terrible.  Occasionally has hand pain, left >right, and indigestion but he thinks that is from the cinnamon.    Lab orders pended.  Please review, add correct diagnosis, approve or deny.     He asked that I call him so he can get completed.  His appt is next week, going to [facility]- doesn't want to make a wasted trip again he said.

## 2025-04-11 ENCOUNTER — RESULTS FOLLOW-UP (OUTPATIENT)
Dept: UROLOGY | Age: 80
End: 2025-04-11

## 2025-04-11 LAB
C-REACTIVE PROTEIN, HIGH SENSITIVITY: 3 MG/L (ref 0–3)
DHEA-S SERPL-MCNC: 141 UG/DL (ref 16.2–123)

## 2025-04-13 ENCOUNTER — RESULTS FOLLOW-UP (OUTPATIENT)
Dept: FAMILY MEDICINE CLINIC | Age: 80
End: 2025-04-13

## 2025-04-13 LAB — NUCLEAR IGG SER QL IA: NORMAL

## 2025-04-14 LAB
GLIADIN IGG SER IA-ACNC: < 0.4 U/ML
TTG IGA SER IA-ACNC: 1 U/ML
TTG IGG SER IA-ACNC: < 0.6 U/ML

## 2025-04-15 LAB — DHEA SERPL-MCNC: 1.09 NG/ML (ref 0.63–4.7)

## 2025-04-16 NOTE — PROGRESS NOTES
[facility]  [address]  [facility] [address]  Dept: [phone]  Dept Fax: [phone]  Loc: [phone]      Jordy Moe is a 79 y.o. White male. Jordy  presents to the [facility] clinic today for   Chief Complaint   Patient presents with    Discuss Labs   , and;   1. Hypertension, unspecified type    2. Acute stress reaction    3. Anxiety    4. CKD (chronic kidney disease), stage IV (HCC)    5. Chronic deep vein thrombosis (DVT) of femoral vein of left lower extremity    6. Prostate cancer (HCC)    7. Elevated troponin    8. Chronic pain of left knee    9. Chronic pain of right knee    10. Knee effusion, right    11. Synovitis    12. Acute pain of right knee    13. Elbow laceration, left, initial encounter    14. Contusion of left elbow, initial encounter    15. Dislocation of left radial head, initial encounter    16. Nocturia    17. Encounter for herb and vitamin supplement management    18. Low glucose level    19. Other intestinal malabsorption    20. Elevated anti-tissue transglutaminase (tTG) IgA level    21. Essential hypertension    22. Lipids abnormal          I have reviewed Jordy Moe medical, surgical and other pertinent history in detail, and have updated medication and allergy information in the computerized patient record.     Clinical Care Team:     -Referring Provider for today's consult: self  -Primary Care Provider: Bobby June,     Medical/Surgical History:   He  has a past medical history of Arthritis, Cancer (HCC), Cerebral artery occlusion with cerebral infarction (HCC), DVT (deep venous thrombosis) (HCC), GERD (gastroesophageal reflux disease), Hx of blood clots, Hypertension, Kidney disease, Prostate cancer (HCC), Pulmonary emboli, Restless legs syndrome, Scoliosis, and TIA (transient ischemic attack).  His  has a past surgical history that includes Skin cancer

## 2025-04-17 ENCOUNTER — OFFICE VISIT (OUTPATIENT)
Dept: FAMILY MEDICINE CLINIC | Age: 80
End: 2025-04-17
Payer: MEDICARE

## 2025-04-17 VITALS
TEMPERATURE: 98.5 F | BODY MASS INDEX: 26.06 KG/M2 | DIASTOLIC BLOOD PRESSURE: 70 MMHG | HEART RATE: 75 BPM | SYSTOLIC BLOOD PRESSURE: 138 MMHG | RESPIRATION RATE: 12 BRPM | WEIGHT: 156.4 LBS | OXYGEN SATURATION: 97 % | HEIGHT: 65 IN

## 2025-04-17 DIAGNOSIS — M25.461 KNEE EFFUSION, RIGHT: ICD-10-CM

## 2025-04-17 DIAGNOSIS — G89.29 CHRONIC PAIN OF LEFT KNEE: ICD-10-CM

## 2025-04-17 DIAGNOSIS — S53.005A DISLOCATION OF LEFT RADIAL HEAD, INITIAL ENCOUNTER: ICD-10-CM

## 2025-04-17 DIAGNOSIS — S51.012A ELBOW LACERATION, LEFT, INITIAL ENCOUNTER: ICD-10-CM

## 2025-04-17 DIAGNOSIS — F41.9 ANXIETY: ICD-10-CM

## 2025-04-17 DIAGNOSIS — E16.2 LOW GLUCOSE LEVEL: ICD-10-CM

## 2025-04-17 DIAGNOSIS — S50.02XA CONTUSION OF LEFT ELBOW, INITIAL ENCOUNTER: ICD-10-CM

## 2025-04-17 DIAGNOSIS — M25.562 CHRONIC PAIN OF LEFT KNEE: ICD-10-CM

## 2025-04-17 DIAGNOSIS — R79.89 ELEVATED TROPONIN: ICD-10-CM

## 2025-04-17 DIAGNOSIS — M25.561 CHRONIC PAIN OF RIGHT KNEE: ICD-10-CM

## 2025-04-17 DIAGNOSIS — G89.29 CHRONIC PAIN OF RIGHT KNEE: ICD-10-CM

## 2025-04-17 DIAGNOSIS — F43.0 ACUTE STRESS REACTION: ICD-10-CM

## 2025-04-17 DIAGNOSIS — Z71.89 ENCOUNTER FOR HERB AND VITAMIN SUPPLEMENT MANAGEMENT: ICD-10-CM

## 2025-04-17 DIAGNOSIS — R76.8 ELEVATED ANTI-TISSUE TRANSGLUTAMINASE (TTG) IGA LEVEL: ICD-10-CM

## 2025-04-17 DIAGNOSIS — M65.90 SYNOVITIS: ICD-10-CM

## 2025-04-17 DIAGNOSIS — I10 ESSENTIAL HYPERTENSION: ICD-10-CM

## 2025-04-17 DIAGNOSIS — N18.4 CKD (CHRONIC KIDNEY DISEASE), STAGE IV (HCC): ICD-10-CM

## 2025-04-17 DIAGNOSIS — C61 PROSTATE CANCER (HCC): ICD-10-CM

## 2025-04-17 DIAGNOSIS — M25.561 ACUTE PAIN OF RIGHT KNEE: ICD-10-CM

## 2025-04-17 DIAGNOSIS — I82.512 CHRONIC DEEP VEIN THROMBOSIS (DVT) OF FEMORAL VEIN OF LEFT LOWER EXTREMITY: ICD-10-CM

## 2025-04-17 DIAGNOSIS — E78.89 LIPIDS ABNORMAL: ICD-10-CM

## 2025-04-17 DIAGNOSIS — K90.89 OTHER INTESTINAL MALABSORPTION: ICD-10-CM

## 2025-04-17 DIAGNOSIS — R35.1 NOCTURIA: ICD-10-CM

## 2025-04-17 DIAGNOSIS — I10 HYPERTENSION, UNSPECIFIED TYPE: Primary | ICD-10-CM

## 2025-04-17 PROCEDURE — 3075F SYST BP GE 130 - 139MM HG: CPT | Performed by: FAMILY MEDICINE

## 2025-04-17 PROCEDURE — G8419 CALC BMI OUT NRM PARAM NOF/U: HCPCS | Performed by: FAMILY MEDICINE

## 2025-04-17 PROCEDURE — G8427 DOCREV CUR MEDS BY ELIG CLIN: HCPCS | Performed by: FAMILY MEDICINE

## 2025-04-17 PROCEDURE — 1036F TOBACCO NON-USER: CPT | Performed by: FAMILY MEDICINE

## 2025-04-17 PROCEDURE — 1123F ACP DISCUSS/DSCN MKR DOCD: CPT | Performed by: FAMILY MEDICINE

## 2025-04-17 PROCEDURE — 3078F DIAST BP <80 MM HG: CPT | Performed by: FAMILY MEDICINE

## 2025-04-17 PROCEDURE — 1160F RVW MEDS BY RX/DR IN RCRD: CPT | Performed by: FAMILY MEDICINE

## 2025-04-17 PROCEDURE — 99213 OFFICE O/P EST LOW 20 MIN: CPT | Performed by: FAMILY MEDICINE

## 2025-04-17 PROCEDURE — 1159F MED LIST DOCD IN RCRD: CPT | Performed by: FAMILY MEDICINE

## 2025-04-24 ENCOUNTER — OFFICE VISIT (OUTPATIENT)
Dept: UROLOGY | Age: 80
End: 2025-04-24
Payer: MEDICARE

## 2025-04-24 ENCOUNTER — TELEPHONE (OUTPATIENT)
Dept: UROLOGY | Age: 80
End: 2025-04-24

## 2025-04-24 VITALS
HEIGHT: 66 IN | SYSTOLIC BLOOD PRESSURE: 134 MMHG | DIASTOLIC BLOOD PRESSURE: 80 MMHG | BODY MASS INDEX: 25.07 KG/M2 | WEIGHT: 156 LBS | OXYGEN SATURATION: 98 % | HEART RATE: 83 BPM

## 2025-04-24 DIAGNOSIS — C61 PROSTATE CANCER (HCC): Primary | ICD-10-CM

## 2025-04-24 DIAGNOSIS — N32.81 OAB (OVERACTIVE BLADDER): ICD-10-CM

## 2025-04-24 DIAGNOSIS — N40.1 BENIGN LOCALIZED PROSTATIC HYPERPLASIA WITH LOWER URINARY TRACT SYMPTOMS (LUTS): ICD-10-CM

## 2025-04-24 DIAGNOSIS — R97.20 RISING PSA LEVEL: ICD-10-CM

## 2025-04-24 PROCEDURE — G8419 CALC BMI OUT NRM PARAM NOF/U: HCPCS

## 2025-04-24 PROCEDURE — 3075F SYST BP GE 130 - 139MM HG: CPT

## 2025-04-24 PROCEDURE — 99214 OFFICE O/P EST MOD 30 MIN: CPT

## 2025-04-24 PROCEDURE — 1159F MED LIST DOCD IN RCRD: CPT

## 2025-04-24 PROCEDURE — G8427 DOCREV CUR MEDS BY ELIG CLIN: HCPCS

## 2025-04-24 PROCEDURE — 3079F DIAST BP 80-89 MM HG: CPT

## 2025-04-24 PROCEDURE — 1036F TOBACCO NON-USER: CPT

## 2025-04-24 PROCEDURE — 1123F ACP DISCUSS/DSCN MKR DOCD: CPT

## 2025-04-24 NOTE — PROGRESS NOTES
[facility] [place] [facility]  [facility]  [address]  [address]  [facility] [address]  Dept: [phone]  Loc: [phone]  Visit Date: 4/24/2025      HPI  Jordy Moe is a 79 y.o. male that presents to the urology clinic for prostate cancer follow-up.    Prostate Cancer  Initially diagnosed with [place] 3+3=6, Grade Group 1 prostate cancer in 2019 by Dr. Collins and was undergoing active surveillance, however- during COVID patient was lost to follow-up. Now re-established and has undergone confirmatory biopsy (January 2024) and has been upstaged to [place] 3+4=7, Grade Group 2 prostate cancer. Low Risk Decipher score: 0.23.    PSA rising. 17.50 up from 12.98 six months prior.     LUTS  + Urgency and frequency. Minimal relief with Gemtesa or Myrbetriq. Patient does not like to take pills, however. Nocturia x 2-3.    IPSS of 12/2.      PSA Trend  17.50   (04/10/25)  12.98   (09/24/24)  13.60   (04/15/24)  12.01   (03/07/23)  11.76   (09/13.22)  12.78   (01/13/22)  13.49   (09/24/21)  11.53   (03/23/21)  10.04   (11/23/20)        Last BUN and creatinine:  Lab Results   Component Value Date    BUN 34 (H) 04/10/2025     Lab Results   Component Value Date    CREATININE 1.9 (H) 04/10/2025           PAST MEDICAL, FAMILY AND SOCIAL HISTORY UPDATE:  Past Medical History:   Diagnosis Date    Arthritis     neck, Wrists , back    Cancer (HCC)     skin (removed)    Cerebral artery occlusion with cerebral infarction (HCC)     TIA    DVT (deep venous thrombosis) (HCC)     GERD (gastroesophageal reflux disease)     Hx of blood clots     PE    Hypertension     Kidney disease     Prostate cancer (HCC) 2019    Pulmonary emboli (HCC) 05/07/2016    Restless legs syndrome     Scoliosis     TIA (transient ischemic attack)      Past Surgical History:   Procedure Laterality Date    ARM SURGERY Right 12/18/2020    EXCISION FIBROXANTHOMA RIGHT FOREARM WITH SKIN GRAFT performed by Luis Gudino MD

## 2025-04-24 NOTE — TELEPHONE ENCOUNTER
Patient scheduled for PET CT PSMA   at [facility] on 5/22/2025.  Arrival of 1215 for a 1245 scan time.  Order mailed with instructions to the patient ; NPO 4 HOURS PRIOR BUT DRINK 24 OZ OF WATER 2 HOURS PRIOR

## 2025-05-01 ENCOUNTER — TELEPHONE (OUTPATIENT)
Dept: FAMILY MEDICINE CLINIC | Age: 80
End: 2025-05-01

## 2025-05-01 ENCOUNTER — TELEMEDICINE (OUTPATIENT)
Dept: FAMILY MEDICINE CLINIC | Age: 80
End: 2025-05-01
Payer: MEDICARE

## 2025-05-01 DIAGNOSIS — R63.5 UNINTENDED WEIGHT GAIN: ICD-10-CM

## 2025-05-01 DIAGNOSIS — I10 HYPERTENSION, UNSPECIFIED TYPE: ICD-10-CM

## 2025-05-01 DIAGNOSIS — R60.0 LOWER LEG EDEMA: Primary | ICD-10-CM

## 2025-05-01 DIAGNOSIS — N18.4 CKD (CHRONIC KIDNEY DISEASE), STAGE IV (HCC): ICD-10-CM

## 2025-05-01 PROCEDURE — 99214 OFFICE O/P EST MOD 30 MIN: CPT | Performed by: FAMILY MEDICINE

## 2025-05-01 RX ORDER — AMLODIPINE BESYLATE 10 MG/1
5 TABLET ORAL DAILY
Qty: 90 TABLET | Refills: 3
Start: 2025-05-01

## 2025-05-01 ASSESSMENT — ENCOUNTER SYMPTOMS
RESPIRATORY NEGATIVE: 1
GASTROINTESTINAL NEGATIVE: 1

## 2025-05-01 NOTE — TELEPHONE ENCOUNTER
Patient called and stated that he has noticed feet and ankle swelling the last couple days. He stated that it started in the left ankle/foot but now both are.   He has noticed on the left ankle he has some red splotch/spots.     He stated that he is on amlodipine and wonders if it is from the medication.   He stated he has been checking his blood pressure once in the morning and then evening.   He stated it is back down to where it was before he started the medication.  He stated 140 to 150 top number. And in the evening he has noticed it to be a little lower he stated that he takes the medication in the morning.

## 2025-05-01 NOTE — PROGRESS NOTES
Jordy Moe (:  1945) is a Established patient, here for evaluation of the following:    Subjective   HPI:    Chief Complaint   Patient presents with    Leg Swelling     See message below:    Patient called and stated that he has noticed feet and ankle swelling the last couple days. He stated that it started in the left ankle/foot but now both are.   He has noticed on the left ankle he has some red splotch/spots.      He stated that he is on amlodipine and wonders if it is from the medication.   He stated he has been checking his blood pressure once in the morning and then evening.   He stated it is back down to where it was before he started the medication.  He stated 140 to 150 top number. And in the evening he has noticed it to be a little lower he stated that he takes the medication in the morning.     Patient Active Problem List   Diagnosis    Hypertension    TIA (transient ischemic attack)    GERD (gastroesophageal reflux disease)    Medication monitoring encounter    SI (sacroiliac) joint inflammation ([facility]), left    Dvt femoral (deep venous thrombosis) ([facility])    Chronic low back pain    Lung mass    Abnormal CT scan, chest, pulmonary nodule.    PSA elevation, bph elevation    Anticoagulated on Coumadin    Sciatica of left side associated with disorder of lumbar spine    Lumbar disc disease with radiculopathy    Lumbar spinal stenosis    Other spondylosis with radiculopathy, lumbar region    DDD (degenerative disc disease), lumbar    RAMAN (acute kidney injury)    CKD (chronic kidney disease) stage 3, GFR 30-59 ml/min ([facility])    Pleural effusion, left    Nocturnal leg cramps    Bilateral carpal tunnel syndrome    Prostate cancer ([facility]), [place] score 6.    History of CVA (cerebrovascular accident)    History of pulmonary embolism    CKD (chronic kidney disease), stage IV ([facility])    COVID-19    Paresthesia of hand, bilateral    Hypercoagulable state     Past Surgical History:   Procedure Laterality Date

## 2025-05-28 ENCOUNTER — LAB (OUTPATIENT)
Dept: LAB | Age: 80
End: 2025-05-28

## 2025-05-28 DIAGNOSIS — N18.32 STAGE 3B CHRONIC KIDNEY DISEASE (HCC): ICD-10-CM

## 2025-05-28 DIAGNOSIS — E55.9 VITAMIN D DEFICIENCY: ICD-10-CM

## 2025-05-28 LAB
25(OH)D3 SERPL-MCNC: 51 NG/ML (ref 30–100)
ANION GAP SERPL CALC-SCNC: 10 MEQ/L (ref 8–16)
BUN SERPL-MCNC: 27 MG/DL (ref 8–23)
CALCIUM SERPL-MCNC: 9.5 MG/DL (ref 8.8–10.2)
CHLORIDE SERPL-SCNC: 103 MEQ/L (ref 98–111)
CO2 SERPL-SCNC: 27 MEQ/L (ref 22–29)
CREAT SERPL-MCNC: 1.8 MG/DL (ref 0.7–1.2)
GFR SERPL CREATININE-BSD FRML MDRD: 37 ML/MIN/1.73M2
GLUCOSE SERPL-MCNC: 97 MG/DL (ref 74–109)
POTASSIUM SERPL-SCNC: 4.4 MEQ/L (ref 3.5–5.2)
SODIUM SERPL-SCNC: 140 MEQ/L (ref 135–145)

## 2025-06-05 ENCOUNTER — OFFICE VISIT (OUTPATIENT)
Dept: NEPHROLOGY | Age: 80
End: 2025-06-05
Payer: MEDICARE

## 2025-06-05 VITALS
DIASTOLIC BLOOD PRESSURE: 78 MMHG | SYSTOLIC BLOOD PRESSURE: 152 MMHG | BODY MASS INDEX: 24.49 KG/M2 | OXYGEN SATURATION: 95 % | HEIGHT: 66 IN | WEIGHT: 152.4 LBS | HEART RATE: 78 BPM

## 2025-06-05 DIAGNOSIS — N31.2 ATONIC BLADDER: ICD-10-CM

## 2025-06-05 DIAGNOSIS — N18.32 STAGE 3B CHRONIC KIDNEY DISEASE (HCC): Primary | ICD-10-CM

## 2025-06-05 DIAGNOSIS — C61 PROSTATE CANCER (HCC): ICD-10-CM

## 2025-06-05 DIAGNOSIS — I10 PRIMARY HYPERTENSION: ICD-10-CM

## 2025-06-05 PROCEDURE — G8427 DOCREV CUR MEDS BY ELIG CLIN: HCPCS | Performed by: INTERNAL MEDICINE

## 2025-06-05 PROCEDURE — 1123F ACP DISCUSS/DSCN MKR DOCD: CPT | Performed by: INTERNAL MEDICINE

## 2025-06-05 PROCEDURE — 1036F TOBACCO NON-USER: CPT | Performed by: INTERNAL MEDICINE

## 2025-06-05 PROCEDURE — 3078F DIAST BP <80 MM HG: CPT | Performed by: INTERNAL MEDICINE

## 2025-06-05 PROCEDURE — 1159F MED LIST DOCD IN RCRD: CPT | Performed by: INTERNAL MEDICINE

## 2025-06-05 PROCEDURE — G8420 CALC BMI NORM PARAMETERS: HCPCS | Performed by: INTERNAL MEDICINE

## 2025-06-05 PROCEDURE — 3077F SYST BP >= 140 MM HG: CPT | Performed by: INTERNAL MEDICINE

## 2025-06-05 PROCEDURE — 99214 OFFICE O/P EST MOD 30 MIN: CPT | Performed by: INTERNAL MEDICINE

## 2025-06-05 NOTE — PROGRESS NOTES
Renal Progress Note    Assessment and Plan:      Diagnosis Orders   1. Stage 3b chronic kidney disease (HCC)  Basic Metabolic Panel    Vitamin D 25 Hydroxy      2. Atonic bladder        3. Primary hypertension        4. Prostate cancer (HCC)                  PLAN:  Serum creatinine is mostly stable at 1.8 mg/dL.  He has ranged between 1.4 mg/dL up to  2.2 mg/dL since 2015.  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  Hypertension is stable.  The blood pressure is normal for his age.  I discussed that with him.  Blood pressure also reviewed.  They are acceptable for the most part.  Adenocarcinoma of the prostate gland is managed by another provider.  Medications reviewed  No changes  Return visit in 6 months with labs          Patient Active Problem List   Diagnosis    Hypertension    TIA (transient ischemic attack)    GERD (gastroesophageal reflux disease)    Medication monitoring encounter    SI (sacroiliac) joint inflammation (HCC), left    Dvt femoral (deep venous thrombosis) ([facility])    Chronic low back pain    Lung mass    Abnormal CT scan, chest, pulmonary nodule.    PSA elevation, bph elevation    Anticoagulated on Coumadin    Sciatica of left side associated with disorder of lumbar spine    Lumbar disc disease with radiculopathy    Lumbar spinal stenosis    Other spondylosis with radiculopathy, lumbar region    DDD (degenerative disc disease), lumbar    RAMAN (acute kidney injury)    CKD (chronic kidney disease) stage 3, GFR 30-59 ml/min ([facility])    Pleural effusion, left    Nocturnal leg cramps    Bilateral carpal tunnel syndrome    Prostate cancer (HCC), [place] score 6.    History of CVA (cerebrovascular accident)    History of pulmonary embolism    CKD (chronic kidney disease), stage IV (HCC)    COVID-19    Paresthesia of hand, bilateral    Hypercoagulable state           Subjective:   Chief complaint:  Chief Complaint   Patient presents with    Follow-up     FU 6 months for CKD 3b      HPI:This is a follow up

## 2025-06-05 NOTE — PROGRESS NOTES
Patient brought his home BP cuff today. He is concerned that it may not be reading correct.  Patients home BP- ARM cuff- 139/89 P 84  Manual cuff today in office-152/78  He was at ED in February for elevated BP.   Patient states Dr. June started Amlodipine 5 mg after the ED visit. He raised it to 10 mg daily. He began having leg swelling so Dr. June reduced it back to 5 mg. Patient says he stopped it himself approximately 2-3 weeks ago until he sees Dr. Mcdowell to get his opinion.

## 2025-06-09 ENCOUNTER — LAB (OUTPATIENT)
Dept: LAB | Age: 80
End: 2025-06-09

## 2025-06-09 DIAGNOSIS — M25.561 CHRONIC PAIN OF RIGHT KNEE: ICD-10-CM

## 2025-06-09 DIAGNOSIS — G89.29 CHRONIC PAIN OF RIGHT KNEE: ICD-10-CM

## 2025-06-09 DIAGNOSIS — I82.512 CHRONIC DEEP VEIN THROMBOSIS (DVT) OF FEMORAL VEIN OF LEFT LOWER EXTREMITY (HCC): ICD-10-CM

## 2025-06-09 DIAGNOSIS — S51.012A ELBOW LACERATION, LEFT, INITIAL ENCOUNTER: ICD-10-CM

## 2025-06-09 DIAGNOSIS — G89.29 CHRONIC PAIN OF LEFT KNEE: ICD-10-CM

## 2025-06-09 DIAGNOSIS — M25.461 KNEE EFFUSION, RIGHT: ICD-10-CM

## 2025-06-09 DIAGNOSIS — M25.561 ACUTE PAIN OF RIGHT KNEE: ICD-10-CM

## 2025-06-09 DIAGNOSIS — S53.005A DISLOCATION OF LEFT RADIAL HEAD, INITIAL ENCOUNTER: ICD-10-CM

## 2025-06-09 DIAGNOSIS — Z71.89 ENCOUNTER FOR HERB AND VITAMIN SUPPLEMENT MANAGEMENT: ICD-10-CM

## 2025-06-09 DIAGNOSIS — M25.562 CHRONIC PAIN OF LEFT KNEE: ICD-10-CM

## 2025-06-09 DIAGNOSIS — E78.89 LIPIDS ABNORMAL: ICD-10-CM

## 2025-06-09 DIAGNOSIS — M65.90 SYNOVITIS: ICD-10-CM

## 2025-06-09 DIAGNOSIS — R79.89 ELEVATED TROPONIN: ICD-10-CM

## 2025-06-09 DIAGNOSIS — I10 ESSENTIAL HYPERTENSION: ICD-10-CM

## 2025-06-09 DIAGNOSIS — C61 PROSTATE CANCER (HCC): ICD-10-CM

## 2025-06-09 DIAGNOSIS — R76.8 ELEVATED ANTI-TISSUE TRANSGLUTAMINASE (TTG) IGA LEVEL: ICD-10-CM

## 2025-06-09 DIAGNOSIS — N18.4 CKD (CHRONIC KIDNEY DISEASE), STAGE IV (HCC): ICD-10-CM

## 2025-06-09 DIAGNOSIS — I10 HYPERTENSION, UNSPECIFIED TYPE: ICD-10-CM

## 2025-06-09 DIAGNOSIS — F41.9 ANXIETY: ICD-10-CM

## 2025-06-09 DIAGNOSIS — E16.2 LOW GLUCOSE LEVEL: ICD-10-CM

## 2025-06-09 DIAGNOSIS — R35.1 NOCTURIA: ICD-10-CM

## 2025-06-09 DIAGNOSIS — K90.89 OTHER INTESTINAL MALABSORPTION: ICD-10-CM

## 2025-06-09 DIAGNOSIS — S50.02XA CONTUSION OF LEFT ELBOW, INITIAL ENCOUNTER: ICD-10-CM

## 2025-06-09 DIAGNOSIS — F43.0 ACUTE STRESS REACTION: ICD-10-CM

## 2025-06-09 LAB — PSA SERPL-MCNC: 13.4 NG/ML (ref 0–1)

## 2025-06-10 ENCOUNTER — RESULTS FOLLOW-UP (OUTPATIENT)
Age: 80
End: 2025-06-10

## 2025-06-10 ENCOUNTER — TELEPHONE (OUTPATIENT)
Dept: UROLOGY | Age: 80
End: 2025-06-10

## 2025-06-10 NOTE — TELEPHONE ENCOUNTER
Patient just had a PSA completed. Do you still want the PET scan scheduled 06/12/2026?    He had a PET scan 2 years ago.

## 2025-06-10 NOTE — TELEPHONE ENCOUNTER
Andre recommended by Beto Vásquez PA-C at the last appt    The patient should go to the ED should they develop fever, chills, nausea, vomiting, significant gross hematuria, chest pain, SOB, calf pain, feelings of incomplete emptying, or should they otherwise feel they need evaluated.

## 2025-06-12 ENCOUNTER — HOSPITAL ENCOUNTER (OUTPATIENT)
Dept: PET IMAGING | Age: 80
Discharge: HOME | End: 2025-06-12
Payer: MEDICARE

## 2025-06-12 DIAGNOSIS — C61 PROSTATE CANCER (HCC): ICD-10-CM

## 2025-06-12 PROCEDURE — 78815 PET IMAGE W/CT SKULL-THIGH: CPT

## 2025-06-25 ENCOUNTER — OFFICE VISIT (OUTPATIENT)
Dept: UROLOGY | Age: 80
End: 2025-06-25
Payer: MEDICARE

## 2025-06-25 VITALS — HEART RATE: 71 BPM | WEIGHT: 152 LBS | BODY MASS INDEX: 24.43 KG/M2 | OXYGEN SATURATION: 96 % | HEIGHT: 66 IN

## 2025-06-25 DIAGNOSIS — C61 PROSTATE CANCER (HCC): Primary | ICD-10-CM

## 2025-06-25 DIAGNOSIS — N32.81 OAB (OVERACTIVE BLADDER): ICD-10-CM

## 2025-06-25 DIAGNOSIS — N40.1 BENIGN LOCALIZED PROSTATIC HYPERPLASIA WITH LOWER URINARY TRACT SYMPTOMS (LUTS): ICD-10-CM

## 2025-06-25 DIAGNOSIS — R97.20 RISING PSA LEVEL: ICD-10-CM

## 2025-06-25 PROCEDURE — 1036F TOBACCO NON-USER: CPT

## 2025-06-25 PROCEDURE — 1123F ACP DISCUSS/DSCN MKR DOCD: CPT

## 2025-06-25 PROCEDURE — G8427 DOCREV CUR MEDS BY ELIG CLIN: HCPCS

## 2025-06-25 PROCEDURE — G8420 CALC BMI NORM PARAMETERS: HCPCS

## 2025-06-25 PROCEDURE — 1159F MED LIST DOCD IN RCRD: CPT

## 2025-06-25 PROCEDURE — 99214 OFFICE O/P EST MOD 30 MIN: CPT

## 2025-06-25 NOTE — PROGRESS NOTES
[facility] SPECIALTY  [facility] UROLOGY  [address]  [address]  [facility] [address]  Dept: [phone]  Loc: [phone]  Visit Date: 6/25/2025      MELVINA Moe is a 79 y.o. male that presents to the urology clinic for prostate cancer follow-up.    Prostate Cancer  Initially diagnosed with [place] 3+3=6, Grade Group 1 prostate cancer in 2019 by Dr. Collins and was undergoing active surveillance, however- during COVID patient was lost to follow-up. Now re-established and has undergone confirmatory biopsy (January 2024) and has been upstaged to [place] 3+4=7, Grade Group 2 prostate cancer. Low Risk Decipher score: 0.23.    Acute rise in PSA from 12.98 to 17.50 between September 2024 and April 2025.     LUTS  + Urgency and frequency. Minimal relief with Gemtesa or Myrbetriq. Patient does not like to take pills, however.   Nocturia x 2-3.    IPSS of 10/2. Stable vs last visit.      PSA Trend  13.40   (06/09/25)  17.50   (04/10/25)  12.98   (09/24/24)  13.60   (04/15/24)  12.01   (03/07/23)  11.76   (09/13.22)  12.78   (01/13/22)  13.49   (09/24/21)  11.53   (03/23/21)  10.04   (11/23/20)      I independently reviewed images and their accompanying reports from the following studies:    PSMA PET/CT SKULL BASE TO MID THIGH  Result Date: 06/12/25    FINDINGS:  Head and neck: Physiologic activity is present in the lacrimal, parotid and  submandibular glands. A poorly visualized nodular density measuring 1.1 cm in or  adjacent to the left tongue has a maximum SUV of 22.5 (image 106). A 1.1 cm soft  tissue nodule adjacent to the left maxilla has a maximum SUV of 12.9 (image 91).     Chest: Cardiac size is normal. Atherosclerotic calcifications are present in the  thoracic aorta and coronary arteries without evidence of aneurysm. There is no  pleural or pericardial effusion. There are calcified granulomas in the right  lung. There is no PSMA avid mediastinal, hilar or axillary

## 2025-07-15 ENCOUNTER — LAB (OUTPATIENT)
Dept: LAB | Age: 80
End: 2025-07-15

## 2025-07-15 DIAGNOSIS — M25.562 CHRONIC PAIN OF LEFT KNEE: ICD-10-CM

## 2025-07-15 DIAGNOSIS — M65.90 SYNOVITIS: ICD-10-CM

## 2025-07-15 DIAGNOSIS — N18.4 CKD (CHRONIC KIDNEY DISEASE), STAGE IV (HCC): ICD-10-CM

## 2025-07-15 DIAGNOSIS — M25.561 ACUTE PAIN OF RIGHT KNEE: ICD-10-CM

## 2025-07-15 DIAGNOSIS — F43.0 ACUTE STRESS REACTION: ICD-10-CM

## 2025-07-15 DIAGNOSIS — C61 PROSTATE CANCER (HCC): ICD-10-CM

## 2025-07-15 DIAGNOSIS — M25.461 KNEE EFFUSION, RIGHT: ICD-10-CM

## 2025-07-15 DIAGNOSIS — E16.2 LOW GLUCOSE LEVEL: ICD-10-CM

## 2025-07-15 DIAGNOSIS — R76.8 ELEVATED ANTI-TISSUE TRANSGLUTAMINASE (TTG) IGA LEVEL: ICD-10-CM

## 2025-07-15 DIAGNOSIS — I10 ESSENTIAL HYPERTENSION: ICD-10-CM

## 2025-07-15 DIAGNOSIS — S50.02XA CONTUSION OF LEFT ELBOW, INITIAL ENCOUNTER: ICD-10-CM

## 2025-07-15 DIAGNOSIS — E78.89 LIPIDS ABNORMAL: ICD-10-CM

## 2025-07-15 DIAGNOSIS — R79.89 ELEVATED TROPONIN: ICD-10-CM

## 2025-07-15 DIAGNOSIS — M25.561 CHRONIC PAIN OF RIGHT KNEE: ICD-10-CM

## 2025-07-15 DIAGNOSIS — K90.89 OTHER INTESTINAL MALABSORPTION: ICD-10-CM

## 2025-07-15 DIAGNOSIS — S53.005A DISLOCATION OF LEFT RADIAL HEAD, INITIAL ENCOUNTER: ICD-10-CM

## 2025-07-15 DIAGNOSIS — I10 HYPERTENSION, UNSPECIFIED TYPE: ICD-10-CM

## 2025-07-15 DIAGNOSIS — R35.1 NOCTURIA: ICD-10-CM

## 2025-07-15 DIAGNOSIS — Z71.89 ENCOUNTER FOR HERB AND VITAMIN SUPPLEMENT MANAGEMENT: ICD-10-CM

## 2025-07-15 DIAGNOSIS — I82.512 CHRONIC DEEP VEIN THROMBOSIS (DVT) OF FEMORAL VEIN OF LEFT LOWER EXTREMITY (HCC): ICD-10-CM

## 2025-07-15 DIAGNOSIS — G89.29 CHRONIC PAIN OF LEFT KNEE: ICD-10-CM

## 2025-07-15 DIAGNOSIS — S51.012A ELBOW LACERATION, LEFT, INITIAL ENCOUNTER: ICD-10-CM

## 2025-07-15 DIAGNOSIS — F41.9 ANXIETY: ICD-10-CM

## 2025-07-15 DIAGNOSIS — G89.29 CHRONIC PAIN OF RIGHT KNEE: ICD-10-CM

## 2025-07-15 LAB
25(OH)D3 SERPL-MCNC: 52 NG/ML (ref 30–100)
BASOPHILS ABSOLUTE: 0 THOU/MM3 (ref 0–0.1)
BASOPHILS NFR BLD AUTO: 0.6 %
C-REACTIVE PROTEIN, HIGH SENSITIVITY: 3.7 MG/L (ref 0–3)
CALCIUM SERPL-MCNC: 9.8 MG/DL (ref 8.8–10.2)
DEPRECATED MEAN GLUCOSE BLD GHB EST-ACNC: 111 MG/DL (ref 70–126)
DEPRECATED RDW RBC AUTO: 43.1 FL (ref 35–45)
DHEA-S SERPL-MCNC: 164 UG/DL (ref 16.2–123)
EOSINOPHIL NFR BLD AUTO: 0.8 %
EOSINOPHILS ABSOLUTE: 0.1 THOU/MM3 (ref 0–0.4)
ERYTHROCYTE [DISTWIDTH] IN BLOOD BY AUTOMATED COUNT: 12.2 % (ref 11.5–14.5)
ERYTHROCYTE [SEDIMENTATION RATE] IN BLOOD BY WESTERGREN METHOD: 16 MM/HR (ref 0–20)
HBA1C MFR BLD HPLC: 5.7 % (ref 4–6)
HCT VFR BLD AUTO: 44.2 % (ref 42–52)
HGB BLD-MCNC: 14.6 GM/DL (ref 14–18)
HOMOCYSTEINE: 13.3 UMOL/L (ref 0–15)
IMM GRANULOCYTES # BLD AUTO: 0 THOU/MM3 (ref 0–0.07)
IMM GRANULOCYTES NFR BLD AUTO: 0 %
IRON SATN MFR SERPL: 27 % (ref 20–50)
IRON SERPL-MCNC: 67 UG/DL (ref 61–157)
LYMPHOCYTES ABSOLUTE: 1.6 THOU/MM3 (ref 1–4.8)
LYMPHOCYTES NFR BLD AUTO: 19.6 %
MAGNESIUM SERPL-MCNC: 2.1 MG/DL (ref 1.6–2.6)
MCH RBC QN AUTO: 31.9 PG (ref 26–33)
MCHC RBC AUTO-ENTMCNC: 33 GM/DL (ref 32.2–35.5)
MCV RBC AUTO: 96.5 FL (ref 80–94)
MONOCYTES ABSOLUTE: 0.6 THOU/MM3 (ref 0.4–1.3)
MONOCYTES NFR BLD AUTO: 7.9 %
NEUTROPHILS ABSOLUTE: 5.7 THOU/MM3 (ref 1.8–7.7)
NEUTROPHILS NFR BLD AUTO: 71.1 %
NRBC BLD AUTO-RTO: 0 /100 WBC
PLATELET # BLD AUTO: 270 THOU/MM3 (ref 130–400)
PMV BLD AUTO: 9.8 FL (ref 9.4–12.4)
PTH-INTACT SERPL-MCNC: 43 PG/ML (ref 15–65)
RBC # BLD AUTO: 4.58 MILL/MM3 (ref 4.7–6.1)
SHBG SERPL-SCNC: 65 NMOL/L (ref 19–76)
TESTOST FREE MFR SERPL: 47.6 PG/ML (ref 47–244)
TESTOST SERPL-MCNC: 377 NG/DL (ref 193–740)
TIBC SERPL-MCNC: 252 UG/DL (ref 171–450)
WBC # BLD AUTO: 8 THOU/MM3 (ref 4.8–10.8)

## 2025-07-16 LAB
THYROPEROXIDASE AB SERPL IA-ACNC: < 4 IU/ML (ref 0–25)
TTG IGA SER IA-ACNC: 0.9 U/ML
TTG IGG SER IA-ACNC: < 0.6 U/ML

## 2025-07-21 LAB — DHEA SERPL-MCNC: 1.02 NG/ML (ref 0.63–4.7)

## 2025-07-23 NOTE — PROGRESS NOTES
[facility] PROFESSIONAL SERVS  [facility] MEDICINE  [address] [place] [address]  [address]  [facility] [address]  Dept: [phone]  Dept Fax: [phone]  Loc: [phone]      Jordy Moe is a 79 y.o. White male. Jordy  presents to the [facility] Medicine today for   Chief Complaint   Patient presents with    Discuss Labs    Sleep Problem     Restless leg getting worse lately, mostly when going to bed  Several years Nocturia 3 times per night, used to be a couple times a night  Several month Tingling in left foot at night, not during day    Back Pain     30 yrs    Anxiety    Hypertension    strength     Lost strength in last 8-10 years  Lost muscle mass the mil time    Chronic Kidney Disease     Stage 3    Skin Problem     Skin cancers 3 types, last most aggesssive    Elevated PSA     Prostate cancer 5 yrs ago    Dizziness     1 year gets a little nausea,   , and;   1. RAMAN (acute kidney injury)    2. Abnormal CT scan, chest, pulmonary nodule.    3. Anticoagulated on Coumadin    4. Bilateral carpal tunnel syndrome    5. Chronic left-sided low back pain with right-sided sciatica    6. Stage 3b chronic kidney disease (HCC)    7. CKD (chronic kidney disease), stage IV (HCC)    8. COVID-19    9. Degeneration of intervertebral disc of lumbar region, unspecified whether pain present    10. Deep vein thrombosis (DVT) of femoral vein, unspecified chronicity, unspecified laterality (HCC)    11. Gastroesophageal reflux disease, unspecified whether esophagitis present    12. History of CVA (cerebrovascular accident)    13. History of pulmonary embolism    14. Hypercoagulable state    15. Resistant hypertension    16. Lumbar disc disease with radiculopathy    17. Lung mass    18. Medication monitoring encounter    19. Spinal stenosis of lumbar region with neurogenic claudication    20. Nocturnal leg cramps    21. Other spondylosis with radiculopathy, lumbar region    22. Paresthesia of hand,

## 2025-07-24 ENCOUNTER — OFFICE VISIT (OUTPATIENT)
Age: 80
End: 2025-07-24
Payer: MEDICARE

## 2025-07-24 VITALS
BODY MASS INDEX: 24.23 KG/M2 | DIASTOLIC BLOOD PRESSURE: 85 MMHG | HEIGHT: 66 IN | RESPIRATION RATE: 12 BRPM | TEMPERATURE: 98.4 F | SYSTOLIC BLOOD PRESSURE: 158 MMHG | HEART RATE: 77 BPM | WEIGHT: 150.8 LBS | OXYGEN SATURATION: 97 %

## 2025-07-24 DIAGNOSIS — M51.369 DEGENERATION OF INTERVERTEBRAL DISC OF LUMBAR REGION, UNSPECIFIED WHETHER PAIN PRESENT: ICD-10-CM

## 2025-07-24 DIAGNOSIS — G56.03 BILATERAL CARPAL TUNNEL SYNDROME: ICD-10-CM

## 2025-07-24 DIAGNOSIS — M48.062 SPINAL STENOSIS OF LUMBAR REGION WITH NEUROGENIC CLAUDICATION: ICD-10-CM

## 2025-07-24 DIAGNOSIS — M46.1 SI (SACROILIAC) JOINT INFLAMMATION: ICD-10-CM

## 2025-07-24 DIAGNOSIS — K21.9 GASTROESOPHAGEAL REFLUX DISEASE, UNSPECIFIED WHETHER ESOPHAGITIS PRESENT: ICD-10-CM

## 2025-07-24 DIAGNOSIS — N17.9 AKI (ACUTE KIDNEY INJURY): Primary | ICD-10-CM

## 2025-07-24 DIAGNOSIS — N18.4 CKD (CHRONIC KIDNEY DISEASE), STAGE IV (HCC): ICD-10-CM

## 2025-07-24 DIAGNOSIS — G45.9 TIA (TRANSIENT ISCHEMIC ATTACK): ICD-10-CM

## 2025-07-24 DIAGNOSIS — R20.2 PARESTHESIA OF HAND, BILATERAL: ICD-10-CM

## 2025-07-24 DIAGNOSIS — R93.89 ABNORMAL CT SCAN, CHEST: ICD-10-CM

## 2025-07-24 DIAGNOSIS — N18.32 STAGE 3B CHRONIC KIDNEY DISEASE (HCC): ICD-10-CM

## 2025-07-24 DIAGNOSIS — I82.419 DEEP VEIN THROMBOSIS (DVT) OF FEMORAL VEIN, UNSPECIFIED CHRONICITY, UNSPECIFIED LATERALITY (HCC): ICD-10-CM

## 2025-07-24 DIAGNOSIS — Z51.81 MEDICATION MONITORING ENCOUNTER: ICD-10-CM

## 2025-07-24 DIAGNOSIS — Z86.73 HISTORY OF CVA (CEREBROVASCULAR ACCIDENT): ICD-10-CM

## 2025-07-24 DIAGNOSIS — M53.86 SCIATICA OF LEFT SIDE ASSOCIATED WITH DISORDER OF LUMBAR SPINE: ICD-10-CM

## 2025-07-24 DIAGNOSIS — Z79.01 ANTICOAGULATED ON COUMADIN: ICD-10-CM

## 2025-07-24 DIAGNOSIS — M47.26 OTHER SPONDYLOSIS WITH RADICULOPATHY, LUMBAR REGION: ICD-10-CM

## 2025-07-24 DIAGNOSIS — J90 PLEURAL EFFUSION, LEFT: ICD-10-CM

## 2025-07-24 DIAGNOSIS — M54.41 CHRONIC LEFT-SIDED LOW BACK PAIN WITH RIGHT-SIDED SCIATICA: ICD-10-CM

## 2025-07-24 DIAGNOSIS — I1A.0 RESISTANT HYPERTENSION: ICD-10-CM

## 2025-07-24 DIAGNOSIS — Z86.711 HISTORY OF PULMONARY EMBOLISM: ICD-10-CM

## 2025-07-24 DIAGNOSIS — R73.9 ELEVATED SERUM GLUCOSE: ICD-10-CM

## 2025-07-24 DIAGNOSIS — R97.20 PSA ELEVATION: ICD-10-CM

## 2025-07-24 DIAGNOSIS — U07.1 COVID-19: ICD-10-CM

## 2025-07-24 DIAGNOSIS — G47.62 NOCTURNAL LEG CRAMPS: ICD-10-CM

## 2025-07-24 DIAGNOSIS — D68.59 HYPERCOAGULABLE STATE: ICD-10-CM

## 2025-07-24 DIAGNOSIS — R91.8 LUNG MASS: ICD-10-CM

## 2025-07-24 DIAGNOSIS — M51.16 LUMBAR DISC DISEASE WITH RADICULOPATHY: ICD-10-CM

## 2025-07-24 DIAGNOSIS — G89.29 CHRONIC LEFT-SIDED LOW BACK PAIN WITH RIGHT-SIDED SCIATICA: ICD-10-CM

## 2025-07-24 DIAGNOSIS — C61 PROSTATE CANCER (HCC): ICD-10-CM

## 2025-07-24 PROCEDURE — 3077F SYST BP >= 140 MM HG: CPT | Performed by: FAMILY MEDICINE

## 2025-07-24 PROCEDURE — 1036F TOBACCO NON-USER: CPT | Performed by: FAMILY MEDICINE

## 2025-07-24 PROCEDURE — G8427 DOCREV CUR MEDS BY ELIG CLIN: HCPCS | Performed by: FAMILY MEDICINE

## 2025-07-24 PROCEDURE — 1123F ACP DISCUSS/DSCN MKR DOCD: CPT | Performed by: FAMILY MEDICINE

## 2025-07-24 PROCEDURE — G8420 CALC BMI NORM PARAMETERS: HCPCS | Performed by: FAMILY MEDICINE

## 2025-07-24 PROCEDURE — 1159F MED LIST DOCD IN RCRD: CPT | Performed by: FAMILY MEDICINE

## 2025-07-24 PROCEDURE — 99215 OFFICE O/P EST HI 40 MIN: CPT | Performed by: FAMILY MEDICINE

## 2025-07-24 PROCEDURE — 3079F DIAST BP 80-89 MM HG: CPT | Performed by: FAMILY MEDICINE

## 2025-07-24 ASSESSMENT — PATIENT HEALTH QUESTIONNAIRE - PHQ9
SUM OF ALL RESPONSES TO PHQ QUESTIONS 1-9: 0
1. LITTLE INTEREST OR PLEASURE IN DOING THINGS: NOT AT ALL
2. FEELING DOWN, DEPRESSED OR HOPELESS: NOT AT ALL

## 2025-08-05 ENCOUNTER — TELEPHONE (OUTPATIENT)
Dept: FAMILY MEDICINE CLINIC | Age: 80
End: 2025-08-05

## 2025-08-05 DIAGNOSIS — G89.29 CHRONIC BILATERAL LOW BACK PAIN WITH LEFT-SIDED SCIATICA: Primary | ICD-10-CM

## 2025-08-05 DIAGNOSIS — M51.16 LUMBAR DISC DISEASE WITH RADICULOPATHY: ICD-10-CM

## 2025-08-05 DIAGNOSIS — Z86.73 HISTORY OF CVA (CEREBROVASCULAR ACCIDENT): ICD-10-CM

## 2025-08-05 DIAGNOSIS — M54.42 CHRONIC BILATERAL LOW BACK PAIN WITH LEFT-SIDED SCIATICA: Primary | ICD-10-CM

## 2025-09-03 ENCOUNTER — OFFICE VISIT (OUTPATIENT)
Dept: FAMILY MEDICINE CLINIC | Age: 80
End: 2025-09-03
Payer: MEDICARE

## 2025-09-03 VITALS
WEIGHT: 152.4 LBS | RESPIRATION RATE: 16 BRPM | DIASTOLIC BLOOD PRESSURE: 70 MMHG | BODY MASS INDEX: 24.61 KG/M2 | SYSTOLIC BLOOD PRESSURE: 138 MMHG | HEART RATE: 84 BPM

## 2025-09-03 DIAGNOSIS — Z86.73 HISTORY OF CVA (CEREBROVASCULAR ACCIDENT): ICD-10-CM

## 2025-09-03 DIAGNOSIS — I10 HYPERTENSION, UNSPECIFIED TYPE: Primary | ICD-10-CM

## 2025-09-03 DIAGNOSIS — K21.9 GASTROESOPHAGEAL REFLUX DISEASE, UNSPECIFIED WHETHER ESOPHAGITIS PRESENT: ICD-10-CM

## 2025-09-03 DIAGNOSIS — N18.30 STAGE 3 CHRONIC KIDNEY DISEASE, UNSPECIFIED WHETHER STAGE 3A OR 3B CKD (HCC): ICD-10-CM

## 2025-09-03 DIAGNOSIS — D68.59 HYPERCOAGULABLE STATE: ICD-10-CM

## 2025-09-03 DIAGNOSIS — C61 PROSTATE CANCER (HCC): ICD-10-CM

## 2025-09-03 PROCEDURE — 1159F MED LIST DOCD IN RCRD: CPT | Performed by: FAMILY MEDICINE

## 2025-09-03 PROCEDURE — 1123F ACP DISCUSS/DSCN MKR DOCD: CPT | Performed by: FAMILY MEDICINE

## 2025-09-03 PROCEDURE — G8427 DOCREV CUR MEDS BY ELIG CLIN: HCPCS | Performed by: FAMILY MEDICINE

## 2025-09-03 PROCEDURE — G8420 CALC BMI NORM PARAMETERS: HCPCS | Performed by: FAMILY MEDICINE

## 2025-09-03 PROCEDURE — 1036F TOBACCO NON-USER: CPT | Performed by: FAMILY MEDICINE

## 2025-09-03 PROCEDURE — 99214 OFFICE O/P EST MOD 30 MIN: CPT | Performed by: FAMILY MEDICINE

## 2025-09-03 PROCEDURE — 3075F SYST BP GE 130 - 139MM HG: CPT | Performed by: FAMILY MEDICINE

## 2025-09-03 PROCEDURE — 3078F DIAST BP <80 MM HG: CPT | Performed by: FAMILY MEDICINE

## 2025-09-03 PROCEDURE — G2211 COMPLEX E/M VISIT ADD ON: HCPCS | Performed by: FAMILY MEDICINE

## 2025-09-03 SDOH — ECONOMIC STABILITY: FOOD INSECURITY: WITHIN THE PAST 12 MONTHS, THE FOOD YOU BOUGHT JUST DIDN'T LAST AND YOU DIDN'T HAVE MONEY TO GET MORE.: NEVER TRUE

## 2025-09-03 SDOH — ECONOMIC STABILITY: FOOD INSECURITY: WITHIN THE PAST 12 MONTHS, YOU WORRIED THAT YOUR FOOD WOULD RUN OUT BEFORE YOU GOT MONEY TO BUY MORE.: NEVER TRUE

## 2025-09-03 ASSESSMENT — ENCOUNTER SYMPTOMS
GASTROINTESTINAL NEGATIVE: 1
RESPIRATORY NEGATIVE: 1

## (undated) DEVICE — SPONGE GZ W4XL4IN COT 12 PLY TYP VII WVN C FLD DSGN

## (undated) DEVICE — PACK PROCEDURE SURG PLAS SC MIN SRHP LF

## (undated) DEVICE — DERMATOME BLADES: Brand: DERMATOME

## (undated) DEVICE — PAD,ABDOMINAL,5"X9",ST,LF,25/BX: Brand: MEDLINE INDUSTRIES, INC.

## (undated) DEVICE — DRESSING,GAUZE,XEROFORM,CURAD,5"X9",ST: Brand: CURAD

## (undated) DEVICE — GLOVE SURG SZ 8 L11.77IN FNGR THK9.8MIL STRW LTX POLYMER

## (undated) DEVICE — BANDAGE GZ W45INXL4 1 10YD FLUF RL 6 PLY DERMACEA

## (undated) DEVICE — BANDAGE,GAUZE,4.5"X4.1YD,STERILE,LF: Brand: MEDLINE

## (undated) DEVICE — GLOVE ORANGE PI 7   MSG9070

## (undated) DEVICE — GLOVE ORANGE PI 7 1/2   MSG9075

## (undated) DEVICE — BANDAGE COMPR M W4INXL10YD WHT BGE VELC E MTRX HK AND LOOP

## (undated) DEVICE — Device

## (undated) DEVICE — 3M™ STERI-STRIP™ COMPOUND BENZOIN TINCTURE 40 BAGS/CARTON 4 CARTONS/CASE C1544: Brand: 3M™ STERI-STRIP™

## (undated) DEVICE — STERILE COTTON BALLS LARGE 5/P: Brand: MEDLINE

## (undated) DEVICE — BANDAGE COMPR W4INXL55YD 1 LAYR COT CLSR DLX E HVY DUTY W

## (undated) DEVICE — GLOVE SURG SZ 65 THK91MIL LTX FREE SYN POLYISOPRENE

## (undated) DEVICE — GAUZE,SPONGE,4"X4",12PLY,STERILE,LF,2'S: Brand: MEDLINE

## (undated) DEVICE — CHLORAPREP 26ML CLEAR

## (undated) DEVICE — APPLICATOR MEDICATED 26 CC SOLUTION CLR STRL CHLORAPREP

## (undated) DEVICE — ZIMMER SKIN GRAFT CARRIER 8 INCH LENGTH: Brand: DERMACARRIERS